# Patient Record
Sex: MALE | Race: BLACK OR AFRICAN AMERICAN | NOT HISPANIC OR LATINO | Employment: OTHER | URBAN - METROPOLITAN AREA
[De-identification: names, ages, dates, MRNs, and addresses within clinical notes are randomized per-mention and may not be internally consistent; named-entity substitution may affect disease eponyms.]

---

## 2018-07-12 ENCOUNTER — OFFICE VISIT (OUTPATIENT)
Dept: NEPHROLOGY | Facility: CLINIC | Age: 58
End: 2018-07-12
Payer: MEDICAID

## 2018-07-12 ENCOUNTER — LAB VISIT (OUTPATIENT)
Dept: LAB | Facility: HOSPITAL | Age: 58
End: 2018-07-12
Payer: MEDICAID

## 2018-07-12 VITALS
HEIGHT: 72 IN | OXYGEN SATURATION: 99 % | DIASTOLIC BLOOD PRESSURE: 60 MMHG | HEART RATE: 79 BPM | SYSTOLIC BLOOD PRESSURE: 110 MMHG | BODY MASS INDEX: 24.81 KG/M2 | WEIGHT: 183.19 LBS

## 2018-07-12 DIAGNOSIS — N18.30 STAGE 3 CHRONIC KIDNEY DISEASE: Primary | ICD-10-CM

## 2018-07-12 DIAGNOSIS — E11.22 TYPE 2 DIABETES MELLITUS WITH STAGE 3 CHRONIC KIDNEY DISEASE, WITHOUT LONG-TERM CURRENT USE OF INSULIN: ICD-10-CM

## 2018-07-12 DIAGNOSIS — N18.30 CHRONIC KIDNEY DISEASE, STAGE III (MODERATE): ICD-10-CM

## 2018-07-12 DIAGNOSIS — N18.30 TYPE 2 DIABETES MELLITUS WITH STAGE 3 CHRONIC KIDNEY DISEASE, WITHOUT LONG-TERM CURRENT USE OF INSULIN: ICD-10-CM

## 2018-07-12 DIAGNOSIS — N18.30 STAGE 3 CHRONIC KIDNEY DISEASE: ICD-10-CM

## 2018-07-12 DIAGNOSIS — D50.9 IRON DEFICIENCY ANEMIA, UNSPECIFIED IRON DEFICIENCY ANEMIA TYPE: ICD-10-CM

## 2018-07-12 LAB
BILIRUB UR QL STRIP: NEGATIVE
CLARITY UR REFRACT.AUTO: CLEAR
COLOR UR AUTO: YELLOW
CREAT UR-MCNC: 111 MG/DL
GLUCOSE UR QL STRIP: ABNORMAL
HGB UR QL STRIP: NEGATIVE
KETONES UR QL STRIP: NEGATIVE
LEUKOCYTE ESTERASE UR QL STRIP: NEGATIVE
NITRITE UR QL STRIP: NEGATIVE
PH UR STRIP: 5 [PH] (ref 5–8)
PROT UR QL STRIP: NEGATIVE
PROT UR-MCNC: 18 MG/DL
PROT/CREAT RATIO, UR: 0.16
SP GR UR STRIP: 1.01 (ref 1–1.03)
URN SPEC COLLECT METH UR: ABNORMAL
UROBILINOGEN UR STRIP-ACNC: NEGATIVE EU/DL

## 2018-07-12 PROCEDURE — 81003 URINALYSIS AUTO W/O SCOPE: CPT

## 2018-07-12 PROCEDURE — 99999 PR PBB SHADOW E&M-EST. PATIENT-LVL III: CPT | Mod: PBBFAC,,, | Performed by: INTERNAL MEDICINE

## 2018-07-12 PROCEDURE — 99204 OFFICE O/P NEW MOD 45 MIN: CPT | Mod: S$PBB,,, | Performed by: INTERNAL MEDICINE

## 2018-07-12 PROCEDURE — 99213 OFFICE O/P EST LOW 20 MIN: CPT | Mod: PBBFAC | Performed by: INTERNAL MEDICINE

## 2018-07-12 PROCEDURE — 84156 ASSAY OF PROTEIN URINE: CPT

## 2018-07-12 RX ORDER — ATORVASTATIN CALCIUM 40 MG/1
TABLET, FILM COATED ORAL
COMMUNITY
End: 2023-01-10

## 2018-07-12 RX ORDER — AMLODIPINE BESYLATE 10 MG/1
TABLET ORAL
COMMUNITY
End: 2020-03-05

## 2018-07-12 RX ORDER — TAMSULOSIN HYDROCHLORIDE 0.4 MG/1
CAPSULE ORAL
COMMUNITY
End: 2023-01-10

## 2018-07-12 RX ORDER — FENOFIBRATE 160 MG/1
TABLET ORAL DAILY
COMMUNITY
End: 2023-01-10

## 2018-07-12 NOTE — PROGRESS NOTES
Subjective:       Patient ID: Hao Medrano is a 57 y.o. Black or  male who presents for new evaluation of Chronic Renal Failure    Presents for nephrology follow up due to abnormal labs, specifically with sCr around 1.9 and identified at a clinic appointment earlier this year, prior to this patient has not had consistent follow.  Other issues identified during the past her was HTN (amlodipine and appears well controlled), hyperlipidemia, hypertriglyceridemia (on statin and fibrate).  He has no urinary complaints, denies NSAIDs, UA from outside labs reveals trace glucose and +1 protein.  A1c within the past several months was at 6.5.  He is not on any medications for diabetes.  He has not had a retinal exam.  He denies neuropathy.    PMH  HTN, hyperlipidemia, hypertriglyceridemia, BPH, leg edema    PSH  Denies    FH  Diabetes, HTN    SH  Non-smoker, no significant alcohol    Review of Systems   Constitutional: Negative for fever and unexpected weight change.   Respiratory: Negative for cough, chest tightness, shortness of breath and wheezing.    Cardiovascular: Positive for leg swelling. Negative for chest pain.   Gastrointestinal: Negative for abdominal distention, abdominal pain, diarrhea and nausea.   Endocrine: Negative for polydipsia.   Genitourinary: Negative for difficulty urinating, dysuria, flank pain, frequency and hematuria.   Musculoskeletal: Negative for arthralgias and myalgias.   Skin: Negative for rash.   Allergic/Immunologic: Negative for immunocompromised state.   Neurological: Negative for dizziness and light-headedness.   Hematological: Negative for adenopathy.   Psychiatric/Behavioral: Negative for confusion.       Objective:      Physical Exam   Constitutional: He is oriented to person, place, and time.   HENT:   Head: Atraumatic.   Neck: No JVD present.   Cardiovascular: Normal rate and regular rhythm.  Exam reveals no friction rub.    Pulmonary/Chest: Effort normal and  breath sounds normal.   Abdominal: Soft. He exhibits no distension.   Musculoskeletal: He exhibits edema.   Neurological: He is alert and oriented to person, place, and time.   Skin: Skin is warm.   Psychiatric: He has a normal mood and affect.       Assessment & Plan:       1. CKD:  Etiology not clear, incidentally noted sCr on follow up clinic appointment earlier this year, does have diabetes, but no reported neuropathy or retinopathy,      Lab Results   Component Value Date    CREATININE 2.3 (H) 07/12/2018     Protein Creatinine Ratios: negative, making DM nephropathy less likely    Prot/Creat Ratio, Ur   Date Value Ref Range Status   07/12/2018 0.16 0.00 - 0.20 Final     ·   ·   Acid-Base: not an issue on today's labs  Lab Results   Component Value Date     07/12/2018    K 4.6 07/12/2018    CO2 27 07/12/2018     2. HTN: Blood pressures well controlled    3. Renal osteodystrophy: last PTH WNL  Lab Results   Component Value Date    PTH 48.0 07/12/2018    CALCIUM 9.8 07/12/2018    PHOS 3.4 07/12/2018       4. Anemia: noted to be anemic, will check iron studies, free light chains and immunofixation electrophoresis  Lab Results   Component Value Date    HGB 10.0 (L) 07/12/2018        5. DM:  Last HbA1C, seems controlled managed by PCP  Lab Results   Component Value Date    HGBA1C 5.9 (H) 07/12/2018       6. Lipid management: managed by PCP  No results found for: LDLCALC    7. ESRD planing: do do not aniticipate at this time    Follow up in 2-3 months with repeats labs and renal ultrasound

## 2018-07-12 NOTE — PATIENT INSTRUCTIONS
1) no NSAIDs  2) blood and urine studies today  3) we will call you to get renal ultrasound done  4) follow up in office in 2-3 months

## 2018-07-12 NOTE — ASSESSMENT & PLAN NOTE
1. CKD: probably stage III, although no labs prior to this year, underlying etiology uncertain, but possibly undiagnosed diabetes and +/- HTN, since blood work started being checked earlier this year, has remained with a stable creatinine      Protein Creatinine Ratios: will need to check    Acid-Base: outside labs ok, will repeat    2. HTN: Blood pressures excellent control in office, continue amlodipine    3. Renal osteodystrophy: vitamin D on the low side per outside labs, start on supplementation, also check PTH    4. Anemia: Hgb 11 per outside labs, colon CA screening as per PCP, will check iron studies    5. DM:  Last HbA1C No results found for: HGBA1C    6. Lipid management: outside labs reviewed, not controlled and was started on statin and fibrate, PCP follows lipid panels      7. ESRD planing: no at this time    Follow up in 2-3 months    Plan:  -CBC  -iron studies  -RFP  -UA and UPC  -PTH and vitamin D  -renal ultrasound

## 2018-08-17 NOTE — PROGRESS NOTES
[facility]    The note from the fellow/resident was reviewed. I have personally interviewed and examined the patient. There were no additional findings with regards to the history or physical exam.    I agree with the assessment and plan of  Dr. Elie Graham

## 2019-02-05 ENCOUNTER — TELEPHONE (OUTPATIENT)
Dept: NEPHROLOGY | Facility: CLINIC | Age: 59
End: 2019-02-05

## 2019-04-02 ENCOUNTER — TELEPHONE (OUTPATIENT)
Dept: NEPHROLOGY | Facility: CLINIC | Age: 59
End: 2019-04-02

## 2019-05-27 DIAGNOSIS — N18.30 STAGE 3 CHRONIC KIDNEY DISEASE: Primary | ICD-10-CM

## 2019-05-31 ENCOUNTER — TELEPHONE (OUTPATIENT)
Dept: NEPHROLOGY | Facility: CLINIC | Age: 59
End: 2019-05-31

## 2019-06-13 ENCOUNTER — TELEPHONE (OUTPATIENT)
Dept: NEPHROLOGY | Facility: CLINIC | Age: 59
End: 2019-06-13

## 2019-06-14 ENCOUNTER — LAB VISIT (OUTPATIENT)
Dept: LAB | Facility: HOSPITAL | Age: 59
End: 2019-06-14
Payer: MEDICAID

## 2019-06-14 DIAGNOSIS — N18.30 STAGE 3 CHRONIC KIDNEY DISEASE: ICD-10-CM

## 2019-06-14 LAB
AMORPH CRY UR QL COMP ASSIST: ABNORMAL
BACTERIA #/AREA URNS AUTO: ABNORMAL /HPF
BILIRUB UR QL STRIP: NEGATIVE
CLARITY UR REFRACT.AUTO: ABNORMAL
COLOR UR AUTO: YELLOW
CREAT UR-MCNC: 132 MG/DL (ref 23–375)
GLUCOSE UR QL STRIP: NEGATIVE
HGB UR QL STRIP: NEGATIVE
HYALINE CASTS UR QL AUTO: 1 /LPF
KETONES UR QL STRIP: NEGATIVE
LEUKOCYTE ESTERASE UR QL STRIP: NEGATIVE
MICROSCOPIC COMMENT: ABNORMAL
NITRITE UR QL STRIP: NEGATIVE
PH UR STRIP: 5 [PH] (ref 5–8)
PROT UR QL STRIP: NEGATIVE
PROT UR-MCNC: 11 MG/DL (ref 0–15)
PROT/CREAT UR: 0.08 MG/G{CREAT} (ref 0–0.2)
RBC #/AREA URNS AUTO: 3 /HPF (ref 0–4)
SP GR UR STRIP: 1.01 (ref 1–1.03)
SQUAMOUS #/AREA URNS AUTO: 0 /HPF
URATE CRY UR QL COMP ASSIST: ABNORMAL
URN SPEC COLLECT METH UR: ABNORMAL
WBC #/AREA URNS AUTO: 1 /HPF (ref 0–5)

## 2019-06-14 PROCEDURE — 81001 URINALYSIS AUTO W/SCOPE: CPT

## 2019-06-14 PROCEDURE — 84156 ASSAY OF PROTEIN URINE: CPT

## 2019-06-20 ENCOUNTER — OFFICE VISIT (OUTPATIENT)
Dept: NEPHROLOGY | Facility: CLINIC | Age: 59
End: 2019-06-20
Payer: MEDICAID

## 2019-06-20 VITALS
SYSTOLIC BLOOD PRESSURE: 124 MMHG | DIASTOLIC BLOOD PRESSURE: 70 MMHG | OXYGEN SATURATION: 95 % | HEIGHT: 72 IN | BODY MASS INDEX: 27.36 KG/M2 | HEART RATE: 85 BPM | WEIGHT: 202 LBS

## 2019-06-20 DIAGNOSIS — N18.9 ANEMIA OF CHRONIC RENAL FAILURE, UNSPECIFIED CKD STAGE: Primary | ICD-10-CM

## 2019-06-20 DIAGNOSIS — D63.1 ANEMIA OF CHRONIC RENAL FAILURE, UNSPECIFIED CKD STAGE: Primary | ICD-10-CM

## 2019-06-20 DIAGNOSIS — N18.30 CHRONIC KIDNEY DISEASE, STAGE III (MODERATE): ICD-10-CM

## 2019-06-20 PROCEDURE — 99213 PR OFFICE/OUTPT VISIT, EST, LEVL III, 20-29 MIN: ICD-10-PCS | Mod: S$PBB,,, | Performed by: INTERNAL MEDICINE

## 2019-06-20 PROCEDURE — 99213 OFFICE O/P EST LOW 20 MIN: CPT | Mod: PBBFAC | Performed by: INTERNAL MEDICINE

## 2019-06-20 PROCEDURE — 99999 PR PBB SHADOW E&M-EST. PATIENT-LVL III: CPT | Mod: PBBFAC,,, | Performed by: INTERNAL MEDICINE

## 2019-06-20 PROCEDURE — 99999 PR PBB SHADOW E&M-EST. PATIENT-LVL III: ICD-10-PCS | Mod: PBBFAC,,, | Performed by: INTERNAL MEDICINE

## 2019-06-20 PROCEDURE — 99213 OFFICE O/P EST LOW 20 MIN: CPT | Mod: S$PBB,,, | Performed by: INTERNAL MEDICINE

## 2019-06-20 RX ORDER — ACETAMINOPHEN 500 MG
TABLET ORAL
COMMUNITY
End: 2023-01-10

## 2019-06-20 RX ORDER — AMLODIPINE BESYLATE 10 MG/1
TABLET ORAL
COMMUNITY
Start: 2014-03-18 | End: 2019-06-20 | Stop reason: SDUPTHER

## 2019-06-20 RX ORDER — LISINOPRIL 10 MG/1
10 TABLET ORAL
COMMUNITY
Start: 2014-03-18 | End: 2019-06-20

## 2019-06-20 NOTE — PROGRESS NOTES
Subjective:       Patient ID: Hao Medrano is a 58 y.o. Black or  male who presents for follow-up evaluation of Chronic Kidney Disease    Presents for nephrology follow up for CKD, he was last seen in July of 2018, sCr at that time 2.3, today noted to be 2.8.  Other issues identified during the past her was HTN (amlodipine and appears well controlled), hyperlipidemia, hypertriglyceridemia (on statin and fibrate).  He has no urinary complaints, denies NSAIDs,   A1c within the past several months was at 6.5.  He is not on any medications for diabetes.  He has not had a retinal exam.  He denies neuropathy.    Review of Systems   Constitutional: Negative for fever and unexpected weight change.   Respiratory: Negative for cough, chest tightness, shortness of breath and wheezing.    Cardiovascular: Negative for chest pain and leg swelling.   Gastrointestinal: Negative for abdominal distention, abdominal pain, diarrhea and nausea.   Endocrine: Negative for polydipsia.   Genitourinary: Negative for difficulty urinating, dysuria, flank pain, frequency and hematuria.   Musculoskeletal: Negative for arthralgias and myalgias.   Skin: Negative for rash.   Allergic/Immunologic: Negative for immunocompromised state.   Neurological: Negative for dizziness and light-headedness.   Hematological: Negative for adenopathy.   Psychiatric/Behavioral: Negative for confusion.       Objective:      Physical Exam   Constitutional: He appears well-developed.   HENT:   Head: Normocephalic and atraumatic.   Eyes: EOM are normal.   Neck: No JVD present.   Cardiovascular: Normal rate and regular rhythm. Exam reveals no friction rub.   Pulmonary/Chest: Effort normal and breath sounds normal.   Abdominal: Soft. He exhibits no distension.   Musculoskeletal: He exhibits no edema.   Neurological: He is alert.   Skin: Skin is warm.   Psychiatric: He has a normal mood and affect.       Assessment & Plan:       Chronic kidney disease,  stage III (moderate)  1. CKD: etiology not clear, work up as ordered during previous visit has not been completed and patient unfortunately was not able to get follow up as we had hoped for, sCr over the past year has gone from 2.3 --> 2.7     Lab Results   Component Value Date    CREATININE 2.7 (H) 06/14/2019     Protein Creatinine Ratios: negative, making significant glomerular pathology less likely    Prot/Creat Ratio, Ur   Date Value Ref Range Status   06/14/2019 0.08 0.00 - 0.20 Final   07/12/2018 0.16 0.00 - 0.20 Final     ·   ·   Acid-Base: stable  Lab Results   Component Value Date     06/14/2019    K 4.4 06/14/2019    CO2 24 06/14/2019     2. HTN: Blood pressures controlled in the office and per patient     3. Renal osteodystrophy: last PTH ok, but need to check again  Lab Results   Component Value Date    PTH 48.0 07/12/2018    CALCIUM 10.0 06/14/2019    PHOS 3.6 06/14/2019       4. Anemia: this is a year okd, we're checking iron studies, patient again instructed to follow up with PCP for colon CA screening   Lab Results   Component Value Date    HGB 10.0 (L) 07/12/2018        5. DM:  Last HbA1C reviewed  Lab Results   Component Value Date    HGBA1C 5.9 (H) 07/12/2018       6. Lipid management: managed by PCP  No results found for: LDLCALC    7. ESRD planing: no need for at this time    Follow up in one month, patient lost to follow up previously and we don't want this happening again    Patient seen with Dr Montoya

## 2019-06-20 NOTE — PATIENT INSTRUCTIONS
1) go to lab today  2) renal ultrasound ordered, [facility] will get in touch with you to schedule  3) no NSAIDs  4) follow up with PCP, need general health maintenance and preventive care, specifically colon cancer screening  5) follow up with us in one month

## 2019-06-20 NOTE — ASSESSMENT & PLAN NOTE
1. CKD: etiology not clear, work up as ordered during previous visit has not been completed and patient unfortunately was not able to get follow up as we had hoped for, sCr over the past year has gone from 2.3 --> 2.7     Lab Results   Component Value Date    CREATININE 2.7 (H) 06/14/2019     Protein Creatinine Ratios: negative, making significant glomerular pathology less likely    Prot/Creat Ratio, Ur   Date Value Ref Range Status   06/14/2019 0.08 0.00 - 0.20 Final   07/12/2018 0.16 0.00 - 0.20 Final     ·   ·   Acid-Base: stable  Lab Results   Component Value Date     06/14/2019    K 4.4 06/14/2019    CO2 24 06/14/2019     2. HTN: Blood pressures controlled in the office and per patient     3. Renal osteodystrophy: last PTH ok, but need to check again  Lab Results   Component Value Date    PTH 48.0 07/12/2018    CALCIUM 10.0 06/14/2019    PHOS 3.6 06/14/2019       4. Anemia: this is a year okd, we're checking iron studies, patient again instructed to follow up with PCP for colon CA screening   Lab Results   Component Value Date    HGB 10.0 (L) 07/12/2018        5. DM:  Last HbA1C reviewed  Lab Results   Component Value Date    HGBA1C 5.9 (H) 07/12/2018       6. Lipid management: managed by PCP  No results found for: LDLCALC    7. ESRD planing: no need for at this time    Follow up in one month, patient lost to follow up previously and we don't want this happening again    Patient seen with Dr Montoya

## 2019-06-21 ENCOUNTER — TELEPHONE (OUTPATIENT)
Dept: NEPHROLOGY | Facility: CLINIC | Age: 59
End: 2019-06-21

## 2019-06-27 ENCOUNTER — HOSPITAL ENCOUNTER (OUTPATIENT)
Dept: RADIOLOGY | Facility: HOSPITAL | Age: 59
Discharge: HOME | End: 2019-06-27
Attending: INTERNAL MEDICINE
Payer: MEDICAID

## 2019-06-27 DIAGNOSIS — N18.30 CHRONIC KIDNEY DISEASE, STAGE III (MODERATE): ICD-10-CM

## 2019-06-27 DIAGNOSIS — D63.1 ANEMIA OF CHRONIC RENAL FAILURE, UNSPECIFIED CKD STAGE: ICD-10-CM

## 2019-06-27 DIAGNOSIS — N18.9 ANEMIA OF CHRONIC RENAL FAILURE, UNSPECIFIED CKD STAGE: ICD-10-CM

## 2019-06-27 PROCEDURE — 76770 US RETROPERITONEAL COMPLETE: ICD-10-PCS | Mod: 26,,, | Performed by: RADIOLOGY

## 2019-06-27 PROCEDURE — 76770 US EXAM ABDO BACK WALL COMP: CPT | Mod: 26,,, | Performed by: RADIOLOGY

## 2019-06-27 PROCEDURE — 76770 US EXAM ABDO BACK WALL COMP: CPT | Mod: TC

## 2019-06-28 NOTE — PROGRESS NOTES
[facility]    The note from the fellow/resident was reviewed. I have personally interviewed and examined the patient. There were no additional findings with regards to the history or physical exam.    I agree with the assessment and plan of  Dr. Elie Graham

## 2019-07-09 ENCOUNTER — TELEPHONE (OUTPATIENT)
Dept: NEPHROLOGY | Facility: CLINIC | Age: 59
End: 2019-07-09

## 2020-01-23 ENCOUNTER — TELEPHONE (OUTPATIENT)
Dept: NEPHROLOGY | Facility: CLINIC | Age: 60
End: 2020-01-23

## 2020-02-14 ENCOUNTER — TELEPHONE (OUTPATIENT)
Dept: NEPHROLOGY | Facility: CLINIC | Age: 60
End: 2020-02-14

## 2020-02-26 ENCOUNTER — TELEPHONE (OUTPATIENT)
Dept: NEPHROLOGY | Facility: CLINIC | Age: 60
End: 2020-02-26

## 2020-02-26 DIAGNOSIS — N18.30 CHRONIC KIDNEY DISEASE, STAGE III (MODERATE): Primary | ICD-10-CM

## 2020-03-02 ENCOUNTER — LAB VISIT (OUTPATIENT)
Dept: LAB | Facility: HOSPITAL | Age: 60
End: 2020-03-02
Attending: STUDENT IN AN ORGANIZED HEALTH CARE EDUCATION/TRAINING PROGRAM
Payer: MEDICAID

## 2020-03-02 DIAGNOSIS — N18.30 CHRONIC KIDNEY DISEASE, STAGE III (MODERATE): ICD-10-CM

## 2020-03-02 LAB
ANION GAP SERPL CALC-SCNC: 8 MMOL/L (ref 8–16)
BASOPHILS # BLD AUTO: 0.03 K/UL (ref 0–0.2)
BASOPHILS NFR BLD: 0.5 % (ref 0–1.9)
BUN SERPL-MCNC: 23 MG/DL (ref 6–20)
CALCIUM SERPL-MCNC: 9.4 MG/DL (ref 8.7–10.5)
CHLORIDE SERPL-SCNC: 103 MMOL/L (ref 95–110)
CO2 SERPL-SCNC: 27 MMOL/L (ref 23–29)
CREAT SERPL-MCNC: 2.5 MG/DL (ref 0.5–1.4)
DIFFERENTIAL METHOD: ABNORMAL
EOSINOPHIL # BLD AUTO: 0.3 K/UL (ref 0–0.5)
EOSINOPHIL NFR BLD: 4.5 % (ref 0–8)
ERYTHROCYTE [DISTWIDTH] IN BLOOD BY AUTOMATED COUNT: 15.9 % (ref 11.5–14.5)
EST. GFR  (AFRICAN AMERICAN): 31.1 ML/MIN/1.73 M^2
EST. GFR  (NON AFRICAN AMERICAN): 26.9 ML/MIN/1.73 M^2
GLUCOSE SERPL-MCNC: 122 MG/DL (ref 70–110)
HCT VFR BLD AUTO: 39.9 % (ref 40–54)
HGB BLD-MCNC: 12.2 G/DL (ref 14–18)
IMM GRANULOCYTES # BLD AUTO: 0.01 K/UL (ref 0–0.04)
IMM GRANULOCYTES NFR BLD AUTO: 0.2 % (ref 0–0.5)
LYMPHOCYTES # BLD AUTO: 1.6 K/UL (ref 1–4.8)
LYMPHOCYTES NFR BLD: 27.4 % (ref 18–48)
MCH RBC QN AUTO: 28.8 PG (ref 27–31)
MCHC RBC AUTO-ENTMCNC: 30.6 G/DL (ref 32–36)
MCV RBC AUTO: 94 FL (ref 82–98)
MONOCYTES # BLD AUTO: 0.6 K/UL (ref 0.3–1)
MONOCYTES NFR BLD: 10.7 % (ref 4–15)
NEUTROPHILS # BLD AUTO: 3.3 K/UL (ref 1.8–7.7)
NEUTROPHILS NFR BLD: 56.7 % (ref 38–73)
NRBC BLD-RTO: 0 /100 WBC
PLATELET # BLD AUTO: 313 K/UL (ref 150–350)
PMV BLD AUTO: 10.1 FL (ref 9.2–12.9)
POTASSIUM SERPL-SCNC: 4.2 MMOL/L (ref 3.5–5.1)
RBC # BLD AUTO: 4.23 M/UL (ref 4.6–6.2)
SODIUM SERPL-SCNC: 138 MMOL/L (ref 136–145)
WBC # BLD AUTO: 5.77 K/UL (ref 3.9–12.7)

## 2020-03-02 PROCEDURE — 85025 COMPLETE CBC W/AUTO DIFF WBC: CPT

## 2020-03-02 PROCEDURE — 80048 BASIC METABOLIC PNL TOTAL CA: CPT

## 2020-03-02 PROCEDURE — 36415 COLL VENOUS BLD VENIPUNCTURE: CPT | Mod: PO

## 2020-03-05 ENCOUNTER — OFFICE VISIT (OUTPATIENT)
Dept: NEPHROLOGY | Facility: CLINIC | Age: 60
End: 2020-03-05
Payer: MEDICAID

## 2020-03-05 VITALS
HEIGHT: 73 IN | HEART RATE: 98 BPM | BODY MASS INDEX: 29.02 KG/M2 | DIASTOLIC BLOOD PRESSURE: 78 MMHG | OXYGEN SATURATION: 98 % | SYSTOLIC BLOOD PRESSURE: 128 MMHG | WEIGHT: 218.94 LBS

## 2020-03-05 DIAGNOSIS — N18.30 CKD (CHRONIC KIDNEY DISEASE) STAGE 3, GFR 30-59 ML/MIN: Primary | ICD-10-CM

## 2020-03-05 DIAGNOSIS — I10 ESSENTIAL HYPERTENSION: ICD-10-CM

## 2020-03-05 DIAGNOSIS — N18.9 ANEMIA OF CHRONIC RENAL FAILURE, UNSPECIFIED CKD STAGE: ICD-10-CM

## 2020-03-05 DIAGNOSIS — D63.1 ANEMIA OF CHRONIC RENAL FAILURE, UNSPECIFIED CKD STAGE: ICD-10-CM

## 2020-03-05 PROCEDURE — 99213 PR OFFICE/OUTPT VISIT, EST, LEVL III, 20-29 MIN: ICD-10-PCS | Mod: S$PBB,,, | Performed by: STUDENT IN AN ORGANIZED HEALTH CARE EDUCATION/TRAINING PROGRAM

## 2020-03-05 PROCEDURE — 99213 OFFICE O/P EST LOW 20 MIN: CPT | Mod: S$PBB,,, | Performed by: STUDENT IN AN ORGANIZED HEALTH CARE EDUCATION/TRAINING PROGRAM

## 2020-03-05 PROCEDURE — 99999 PR PBB SHADOW E&M-EST. PATIENT-LVL III: CPT | Mod: PBBFAC,,, | Performed by: STUDENT IN AN ORGANIZED HEALTH CARE EDUCATION/TRAINING PROGRAM

## 2020-03-05 PROCEDURE — 99999 PR PBB SHADOW E&M-EST. PATIENT-LVL III: ICD-10-PCS | Mod: PBBFAC,,, | Performed by: STUDENT IN AN ORGANIZED HEALTH CARE EDUCATION/TRAINING PROGRAM

## 2020-03-05 PROCEDURE — 99213 OFFICE O/P EST LOW 20 MIN: CPT | Mod: PBBFAC | Performed by: STUDENT IN AN ORGANIZED HEALTH CARE EDUCATION/TRAINING PROGRAM

## 2020-03-05 RX ORDER — LISINOPRIL 10 MG/1
20 TABLET ORAL DAILY
Qty: 180 TABLET | Refills: 3 | Status: SHIPPED | OUTPATIENT
Start: 2020-03-05 | End: 2023-01-10

## 2020-03-05 RX ORDER — FERROUS SULFATE 325(65) MG
325 TABLET, DELAYED RELEASE (ENTERIC COATED) ORAL 2 TIMES DAILY
Qty: 60 TABLET | Refills: 6 | Status: SHIPPED | OUTPATIENT
Start: 2020-03-05 | End: 2023-01-10

## 2020-03-05 NOTE — PROGRESS NOTES
I have personally interviewed and examined the patient. Clinical, laboratorial and imaging information available in medical records were reveiwed by me. I agree with the assessment and recommendations provided by Dr. Munguia who was under my supervision.

## 2020-03-05 NOTE — PROGRESS NOTES
Subjective:       Patient ID: Hao Medrano is a 59 y.o. Black or  male who presents for follow-up evaluation of No chief complaint on file.    59 y ro M with PMH of CKD, HTN, and HLD who presents for nephrology follow up of his CKD. He was last seen in nephrology clinic 6/2019 and at that time his Cr was 2.8 (previously 2.3 in 2018) and there was no protein on UA. CKD was noted to be of unclear etiology at that time. Medical issues identified in the past include HTN (on amlodipine and appears well controlled), hyperlipidemia, hypertriglyceridemia (on statin and fibrate).  He has no urinary complaints, denies NSAIDs, A1c in 2018 was 5.9.  He is not on any medications for diabetes.  He has not had a retinal exam.  He denies neuropathy.    Review of Systems   Constitutional: Negative for fever and unexpected weight change.   Respiratory: Negative for cough, chest tightness, shortness of breath and wheezing.    Cardiovascular: Negative for chest pain and leg swelling.   Gastrointestinal: Negative for abdominal distention, abdominal pain, diarrhea and nausea.   Endocrine: Negative for polydipsia.   Genitourinary: Negative for difficulty urinating, dysuria, flank pain, frequency and hematuria.   Musculoskeletal: Negative for arthralgias and myalgias.   Skin: Negative for rash.   Allergic/Immunologic: Negative for immunocompromised state.   Neurological: Negative for dizziness and light-headedness.   Hematological: Negative for adenopathy.   Psychiatric/Behavioral: Negative for confusion.       Medication List with Changes/Refills   Current Medications    AMLODIPINE (NORVASC) 10 MG TABLET    amlodipine 10 mg tablet   Take 1 tablet(s) every day by oral route.    ATORVASTATIN (LIPITOR) 40 MG TABLET    atorvastatin 40 mg tablet    CHOLECALCIFEROL, VITAMIN D3, (VITAMIN D3) 2,000 UNIT CAP    Vitamin D3 2,000 unit capsule   Take 1 capsule every day by oral route.    FENOFIBRATE 160 MG TAB    Take by mouth once  daily.    TAMSULOSIN (FLOMAX) 0.4 MG CAP    tamsulosin 0.4 mg capsule     No past medical history on file.    No family history on file.     No past surgical history on file.     Social History     Socioeconomic History    Marital status: Single     Spouse name: Not on file    Number of children: Not on file    Years of education: Not on file    Highest education level: Not on file   Occupational History    Not on file   Social Needs    Financial resource strain: Not on file    Food insecurity:     Worry: Not on file     Inability: Not on file    Transportation needs:     Medical: Not on file     Non-medical: Not on file   Tobacco Use    Smoking status: Never Smoker   Substance and Sexual Activity    Alcohol use: Not on file    Drug use: Not on file    Sexual activity: Not on file   Lifestyle    Physical activity:     Days per week: Not on file     Minutes per session: Not on file    Stress: Not on file   Relationships    Social connections:     Talks on phone: Not on file     Gets together: Not on file     Attends Yazdanism service: Not on file     Active member of club or organization: Not on file     Attends meetings of clubs or organizations: Not on file     Relationship status: Not on file   Other Topics Concern    Not on file   Social History Narrative    Not on file     Objective:     There were no vitals taken for this visit.    Physical Exam   Constitutional: He appears well-developed.   HENT:   Head: Normocephalic and atraumatic.   Eyes: EOM are normal.   Neck: No JVD present.   Cardiovascular: Normal rate and regular rhythm. Exam reveals no friction rub.   Pulmonary/Chest: Effort normal and breath sounds normal.   Abdominal: Soft. He exhibits no distension.   Musculoskeletal: He exhibits no edema.   Neurological: He is alert.   Skin: Skin is warm.   Psychiatric: He has a normal mood and affect.       Assessment & Plan:     Chronic kidney disease, stage III (moderate)  1. CKD   Cr today  2.5, improved from 2.7 in June 2019, cr was 2.3 in 2018- stable kidney disease   No significant protein on UA (0.14 u p/cr), making significant glomerular pathology less likely   Acid/base status stable- bicarb WNLs     2. HTN: Blood pressures controlled in the office and per patient   Switching amlodipine 10 mg to lisinopril 10 mg for this CKD patient   Will check BMP in 2 weeks to monitor for electrolyte abnormalities      3. Renal osteodystrophy:   last PTH 30 6/2019  Vit D 16 in 2018  On vit D supplementation already  Will order Vit D level at next vsiit    4. Anemia:   Hb 12.2, at goal for CKD  Sat 13, Fe 52, Ferritin 453 from 6/2019  Will start iron supplementation and repeat iron panel at next visit    5. DM:  Last HbA1C reviewed        Lab Results   Component Value Date     HGBA1C 5.9 (H) 07/12/2018         6. Lipid management: managed by PCP  On lipitor 40         RTC in 6 mo or PRN if symptoms worsen or fail to improve. Labs in 2 weeks.     Patient seen with Dr. Nixon Munguia MD  Nephrology PGY-4

## 2020-03-19 ENCOUNTER — LAB VISIT (OUTPATIENT)
Dept: LAB | Facility: HOSPITAL | Age: 60
End: 2020-03-19
Attending: STUDENT IN AN ORGANIZED HEALTH CARE EDUCATION/TRAINING PROGRAM
Payer: MEDICAID

## 2020-03-19 DIAGNOSIS — N18.9 ANEMIA OF CHRONIC RENAL FAILURE, UNSPECIFIED CKD STAGE: ICD-10-CM

## 2020-03-19 DIAGNOSIS — D63.1 ANEMIA OF CHRONIC RENAL FAILURE, UNSPECIFIED CKD STAGE: ICD-10-CM

## 2020-03-19 DIAGNOSIS — N18.30 CKD (CHRONIC KIDNEY DISEASE) STAGE 3, GFR 30-59 ML/MIN: ICD-10-CM

## 2020-03-19 LAB
25(OH)D3+25(OH)D2 SERPL-MCNC: 56 NG/ML (ref 30–96)
ANION GAP SERPL CALC-SCNC: 10 MMOL/L (ref 8–16)
BUN SERPL-MCNC: 19 MG/DL (ref 6–20)
CALCIUM SERPL-MCNC: 9.9 MG/DL (ref 8.7–10.5)
CHLORIDE SERPL-SCNC: 103 MMOL/L (ref 95–110)
CO2 SERPL-SCNC: 26 MMOL/L (ref 23–29)
CREAT SERPL-MCNC: 2.4 MG/DL (ref 0.5–1.4)
EST. GFR  (AFRICAN AMERICAN): 32.7 ML/MIN/1.73 M^2
EST. GFR  (NON AFRICAN AMERICAN): 28.3 ML/MIN/1.73 M^2
FERRITIN SERPL-MCNC: 475 NG/ML (ref 20–300)
GLUCOSE SERPL-MCNC: 110 MG/DL (ref 70–110)
IRON SERPL-MCNC: 99 UG/DL (ref 45–160)
POTASSIUM SERPL-SCNC: 4.1 MMOL/L (ref 3.5–5.1)
PTH-INTACT SERPL-MCNC: 39 PG/ML (ref 9–77)
SATURATED IRON: 24 % (ref 20–50)
SODIUM SERPL-SCNC: 139 MMOL/L (ref 136–145)
TOTAL IRON BINDING CAPACITY: 416 UG/DL (ref 250–450)
TRANSFERRIN SERPL-MCNC: 281 MG/DL (ref 200–375)

## 2020-03-19 PROCEDURE — 36415 COLL VENOUS BLD VENIPUNCTURE: CPT | Mod: PO

## 2020-03-19 PROCEDURE — 80048 BASIC METABOLIC PNL TOTAL CA: CPT

## 2020-03-19 PROCEDURE — 82728 ASSAY OF FERRITIN: CPT

## 2020-03-19 PROCEDURE — 83540 ASSAY OF IRON: CPT

## 2020-03-19 PROCEDURE — 83970 ASSAY OF PARATHORMONE: CPT

## 2020-03-19 PROCEDURE — 82306 VITAMIN D 25 HYDROXY: CPT

## 2020-11-04 ENCOUNTER — TELEPHONE (OUTPATIENT)
Dept: SURGERY | Facility: CLINIC | Age: 60
End: 2020-11-04

## 2020-11-04 NOTE — TELEPHONE ENCOUNTER
Called patient at all available numbers in reference to a referral to  Ambulatory Colorectal Surgery for colon cancer screening,patient will call back to schedule when can coordinate with spouse's off day.

## 2020-11-04 NOTE — TELEPHONE ENCOUNTER
----- Message from Grace Oropeza MA sent at 11/4/2020 10:24 AM CST -----  Good morning,    We received a referral from Keara Yanes NP with [facility] for this patient to be seen for a colonoscopy. The referring diagnosis is Z12.11. I have scanned the referral and records into  for review. Please review and reach out to the patient to schedule.    Thank you   [facility]    Ext [phone]

## 2023-01-10 ENCOUNTER — ANESTHESIA EVENT (OUTPATIENT)
Dept: ENDOSCOPY | Facility: HOSPITAL | Age: 63
End: 2023-01-10
Payer: MEDICARE

## 2023-01-10 ENCOUNTER — HOSPITAL ENCOUNTER (OUTPATIENT)
Facility: HOSPITAL | Age: 63
End: 2023-01-16
Attending: EMERGENCY MEDICINE | Admitting: STUDENT IN AN ORGANIZED HEALTH CARE EDUCATION/TRAINING PROGRAM
Payer: MEDICARE

## 2023-01-10 DIAGNOSIS — R07.9 CHEST PAIN: ICD-10-CM

## 2023-01-10 DIAGNOSIS — T85.528A DISLODGED GASTROSTOMY TUBE: Primary | ICD-10-CM

## 2023-01-10 DIAGNOSIS — R00.0 TACHYCARDIA: ICD-10-CM

## 2023-01-10 DIAGNOSIS — R13.12 OROPHARYNGEAL DYSPHAGIA: ICD-10-CM

## 2023-01-10 DIAGNOSIS — K94.23 PEG TUBE MALFUNCTION: ICD-10-CM

## 2023-01-10 PROBLEM — E78.5 HLD (HYPERLIPIDEMIA): Status: ACTIVE | Noted: 2023-01-10

## 2023-01-10 PROBLEM — D64.9 ANEMIA: Status: ACTIVE | Noted: 2023-01-10

## 2023-01-10 LAB
ALBUMIN SERPL BCP-MCNC: 2.6 G/DL (ref 3.5–5.2)
ALP SERPL-CCNC: 182 U/L (ref 55–135)
ALT SERPL W/O P-5'-P-CCNC: 25 U/L (ref 10–44)
ANION GAP SERPL CALC-SCNC: 12 MMOL/L (ref 8–16)
AST SERPL-CCNC: 20 U/L (ref 10–40)
BASOPHILS # BLD AUTO: 0.04 K/UL (ref 0–0.2)
BASOPHILS NFR BLD: 0.4 % (ref 0–1.9)
BILIRUB SERPL-MCNC: 0.4 MG/DL (ref 0.1–1)
BUN SERPL-MCNC: 31 MG/DL (ref 8–23)
CALCIUM SERPL-MCNC: 10.2 MG/DL (ref 8.7–10.5)
CHLORIDE SERPL-SCNC: 100 MMOL/L (ref 95–110)
CO2 SERPL-SCNC: 24 MMOL/L (ref 23–29)
CREAT SERPL-MCNC: 1.3 MG/DL (ref 0.5–1.4)
DIFFERENTIAL METHOD: ABNORMAL
EOSINOPHIL # BLD AUTO: 1.9 K/UL (ref 0–0.5)
EOSINOPHIL NFR BLD: 19.3 % (ref 0–8)
ERYTHROCYTE [DISTWIDTH] IN BLOOD BY AUTOMATED COUNT: 16.6 % (ref 11.5–14.5)
EST. GFR  (NO RACE VARIABLE): >60 ML/MIN/1.73 M^2
GLUCOSE SERPL-MCNC: 122 MG/DL (ref 70–110)
HCT VFR BLD AUTO: 39.4 % (ref 40–54)
HGB BLD-MCNC: 12.2 G/DL (ref 14–18)
IMM GRANULOCYTES # BLD AUTO: 0.02 K/UL (ref 0–0.04)
IMM GRANULOCYTES NFR BLD AUTO: 0.2 % (ref 0–0.5)
LYMPHOCYTES # BLD AUTO: 1.1 K/UL (ref 1–4.8)
LYMPHOCYTES NFR BLD: 11.6 % (ref 18–48)
MCH RBC QN AUTO: 27.6 PG (ref 27–31)
MCHC RBC AUTO-ENTMCNC: 31 G/DL (ref 32–36)
MCV RBC AUTO: 89 FL (ref 82–98)
MONOCYTES # BLD AUTO: 0.5 K/UL (ref 0.3–1)
MONOCYTES NFR BLD: 5.1 % (ref 4–15)
NEUTROPHILS # BLD AUTO: 6.1 K/UL (ref 1.8–7.7)
NEUTROPHILS NFR BLD: 63.4 % (ref 38–73)
NRBC BLD-RTO: 0 /100 WBC
PLATELET # BLD AUTO: 505 K/UL (ref 150–450)
PMV BLD AUTO: 9.6 FL (ref 9.2–12.9)
POCT GLUCOSE: 96 MG/DL (ref 70–110)
POTASSIUM SERPL-SCNC: 4.7 MMOL/L (ref 3.5–5.1)
PROT SERPL-MCNC: 8.3 G/DL (ref 6–8.4)
RBC # BLD AUTO: 4.42 M/UL (ref 4.6–6.2)
SODIUM SERPL-SCNC: 136 MMOL/L (ref 136–145)
WBC # BLD AUTO: 9.64 K/UL (ref 3.9–12.7)

## 2023-01-10 PROCEDURE — 99900026 HC AIRWAY MAINTENANCE (STAT)

## 2023-01-10 PROCEDURE — 99204 OFFICE O/P NEW MOD 45 MIN: CPT | Mod: GC,,, | Performed by: INTERNAL MEDICINE

## 2023-01-10 PROCEDURE — 99223 1ST HOSP IP/OBS HIGH 75: CPT | Mod: ,,,

## 2023-01-10 PROCEDURE — 99204 PR OFFICE/OUTPT VISIT, NEW, LEVL IV, 45-59 MIN: ICD-10-PCS | Mod: GC,,, | Performed by: INTERNAL MEDICINE

## 2023-01-10 PROCEDURE — G0378 HOSPITAL OBSERVATION PER HR: HCPCS

## 2023-01-10 PROCEDURE — 93010 EKG 12-LEAD: ICD-10-PCS | Mod: ,,, | Performed by: INTERNAL MEDICINE

## 2023-01-10 PROCEDURE — 99285 EMERGENCY DEPT VISIT HI MDM: CPT | Mod: ,,, | Performed by: EMERGENCY MEDICINE

## 2023-01-10 PROCEDURE — 99285 PR EMERGENCY DEPT VISIT,LEVEL V: ICD-10-PCS | Mod: ,,, | Performed by: EMERGENCY MEDICINE

## 2023-01-10 PROCEDURE — 99285 EMERGENCY DEPT VISIT HI MDM: CPT | Mod: 25

## 2023-01-10 PROCEDURE — 93010 ELECTROCARDIOGRAM REPORT: CPT | Mod: ,,, | Performed by: INTERNAL MEDICINE

## 2023-01-10 PROCEDURE — 82962 GLUCOSE BLOOD TEST: CPT

## 2023-01-10 PROCEDURE — 99223 PR INITIAL HOSPITAL CARE,LEVL III: ICD-10-PCS | Mod: ,,,

## 2023-01-10 PROCEDURE — 85025 COMPLETE CBC W/AUTO DIFF WBC: CPT | Performed by: STUDENT IN AN ORGANIZED HEALTH CARE EDUCATION/TRAINING PROGRAM

## 2023-01-10 PROCEDURE — 80053 COMPREHEN METABOLIC PANEL: CPT | Performed by: STUDENT IN AN ORGANIZED HEALTH CARE EDUCATION/TRAINING PROGRAM

## 2023-01-10 PROCEDURE — 93005 ELECTROCARDIOGRAM TRACING: CPT

## 2023-01-10 PROCEDURE — 99900035 HC TECH TIME PER 15 MIN (STAT)

## 2023-01-10 RX ORDER — ONDANSETRON 8 MG/1
8 TABLET, ORALLY DISINTEGRATING ORAL EVERY 8 HOURS PRN
Status: DISCONTINUED | OUTPATIENT
Start: 2023-01-10 | End: 2023-01-16 | Stop reason: HOSPADM

## 2023-01-10 RX ORDER — IPRATROPIUM BROMIDE AND ALBUTEROL SULFATE 2.5; .5 MG/3ML; MG/3ML
3 SOLUTION RESPIRATORY (INHALATION) EVERY 4 HOURS PRN
Status: DISCONTINUED | OUTPATIENT
Start: 2023-01-10 | End: 2023-01-16 | Stop reason: HOSPADM

## 2023-01-10 RX ORDER — TALC
6 POWDER (GRAM) TOPICAL NIGHTLY PRN
Status: DISCONTINUED | OUTPATIENT
Start: 2023-01-10 | End: 2023-01-16 | Stop reason: HOSPADM

## 2023-01-10 RX ORDER — PROCHLORPERAZINE EDISYLATE 5 MG/ML
5 INJECTION INTRAMUSCULAR; INTRAVENOUS EVERY 6 HOURS PRN
Status: DISCONTINUED | OUTPATIENT
Start: 2023-01-10 | End: 2023-01-16 | Stop reason: HOSPADM

## 2023-01-10 RX ORDER — IBUPROFEN 200 MG
16 TABLET ORAL
Status: DISCONTINUED | OUTPATIENT
Start: 2023-01-10 | End: 2023-01-16 | Stop reason: HOSPADM

## 2023-01-10 RX ORDER — IBUPROFEN 200 MG
24 TABLET ORAL
Status: DISCONTINUED | OUTPATIENT
Start: 2023-01-10 | End: 2023-01-16 | Stop reason: HOSPADM

## 2023-01-10 RX ORDER — INSULIN ASPART 100 [IU]/ML
0-5 INJECTION, SOLUTION INTRAVENOUS; SUBCUTANEOUS
Status: DISCONTINUED | OUTPATIENT
Start: 2023-01-10 | End: 2023-01-16 | Stop reason: HOSPADM

## 2023-01-10 RX ORDER — SODIUM CHLORIDE 0.9 % (FLUSH) 0.9 %
5 SYRINGE (ML) INJECTION
Status: DISCONTINUED | OUTPATIENT
Start: 2023-01-10 | End: 2023-01-16 | Stop reason: HOSPADM

## 2023-01-10 RX ORDER — SIMETHICONE 80 MG
1 TABLET,CHEWABLE ORAL 4 TIMES DAILY PRN
Status: DISCONTINUED | OUTPATIENT
Start: 2023-01-10 | End: 2023-01-16 | Stop reason: HOSPADM

## 2023-01-10 RX ORDER — ACETAMINOPHEN 325 MG/1
650 TABLET ORAL EVERY 4 HOURS PRN
Status: DISCONTINUED | OUTPATIENT
Start: 2023-01-10 | End: 2023-01-16 | Stop reason: HOSPADM

## 2023-01-10 RX ORDER — NALOXONE HCL 0.4 MG/ML
0.02 VIAL (ML) INJECTION
Status: DISCONTINUED | OUTPATIENT
Start: 2023-01-10 | End: 2023-01-16 | Stop reason: HOSPADM

## 2023-01-10 RX ORDER — ACETAMINOPHEN 500 MG
1000 TABLET ORAL EVERY 8 HOURS PRN
Status: DISCONTINUED | OUTPATIENT
Start: 2023-01-10 | End: 2023-01-16 | Stop reason: HOSPADM

## 2023-01-10 RX ORDER — MAG HYDROX/ALUMINUM HYD/SIMETH 200-200-20
30 SUSPENSION, ORAL (FINAL DOSE FORM) ORAL 4 TIMES DAILY PRN
Status: DISCONTINUED | OUTPATIENT
Start: 2023-01-10 | End: 2023-01-16 | Stop reason: HOSPADM

## 2023-01-10 RX ORDER — GLUCAGON 1 MG
1 KIT INJECTION
Status: DISCONTINUED | OUTPATIENT
Start: 2023-01-10 | End: 2023-01-16 | Stop reason: HOSPADM

## 2023-01-10 RX ORDER — BISACODYL 10 MG
10 SUPPOSITORY, RECTAL RECTAL DAILY PRN
Status: DISCONTINUED | OUTPATIENT
Start: 2023-01-10 | End: 2023-01-16 | Stop reason: HOSPADM

## 2023-01-10 NOTE — PHARMACY MED REC
"            Admission Medication History     The home medication history was taken by Leroy Pratt.    You may go to "Admission" then "Reconcile Home Medications" tabs to review and/or act upon these items.     The home medication list has been updated by the Pharmacy department.   Please read ALL comments highlighted in yellow.   Please address this information as you see fit.    Feel free to contact us if you have any questions or require assistance.      The medications listed below were removed from the home medication list. Please reorder if appropriate:  Patient reports no longer taking the following medication(s):  ATORVASTATIN 40 MG TABLET  CHOLECALCIFEROL 2,000 CAPSULE  FENOFIBRATE 160 MG TABLET  FERROUS SULFATE 325 MG TABLET  LISINOPRIL 10 MG TABLET  TAMSULOSIN 0.4 MG CAPSULE      PATIENT MEDICATION LIST IS ATTACHED TO MED RECONCILIATION REPORT    Leroy Pratt  EXT [id number]      .        "

## 2023-01-10 NOTE — ED PROVIDER NOTES
Encounter Date: 1/10/2023       History     Chief Complaint   Patient presents with    PEG Tube Problem     [facility]     Mr. Medrano is a 62yr old M with PMH of CKD, HTN, and HLD who presents to the emergency department due to PEG tube dislodgement.  According to patients skilled nursing facility they stated that patient's PEG tube had become dislodged. It is unknown how long  exactly it was dislodged for but they stated that patient has been unable to feed due to that and so he was sent to the emergency department for it to be replaced.  They stated that patient is at his baseline which is non-communicative.     The history is provided by the nursing home and the EMS personnel.   Review of patient's allergies indicates:  No Known Allergies  No past medical history on file.  No past surgical history on file.  No family history on file.  Social History     Tobacco Use    Smoking status: Never     Review of Systems   Unable to perform ROS: Patient nonverbal     Physical Exam     Initial Vitals [01/10/23 0558]   BP Pulse Resp Temp SpO2   134/81 95 18 98.7 °F (37.1 °C) 99 %      MAP       --         Physical Exam    Nursing note and vitals reviewed.  Constitutional: He appears well-developed and well-nourished. He is not diaphoretic. No distress.   HENT:   Head: Normocephalic and atraumatic.   Mouth/Throat: Oropharynx is clear and moist.   Trach in place   Eyes: Conjunctivae and EOM are normal. Pupils are equal, round, and reactive to light.   Neck:   Normal range of motion.  Cardiovascular:  Normal rate, normal heart sounds and intact distal pulses.     Exam reveals no gallop.       No murmur heard.  Pulmonary/Chest: Breath sounds normal. No respiratory distress. He has no wheezes. He exhibits no tenderness.   Abdominal: Abdomen is soft. Bowel sounds are normal. He exhibits no distension. There is no abdominal tenderness.   Tract of PEG tube in place   Musculoskeletal:         General:  No tenderness or edema.      Cervical back: Normal range of motion.     Neurological: He is alert.   Skin: Skin is warm. Capillary refill takes less than 2 seconds. No erythema.       ED Course   Procedures  Labs Reviewed   CBC W/ AUTO DIFFERENTIAL - Abnormal; Notable for the following components:       Result Value    RBC 4.42 (*)     Hemoglobin 12.2 (*)     Hematocrit 39.4 (*)     MCHC 31.0 (*)     RDW 16.6 (*)     Platelets 505 (*)     Eos # 1.9 (*)     Lymph % 11.6 (*)     Eosinophil % 19.3 (*)     All other components within normal limits   COMPREHENSIVE METABOLIC PANEL   PROTIME-INR   APTT   URINALYSIS, REFLEX TO URINE CULTURE          Imaging Results    None          Medications   iohexol (OMNIPAQUE) oral solution 30 mL (has no administration in time range)   sodium chloride 0.9% flush 5 mL (has no administration in time range)   albuterol-ipratropium 2.5 mg-0.5 mg/3 mL nebulizer solution 3 mL (has no administration in time range)   melatonin tablet 6 mg (has no administration in time range)   ondansetron disintegrating tablet 8 mg (has no administration in time range)   prochlorperazine injection Soln 5 mg (has no administration in time range)   bisacodyL suppository 10 mg (has no administration in time range)   simethicone chewable tablet 80 mg (has no administration in time range)   aluminum-magnesium hydroxide-simethicone 200-200-20 mg/5 mL suspension 30 mL (has no administration in time range)   acetaminophen tablet 650 mg (has no administration in time range)   acetaminophen tablet 1,000 mg (has no administration in time range)   naloxone 0.4 mg/mL injection 0.02 mg (has no administration in time range)   glucose chewable tablet 16 g (has no administration in time range)   glucose chewable tablet 24 g (has no administration in time range)   glucagon (human recombinant) injection 1 mg (has no administration in time range)   insulin aspart U-100 pen 0-5 Units (has no administration in time range)    dextrose 10% bolus 125 mL 125 mL (has no administration in time range)   dextrose 10% bolus 250 mL 250 mL (has no administration in time range)     Medical Decision Making:   Initial Assessment:   Mr. Medrano is a 62 y ro M with PMH of CKD, HTN, and HLD who presents to the emergency department due to PEG tube dislodgement.  Differential Diagnosis:   PEG tube dislodgement  PEG tube tract blockage  Malnutrition  Failure to thrive  ED Management:  Patient was thoroughly examined,  He appeared to be at his baseline  An attempt was made to replace the patient's PEG tube using a 22 Turkish which is what he had before but the tube was unable to be put in.  We then tried an 18 Turkish as well as a 16 Turkish but patient's tract appeared to be closed and was not able to take the tube.  Discussion was had with gastroenterology who came and evaluated the patient.   Gastroenterology was unable to replace the tube and so they advised that patient should be admitted for them to place it in the OR.  Discussion was had with Hospital Medicine and patient was admitted.            ED Course as of 01/10/23 1200   Tue Narayan 10, 2023   0809 Discussion had with the gastroenterology fellow who will come evaluate the patient.  [OO]   1017 I touched base again with Gastroenterology who now state that they will try to get the tube in but if they are not able to patient would need to be admitted to get a PEG.  [OO]      ED Course User Index  [OO] Bello Moses MD                 Clinical Impression:   Final diagnoses:  [K94.23] PEG tube malfunction        ED Disposition Condition    Observation                 Bello Moses MD  Resident  01/10/23 1200

## 2023-01-10 NOTE — CONSULTS
[facility]  Gastroenterology  Consult Note    Patient Name: Hao Medrano  MRN: [id number]  Admission Date: 1/10/2023  Hospital Length of Stay: 0 days  Code Status: Full Code   Attending Provider: Bharati Hernandez MD   Consulting Provider: Inder Del Castillo MD  Primary Care Physician: Keara Yanes NP  Principal Problem:Dislodged gastrostomy tube    Inpatient consult to Gastroenterology  Consult performed by: Inder Del Castillo MD  Consult ordered by: Bello Moses MD      Subjective:     HPI: Hao Medrano is a 62 y.o. male with history of CVA and subsequent trach/PEG, CKD3b, HLD who presented from NH after PEG was dislodged. Unclear when. Pt nonverbal. Unclear exactly when placed, ED was unable to contact NH to provide history. Multiple attempts to replace PEG with 22 Fr, 18 Fr, 16 Fr replacement PEGs were unsuccessful by both ED and GI at bedside. Concerned tract has closed.         No past medical history on file.    No past surgical history on file.    No family history on file.    Social History     Socioeconomic History    Marital status: Single   Tobacco Use    Smoking status: Never       No current facility-administered medications on file prior to encounter.     Current Outpatient Medications on File Prior to Encounter   Medication Sig Dispense Refill    atorvastatin (LIPITOR) 40 MG tablet atorvastatin 40 mg tablet      cholecalciferol, vitamin D3, (VITAMIN D3) 2,000 unit Cap Vitamin D3 2,000 unit capsule   Take 1 capsule every day by oral route.      fenofibrate 160 MG Tab Take by mouth once daily.      ferrous sulfate 325 (65 FE) MG EC tablet Take 1 tablet (325 mg total) by mouth 2 (two) times daily. 60 tablet 6    lisinopril 10 MG tablet Take 2 tablets (20 mg total) by mouth once daily. 180 tablet 3    tamsulosin (FLOMAX) 0.4 mg Cap tamsulosin 0.4 mg capsule         Review of patient's allergies indicates:  No Known Allergies    Review of Systems   Unable to perform  ROS: Mental status change      Objective:     Vitals:    01/10/23 1125   BP: 138/70   Pulse: 104   Resp: 17   Temp: 98.8 °F (37.1 °C)         Constitutional:  not in acute distress and well developed. Nonverbal, not responding to commands or questions.  HENT: Head: Normal, normocephalic, atraumatic.  Eyes: conjunctiva clear and sclera nonicteric  Cardiovascular: regular rate and rhythm and no murmur  Respiratory: normal chest expansion & respiratory effort   and no accessory muscle use  GI: soft, non-tender, without masses or organomegaly. PEG site with some granulation tissue, no clear visible lumen. No discharge.  Musculoskeletal: no muscular tenderness noted  Skin: normal color  Neurological: nonverbal, some spontaneous movement in UEs, chronic contractures        Significant Labs:  Recent Labs   Lab 01/10/23  1123   HGB 12.2*       Lab Results   Component Value Date    WBC 9.64 01/10/2023    HGB 12.2 (L) 01/10/2023    HCT 39.4 (L) 01/10/2023    MCV 89 01/10/2023     (H) 01/10/2023       Lab Results   Component Value Date     01/10/2023    K 4.7 01/10/2023     01/10/2023    CO2 24 01/10/2023    BUN 31 (H) 01/10/2023    CREATININE 1.3 01/10/2023    CALCIUM 10.2 01/10/2023    ANIONGAP 12 01/10/2023    ESTGFRAFRICA 32.7 (A) 03/19/2020    EGFRNONAA 28.3 (A) 03/19/2020       Lab Results   Component Value Date    ALT 25 01/10/2023    AST 20 01/10/2023    ALKPHOS 182 (H) 01/10/2023    BILITOT 0.4 01/10/2023       No results found for: INR    Significant Imaging:  Reviewed pertinent radiology findings.       Assessment/Plan:     Hao Medrano is a 62 y.o. male with history of CVA s/p trach and PEG (unclear when placed) who presents after PEG was dislodged at NH. Unable to replace PEG at bedside. Unclear how long PEG has been dislodged. Concerned tract has closed. Plan EGD 1/11 for replacement PEG vs new PEG at novel site.    Problem List:  PEG dislodged      Recommendations:  - EGD 1/11 with PEG  replacement vs new PEG . Keep NPO  - Hold DVT chemoprophylaxis, SCDs if needed    Thank you for involving us in the care of Hao Medrano. Please call with any additional questions, concerns or changes in the patient's clinical status. We will continue to follow.     Inder Del Castillo MD  Gastroenterology Fellow PGY IV  [facility]

## 2023-01-10 NOTE — PROVIDER PROGRESS NOTES - EMERGENCY DEPT.
Encounter Date: 1/10/2023    ED Physician Progress Notes          Physician Attestation Statement for Resident:  As the supervising MD   Physician Attestation Statement: I have personally seen and examined this patient.       I agree with the history unless otherwise noted.     As the supervising MD I agree with the PE unless otherwise noted.      Left upper quadrant with granulation tissue noted left upper quadrant with granulation tissue noted, able to pass Q-tip through G-tube site, unable to pass 22 [place] then 18 [place] then 16 [place] G-tube.    I have reviewed and agree with the residents interpretation of the following unless otherwise noted:   I have personally reviewed and interpreted the patients laboratory studies.    I have also reviewed the following:   old records at this facility,       As the supervising MD I agree with the treatment, course, plan, and disposition unless otherwise noted.    Patient is a 62-year-old male with a previous history of CVA, trach/PEG who was transferred from his nursing home for evaluation of PEG tube removal.  We were initially unable to contact the patient's nursing home to verify how long the PEG tube has been out as well as when the PEG tube was originally placed.    The patient's nursing home orders state that if the PEG tube is removed a Sheets catheter should be placed in a PEG tube replaced after calling the physician suggesting that this is likely a well-healed PEG site.  And thus we decided to attempt to replace PEG tube.  We were unable to pass a PEG tube after 4 attempts with 3 different sizes G tubes and thus we discussed with gastroenterology who were also unsuccessful.  Will plan for admission and GI will plan for either wire G-tube placement and dilation or will place a new PEG tube as appropriate.    Will plan for admission with plan as above

## 2023-01-10 NOTE — H&P
[facility] - Emergency Dept  [facility] Medicine  History & Physical    Patient Name: Hao Medrano  MRN: [id number]  Patient Class: OP- Observation  Admission Date: 1/10/2023  Attending Physician: Bharati Hernandez MD   Primary Care Provider: Keara Yanes NP         Patient information was obtained from past medical records and ER records.     Subjective:     Principal Problem:Dislodged gastrostomy tube    Chief Complaint:   Chief Complaint   Patient presents with    PEG Tube Problem     From [facility], pulled out PEG tube. Trach        HPI: Hao Medrano is a 62 y.o. male with a past medical history of CVA with subsequent trach/PEG, CKD3b, and HLD who was transferred from his nursing home for evaluation of PEG tube removal. Per ED note, patient's friend reports that they are unsure how log the peg tube has been dislodged and they are unsure how it happened. They stated that patient is at his baseline which is non-communicative.      ED: tachycardic up to 117, otherwise vital signs stable, afebile. CBC with anemia, Hb 12.2. No leukocytosis. Cr 1.3, glucose 122.  XR of g-tube ordered and pending. ED provider attempted to replace PEG tube with a Sheets but unsuccessful after four attempts with three different sized 3 tubes. GI consulted.       No past medical history on file.    No past surgical history on file.    Review of patient's allergies indicates:  No Known Allergies    No current facility-administered medications on file prior to encounter.     Current Outpatient Medications on File Prior to Encounter   Medication Sig    atorvastatin (LIPITOR) 40 MG tablet atorvastatin 40 mg tablet    cholecalciferol, vitamin D3, (VITAMIN D3) 2,000 unit Cap Vitamin D3 2,000 unit capsule   Take 1 capsule every day by oral route.    fenofibrate 160 MG Tab Take by mouth once daily.    ferrous sulfate 325 (65 FE) MG EC tablet Take 1 tablet (325 mg total) by mouth 2 (two) times daily.    lisinopril 10 MG tablet Take 2  tablets (20 mg total) by mouth once daily.    tamsulosin (FLOMAX) 0.4 mg Cap tamsulosin 0.4 mg capsule     Family History    None       Tobacco Use    Smoking status: Never    Smokeless tobacco: Not on file   Substance and Sexual Activity    Alcohol use: Not on file    Drug use: Not on file    Sexual activity: Not on file     Review of Systems   Unable to perform ROS: Patient nonverbal   Objective:     Vital Signs (Most Recent):  Temp: 98.8 °F (37.1 °C) (01/10/23 1125)  Pulse: 104 (01/10/23 1125)  Resp: 17 (01/10/23 1125)  BP: 138/70 (01/10/23 1125)  SpO2: 99 % (01/10/23 1125) Vital Signs (24h Range):  Temp:  [98.3 °F (36.8 °C)-98.8 °F (37.1 °C)] 98.8 °F (37.1 °C)  Pulse:  [] 104  Resp:  [16-23] 17  SpO2:  [96 %-100 %] 99 %  BP: (116-149)/(59-81) 138/70        There is no height or weight on file to calculate BMI.    Physical Exam  Vitals and nursing note reviewed.   Constitutional:       General: He is not in acute distress.     Appearance: He is well-developed.      Comments: Chronically ill-appearing   HENT:      Head: Normocephalic and atraumatic.   Eyes:      Extraocular Movements: Extraocular movements intact.      Conjunctiva/sclera: Conjunctivae normal.   Neck:      Trachea: Tracheostomy present.      Comments: Trach in place  Cardiovascular:      Rate and Rhythm: Regular rhythm. Tachycardia present.      Heart sounds: Normal heart sounds.   Pulmonary:      Effort: Pulmonary effort is normal. No respiratory distress.      Breath sounds: Normal breath sounds. No wheezing.   Abdominal:      General: Bowel sounds are normal. There is no distension.      Palpations: Abdomen is soft.      Tenderness: There is no abdominal tenderness.      Comments: G-tube site is pink and appears to be well healed, no erythema or discharge   Musculoskeletal:         General: No tenderness. Normal range of motion.      Cervical back: Normal range of motion and neck supple.   Skin:     General: Skin is warm and dry.       Capillary Refill: Capillary refill takes less than 2 seconds.      Findings: No rash.   Neurological:      General: No focal deficit present.      Mental Status: He is alert. Mental status is at baseline.   Psychiatric:         Behavior: Behavior normal.         Thought Content: Thought content normal.         Judgment: Judgment normal.           Significant Labs: All pertinent labs within the past 24 hours have been reviewed.  CBC:   Recent Labs   Lab 01/10/23  1123   WBC 9.64   HGB 12.2*   HCT 39.4*   *     CMP:   Recent Labs   Lab 01/10/23  1123      K 4.7      CO2 24   *   BUN 31*   CREATININE 1.3   CALCIUM 10.2   PROT 8.3   ALBUMIN 2.6*   BILITOT 0.4   ALKPHOS 182*   AST 20   ALT 25   ANIONGAP 12       Significant Imaging: I have reviewed all pertinent imaging results/findings within the past 24 hours.    Assessment/Plan:     * Dislodged gastrostomy tube  Patient transferred from nursing home due to dislodged PEG tube. Unsure of how long the peg tube has been dislodged.  - ED providers attempted four times to replaced peg tube with three different sized g tubes and were unsuccessful  - XR of G tube ordered and pending  - GI consulted, plan to replace tomorrow  - NPO for now      Chronic kidney disease, stage III (moderate)  - Cr 1.3 at admission, improved from recent baseline of ~2.0  - renally dosing all medications to CrCl cannot be calculated (Unknown ideal weight.).   - avoid nephrotoxins  - will continue to monitor on daily labs    Anemia  - patient's anemia is currently controlled  - etiology likely due to anemia of chronic disease +/- CKD  - continue ferrous sulfate when able  - current CBC reviewed -   Lab Results   Component Value Date    HGB 12.2 (L) 01/10/2023    HCT 39.4 (L) 01/10/2023     - monitor serial CBC and transfuse if patient becomes hemodynamically unstable, symptomatic, or H/H drops below 7/21.         HLD (hyperlipidemia)  - holding atorvastatin and fenofibrate  due to peg tube dislodgement  - resume when able        VTE Risk Mitigation (From admission, onward)         Ordered     IP VTE LOW RISK PATIENT  Once         01/10/23 1140     Place sequential compression device  Until discontinued         01/10/23 1140                   Elaina Mcwilliams PA-C  Department of Hospital Medicine   [facility] - Emergency Dept

## 2023-01-10 NOTE — SUBJECTIVE & OBJECTIVE
No past medical history on file.    No past surgical history on file.    Review of patient's allergies indicates:  No Known Allergies    No current facility-administered medications on file prior to encounter.     Current Outpatient Medications on File Prior to Encounter   Medication Sig    atorvastatin (LIPITOR) 40 MG tablet atorvastatin 40 mg tablet    cholecalciferol, vitamin D3, (VITAMIN D3) 2,000 unit Cap Vitamin D3 2,000 unit capsule   Take 1 capsule every day by oral route.    fenofibrate 160 MG Tab Take by mouth once daily.    ferrous sulfate 325 (65 FE) MG EC tablet Take 1 tablet (325 mg total) by mouth 2 (two) times daily.    lisinopril 10 MG tablet Take 2 tablets (20 mg total) by mouth once daily.    tamsulosin (FLOMAX) 0.4 mg Cap tamsulosin 0.4 mg capsule     Family History    None       Tobacco Use    Smoking status: Never    Smokeless tobacco: Not on file   Substance and Sexual Activity    Alcohol use: Not on file    Drug use: Not on file    Sexual activity: Not on file     Review of Systems   Unable to perform ROS: Patient nonverbal   Objective:     Vital Signs (Most Recent):  Temp: 98.8 °F (37.1 °C) (01/10/23 1125)  Pulse: 104 (01/10/23 1125)  Resp: 17 (01/10/23 1125)  BP: 138/70 (01/10/23 1125)  SpO2: 99 % (01/10/23 1125) Vital Signs (24h Range):  Temp:  [98.3 °F (36.8 °C)-98.8 °F (37.1 °C)] 98.8 °F (37.1 °C)  Pulse:  [] 104  Resp:  [16-23] 17  SpO2:  [96 %-100 %] 99 %  BP: (116-149)/(59-81) 138/70        There is no height or weight on file to calculate BMI.    Physical Exam  Vitals and nursing note reviewed.   Constitutional:       General: He is not in acute distress.     Appearance: He is well-developed.      Comments: Chronically ill-appearing   HENT:      Head: Normocephalic and atraumatic.   Eyes:      Extraocular Movements: Extraocular movements intact.      Conjunctiva/sclera: Conjunctivae normal.   Neck:      Trachea: Tracheostomy present.      Comments: Trach in  place  Cardiovascular:      Rate and Rhythm: Regular rhythm. Tachycardia present.      Heart sounds: Normal heart sounds.   Pulmonary:      Effort: Pulmonary effort is normal. No respiratory distress.      Breath sounds: Normal breath sounds. No wheezing.   Abdominal:      General: Bowel sounds are normal. There is no distension.      Palpations: Abdomen is soft.      Tenderness: There is no abdominal tenderness.      Comments: G-tube site is pink and appears to be well healed, no erythema or discharge   Musculoskeletal:         General: No tenderness. Normal range of motion.      Cervical back: Normal range of motion and neck supple.   Skin:     General: Skin is warm and dry.      Capillary Refill: Capillary refill takes less than 2 seconds.      Findings: No rash.   Neurological:      General: No focal deficit present.      Mental Status: He is alert. Mental status is at baseline.   Psychiatric:         Behavior: Behavior normal.         Thought Content: Thought content normal.         Judgment: Judgment normal.           Significant Labs: All pertinent labs within the past 24 hours have been reviewed.  CBC:   Recent Labs   Lab 01/10/23  1123   WBC 9.64   HGB 12.2*   HCT 39.4*   *     CMP:   Recent Labs   Lab 01/10/23  1123      K 4.7      CO2 24   *   BUN 31*   CREATININE 1.3   CALCIUM 10.2   PROT 8.3   ALBUMIN 2.6*   BILITOT 0.4   ALKPHOS 182*   AST 20   ALT 25   ANIONGAP 12       Significant Imaging: I have reviewed all pertinent imaging results/findings within the past 24 hours.

## 2023-01-10 NOTE — ASSESSMENT & PLAN NOTE
- Cr 1.3 at admission, improved from recent baseline of ~2.0  - renally dosing all medications to CrCl cannot be calculated (Unknown ideal weight.).   - avoid nephrotoxins  - will continue to monitor on daily labs

## 2023-01-10 NOTE — HPI
Hao Medrano is a 62 y.o. male with a past medical history of CVA with subsequent trach/PEG, CKD3b, and HLD who was transferred from his nursing home for evaluation of PEG tube removal. Per ED note, patient's friend reports that they are unsure how log the peg tube has been dislodged and they are unsure how it happened. They stated that patient is at his baseline which is non-communicative.      ED: tachycardic up to 117, otherwise vital signs stable, afebile. CBC with anemia, Hb 12.2. No leukocytosis. Cr 1.3, glucose 122.  XR of g-tube ordered and pending. ED provider attempted to replace PEG tube with a Sheets but unsuccessful after four attempts with three different sized 3 tubes. GI consulted.

## 2023-01-10 NOTE — ED TRIAGE NOTES
Hao ANTONIO Mitchell, an 62 y.o. male presents to the ED via EMS from [facility] due to a dislodged Peg tube. Pt is trached but not on a vent. Pt is in no acute distress at this time. Pt is nonverbal and able to answer questions.       Review of patient's allergies indicates:  No Known Allergies  Chief Complaint   Patient presents with    PEG Tube Problem     From [facility], pulled out PEG tube. Trach     No past medical history on file.   
Alert and oriented to person, place and time

## 2023-01-10 NOTE — ASSESSMENT & PLAN NOTE
Patient transferred from nursing home due to dislodged PEG tube. Unsure of how long the peg tube has been dislodged.  - ED providers attempted four times to replaced peg tube with three different sized g tubes and were unsuccessful  - XR of G tube ordered and pending  - GI consulted, plan to replace tomorrow  - NPO for now

## 2023-01-10 NOTE — H&P (VIEW-ONLY)
[facility]  Gastroenterology  Consult Note    Patient Name: Hao Medrano  MRN: [id number]  Admission Date: 1/10/2023  Hospital Length of Stay: 0 days  Code Status: Full Code   Attending Provider: Bharati Hernandez MD   Consulting Provider: Inder Del Castillo MD  Primary Care Physician: Keara Yanes NP  Principal Problem:Dislodged gastrostomy tube    Inpatient consult to Gastroenterology  Consult performed by: Inder Del Castillo MD  Consult ordered by: Bello Moses MD      Subjective:     HPI: Hao Medrano is a 62 y.o. male with history of CVA and subsequent trach/PEG, CKD3b, HLD who presented from NH after PEG was dislodged. Unclear when. Pt nonverbal. Unclear exactly when placed, ED was unable to contact NH to provide history. Multiple attempts to replace PEG with 22 Fr, 18 Fr, 16 Fr replacement PEGs were unsuccessful by both ED and GI at bedside. Concerned tract has closed.         No past medical history on file.    No past surgical history on file.    No family history on file.    Social History     Socioeconomic History    Marital status: Single   Tobacco Use    Smoking status: Never       No current facility-administered medications on file prior to encounter.     Current Outpatient Medications on File Prior to Encounter   Medication Sig Dispense Refill    atorvastatin (LIPITOR) 40 MG tablet atorvastatin 40 mg tablet      cholecalciferol, vitamin D3, (VITAMIN D3) 2,000 unit Cap Vitamin D3 2,000 unit capsule   Take 1 capsule every day by oral route.      fenofibrate 160 MG Tab Take by mouth once daily.      ferrous sulfate 325 (65 FE) MG EC tablet Take 1 tablet (325 mg total) by mouth 2 (two) times daily. 60 tablet 6    lisinopril 10 MG tablet Take 2 tablets (20 mg total) by mouth once daily. 180 tablet 3    tamsulosin (FLOMAX) 0.4 mg Cap tamsulosin 0.4 mg capsule         Review of patient's allergies indicates:  No Known Allergies    Review of Systems   Unable to perform  ROS: Mental status change      Objective:     Vitals:    01/10/23 1125   BP: 138/70   Pulse: 104   Resp: 17   Temp: 98.8 °F (37.1 °C)         Constitutional:  not in acute distress and well developed. Nonverbal, not responding to commands or questions.  HENT: Head: Normal, normocephalic, atraumatic.  Eyes: conjunctiva clear and sclera nonicteric  Cardiovascular: regular rate and rhythm and no murmur  Respiratory: normal chest expansion & respiratory effort   and no accessory muscle use  GI: soft, non-tender, without masses or organomegaly. PEG site with some granulation tissue, no clear visible lumen. No discharge.  Musculoskeletal: no muscular tenderness noted  Skin: normal color  Neurological: nonverbal, some spontaneous movement in UEs, chronic contractures        Significant Labs:  Recent Labs   Lab 01/10/23  1123   HGB 12.2*       Lab Results   Component Value Date    WBC 9.64 01/10/2023    HGB 12.2 (L) 01/10/2023    HCT 39.4 (L) 01/10/2023    MCV 89 01/10/2023     (H) 01/10/2023       Lab Results   Component Value Date     01/10/2023    K 4.7 01/10/2023     01/10/2023    CO2 24 01/10/2023    BUN 31 (H) 01/10/2023    CREATININE 1.3 01/10/2023    CALCIUM 10.2 01/10/2023    ANIONGAP 12 01/10/2023    ESTGFRAFRICA 32.7 (A) 03/19/2020    EGFRNONAA 28.3 (A) 03/19/2020       Lab Results   Component Value Date    ALT 25 01/10/2023    AST 20 01/10/2023    ALKPHOS 182 (H) 01/10/2023    BILITOT 0.4 01/10/2023       No results found for: INR    Significant Imaging:  Reviewed pertinent radiology findings.       Assessment/Plan:     Hao Medrano is a 62 y.o. male with history of CVA s/p trach and PEG (unclear when placed) who presents after PEG was dislodged at NH. Unable to replace PEG at bedside. Unclear how long PEG has been dislodged. Concerned tract has closed. Plan EGD 1/11 for replacement PEG vs new PEG at novel site.    Problem List:  PEG dislodged      Recommendations:  - EGD 1/11 with PEG  replacement vs new PEG . Keep NPO  - Hold DVT chemoprophylaxis, SCDs if needed    Thank you for involving us in the care of Hao Medrano. Please call with any additional questions, concerns or changes in the patient's clinical status. We will continue to follow.     Inder Del Castillo MD  Gastroenterology Fellow PGY IV  [facility]

## 2023-01-10 NOTE — ASSESSMENT & PLAN NOTE
- patient's anemia is currently controlled  - etiology likely due to anemia of chronic disease +/- CKD  - continue ferrous sulfate when able  - current CBC reviewed -   Lab Results   Component Value Date    HGB 12.2 (L) 01/10/2023    HCT 39.4 (L) 01/10/2023     - monitor serial CBC and transfuse if patient becomes hemodynamically unstable, symptomatic, or H/H drops below 7/21.

## 2023-01-11 ENCOUNTER — ANESTHESIA (OUTPATIENT)
Dept: ENDOSCOPY | Facility: HOSPITAL | Age: 63
End: 2023-01-11
Payer: MEDICARE

## 2023-01-11 ENCOUNTER — ANESTHESIA EVENT (OUTPATIENT)
Dept: ENDOSCOPY | Facility: HOSPITAL | Age: 63
End: 2023-01-11
Payer: MEDICARE

## 2023-01-11 LAB
ANION GAP SERPL CALC-SCNC: 11 MMOL/L (ref 8–16)
ANISOCYTOSIS BLD QL SMEAR: SLIGHT
BASOPHILS # BLD AUTO: 0.04 K/UL (ref 0–0.2)
BASOPHILS NFR BLD: 0.5 % (ref 0–1.9)
BUN SERPL-MCNC: 33 MG/DL (ref 8–23)
CALCIUM SERPL-MCNC: 10.2 MG/DL (ref 8.7–10.5)
CHLORIDE SERPL-SCNC: 103 MMOL/L (ref 95–110)
CO2 SERPL-SCNC: 24 MMOL/L (ref 23–29)
CREAT SERPL-MCNC: 1.1 MG/DL (ref 0.5–1.4)
DIFFERENTIAL METHOD: ABNORMAL
EOSINOPHIL # BLD AUTO: 2.4 K/UL (ref 0–0.5)
EOSINOPHIL NFR BLD: 26.7 % (ref 0–8)
ERYTHROCYTE [DISTWIDTH] IN BLOOD BY AUTOMATED COUNT: 16.8 % (ref 11.5–14.5)
EST. GFR  (NO RACE VARIABLE): >60 ML/MIN/1.73 M^2
GLUCOSE SERPL-MCNC: 90 MG/DL (ref 70–110)
HCT VFR BLD AUTO: 36.7 % (ref 40–54)
HGB BLD-MCNC: 11.1 G/DL (ref 14–18)
IMM GRANULOCYTES # BLD AUTO: 0.02 K/UL (ref 0–0.04)
IMM GRANULOCYTES NFR BLD AUTO: 0.2 % (ref 0–0.5)
LYMPHOCYTES # BLD AUTO: 1.2 K/UL (ref 1–4.8)
LYMPHOCYTES NFR BLD: 13.1 % (ref 18–48)
MCH RBC QN AUTO: 28 PG (ref 27–31)
MCHC RBC AUTO-ENTMCNC: 30.2 G/DL (ref 32–36)
MCV RBC AUTO: 93 FL (ref 82–98)
MONOCYTES # BLD AUTO: 0.7 K/UL (ref 0.3–1)
MONOCYTES NFR BLD: 7.8 % (ref 4–15)
NEUTROPHILS # BLD AUTO: 4.6 K/UL (ref 1.8–7.7)
NEUTROPHILS NFR BLD: 51.7 % (ref 38–73)
NRBC BLD-RTO: 0 /100 WBC
PLATELET # BLD AUTO: 502 K/UL (ref 150–450)
PLATELET BLD QL SMEAR: ABNORMAL
PMV BLD AUTO: 9.8 FL (ref 9.2–12.9)
POCT GLUCOSE: 103 MG/DL (ref 70–110)
POCT GLUCOSE: 85 MG/DL (ref 70–110)
POTASSIUM SERPL-SCNC: 4.2 MMOL/L (ref 3.5–5.1)
RBC # BLD AUTO: 3.96 M/UL (ref 4.6–6.2)
SODIUM SERPL-SCNC: 138 MMOL/L (ref 136–145)
WBC # BLD AUTO: 8.86 K/UL (ref 3.9–12.7)
WBC TOXIC VACUOLES BLD QL SMEAR: PRESENT

## 2023-01-11 PROCEDURE — 27000221 HC OXYGEN, UP TO 24 HOURS

## 2023-01-11 PROCEDURE — 37000008 HC ANESTHESIA 1ST 15 MINUTES: Performed by: INTERNAL MEDICINE

## 2023-01-11 PROCEDURE — 36415 COLL VENOUS BLD VENIPUNCTURE: CPT

## 2023-01-11 PROCEDURE — 96374 THER/PROPH/DIAG INJ IV PUSH: CPT

## 2023-01-11 PROCEDURE — 25000003 PHARM REV CODE 250: Performed by: NURSE ANESTHETIST, CERTIFIED REGISTERED

## 2023-01-11 PROCEDURE — 94761 N-INVAS EAR/PLS OXIMETRY MLT: CPT

## 2023-01-11 PROCEDURE — G0378 HOSPITAL OBSERVATION PER HR: HCPCS

## 2023-01-11 PROCEDURE — 63600175 PHARM REV CODE 636 W HCPCS: Performed by: NURSE ANESTHETIST, CERTIFIED REGISTERED

## 2023-01-11 PROCEDURE — 99900035 HC TECH TIME PER 15 MIN (STAT)

## 2023-01-11 PROCEDURE — 80048 BASIC METABOLIC PNL TOTAL CA: CPT

## 2023-01-11 PROCEDURE — 99900026 HC AIRWAY MAINTENANCE (STAT)

## 2023-01-11 PROCEDURE — 37000009 HC ANESTHESIA EA ADD 15 MINS: Performed by: INTERNAL MEDICINE

## 2023-01-11 PROCEDURE — 99232 PR SUBSEQUENT HOSPITAL CARE,LEVL II: ICD-10-PCS | Mod: ,,, | Performed by: STUDENT IN AN ORGANIZED HEALTH CARE EDUCATION/TRAINING PROGRAM

## 2023-01-11 PROCEDURE — 99232 SBSQ HOSP IP/OBS MODERATE 35: CPT | Mod: ,,, | Performed by: STUDENT IN AN ORGANIZED HEALTH CARE EDUCATION/TRAINING PROGRAM

## 2023-01-11 PROCEDURE — 85025 COMPLETE CBC W/AUTO DIFF WBC: CPT

## 2023-01-11 PROCEDURE — 63600175 PHARM REV CODE 636 W HCPCS: Performed by: STUDENT IN AN ORGANIZED HEALTH CARE EDUCATION/TRAINING PROGRAM

## 2023-01-11 RX ORDER — INSULIN GLARGINE-YFGN 100 [IU]/ML
15 INJECTION, SOLUTION SUBCUTANEOUS DAILY
COMMUNITY

## 2023-01-11 RX ORDER — NYSTATIN 100000 [USP'U]/G
POWDER TOPICAL
COMMUNITY

## 2023-01-11 RX ORDER — LIDOCAINE HYDROCHLORIDE 20 MG/ML
INJECTION INTRAVENOUS
Status: DISCONTINUED | OUTPATIENT
Start: 2023-01-11 | End: 2023-01-11

## 2023-01-11 RX ORDER — CARVEDILOL 3.12 MG/1
3.12 TABLET ORAL 2 TIMES DAILY
COMMUNITY

## 2023-01-11 RX ORDER — LEVETIRACETAM 500 MG/5ML
750 INJECTION, SOLUTION, CONCENTRATE INTRAVENOUS EVERY 12 HOURS
Status: DISCONTINUED | OUTPATIENT
Start: 2023-01-11 | End: 2023-01-12

## 2023-01-11 RX ORDER — CALCIUM CARBONATE 500(1250)
1250 TABLET ORAL DAILY
COMMUNITY

## 2023-01-11 RX ORDER — ARGININE/GLUTAMINE/CALCIUM BMB 7G-7G-1.5G
1 POWDER IN PACKET (EA) ORAL 2 TIMES DAILY
COMMUNITY

## 2023-01-11 RX ORDER — ESOMEPRAZOLE MAGNESIUM 40 MG/1
40 GRANULE, DELAYED RELEASE ORAL DAILY
COMMUNITY

## 2023-01-11 RX ORDER — NAPROXEN SODIUM 220 MG/1
81 TABLET, FILM COATED ORAL DAILY
Status: ON HOLD | COMMUNITY
End: 2023-04-11 | Stop reason: HOSPADM

## 2023-01-11 RX ORDER — CEFAZOLIN SODIUM 1 G/3ML
INJECTION, POWDER, FOR SOLUTION INTRAMUSCULAR; INTRAVENOUS
Status: DISCONTINUED | OUTPATIENT
Start: 2023-01-11 | End: 2023-01-11

## 2023-01-11 RX ORDER — LEVETIRACETAM 100 MG/ML
750 SOLUTION ORAL 2 TIMES DAILY
COMMUNITY

## 2023-01-11 RX ORDER — BACLOFEN 5 MG/1
15 TABLET ORAL 3 TIMES DAILY
COMMUNITY

## 2023-01-11 RX ORDER — PROPOFOL 10 MG/ML
VIAL (ML) INTRAVENOUS
Status: DISCONTINUED | OUTPATIENT
Start: 2023-01-11 | End: 2023-01-11

## 2023-01-11 RX ORDER — PREDNISONE 10 MG/1
10 TABLET ORAL DAILY
Status: ON HOLD | COMMUNITY
End: 2023-02-12 | Stop reason: HOSPADM

## 2023-01-11 RX ADMIN — PROPOFOL 20 MG: 10 INJECTION, EMULSION INTRAVENOUS at 02:01

## 2023-01-11 RX ADMIN — SODIUM CHLORIDE: 0.9 INJECTION, SOLUTION INTRAVENOUS at 02:01

## 2023-01-11 RX ADMIN — CEFAZOLIN 2 G: 2 INJECTION, POWDER, FOR SOLUTION INTRAMUSCULAR; INTRAVENOUS at 02:01

## 2023-01-11 RX ADMIN — LEVETIRACETAM 750 MG: 100 INJECTION, SOLUTION INTRAVENOUS at 09:01

## 2023-01-11 RX ADMIN — LIDOCAINE HYDROCHLORIDE 20 MG: 20 INJECTION INTRAVENOUS at 02:01

## 2023-01-11 NOTE — ANESTHESIA PREPROCEDURE EVALUATION
[facility]  Anesthesia Pre-Operative Evaluation         Patient Name: Hao Medrano  YOB: 1960  MRN: [id number]    SUBJECTIVE:     Pre-operative evaluation for Procedure(s) (LRB):  EGD (ESOPHAGOGASTRODUODENOSCOPY) (N/A)     01/11/2023    Hao Medrano is a 62 y.o. male w/ a significant PMHx of CVA with subsequent trach/PEG, CKD3b, and HLD who was transferred from his nursing home for evaluation of PEG tube removal. Per ED note, patient's friend reports that they are unsure how log the peg tube has been dislodged and they are unsure how it happened. They stated that patient is at his baseline which is non-communicative.     Patient now presents for the above procedure(s).      Of note, pts common law wife provided consent via telephone given pt is non verbal at baseline     LDA:        Peripheral IV - Single Lumen 01/10/23 1219 20 G Left Upper Arm (Active)   Site Assessment Clean;Dry;Intact 01/10/23 1219   Extremity Assessment Distal to IV No abnormal discoloration 01/10/23 1219   Line Status Blood return noted;Saline locked;Flushed 01/10/23 1219   Dressing Status Clean;Dry;Intact 01/10/23 1219   Number of days: 0       Prev airway: Pt has tracheostomy     Drips: None documented.    Patient Active Problem List   Diagnosis    Chronic kidney disease, stage III (moderate)    Dislodged gastrostomy tube    HLD (hyperlipidemia)    Anemia    Oropharyngeal dysphagia       Review of patient's allergies indicates:  No Known Allergies    Current Outpatient Medications:  No current facility-administered medications for this visit.  No current outpatient medications on file.    Facility-Administered Medications Ordered in Other Visits:     acetaminophen tablet 1,000 mg, 1,000 mg, Oral, Q8H PRN, Elaina Mcwilliams PA-C    acetaminophen tablet 650 mg, 650 mg, Oral, Q4H PRN, Elaina Mcwilliams PA-C    albuterol-ipratropium 2.5 mg-0.5 mg/3 mL nebulizer solution 3 mL, 3 mL, Nebulization, Q4H  PRN, Elaina Mcwilliams PA-C    aluminum-magnesium hydroxide-simethicone 200-200-20 mg/5 mL suspension 30 mL, 30 mL, Oral, QID PRN, Elaina Mcwilliams PA-C    bisacodyL suppository 10 mg, 10 mg, Rectal, Daily PRN, Elaina Mcwilliams PA-C    ceFAZolin 2 g in dextrose 5 % in water (D5W) 5 % 50 mL IVPB (MB+), 2 g, Intravenous, Once, Inder Del Castillo MD    dextrose 10% bolus 125 mL 125 mL, 12.5 g, Intravenous, PRN, Bharati Hernandez MD    dextrose 10% bolus 250 mL 250 mL, 25 g, Intravenous, PRN, Bharati Hernandez MD    glucagon (human recombinant) injection 1 mg, 1 mg, Intramuscular, PRN, Elaina Mcwilliams PA-C    glucose chewable tablet 16 g, 16 g, Oral, PRN, Elaina Mcwilliams PA-C    glucose chewable tablet 24 g, 24 g, Oral, PRN, Elaina Mcwilliams PA-C    insulin aspart U-100 pen 0-5 Units, 0-5 Units, Subcutaneous, QID (AC + HS) PRN, Elaina Mcwilliams PA-C    levETIRAcetam injection 750 mg, 750 mg, Intravenous, Q12H, Bharati Hernandez MD    melatonin tablet 6 mg, 6 mg, Oral, Nightly PRN, Elaina Mcwilliams PA-C    naloxone 0.4 mg/mL injection 0.02 mg, 0.02 mg, Intravenous, PRN, Elaina Mcwilliams PA-C    ondansetron disintegrating tablet 8 mg, 8 mg, Oral, Q8H PRN, Elaina Mcwilliams PA-C    prochlorperazine injection Soln 5 mg, 5 mg, Intravenous, Q6H PRN, Elaina Mcwilliams PA-C    simethicone chewable tablet 80 mg, 1 tablet, Oral, QID PRN, Elaina Mcwilliams PA-C    sodium chloride 0.9% flush 5 mL, 5 mL, Intravenous, PRN, Elaina Mcwilliams PA-C    No past surgical history on file.    Social History     Socioeconomic History    Marital status: Single   Tobacco Use    Smoking status: Never       OBJECTIVE:     Vital Signs Range (Last 24H):  Temp:  [36.6 °C (97.8 °F)-37.2 °C (99 °F)]   Pulse:  []   Resp:  [16-24]   BP: (124-147)/(60-78)   SpO2:  [96 %-100 %]       Significant Labs:  Lab Results   Component Value Date    WBC 8.86 01/11/2023    HGB 11.1 (L) 01/11/2023    HCT 36.7 (L) 01/11/2023     (H) 01/11/2023     ALT 25 01/10/2023    AST 20 01/10/2023     01/11/2023    K 4.2 01/11/2023     01/11/2023    CREATININE 1.1 01/11/2023    BUN 33 (H) 01/11/2023    CO2 24 01/11/2023    HGBA1C 5.9 (H) 07/12/2018       Diagnostic Studies: No relevant studies.    EKG:   Results for orders placed or performed during the hospital encounter of 01/10/23   EKG 12-lead    Collection Time: 01/10/23 12:29 PM    Narrative    Test Reason : R00.0,    Vent. Rate : 100 BPM     Atrial Rate : 100 BPM     P-R Int : 148 ms          QRS Dur : 082 ms      QT Int : 354 ms       P-R-T Axes : 049 029 012 degrees     QTc Int : 456 ms    Normal sinus rhythm  Nonspecific T wave abnormality  Abnormal ECG  No previous ECGs available  Confirmed by Shayne Moss MD ([id number]) on 1/10/2023 4:34:35 PM    Referred By: [email or web address]   SELF           Confirmed By:Shayne Moss MD       2D ECHO:  TTE:  No results found for this or any previous visit.    HARDEEP:  No results found for this or any previous visit.    ASSESSMENT/PLAN:                                                                                                                 01/11/2023  Hao Medrano is a 62 y.o., male.      Pre-op Assessment    I have reviewed the Patient Summary Reports.     I have reviewed the Nursing Notes. I have reviewed the NPO Status.   I have reviewed the Medications.     Review of Systems  Anesthesia Hx:  History of prior surgery of interest to airway management or planning: Denies Family Hx of Anesthesia complications.   Denies Personal Hx of Anesthesia complications.   Hematology/Oncology:         -- Anemia:   Cardiovascular:   Denies MI.  Denies CAD.    hyperlipidemia    Pulmonary:   Denies COPD. Tracheostomy    Renal/:   Chronic Renal Disease, CKD    Hepatic/GI:   Denies GERD.    Neurological:   CVA    Psych:  Psychiatric Normal           Physical Exam  General: Cooperative  Non verbal at baseline   Airway:  Pre-Existing Airway: Tracheostomy tube    Dental:Airway  exam limited 2/2 to pt cooperation   Chest/Lungs:  Clear to auscultation, Normal Respiratory Rate  Trach in place   Heart:  Rhythm: Regular Rhythm        Anesthesia Plan  Type of Anesthesia, risks & benefits discussed:    Anesthesia Type: Gen Natural Airway, MAC  Intra-op Monitoring Plan: Standard ASA Monitors  Post Op Pain Control Plan: multimodal analgesia and IV/PO Opioids PRN  Induction:  IV  Informed Consent: Informed consent signed with the Patient representative and all parties understand the risks and agree with anesthesia plan.  All questions answered.   ASA Score: 3  Day of Surgery Review of History & Physical: H&P Update referred to the surgeon/provider.    Ready For Surgery From Anesthesia Perspective.     .

## 2023-01-11 NOTE — ED NOTES
Awaiting GI  
Bed: EDOU02  Expected date: 1/10/23  Expected time: 2:48 PM  Means of arrival:   Comments:  
CBG 96  
GI at bedside  
MD at bedside attempting to replace PEG tube  
Patient awake in stretcher, pt has trach (no vent), nonverbal. VSS. Bed low and locked, SR up x2, call bell in patient reach. Pt remains on continuous cardiac monitor, continuous pulse ox, and auto BP cuff.    APPEARANCE: awake,   SKIN: warm, dry and intact. Heel protectors present, Bandage to L heel   MUSCULOSKELETAL: Significantly impaired, bed bound   RESPIRATORY: Denies shortness of breath.Respirations unlabored. Trach present, no vent   CARDIAC: Denies CP, 2+ distal pulses; no peripheral edema  ABDOMEN: S/ND/NT, Denies nausea. Dislodged Peg tube   : Incont. X2   Neurologic: Unable to assess due to pt's condition, nonverbal   
Patient incontinent of urine and stool.  Patient's diapers and linens changed.  Wound care provided and dressings changed appropriately. Patient repositioned for comfort.  Side rails up x 2.  All risk band applied.  Call button in reach.  VSS   
Respiratory at bedside suctioning patient.   
Detail Level: Detailed

## 2023-01-11 NOTE — ANESTHESIA POSTPROCEDURE EVALUATION
Anesthesia Post Evaluation    Patient: Hao Medrano    Procedure(s) Performed: Procedure(s) (LRB):  EGD (ESOPHAGOGASTRODUODENOSCOPY) (N/A)    Final Anesthesia Type: general      Patient location during evaluation: PACU  Patient participation: Yes- Able to Participate  Level of consciousness: awake and alert  Post-procedure vital signs: reviewed and stable  Pain management: adequate  Airway patency: patent    PONV status at discharge: No PONV  Anesthetic complications: no      Cardiovascular status: blood pressure returned to baseline  Respiratory status: spontaneous ventilation and room air  Hydration status: euvolemic  Follow-up not needed.          Vitals Value Taken Time         No case tracking events are documented in the log.      Pain/Jane Score: No data recorded

## 2023-01-11 NOTE — RESPIRATORY THERAPY
RAPID RESPONSE RESPIRATORY THERAPY PROACTIVE NOTE           Time of visit: 1128     Code Status: Full Code   : 1960  Bed: 91159/96649 A:   MRN: [id number]  Time spent at the bedside: < 15 min    SITUATION    Evaluated patient for: LDA Check     BACKGROUND    Patient has no past medical history on file.  Clinically Significant Surgical Hx: tracheostomy    24 Hours Vitals Range:  Temp:  [97.8 °F (36.6 °C)-99 °F (37.2 °C)]   Pulse:  []   Resp:  [15-24]   BP: (118-146)/(60-78)   SpO2:  [95 %-100 %]     Labs:    Recent Labs     01/10/23  1123 23  0451    138   K 4.7 4.2    103   CO2 24 24   CREATININE 1.3 1.1   * 90        No results for input(s): PH, PCO2, PO2, HCO3, POCSATURATED, BE in the last 72 hours.    ASSESSMENT/INTERVENTIONS  Patient resting comfortably. No respiratory concerns at this time.       Last VS   Temp: 97.8 °F (36.6 °C) (1207)  Pulse: 106 (1207)  Resp: 16 (1207)  BP: 140/69 (1207)  SpO2: 100 % (1207)      Extra trachs at bedside: 60XLT proximal cuffed Shiley, 50XLT proximal cuffed Shiley sent to floor and RT made aware to retrieve from tube station  Level of Consciousness: Level of Consciousness (AVPU): alert  Respiratory Effort: Respiratory Effort: Normal, Unlabored Expansion/Accessory Muscle Usage: Expansion/Accessory Muscles/Retractions: expansion symmetric, no retractions, no use of accessory muscles  All Lung Field Breath Sounds: All Lung Fields Breath Sounds: Anterior:, Lateral:, coarse  O2 Device/Concentration: 5L/28%  Surgical airway: Yes, Type: Shiley Size: 6 XLT, cuffed  Ambu at bedside: Ambu bag with the patient?: Yes, Adult Ambu     Active Orders   Respiratory Care    Inhalation Treatment Q4H PRN     Frequency: Q4H PRN     Number of Occurrences: Until Specified    Oxygen PRN     Frequency: PRN     Number of Occurrences: Until Specified     Order Questions:      Device type: Low flow      Device: Nasal Cannula (1- 5 Liters)       LPM: 1-5      Titrate O2 per Oxygen Titration Protocol: Yes      To maintain SpO2 goal of: >= 90%      Notify MD of: Inability to achieve desired SpO2; Sudden change in patient status and requires 20% increase in FiO2; Patient requires >60% FiO2       RECOMMENDATIONS    We recommend: RRT Recs: Continue POC per primary team.      FOLLOW-UP    Please call back the Rapid Response RT, Patricia Khan, RRT at x [phone] for any questions or concerns.

## 2023-01-11 NOTE — ASSESSMENT & PLAN NOTE
Patient transferred from nursing home due to dislodged PEG tube. Unsure of how long the peg tube has been dislodged.  ED providers attempted four times to replaced peg tube with three different sized g tubes and were unsuccessful  - GI consulted, plan to replace today  - NPO for now

## 2023-01-11 NOTE — NURSING
Nurses Note -- 4 Eyes      1/11/2023   6:07 AM      Skin assessed during: Admit      [] No Pressure Injuries Present    []Prevention Measures Documented      [x] Yes- Altered Skin Integrity Present or Discovered   [] LDA Added if Not in Epic (Describe Wound)   [x] New Altered Skin Integrity was Present on Admit and Documented in LDA   [] Wound Image Taken    Wound Care Consulted? Yes    Attending Nurse:  Juli Treviño LPN     Second RN/Staff Member:  THIERRY FLOWERS

## 2023-01-11 NOTE — TRANSFER OF CARE
Anesthesia Transfer of Care Note    Patient: Hao Medrano    Procedure(s) Performed: Procedure(s) (LRB):  EGD (ESOPHAGOGASTRODUODENOSCOPY) (N/A)    Patient location: PACU    Anesthesia Type: general    Transport from OR: Transported from OR on 6-10 L/min O2 by face mask with adequate spontaneous ventilation    Post pain: adequate analgesia    Post assessment: no apparent anesthetic complications    Post vital signs: stable    Level of consciousness: awake    Nausea/Vomiting: no nausea/vomiting    Complications: none    Transfer of care protocol was followed      Last vitals:   Visit Vitals  BP (!) 147/73 (BP Location: Right leg, Patient Position: Lying)   Pulse 99   Temp 36.8 °C (98.2 °F) (Temporal)   Resp 16   SpO2 96%

## 2023-01-11 NOTE — PLAN OF CARE
CM Unable to do initial assessment d/t patient just returned from surgery, unable to answer questions at this time.    NATASHA StinsonN, BS, RN, CCM

## 2023-01-11 NOTE — TREATMENT PLAN
Brief GI Treatment Plan    While attempting to sedate for EGD and PEG replacement, only present IV infiltrated and was not functional. RN and anesthesia attempted to obtain new IV access but unsuccessful. Will reschedule for 1/12. Please ensure functional IV access is available.    Inder Del Castillo  Gastroenterology Fellow, PGY-IV

## 2023-01-11 NOTE — INTERVAL H&P NOTE
The patient has been examined and the H&P has been reviewed:    I concur with the findings and no changes have occurred since H&P was written.    Procedure risks, benefits and alternative options discussed and understood by family.    EGD with attempted PEG replacement      Active Hospital Problems    Diagnosis  POA    *Dislodged gastrostomy tube [T85.528A]  Yes    HLD (hyperlipidemia) [E78.5]  Yes    Anemia [D64.9]  Yes    Oropharyngeal dysphagia [R13.12]  Yes    Chronic kidney disease, stage III (moderate) [N18.30]  Yes      Resolved Hospital Problems   No resolved problems to display.

## 2023-01-11 NOTE — SUBJECTIVE & OBJECTIVE
Interval History:  No acute events overnight.  Patient is non communicative however tracks me across the room and nod to yes or no to simple questions.    Review of Systems   Unable to perform ROS: Patient nonverbal   Objective:     Vital Signs (Most Recent):  Temp: 98.7 °F (37.1 °C) (01/11/23 0859)  Pulse: 104 (01/11/23 1124)  Resp: 19 (01/11/23 1124)  BP: (!) 144/78 (01/11/23 0859)  SpO2: 100 % (01/11/23 1124)   Vital Signs (24h Range):  Temp:  [98.1 °F (36.7 °C)-99 °F (37.2 °C)] 98.7 °F (37.1 °C)  Pulse:  [] 104  Resp:  [15-24] 19  SpO2:  [95 %-100 %] 100 %  BP: (118-146)/(60-78) 144/78        There is no height or weight on file to calculate BMI.  No intake or output data in the 24 hours ending 01/11/23 1134   Physical Exam  Constitutional:       General: He is not in acute distress.     Appearance: He is well-developed. He is ill-appearing. He is not toxic-appearing.   HENT:      Head: Normocephalic and atraumatic.   Eyes:      Conjunctiva/sclera: Conjunctivae normal.      Pupils: Pupils are equal, round, and reactive to light.   Neck:      Thyroid: No thyromegaly.      Vascular: No JVD.   Cardiovascular:      Rate and Rhythm: Regular rhythm. Tachycardia present.      Heart sounds: Normal heart sounds. No murmur heard.    No friction rub. No gallop.   Pulmonary:      Effort: Pulmonary effort is normal. No respiratory distress.      Breath sounds: Normal breath sounds. No wheezing or rales.      Comments: Trach in place  Abdominal:      General: Bowel sounds are normal. There is no distension.      Palpations: Abdomen is soft. There is no mass.      Tenderness: There is no abdominal tenderness. There is no guarding or rebound.      Hernia: No hernia is present.      Comments: G-tube site is pink and appears to be well healed, no erythema or discharge    Musculoskeletal:         General: No deformity. Normal range of motion.      Cervical back: Normal range of motion and neck supple.   Skin:     General:  Skin is warm and dry.   Neurological:      Mental Status: He is alert. Mental status is at baseline.       Significant Labs: All pertinent labs within the past 24 hours have been reviewed.  CBC:   Recent Labs   Lab 01/10/23  1123 01/11/23  0451   WBC 9.64 8.86   HGB 12.2* 11.1*   HCT 39.4* 36.7*   * 502*     CMP:   Recent Labs   Lab 01/10/23  1123 01/11/23  0451    138   K 4.7 4.2    103   CO2 24 24   * 90   BUN 31* 33*   CREATININE 1.3 1.1   CALCIUM 10.2 10.2   PROT 8.3  --    ALBUMIN 2.6*  --    BILITOT 0.4  --    ALKPHOS 182*  --    AST 20  --    ALT 25  --    ANIONGAP 12 11       Significant Imaging: I have reviewed all pertinent imaging results/findings within the past 24 hours.

## 2023-01-11 NOTE — PROGRESS NOTES
[facility] ([address] Medicine  Progress Note    Patient Name: Hao Medrano  MRN: [id number]  Patient Class: OP- Observation   Admission Date: 1/10/2023  Length of Stay: 0 days  Attending Physician: Bharati Hernandez MD  Primary Care Provider: Keara Yanes NP        Subjective:     Principal Problem:Dislodged gastrostomy tube        HPI:  Hao Medrano is a 62 y.o. male with a past medical history of CVA with subsequent trach/PEG, CKD3b, and HLD who was transferred from his nursing home for evaluation of PEG tube removal. Per ED note, patient's friend reports that they are unsure how log the peg tube has been dislodged and they are unsure how it happened. They stated that patient is at his baseline which is non-communicative.      ED: tachycardic up to 117, otherwise vital signs stable, afebile. CBC with anemia, Hb 12.2. No leukocytosis. Cr 1.3, glucose 122.  XR of g-tube ordered and pending. ED provider attempted to replace PEG tube with a Sheets but unsuccessful after four attempts with three different sized 3 tubes. GI consulted.       Overview/Hospital Course:  No acute events since admission.  Pending EGD-peg placement      Interval History:  No acute events overnight.  Patient is non communicative however tracks me across the room and nod to yes or no to simple questions.    Review of Systems   Unable to perform ROS: Patient nonverbal   Objective:     Vital Signs (Most Recent):  Temp: 98.7 °F (37.1 °C) (01/11/23 0859)  Pulse: 104 (01/11/23 1124)  Resp: 19 (01/11/23 1124)  BP: (!) 144/78 (01/11/23 0859)  SpO2: 100 % (01/11/23 1124)   Vital Signs (24h Range):  Temp:  [98.1 °F (36.7 °C)-99 °F (37.2 °C)] 98.7 °F (37.1 °C)  Pulse:  [] 104  Resp:  [15-24] 19  SpO2:  [95 %-100 %] 100 %  BP: (118-146)/(60-78) 144/78        There is no height or weight on file to calculate BMI.  No intake or output data in the 24 hours ending 01/11/23 4208   Physical Exam  Constitutional:        General: He is not in acute distress.     Appearance: He is well-developed. He is ill-appearing. He is not toxic-appearing.   HENT:      Head: Normocephalic and atraumatic.   Eyes:      Conjunctiva/sclera: Conjunctivae normal.      Pupils: Pupils are equal, round, and reactive to light.   Neck:      Thyroid: No thyromegaly.      Vascular: No JVD.   Cardiovascular:      Rate and Rhythm: Regular rhythm. Tachycardia present.      Heart sounds: Normal heart sounds. No murmur heard.    No friction rub. No gallop.   Pulmonary:      Effort: Pulmonary effort is normal. No respiratory distress.      Breath sounds: Normal breath sounds. No wheezing or rales.      Comments: Trach in place  Abdominal:      General: Bowel sounds are normal. There is no distension.      Palpations: Abdomen is soft. There is no mass.      Tenderness: There is no abdominal tenderness. There is no guarding or rebound.      Hernia: No hernia is present.      Comments: G-tube site is pink and appears to be well healed, no erythema or discharge    Musculoskeletal:         General: No deformity. Normal range of motion.      Cervical back: Normal range of motion and neck supple.   Skin:     General: Skin is warm and dry.   Neurological:      Mental Status: He is alert. Mental status is at baseline.       Significant Labs: All pertinent labs within the past 24 hours have been reviewed.  CBC:   Recent Labs   Lab 01/10/23  1123 01/11/23  0451   WBC 9.64 8.86   HGB 12.2* 11.1*   HCT 39.4* 36.7*   * 502*     CMP:   Recent Labs   Lab 01/10/23  1123 01/11/23  0451    138   K 4.7 4.2    103   CO2 24 24   * 90   BUN 31* 33*   CREATININE 1.3 1.1   CALCIUM 10.2 10.2   PROT 8.3  --    ALBUMIN 2.6*  --    BILITOT 0.4  --    ALKPHOS 182*  --    AST 20  --    ALT 25  --    ANIONGAP 12 11       Significant Imaging: I have reviewed all pertinent imaging results/findings within the past 24 hours.      Assessment/Plan:      * Dislodged  gastrostomy tube  Patient transferred from nursing home due to dislodged PEG tube. Unsure of how long the peg tube has been dislodged.  ED providers attempted four times to replaced peg tube with three different sized g tubes and were unsuccessful  - GI consulted, plan to replace today  - NPO for now      Oropharyngeal dysphagia  S/p PEG placement previously. CVA secondary to prev history of CVA  - Pending Peg replacement by GI today.      Anemia  - patient's anemia is currently controlled  - etiology likely due to anemia of chronic disease +/- CKD  - continue ferrous sulfate when able  - current CBC reviewed -   Lab Results   Component Value Date    HGB 12.2 (L) 01/10/2023    HCT 39.4 (L) 01/10/2023     - monitor serial CBC and transfuse if patient becomes hemodynamically unstable, symptomatic, or H/H drops below 7/21.         HLD (hyperlipidemia)  - holding atorvastatin and fenofibrate due to peg tube dislodgement  - resume when able      Chronic kidney disease, stage III (moderate)  - Cr 1.3 at admission, improved from recent baseline of ~2.0  - renally dosing all medications to CrCl cannot be calculated (Unknown ideal weight.).   - avoid nephrotoxins  - will continue to monitor on daily labs      VTE Risk Mitigation (From admission, onward)         Ordered     IP VTE LOW RISK PATIENT  Once         01/10/23 1140     Place sequential compression device  Until discontinued         01/10/23 1140                Discharge Planning   FAINA:      Code Status: Full Code   Is the patient medically ready for discharge?: No    Reason for patient still in hospital (select all that apply): Patient trending condition                     Bharati Hernandez MD  Department of Hospital Medicine   [facility] - Intensive Care ([place])

## 2023-01-11 NOTE — ASSESSMENT & PLAN NOTE
S/p PEG placement previously. CVA secondary to prev history of CVA  - Pending Peg replacement by GI today.

## 2023-01-11 NOTE — ANESTHESIA PREPROCEDURE EVALUATION
[facility]  Anesthesia Pre-Operative Evaluation         Patient Name: Hao Medrano  YOB: 1960  MRN: [id number]    SUBJECTIVE:     Pre-operative evaluation for Procedure(s) (LRB):  EGD (ESOPHAGOGASTRODUODENOSCOPY) (N/A)     01/10/2023    Hao Medrano is a 62 y.o. male w/ a significant PMHx of CVA with subsequent trach/PEG, CKD3b, and HLD who was transferred from his nursing home for evaluation of PEG tube removal. Per ED note, patient's friend reports that they are unsure how log the peg tube has been dislodged and they are unsure how it happened. They stated that patient is at his baseline which is non-communicative.     Patient now presents for the above procedure(s).      Of note, pts common law wife provided consent via telephone given pt is non verbal at baseline     LDA:        Peripheral IV - Single Lumen 01/10/23 1219 20 G Left Upper Arm (Active)   Site Assessment Clean;Dry;Intact 01/10/23 1219   Extremity Assessment Distal to IV No abnormal discoloration 01/10/23 1219   Line Status Blood return noted;Saline locked;Flushed 01/10/23 1219   Dressing Status Clean;Dry;Intact 01/10/23 1219   Number of days: 0       Prev airway: Pt has tracheostomy     Drips: None documented.    Patient Active Problem List   Diagnosis    Chronic kidney disease, stage III (moderate)    Dislodged gastrostomy tube    HLD (hyperlipidemia)    Anemia    Oropharyngeal dysphagia       Review of patient's allergies indicates:  No Known Allergies    Current Outpatient Medications:    Current Facility-Administered Medications:     acetaminophen tablet 1,000 mg, 1,000 mg, Oral, Q8H PRN, Elaina Mcwilliams PA-C    acetaminophen tablet 650 mg, 650 mg, Oral, Q4H PRN, Elaina Mcwilliams PA-C    albuterol-ipratropium 2.5 mg-0.5 mg/3 mL nebulizer solution 3 mL, 3 mL, Nebulization, Q4H PRN, Elaina Mcwilliams PA-C    aluminum-magnesium hydroxide-simethicone 200-200-20 mg/5 mL suspension 30 mL, 30 mL, Oral, QID  PRN, Elaina Mcwilliams PA-C    bisacodyL suppository 10 mg, 10 mg, Rectal, Daily PRN, Elaina Mcwilliams PA-C    dextrose 10% bolus 125 mL 125 mL, 12.5 g, Intravenous, PRN, Bharati Hernandez MD    dextrose 10% bolus 250 mL 250 mL, 25 g, Intravenous, PRN, Bharati Hernandez MD    glucagon (human recombinant) injection 1 mg, 1 mg, Intramuscular, PRN, Elaina Mcwilliams PA-C    glucose chewable tablet 16 g, 16 g, Oral, PRN, Elaina Mcwilliams PA-C    glucose chewable tablet 24 g, 24 g, Oral, PRN, Elaina Mcwilliams PA-C    insulin aspart U-100 pen 0-5 Units, 0-5 Units, Subcutaneous, QID (AC + HS) PRN, Elaina Mcwilliams PA-C    melatonin tablet 6 mg, 6 mg, Oral, Nightly PRN, Elaina Mcwilliams PA-C    naloxone 0.4 mg/mL injection 0.02 mg, 0.02 mg, Intravenous, PRN, Elaina Mcwilliams PA-C    ondansetron disintegrating tablet 8 mg, 8 mg, Oral, Q8H PRN, Elaina Mcwilliams PA-C    prochlorperazine injection Soln 5 mg, 5 mg, Intravenous, Q6H PRN, Elaina Mcwilliams PA-C    simethicone chewable tablet 80 mg, 1 tablet, Oral, QID PRN, Elaina Mcwilliams PA-C    sodium chloride 0.9% flush 5 mL, 5 mL, Intravenous, PRN, Elaina Mcwilliams PA-C    No past surgical history on file.    Social History     Socioeconomic History    Marital status: Single   Tobacco Use    Smoking status: Never       OBJECTIVE:     Vital Signs Range (Last 24H):  Temp:  [36.8 °C (98.3 °F)-37.2 °C (99 °F)]   Pulse:  []   Resp:  [15-24]   BP: (116-149)/(59-81)   SpO2:  [95 %-100 %]       Significant Labs:  Lab Results   Component Value Date    WBC 9.64 01/10/2023    HGB 12.2 (L) 01/10/2023    HCT 39.4 (L) 01/10/2023     (H) 01/10/2023    ALT 25 01/10/2023    AST 20 01/10/2023     01/10/2023    K 4.7 01/10/2023     01/10/2023    CREATININE 1.3 01/10/2023    BUN 31 (H) 01/10/2023    CO2 24 01/10/2023    HGBA1C 5.9 (H) 07/12/2018       Diagnostic Studies: No relevant studies.    EKG:   Results for orders placed or performed during the hospital  encounter of 01/10/23   EKG 12-lead    Collection Time: 01/10/23 12:29 PM    Narrative    Test Reason : R00.0,    Vent. Rate : 100 BPM     Atrial Rate : 100 BPM     P-R Int : 148 ms          QRS Dur : 082 ms      QT Int : 354 ms       P-R-T Axes : 049 029 012 degrees     QTc Int : 456 ms    Normal sinus rhythm  Nonspecific T wave abnormality  Abnormal ECG  No previous ECGs available  Confirmed by Shayne Moss MD ([id number]) on 1/10/2023 4:34:35 PM    Referred By: MAKAYLA   SELF           Confirmed By:Shayne Moss MD       2D ECHO:  TTE:  No results found for this or any previous visit.    HARDEEP:  No results found for this or any previous visit.    ASSESSMENT/PLAN:                                                                                                                 01/10/2023  Hao Medrano is a 62 y.o., male.      Pre-op Assessment    I have reviewed the Patient Summary Reports.     I have reviewed the Nursing Notes. I have reviewed the NPO Status.   I have reviewed the Medications.     Review of Systems  Anesthesia Hx:  History of prior surgery of interest to airway management or planning: Denies Family Hx of Anesthesia complications.   Denies Personal Hx of Anesthesia complications.   Hematology/Oncology:         -- Anemia:   Cardiovascular:   Denies MI.  Denies CAD.    hyperlipidemia    Pulmonary:   Denies COPD. Tracheostomy    Renal/:   Chronic Renal Disease, CKD    Hepatic/GI:   Denies GERD.    Neurological:   CVA    Psych:  Psychiatric Normal           Physical Exam  General: Cooperative  Non verbal at baseline   Airway:  Pre-Existing Airway: Tracheostomy tube    Dental:Airway exam limited 2/2 to pt cooperation   Chest/Lungs:  Clear to auscultation, Normal Respiratory Rate  Trach in place   Heart:  Rhythm: Regular Rhythm        Anesthesia Plan  Type of Anesthesia, risks & benefits discussed:    Anesthesia Type: Gen Natural Airway, MAC  Intra-op Monitoring Plan: Standard ASA Monitors  Post Op Pain  Control Plan: multimodal analgesia and IV/PO Opioids PRN  Induction:  IV  Informed Consent: Informed consent signed with the Patient representative and all parties understand the risks and agree with anesthesia plan.  All questions answered.   ASA Score: 3  Day of Surgery Review of History & Physical: H&P Update referred to the surgeon/provider.    Ready For Surgery From Anesthesia Perspective.     .

## 2023-01-11 NOTE — PHARMACY MED REC
"Admission Medication History     The home medication history was taken by Bree Rubin.    You may go to "Admission" then "Reconcile Home Medications" tabs to review and/or act upon these items.     The home medication list has been updated by the Pharmacy department.   Please read ALL comments highlighted in yellow.   Please address this information as you see fit.    Feel free to contact us if you have any questions or require assistance.        Medications listed below were obtained from: Nursing home  PTA Medications   Medication Sig    arginine-glutamine-calcium HMB (SHEREE) 7-7-1.5 gram PwPk   1 packet by Per G Tube route 2 (two) times daily.    aspirin 81 MG Chew   81 mg by Per G Tube route once daily.    baclofen (LIORESAL) 5 mg Tab tablet   15 mg by Per G Tube route 3 (three) times daily.    calcium carbonate (OS-OLGA) 500 mg calcium (1,250 mg) tablet   1,250 mg by Per G Tube route once daily.    carvediloL (COREG) 3.125 MG tablet   3.125 mg by Per G Tube route 2 (two) times daily.    esomeprazole (NEXIUM PACKET) 40 mg GrPS   40 mg by Per G Tube route once daily.    insulin glargine-yfgn (SEMGLEE,INSULIN GLARG-YFGN,PEN) 100 unit/mL (3 mL) InPn   Inject 15 Units into the skin once daily.    Lactobacillus rhamnosus GG (CULTURELLE) 10 billion cell capsule   2 capsules by Per G Tube route 2 (two) times a day.    levETIRAcetam (KEPPRA) 100 mg/mL Soln   750 mg by Per G Tube route 2 (two) times daily.    menthol/zinc oxide (CALMOSEPTINE TOP)   Apply to the affected area twice daily as needed    nystatin (MYCOSTATIN) powder   Apply to the affected area daily    predniSONE (DELTASONE) 10 MG tablet 10 mg by Per G Tube route once daily.       Potential issues to be addressed PRIOR TO DISCHARGE  The listed medications were obtained from another facility ([facility]). The patient may not have been able to fill these prescriptions prior to this admission and may require new scripts upon discharge. "     Bree Rubin  [id number]                .

## 2023-01-11 NOTE — PLAN OF CARE
Problem: Adult Inpatient Plan of Care  Goal: Plan of Care Review  Outcome: Ongoing, Progressing     Problem: Infection  Goal: Absence of Infection Signs and Symptoms  Outcome: Ongoing, Progressing     Problem: Impaired Wound Healing  Goal: Optimal Wound Healing  Outcome: Ongoing, Progressing     Problem: Skin Injury Risk Increased  Goal: Skin Health and Integrity  Outcome: Ongoing, Progressing     Pt AO to self. No acute events noted during shift. Vitals remained stable. No physical signs or c/o pain or discomfort noted, pt remains NPO per MD orders. Remains on 5L per Trach. Q1-2hr safety checks and turns completed. Bed remained low, locked, with all personal items in reach.

## 2023-01-11 NOTE — NURSING
Pt is Alert to self. Arrived to unit via stretcher and one assist. Upon arrival vs stable. Pt has been oriented to room. Bed is low, locked, with all personal items in reach. Will continue to monitor.

## 2023-01-12 ENCOUNTER — ANESTHESIA (OUTPATIENT)
Dept: ENDOSCOPY | Facility: HOSPITAL | Age: 63
End: 2023-01-12
Payer: MEDICARE

## 2023-01-12 LAB
ANION GAP SERPL CALC-SCNC: 15 MMOL/L (ref 8–16)
ANISOCYTOSIS BLD QL SMEAR: SLIGHT
BASOPHILS # BLD AUTO: 0.04 K/UL (ref 0–0.2)
BASOPHILS NFR BLD: 0.4 % (ref 0–1.9)
BUN SERPL-MCNC: 31 MG/DL (ref 8–23)
CALCIUM SERPL-MCNC: 10.1 MG/DL (ref 8.7–10.5)
CHLORIDE SERPL-SCNC: 105 MMOL/L (ref 95–110)
CO2 SERPL-SCNC: 22 MMOL/L (ref 23–29)
CREAT SERPL-MCNC: 1.3 MG/DL (ref 0.5–1.4)
DIFFERENTIAL METHOD: ABNORMAL
EOSINOPHIL # BLD AUTO: 2.6 K/UL (ref 0–0.5)
EOSINOPHIL NFR BLD: 27.8 % (ref 0–8)
ERYTHROCYTE [DISTWIDTH] IN BLOOD BY AUTOMATED COUNT: 16.3 % (ref 11.5–14.5)
EST. GFR  (NO RACE VARIABLE): >60 ML/MIN/1.73 M^2
GLUCOSE SERPL-MCNC: 74 MG/DL (ref 70–110)
HCT VFR BLD AUTO: 36.2 % (ref 40–54)
HGB BLD-MCNC: 11.1 G/DL (ref 14–18)
IMM GRANULOCYTES # BLD AUTO: 0.02 K/UL (ref 0–0.04)
IMM GRANULOCYTES NFR BLD AUTO: 0.2 % (ref 0–0.5)
LYMPHOCYTES # BLD AUTO: 1.3 K/UL (ref 1–4.8)
LYMPHOCYTES NFR BLD: 14.3 % (ref 18–48)
MCH RBC QN AUTO: 27.3 PG (ref 27–31)
MCHC RBC AUTO-ENTMCNC: 30.7 G/DL (ref 32–36)
MCV RBC AUTO: 89 FL (ref 82–98)
MONOCYTES # BLD AUTO: 0.7 K/UL (ref 0.3–1)
MONOCYTES NFR BLD: 7.2 % (ref 4–15)
NEUTROPHILS # BLD AUTO: 4.7 K/UL (ref 1.8–7.7)
NEUTROPHILS NFR BLD: 50.1 % (ref 38–73)
NRBC BLD-RTO: 0 /100 WBC
PLATELET # BLD AUTO: 579 K/UL (ref 150–450)
PLATELET BLD QL SMEAR: ABNORMAL
PMV BLD AUTO: 9.9 FL (ref 9.2–12.9)
POCT GLUCOSE: 77 MG/DL (ref 70–110)
POCT GLUCOSE: 79 MG/DL (ref 70–110)
POCT GLUCOSE: 80 MG/DL (ref 70–110)
POIKILOCYTOSIS BLD QL SMEAR: SLIGHT
POTASSIUM SERPL-SCNC: 4.2 MMOL/L (ref 3.5–5.1)
RBC # BLD AUTO: 4.06 M/UL (ref 4.6–6.2)
SODIUM SERPL-SCNC: 142 MMOL/L (ref 136–145)
WBC # BLD AUTO: 9.38 K/UL (ref 3.9–12.7)

## 2023-01-12 PROCEDURE — 63600175 PHARM REV CODE 636 W HCPCS: Performed by: NURSE ANESTHETIST, CERTIFIED REGISTERED

## 2023-01-12 PROCEDURE — 99900035 HC TECH TIME PER 15 MIN (STAT)

## 2023-01-12 PROCEDURE — 25000003 PHARM REV CODE 250: Performed by: HOSPITALIST

## 2023-01-12 PROCEDURE — G0378 HOSPITAL OBSERVATION PER HR: HCPCS

## 2023-01-12 PROCEDURE — 43246 PR EGD, FLEX, W/PLCMT, GASTROSTOMY TUBE: ICD-10-PCS | Mod: ,,, | Performed by: INTERNAL MEDICINE

## 2023-01-12 PROCEDURE — D9220A PRA ANESTHESIA: ICD-10-PCS | Mod: ANES,,, | Performed by: ANESTHESIOLOGY

## 2023-01-12 PROCEDURE — 27201012 HC FORCEPS, HOT/COLD, DISP: Performed by: INTERNAL MEDICINE

## 2023-01-12 PROCEDURE — 43246 EGD PLACE GASTROSTOMY TUBE: CPT | Performed by: INTERNAL MEDICINE

## 2023-01-12 PROCEDURE — D9220A PRA ANESTHESIA: Mod: CRNA,,, | Performed by: NURSE ANESTHETIST, CERTIFIED REGISTERED

## 2023-01-12 PROCEDURE — D9220A PRA ANESTHESIA: Mod: ANES,,, | Performed by: ANESTHESIOLOGY

## 2023-01-12 PROCEDURE — 43246 EGD PLACE GASTROSTOMY TUBE: CPT | Mod: ,,, | Performed by: INTERNAL MEDICINE

## 2023-01-12 PROCEDURE — 37000008 HC ANESTHESIA 1ST 15 MINUTES: Performed by: INTERNAL MEDICINE

## 2023-01-12 PROCEDURE — 99900031 HC PATIENT EDUCATION (STAT)

## 2023-01-12 PROCEDURE — 99900026 HC AIRWAY MAINTENANCE (STAT)

## 2023-01-12 PROCEDURE — 94761 N-INVAS EAR/PLS OXIMETRY MLT: CPT

## 2023-01-12 PROCEDURE — 63600175 PHARM REV CODE 636 W HCPCS: Performed by: HOSPITALIST

## 2023-01-12 PROCEDURE — D9220A PRA ANESTHESIA: ICD-10-PCS | Mod: CRNA,,, | Performed by: NURSE ANESTHETIST, CERTIFIED REGISTERED

## 2023-01-12 PROCEDURE — 27000221 HC OXYGEN, UP TO 24 HOURS

## 2023-01-12 PROCEDURE — 36415 COLL VENOUS BLD VENIPUNCTURE: CPT

## 2023-01-12 PROCEDURE — 80048 BASIC METABOLIC PNL TOTAL CA: CPT

## 2023-01-12 PROCEDURE — 27201018 HC KIT, PEG (ANY): Performed by: INTERNAL MEDICINE

## 2023-01-12 PROCEDURE — 85025 COMPLETE CBC W/AUTO DIFF WBC: CPT

## 2023-01-12 PROCEDURE — 37000009 HC ANESTHESIA EA ADD 15 MINS: Performed by: INTERNAL MEDICINE

## 2023-01-12 PROCEDURE — 25000003 PHARM REV CODE 250: Performed by: NURSE ANESTHETIST, CERTIFIED REGISTERED

## 2023-01-12 RX ORDER — PROPOFOL 10 MG/ML
VIAL (ML) INTRAVENOUS CONTINUOUS PRN
Status: DISCONTINUED | OUTPATIENT
Start: 2023-01-12 | End: 2023-01-12

## 2023-01-12 RX ORDER — PROPOFOL 10 MG/ML
VIAL (ML) INTRAVENOUS
Status: DISCONTINUED | OUTPATIENT
Start: 2023-01-12 | End: 2023-01-12

## 2023-01-12 RX ORDER — LORAZEPAM 2 MG/ML
0.25 INJECTION INTRAMUSCULAR ONCE AS NEEDED
Status: DISCONTINUED | OUTPATIENT
Start: 2023-01-12 | End: 2023-01-12 | Stop reason: HOSPADM

## 2023-01-12 RX ORDER — MEPERIDINE HYDROCHLORIDE 50 MG/ML
12.5 INJECTION INTRAMUSCULAR; INTRAVENOUS; SUBCUTANEOUS ONCE AS NEEDED
Status: DISCONTINUED | OUTPATIENT
Start: 2023-01-12 | End: 2023-01-12 | Stop reason: HOSPADM

## 2023-01-12 RX ORDER — CEFAZOLIN SODIUM 1 G/3ML
INJECTION, POWDER, FOR SOLUTION INTRAMUSCULAR; INTRAVENOUS
Status: DISCONTINUED | OUTPATIENT
Start: 2023-01-12 | End: 2023-01-12

## 2023-01-12 RX ORDER — HYDROMORPHONE HYDROCHLORIDE 1 MG/ML
0.2 INJECTION, SOLUTION INTRAMUSCULAR; INTRAVENOUS; SUBCUTANEOUS EVERY 5 MIN PRN
Status: DISCONTINUED | OUTPATIENT
Start: 2023-01-12 | End: 2023-01-12 | Stop reason: HOSPADM

## 2023-01-12 RX ORDER — PANTOPRAZOLE SODIUM 40 MG/1
40 FOR SUSPENSION ORAL DAILY
Status: DISCONTINUED | OUTPATIENT
Start: 2023-01-12 | End: 2023-01-16 | Stop reason: HOSPADM

## 2023-01-12 RX ORDER — LEVETIRACETAM 100 MG/ML
750 SOLUTION ORAL 2 TIMES DAILY
Status: DISCONTINUED | OUTPATIENT
Start: 2023-01-12 | End: 2023-01-16 | Stop reason: HOSPADM

## 2023-01-12 RX ORDER — PREDNISONE 5 MG/1
10 TABLET ORAL DAILY
Status: DISCONTINUED | OUTPATIENT
Start: 2023-01-12 | End: 2023-01-16 | Stop reason: HOSPADM

## 2023-01-12 RX ORDER — ONDANSETRON 2 MG/ML
4 INJECTION INTRAMUSCULAR; INTRAVENOUS DAILY PRN
Status: DISCONTINUED | OUTPATIENT
Start: 2023-01-12 | End: 2023-01-12 | Stop reason: HOSPADM

## 2023-01-12 RX ORDER — SODIUM CHLORIDE 9 MG/ML
INJECTION, SOLUTION INTRAVENOUS CONTINUOUS
Status: DISCONTINUED | OUTPATIENT
Start: 2023-01-12 | End: 2023-01-13

## 2023-01-12 RX ORDER — LIDOCAINE HCL/PF 100 MG/5ML
SYRINGE (ML) INTRAVENOUS
Status: DISCONTINUED | OUTPATIENT
Start: 2023-01-12 | End: 2023-01-12

## 2023-01-12 RX ORDER — SODIUM CHLORIDE 9 MG/ML
INJECTION, SOLUTION INTRAVENOUS CONTINUOUS PRN
Status: DISCONTINUED | OUTPATIENT
Start: 2023-01-12 | End: 2023-01-12

## 2023-01-12 RX ADMIN — PROPOFOL 150 MCG/KG/MIN: 10 INJECTION, EMULSION INTRAVENOUS at 10:01

## 2023-01-12 RX ADMIN — PROPOFOL 20 MG: 10 INJECTION, EMULSION INTRAVENOUS at 10:01

## 2023-01-12 RX ADMIN — SODIUM CHLORIDE: 0.9 INJECTION, SOLUTION INTRAVENOUS at 10:01

## 2023-01-12 RX ADMIN — PREDNISONE 10 MG: 5 TABLET ORAL at 08:01

## 2023-01-12 RX ADMIN — LEVETIRACETAM 750 MG: 100 SOLUTION ORAL at 08:01

## 2023-01-12 RX ADMIN — PANTOPRAZOLE SODIUM 40 MG: 40 GRANULE, DELAYED RELEASE ORAL at 08:01

## 2023-01-12 RX ADMIN — CEFAZOLIN 2 G: 330 INJECTION, POWDER, FOR SOLUTION INTRAMUSCULAR; INTRAVENOUS at 10:01

## 2023-01-12 RX ADMIN — PROPOFOL 80 MG: 10 INJECTION, EMULSION INTRAVENOUS at 10:01

## 2023-01-12 RX ADMIN — CEFAZOLIN 1 G: 330 INJECTION, POWDER, FOR SOLUTION INTRAMUSCULAR; INTRAVENOUS at 10:01

## 2023-01-12 RX ADMIN — BACLOFEN 15 MG: 5 TABLET ORAL at 08:01

## 2023-01-12 RX ADMIN — Medication 20 MG: at 10:01

## 2023-01-12 NOTE — INTERVAL H&P NOTE
The patient has been examined and the H&P has been reviewed:    I concur with the findings and no changes have occurred since H&P was written.    Procedure risks, benefits and alternative options discussed and understood by patient/family.    EGD today for PEG placement      Active Hospital Problems    Diagnosis  POA    *Dislodged gastrostomy tube [T85.528A]  Yes    HLD (hyperlipidemia) [E78.5]  Yes    Anemia [D64.9]  Yes    Oropharyngeal dysphagia [R13.12]  Yes    Chronic kidney disease, stage III (moderate) [N18.30]  Yes      Resolved Hospital Problems   No resolved problems to display.

## 2023-01-12 NOTE — ANESTHESIA RELEASE NOTE
"Anesthesia Release from PACU Note    Patient: Hao Medrano    Procedure(s) Performed: Procedure(s) (LRB):  EGD (ESOPHAGOGASTRODUODENOSCOPY) (N/A)    Anesthesia type: general    Post pain: Adequate analgesia    Post assessment: no apparent anesthetic complications    Last Vitals:   Visit Vitals  /71   Pulse 104   Temp 36.7 °C (98 °F) (Temporal)   Resp 18   Ht 6' 0.84" (1.85 m)   Wt 72 kg (158 lb 11.7 oz)   SpO2 100%   BMI 21.04 kg/m²       Post vital signs: stable    Level of consciousness: awake and alert     Nausea/Vomiting: no nausea/no vomiting    Complications: none    Airway Patency: patent    Respiratory: unassisted on baseline O2 requirement    Cardiovascular: stable and blood pressure at baseline    Hydration: euvolemic  "

## 2023-01-12 NOTE — PLAN OF CARE
[facility] ([place] [address])  Initial Discharge Assessment       Primary Care Provider: Keara Yanes NP    Admission Diagnosis: Oropharyngeal dysphagia [R13.12]  Tachycardia [R00.0]  Dislodged gastrostomy tube [T85.528A]  Chest pain [R07.9]  PEG tube malfunction [K94.23]    Admission Date: 1/10/2023  Expected Discharge Date: 1/12/2023    Discharge Barriers Identified: (P) None    Payor: [employer] MANAGED MEDICARE / Plan: [employer]ET[employer] MEDICARE DUAL DSNP / Product Type: Medicare Advantage /     Extended Emergency Contact Information  Primary Emergency Contact: Rosa Isela Damon  Home Phone: [phone]  Relation: Spouse    Discharge Plan A: (P) Return to nursing home  Discharge Plan B: (P) Return to Nursing Home      [place] [id number] at [facility] [address] AT [facility] [address]  [address]  [facility] [address]  [facility] [address]  Phone: [phone] Fax: [phone]    [place] [address] [facility]  [address] [facility]  [address]  [facility] [address]  Phone: [phone] Fax: [phone]      Initial Assessment (most recent)       Adult Discharge Assessment - 01/12/23 1602          Discharge Assessment    Assessment Type Discharge Planning Assessment (P)      Confirmed/corrected address, phone number and insurance Yes (P)    Lives at [facility].    Source of Information family (P)      If unable to respond/provide information was family/caregiver contacted? Yes (P)      Contact Name/Number Rosa Isela Damon [phone] (P)      Communicated FAINA with patient/caregiver No (P)      Reason For Admission Dislodged gastrostomy tube (P)      People in Home facility resident (P)      Facility Arrived From: [facility] (P)      Do you expect to return to your current living situation? Yes (P)      Do you have help at home or someone to help you manage your care at home? Yes (P)      Who are your caregiver(s) and their phone number(s)?  Rosa Isela Damon [phone]; staff at [facility] (P)      Prior to hospitilization cognitive status: Unable to Assess (P)      Current cognitive status: Unable to Assess (P)    Pt with history of massive stroke per spouse; currently has a trach    Walking or Climbing Stairs transferring difficulty, dependent (P)      Mobility Management Facility assists (P)      Dressing/Bathing dressing difficulty, dependent (P)      Dressing/Bathing Management Facility assists (P)      Equipment Currently Used at Home none (P)      Readmission within 30 days? No (P)      Do you currently have service(s) that help you manage your care at home? Yes (P)      Name and Contact number of agency Rosa Isela Damon [phone] (P)      Is the pt/caregiver preference to resume services with current agency Yes (P)      Do you take prescription medications? Yes (P)      Do you have prescription coverage? Yes (P)      Coverage [employer] (P)      Who is going to help you get home at discharge? transfer by ambulance (P)      How do you get to doctors appointments? other (see comments) (P)    [facility] resident    Are you on dialysis? No (P)      Do you take coumadin? No (P)      Discharge Plan A Return to nursing home (P)      Discharge Plan B Return to Nursing Home (P)      DME Needed Upon Discharge  other (see comments) (P)    tbd    Discharge Plan discussed with: Spouse/sig other (P)      Discharge Barriers Identified None (P)         OTHER    Name(s) of People in Home Facility Resident (P)                    Pt unable to speak d/t history massive stroke.  Spoke with spouse Rosa Isela Damon, at [phone] from [employer]; she states her number is [phone].  Patient is resident of [facility], and plan is for him to return upon d/c from this facility.    Tamara Rice, NATASHAN, BS, RN, CCM

## 2023-01-12 NOTE — TRANSFER OF CARE
"Anesthesia Transfer of Care Note    Patient: Hao Medrano    Procedure(s) Performed: Procedure(s) (LRB):  EGD (ESOPHAGOGASTRODUODENOSCOPY) (N/A)    Patient location: PACU    Anesthesia Type: general    Transport from OR: Transported from OR on 6-10 L/min O2 by face mask with adequate spontaneous ventilation    Post pain: adequate analgesia    Post assessment: no apparent anesthetic complications and tolerated procedure well    Post vital signs: stable    Level of consciousness: sedated    Nausea/Vomiting: no nausea/vomiting    Complications: none    Transfer of care protocol was followed      Last vitals:   Visit Vitals  /66 (BP Location: Left arm, Patient Position: Lying)   Pulse 108   Temp 36.8 °C (98.3 °F) (Temporal)   Resp 17   Ht 6' 0.84" (1.85 m)   Wt 72 kg (158 lb 11.7 oz)   SpO2 100%   BMI 21.04 kg/m²     "

## 2023-01-12 NOTE — TREATMENT PLAN
GI Treatment Plan    19 F PEG tube placed without any immediate complications.   External bumper at 3 cm shelby.    Please follow the post-PEG recommendations including:   - Today: After 4 hours, may use PEG today for only medications and water  - Tomorrow: may use PEG tomorrow for feedings  - Flushes: flush PEG daily with 60 ml water  - Please consult nutrition for tube feed goals and recommendations.    Care Instructions  - antibiotic ointment to site, clean site with soap and water daily and dry thoroughly and clean site daily with half-strength hydrogen peroxide for three days, then soap and water daily.  - Dressing: change dressing on top of bumper daily    Inder Del Castillo MD  Gastroenterology Fellow

## 2023-01-12 NOTE — RESPIRATORY THERAPY
RAPID RESPONSE RESPIRATORY THERAPY PROACTIVE NOTE           Time of visit: 1536     Code Status: Full Code   : 1960  Bed: 73121/80120 A:   MRN: [id number]  Time spent at the bedside: < 15 min    SITUATION    Evaluated patient for: LDA Check     BACKGROUND    Patient has no past medical history on file.  Clinically Significant Surgical Hx: tracheostomy    24 Hours Vitals Range:  Temp:  [97.7 °F (36.5 °C)-98.8 °F (37.1 °C)]   Pulse:  [104-120]   Resp:  [16-20]   BP: (122-146)/(65-77)   SpO2:  [100 %]     Labs:    Recent Labs     01/10/23  1123 23  0451 23  0427    138 142   K 4.7 4.2 4.2    103 105   CO2 24 24 22*   CREATININE 1.3 1.1 1.3   * 90 74        No results for input(s): PH, PCO2, PO2, HCO3, POCSATURATED, BE in the last 72 hours.    ASSESSMENT/INTERVENTIONS  Pt on 5L 28% trach collar. Shiley size 6 cuffed XLT trach in place. All supplies in room. Extra cuffed XLT trachs at bedside sizes 4 and 6, as well as sizes 4 and 6 uncuffed.      Last VS   Temp: 97.7 °F (36.5 °C) ( 1508)  Pulse: 108 ( 1508)  Resp: 17 ( 1508)  BP: 131/65 ( 1508)  SpO2: 100 % ( 1508)      Extra trachs at bedside: cuffed XLT 4 and 6, and uncuffed XLT 4 and 6.  Level of Consciousness: Level of Consciousness (AVPU): responds to voice  Respiratory Effort: Respiratory Effort: Unlabored Expansion/Accessory Muscle Usage: Expansion/Accessory Muscles/Retractions: no retractions, no use of accessory muscles  All Lung Field Breath Sounds: All Lung Fields Breath Sounds: Anterior:, Lateral:, diminished  O2 Device/Concentration: 5L 28% TC.  Surgical airway: Yes, Type: Shiley Size: 6 XLT, cuffed  Ambu at bedside: Ambu bag with the patient?: Yes, Adult Ambu     Active Orders   Respiratory Care    Inhalation Treatment Q4H PRN     Frequency: Q4H PRN     Number of Occurrences: Until Specified    Oxygen Continuous     Frequency: Continuous     Number of Occurrences: Until Specified     Order  Comments: Discontinue when Sp02 is greater than or equal to 95% of equal to Preop Sp02       Order Questions:      Device type: Low flow      Device: Trach Collar      FiO2%: 21      Titrate O2 per Oxygen Titration Protocol: Yes      To maintain SpO2 goal of: >= 90%      Notify MD of: Inability to achieve desired SpO2    Oxygen PRN     Frequency: PRN     Number of Occurrences: Until Specified     Order Questions:      Device type: Low flow      Device: Trach Collar      Titrate O2 per Oxygen Titration Protocol: Yes      To maintain SpO2 goal of: >= 90%      Notify MD of: Inability to achieve desired SpO2; Sudden change in patient status and requires 20% increase in FiO2; Patient requires >60% FiO2    Routine tracheostomy care     Frequency: BID     Number of Occurrences: Until Specified    SUCTION PRN     Frequency: PRN     Number of Occurrences: Until Specified       RECOMMENDATIONS    We recommend: RRT Recs: Continue POC per primary team.      FOLLOW-UP    Please call back the Rapid Response RT, Isak Ly RRT at x [phone] for any questions or concerns.

## 2023-01-12 NOTE — PROVATION PATIENT INSTRUCTIONS
Discharge Summary/Instructions after an Endoscopic Procedure  Patient Name: Hao Medrano  Patient MRN: [id number]  Patient YOB: 1960 Thursday, January 12, 2023  Francisco Javier Holt MD  Dear patient,  As a result of recent federal legislation (The Federal Cures Act), you may   receive lab or pathology results from your procedure in your [facility]   account before your physician is able to contact you. Your physician or   their representative will relay the results to you with their   recommendations at their soonest availability.  Thank you,  RESTRICTIONS:  During your procedure today, you received medications for sedation.  These   medications may affect your judgment, balance and coordination.  Therefore,   for 24 hours, you have the following restrictions:   - DO NOT drive a car, operate machinery, make legal/financial decisions,   sign important papers or drink alcohol.    ACTIVITY:  Today: no heavy lifting, straining or running due to procedural   sedation/anesthesia.  The following day: return to full activity including work.  DIET:  Eat and drink normally unless instructed otherwise.     TREATMENT FOR COMMON SIDE EFFECTS:  - Mild abdominal pain, nausea, belching, bloating or excessive gas:  rest,   eat lightly and use a heating pad.  - Sore Throat: treat with throat lozenges and/or gargle with warm salt   water.  - Because air was used during the procedure, expelling large amounts of air   from your rectum or belching is normal.  - If a bowel prep was taken, you may not have a bowel movement for 1-3 days.    This is normal.  SYMPTOMS TO WATCH FOR AND REPORT TO YOUR PHYSICIAN:  1. Abdominal pain or bloating, other than gas cramps.  2. Chest pain.  3. Back pain.  4. Signs of infection such as: chills or fever occurring within 24 hours   after the procedure.  5. Rectal bleeding, which would show as bright red, maroon, or black stools.   (A tablespoon of blood from the rectum is not serious, especially  if   hemorrhoids are present.)  6. Vomiting.  7. Weakness or dizziness.  GO DIRECTLY TO THE NEAREST EMERGENCY ROOM IF YOU HAVE ANY OF THE FOLLOWING:      Difficulty breathing              Chills and/or fever over 101 F   Persistent vomiting and/or vomiting blood   Severe abdominal pain   Severe chest pain   Black, tarry stools   Bleeding- more than one tablespoon   Any other symptom or condition that you feel may need urgent attention  Your doctor recommends these additional instructions:  If any biopsies were taken, your doctors clinic will contact you in 1 to 2   weeks with any results.  - Return patient to hospital [facility] for ongoing care.   - Please follow the post-PEG recommendations including: Nutrition consult   for formula and volume, advance food and medications per primary care   provider, change dressing once per day, may use PEG today for meds and   water and may use PEG tomorrow for feedings.   - Continue present medications.  For questions, problems or results please call your physician - Francisco Javier Holt MD at Work:  [phone].  [facility], EMERGENCY ROOM PHONE NUMBER: [phone]  IF A COMPLICATION OR EMERGENCY SITUATION ARISES AND YOU ARE UNABLE TO REACH   YOUR PHYSICIAN - GO DIRECTLY TO THE EMERGENCY ROOM.  Francisco Javier Holt MD  1/12/2023 12:20:31 PM  This report has been verified and signed electronically.  Dear patient,  As a result of recent federal legislation (The Federal Cures Act), you may   receive lab or pathology results from your procedure in your [facility]   account before your physician is able to contact you. Your physician or   their representative will relay the results to you with their   recommendations at their soonest availability.  Thank you,  PROVATION

## 2023-01-13 LAB
BACTERIA #/AREA URNS AUTO: ABNORMAL /HPF
BILIRUB UR QL STRIP: NEGATIVE
CLARITY UR REFRACT.AUTO: ABNORMAL
COLOR UR AUTO: YELLOW
GLUCOSE UR QL STRIP: NEGATIVE
HGB UR QL STRIP: ABNORMAL
HYALINE CASTS UR QL AUTO: 0 /LPF
KETONES UR QL STRIP: ABNORMAL
LEUKOCYTE ESTERASE UR QL STRIP: ABNORMAL
MICROSCOPIC COMMENT: ABNORMAL
NITRITE UR QL STRIP: NEGATIVE
PH UR STRIP: 5 [PH] (ref 5–8)
POCT GLUCOSE: 106 MG/DL (ref 70–110)
POCT GLUCOSE: 119 MG/DL (ref 70–110)
POCT GLUCOSE: 95 MG/DL (ref 70–110)
POCT GLUCOSE: 98 MG/DL (ref 70–110)
PROT UR QL STRIP: ABNORMAL
RBC #/AREA URNS AUTO: 5 /HPF (ref 0–4)
SARS-COV-2 RNA RESP QL NAA+PROBE: NOT DETECTED
SP GR UR STRIP: 1.02 (ref 1–1.03)
SQUAMOUS #/AREA URNS AUTO: 0 /HPF
URN SPEC COLLECT METH UR: ABNORMAL
WBC #/AREA URNS AUTO: >100 /HPF (ref 0–5)
YEAST UR QL AUTO: ABNORMAL

## 2023-01-13 PROCEDURE — 63600175 PHARM REV CODE 636 W HCPCS: Performed by: HOSPITALIST

## 2023-01-13 PROCEDURE — 99900035 HC TECH TIME PER 15 MIN (STAT)

## 2023-01-13 PROCEDURE — 99900026 HC AIRWAY MAINTENANCE (STAT)

## 2023-01-13 PROCEDURE — 87086 URINE CULTURE/COLONY COUNT: CPT

## 2023-01-13 PROCEDURE — 51798 US URINE CAPACITY MEASURE: CPT

## 2023-01-13 PROCEDURE — 99900031 HC PATIENT EDUCATION (STAT)

## 2023-01-13 PROCEDURE — U0005 INFEC AGEN DETEC AMPLI PROBE: HCPCS | Performed by: HOSPITALIST

## 2023-01-13 PROCEDURE — 27200966 HC CLOSED SUCTION SYSTEM

## 2023-01-13 PROCEDURE — 27000221 HC OXYGEN, UP TO 24 HOURS

## 2023-01-13 PROCEDURE — 25000003 PHARM REV CODE 250: Performed by: HOSPITALIST

## 2023-01-13 PROCEDURE — 81001 URINALYSIS AUTO W/SCOPE: CPT

## 2023-01-13 PROCEDURE — 87106 FUNGI IDENTIFICATION YEAST: CPT

## 2023-01-13 PROCEDURE — 87088 URINE BACTERIA CULTURE: CPT

## 2023-01-13 PROCEDURE — G0378 HOSPITAL OBSERVATION PER HR: HCPCS

## 2023-01-13 PROCEDURE — 94761 N-INVAS EAR/PLS OXIMETRY MLT: CPT

## 2023-01-13 RX ORDER — PANTOPRAZOLE SODIUM 40 MG/1
40 FOR SUSPENSION ORAL DAILY
Qty: 30 PACKET | Refills: 11 | Status: SHIPPED | OUTPATIENT
Start: 2023-01-13 | End: 2023-01-13 | Stop reason: HOSPADM

## 2023-01-13 RX ADMIN — PANTOPRAZOLE SODIUM 40 MG: 40 GRANULE, DELAYED RELEASE ORAL at 10:01

## 2023-01-13 RX ADMIN — LEVETIRACETAM 750 MG: 100 SOLUTION ORAL at 11:01

## 2023-01-13 RX ADMIN — LEVETIRACETAM 750 MG: 100 SOLUTION ORAL at 10:01

## 2023-01-13 RX ADMIN — PREDNISONE 10 MG: 5 TABLET ORAL at 10:01

## 2023-01-13 RX ADMIN — BACLOFEN 15 MG: 5 TABLET ORAL at 11:01

## 2023-01-13 RX ADMIN — BACLOFEN 15 MG: 5 TABLET ORAL at 05:01

## 2023-01-13 RX ADMIN — BACLOFEN 15 MG: 5 TABLET ORAL at 10:01

## 2023-01-13 NOTE — PLAN OF CARE
[facility]     Department of Hospital Medicine     [address] [place] [address]     [phone] after hours  [phone] fax       NURSING HOME ORDERS    Patient Name: Hao Medrano  YOB: [birth year]    Admit to Nursing Home:  Regular Bed       Diagnoses:  Active Hospital Problems    Diagnosis  POA    *Dislodged gastrostomy tube [T85.528A]  Yes    HLD (hyperlipidemia) [E78.5]  Yes    Anemia [D64.9]  Yes    Oropharyngeal dysphagia [R13.12]  Yes    Chronic kidney disease, stage III (moderate) [N18.30]  Yes      Resolved Hospital Problems   No resolved problems to display.       Patient is homebound due to:  Dislodged gastrostomy tube    Allergies:Review of patient's allergies indicates:  No Known Allergies    Vitals:       Per Routine    Diet:     Tube Feeding:       -  Isosource 1.5 @ 55ml/hr x 24 hr to provide 1980 kcal, 90g protein, 1008 ml fluid.      - Flush tube with 250 mL water every 8 hours    Acitivities:  - Up in a chair each morning as tolerated  - Ambulate with assistance to bathroom  - Scheduled walks once each shift (every 8 hours)  - May ambulate independently  - May use walker, cane, or self-propelled wheelchair       - Weight bearing: as tolerated    LABS:  Per facility protocol    Nursing Precautions:     - Aspiration precautions:             - Total assistance with meals            -  Upright 90 degrees befor during and after meals             -  Suction at bedside          - Fall precautions per nursing home protocol   - Seizure precaution per detention protocol   - Decubitus precautions:        -  for positioning   - Pressure reducing foam mattress   - Turn patient every two hours. Use wedge pillows to anchor patient    CONSULTS:    Physical Therapy to evaluate and treat     Occupational Therapy to evaluate and treat     Speech Therapy  to evaluate and treat     Nutrition to evaluate and recommend  diet     Psychiatry to evaluate and follow patients for delirium       MISCELLANEOUS CARE:                  PEG Care:  Clean site every 24 hours  flush PEG daily with 60 ml water  - Antibiotic ointment to site, clean site with soap and water daily and dry thoroughly and clean site daily with half-strength hydrogen peroxide for three days, then soap and water daily.  - Dressing: change dressing on top of bumper daily       Sheets Care: Empty Sheets bag every shift.  Change Sheets every month     Routine Skin for Bedridden Patients:  Apply moisture barrier cream to all    skin folds and wet areas in perineal area daily and after baths and                           all bowel movements.    An    WOUND care     wound care to bi-lateral heel, sacrum, left inner thigh ,right medial ankle ,and right medial lower leg.      sacrum area-  cleanse sacral area with soap and water pat dry apply triad paste, twice a day.     The bi-lateral heels treatment to cleanse with soap and water pat dry apply Mepilex Ag that will dry out the open skin treatment daily     the right medial lower leg and right medial ankle apply Aquacel Ag daily.     To right 5th toe apply Mepilex Ag daily  .      The left heel and left great toe has the mepilex Ag for treatment.     Apply dry gauze and abd pad to bi lateral heels, wrap with cast padding covering the wound dressing to the right lower leg and right and left toes, secure with ace bandage daily.     The left inner thigh, cleanse with soap and water pat dry apply a Mepilex border daily     Medications: Discontinue all previous medication orders, if any. See new list below.      START taking these medications    Details   pantoprazole (PROTONIX) 40 mg suspension 1 packet (40 mg total) by Per G Tube route once daily.  Qty: 30 packet, Refills: 11           CONTINUE these medications which have NOT CHANGED    Details   arginine-glutamine-calcium HMB (SHEREE) 7-7-1.5 gram PwPk 1 packet by Per G Tube route  2 (two) times daily.      aspirin 81 MG Chew 81 mg by Per G Tube route once daily.      baclofen (LIORESAL) 5 mg Tab tablet 15 mg by Per G Tube route 3 (three) times daily.      calcium carbonate (OS-OLGA) 500 mg calcium (1,250 mg) tablet 1,250 mg by Per G Tube route once daily.      carvediloL (COREG) 3.125 MG tablet 3.125 mg by Per G Tube route 2 (two) times daily.      esomeprazole (NEXIUM PACKET) 40 mg GrPS 40 mg by Per G Tube route once daily.      insulin glargine-yfgn (SEMGLEE,INSULIN GLARG-YFGN,PEN) 100 unit/mL (3 mL) InPn Inject 15 Units into the skin once daily.      Lactobacillus rhamnosus GG (CULTURELLE) 10 billion cell capsule 2 capsules by Per G Tube route 2 (two) times a day.      levETIRAcetam (KEPPRA) 100 mg/mL Soln 750 mg by Per G Tube route 2 (two) times daily.      menthol/zinc oxide (CALMOSEPTINE TOP) Apply to the affected area twice daily as needed      nystatin (MYCOSTATIN) powder Apply to the affected area daily      predniSONE (DELTASONE) 10 MG tablet 10 mg by Per G Tube route once daily.                        _________________________________  Suyapa Guzmán MD  01/13/2023

## 2023-01-13 NOTE — PROGRESS NOTES
01/13/23 0800   WOCN Assessment   WOCN Total Time (mins) 45   Visit Date 01/13/23   Visit Time 0800   Consult Type New   WOCN Speciality Wound   Intervention assessed;changed;applied;chart review;coordination of care;orders        Altered Skin Integrity 01/10/23 1434 Left anterior Thigh #1 Skin Tear Partial thickness tissue loss. Shallow open ulcer with a red or pink wound bed, without slough. Intact or Open/Ruptured Serum-filled blister.   Date First Assessed/Time First Assessed: 01/10/23 1434   Altered Skin Integrity Present on Admission: yes  Side: Left  Orientation: anterior  Location: Thigh  Wound Number: #1  Is this injury device related?: (c) Other (see comments)  Primary Wound Ty...   Wound Image    Description of Altered Skin Integrity Partial thickness tissue loss. Shallow open ulcer with a red or pink wound bed, without slough. Intact or Open/Ruptured Serum-filled blister.   Dressing Appearance Dried drainage   Drainage Amount Scant   Drainage Characteristics/Odor Serosanguineous;No odor   Appearance Red   Tissue loss description Partial thickness   Red (%), Wound Tissue Color 100 %   Periwound Area Dry   Wound Edges Defined   Wound Length (cm) 16 cm   Wound Width (cm) 5 cm   Wound Depth (cm) 0.1 cm   Wound Volume (cm^3) 8 cm^3   Wound Surface Area (cm^2) 80 cm^2        Altered Skin Integrity 01/10/23 1436 Right posterior Heel #2 Ulceration Full thickness tissue loss. Subcutaneous fat may be visible but bone, tendon or muscle are not exposed   Date First Assessed/Time First Assessed: 01/10/23 1436   Altered Skin Integrity Present on Admission: yes  Side: Right  Orientation: posterior  Location: Heel  Wound Number: #2  Is this injury device related?: Yes  Primary Wound Type: Ulceration  Desc...   Wound Image    Description of Altered Skin Integrity Full thickness tissue loss. Subcutaneous fat may be visible but bone, tendon or muscle are not exposed   Dressing Appearance Moist drainage   Drainage Amount  Small   Drainage Characteristics/Odor Serosanguineous;No odor   Appearance Red   Tissue loss description Full thickness   Red (%), Wound Tissue Color 90 %   Yellow (%), Wound Tissue Color 10 %   Periwound Area Pink   Wound Edges Defined   Wound Length (cm) 4 cm   Wound Width (cm) 8 cm   Wound Depth (cm) 0.1 cm   Wound Volume (cm^3) 3.2 cm^3   Wound Surface Area (cm^2) 32 cm^2        Altered Skin Integrity 01/10/23 1437 Left posterior Heel #3   Date First Assessed/Time First Assessed: 01/10/23 1437   Altered Skin Integrity Present on Admission: yes  Side: Left  Orientation: posterior  Location: Heel  Wound Number: #3  Is this injury device related?: Yes   Wound Image    Description of Altered Skin Integrity Full thickness tissue loss. Subcutaneous fat may be visible but bone, tendon or muscle are not exposed   Dressing Appearance Moist drainage   Drainage Amount Small   Drainage Characteristics/Odor Serosanguineous;No odor   Tissue loss description Full thickness   Black (%), Wound Tissue Color 20 %   Red (%), Wound Tissue Color 80 %   Wound Edges Defined   Wound Length (cm) 2.5 cm   Wound Width (cm) 4 cm   Wound Depth (cm) 0.1 cm   Wound Volume (cm^3) 1 cm^3   Wound Surface Area (cm^2) 10 cm^2        Altered Skin Integrity 01/10/23 2300 Sacral spine   Date First Assessed/Time First Assessed: 01/10/23 2300   Altered Skin Integrity Present on Admission: yes  Location: Sacral spine  Is this injury device related?: No   Wound Image    Description of Altered Skin Integrity Partial thickness tissue loss. Shallow open ulcer with a red or pink wound bed, without slough. Intact or Open/Ruptured Serum-filled blister.   Dressing Appearance Dry   Drainage Amount Scant   Drainage Characteristics/Odor Serosanguineous   Appearance Red   Tissue loss description Partial thickness   Red (%), Wound Tissue Color 100 %   Periwound Area Dry   Wound Edges Undefined   Wound Length (cm) 17 cm   Wound Width (cm) 15 cm   Wound Depth (cm) 0.1  cm   Wound Volume (cm^3) 25.5 cm^3   Wound Surface Area (cm^2) 255 cm^2        Altered Skin Integrity 01/11/23 0800 Right lateral Toe, fifth #4 Ulceration Partial thickness tissue loss. Shallow open ulcer with a red or pink wound bed, without slough. Intact or Open/Ruptured Serum-filled blister.   Date First Assessed/Time First Assessed: 01/11/23 0800   Altered Skin Integrity Present on Admission: yes  Side: Right  Orientation: lateral  Location: Toe, fifth  Wound Number: #4  Is this injury device related?: No  Primary Wound Type: Ulceration  D...   Wound Image    Description of Altered Skin Integrity Full thickness tissue loss. Subcutaneous fat may be visible but bone, tendon or muscle are not exposed   Dressing Appearance Moist drainage   Drainage Amount Small   Drainage Characteristics/Odor Serosanguineous;No odor   Red (%), Wound Tissue Color 60 %   Yellow (%), Wound Tissue Color 40 %   Periwound Area Dry   Wound Length (cm) 4 cm   Wound Width (cm) 7 cm   Wound Depth (cm) 0.4 cm   Wound Volume (cm^3) 11.2 cm^3   Wound Surface Area (cm^2) 28 cm^2        Altered Skin Integrity 01/13/23 0800 Right anterior;medial Ankle Partial thickness tissue loss. Shallow open ulcer with a red or pink wound bed, without slough. Intact or Open/Ruptured Serum-filled blister.   Date First Assessed/Time First Assessed: 01/13/23 0800   Side: Right  Orientation: anterior;medial  Location: Ankle  Description of Altered Skin Integrity: Partial thickness tissue loss. Shallow open ulcer with a red or pink wound bed, without slough....   Wound Image    Description of Altered Skin Integrity Partial thickness tissue loss. Shallow open ulcer with a red or pink wound bed, without slough. Intact or Open/Ruptured Serum-filled blister.   Dressing Appearance Dried drainage   Drainage Amount Small   Drainage Characteristics/Odor Serosanguineous;No odor   Appearance Red   Red (%), Wound Tissue Color 100 %   Wound Edges Defined   Wound Length (cm) 1.5  cm   Wound Width (cm) 2.5 cm   Wound Depth (cm) 0.1 cm   Wound Volume (cm^3) 0.375 cm^3   Wound Surface Area (cm^2) 3.75 cm^2        Altered Skin Integrity 01/13/23 0800 Right lower;medial Leg Abrasion(s)   Date First Assessed/Time First Assessed: 01/13/23 0800   Side: Right  Orientation: lower;medial  Location: Leg  Primary Wound Type: Abrasion(s)   Wound Image    Description of Altered Skin Integrity Full thickness tissue loss. Subcutaneous fat may be visible but bone, tendon or muscle are not exposed   Dressing Appearance Moist drainage   Drainage Amount Small   Drainage Characteristics/Odor Serosanguineous;No odor   Tissue loss description Full thickness   Red (%), Wound Tissue Color 95 %   Yellow (%), Wound Tissue Color 5 %   Periwound Area Dry;Excoriated   Wound Edges Defined   Wound Length (cm) 4 cm   Wound Width (cm) 2 cm   Wound Depth (cm) 0.1 cm   Wound Volume (cm^3) 0.8 cm^3   Wound Surface Area (cm^2) 8 cm^2        Altered Skin Integrity 01/13/23 0800 Left medial;anterior Toe, first   Date First Assessed/Time First Assessed: 01/13/23 0800   Side: Left  Orientation: medial;anterior  Location: Toe, first   Wound Image    Dressing Appearance Dry   Drainage Amount Scant   Drainage Characteristics/Odor Serosanguineous;No odor   Black (%), Wound Tissue Color 40 %   Red (%), Wound Tissue Color 60 %   Periwound Area Dry   Wound Edges Defined   Wound Length (cm) 9 cm   Wound Width (cm) 5 cm   Wound Depth (cm) 0.1 cm   Wound Volume (cm^3) 4.5 cm^3   Wound Surface Area (cm^2) 45 cm^2   [facility] ([place] [address])  Wound Care    Patient Name:  Hao Medrano   MRN:  [id number]  Date: 1/13/2023  Diagnosis: Dislodged gastrostomy tube    History:     History reviewed. No pertinent past medical history.    Social History     Socioeconomic History    Marital status: Single   Tobacco Use    Smoking status: Never       Precautions:     Allergies as of 01/10/2023    (No Known Allergies)       [facility] Assessment  Details/Treatment   Patient consult for wound care to bi-lateral heel, sacrum, left inner thigh ,right medial ankle ,and right medial lower leg. Patient admitted with wounds and dry flaky fungal skin. The sacrum area is moisture associated. The treatment, to cleanse sacral area with soap and water pat dry apply triad paste, twice a day. The bi-lateral heels treatment to cleanse with soap and water pat dry apply Mepilex Ag that will dry out the open skin, the right medial lower leg and right medial ankle apply Aquacel Ag , to right 5th toe apply Mepilex Ag . The mepilex Ag is a foam dressing for drying out wound and provide comfort. The left heel and left great toe has the mepilex Ag for treatment. Apply dry gauze and abd pad to bi lateral heels, wrap with cast padding covering the wound dressing to the right lower leg and right and left toes, secure with ace bandage. The left inner thigh, cleanse with soap and water pat dry apply a Mepilex border. The sacral area, cleanse sacral area with soap and water pat dry apply triad paste. The patient has a waffle mattress ordered, with purple wedge for turning.    Recommendations made to primary team for  the above Orders placed.     01/13/2023

## 2023-01-13 NOTE — TREATMENT PLAN
GI Treatment Plan    PEG site examined. Bumper at 3 cm shelby at skin level. Bumper rotates 360 degrees with ease. Abdomen is soft and non-tender.     - May start using PEG for feeding.   - Be advised to elevate head end of bed to 30 degrees or more while using PEG.   - Please consult nutrition for tube feed goals and recommendations.    Care Instructions  - Flushes: flush PEG daily with 60 ml water  - Antibiotic ointment to site, clean site with soap and water daily and dry thoroughly and clean site daily with half-strength hydrogen peroxide for three days, then soap and water daily.  - Dressing: change dressing on top of bumper daily    Thank you for involving us in the care of Hao Medrano. We are signing-off. Please call with any additional questions, concerns or changes in the patient's clinical status.    Inder Del Castillo MD  Gastroenterology Fellow

## 2023-01-13 NOTE — PLAN OF CARE
Recommendations    If continuous TF warranted: Isosource 1.5 @ 55ml/hr x 24 hr to provide 1980 kcal, 90g protein, 1008 ml fluid. Require additional 1000ml FW/day or per MD    If Bolus TF warranted: Isosource 1.5 x 5 cans per day to provide 1875 kcal, 85g protein, 955ml fluid. Require additional 1000ml FW/day or per MD    When medically able, ADAT Regular diet with texture per SLP    RD to monitor and follow    Goals: Meet % EEN, EPN by RD f/u  Nutrition Goal Status: new  Communication of RD Recs:  (POC)

## 2023-01-13 NOTE — PLAN OF CARE
LELA called [facility] [phone] to ask about returning patient.  Admissions is closed and pt cannot return without admissions accepting pt back. LELA was told admissions will be back in the office on Monday January 16 - even though it is a holiday      Kesha Anand CD, MSW, LMSW, RSW   Case Management  [facility]  Email: [email or web address]

## 2023-01-13 NOTE — CONSULTS
"  [facility] ([facility]  Adult Nutrition  Consult Note    SUMMARY     Recommendations    If continuous TF warranted: Isosource 1.5 @ 55ml/hr x 24 hr to provide 1980 kcal, 90g protein, 1008 ml fluid. Require additional 1000ml FW/day or per MD    If Bolus TF warranted: Isosource 1.5 x 5 cans per day to provide 1875 kcal, 85g protein, 955ml fluid. Require additional 1000ml FW/day or per MD    When medically able, ADAT Regular diet with texture per SLP    RD to monitor and follow    Goals: Meet % EEN, EPN by RD f/u  Nutrition Goal Status: new  Communication of RD Recs:  (POC)    Assessment and Plan    Nutrition Problem:  Inadequate energy intake     Related to (etiology):   Inability to consume sufficient energy      Signs and Symptoms (as evidenced by):   NPO     Interventions/Recommendations (treatment strategy):  Collaboration with providers  EN     Nutrition Diagnosis Status:   New     Reason for Assessment    Reason For Assessment: consult  Diagnosis:  (Dislodged gastrostomy tube)  Relevant Medical History: CKD stage 3, HLD, anemia, dysphagi  Interdisciplinary Rounds: did not attend    General Information Comments:   62 y.o. male with a past medical history of CVA with subsequent trach/PEG, CKD3b, and HLD who was transferred from his nursing home for evaluation of PEG tube removal. RD consulted for TF recs. PEG tube in place. No records on pt's previous TF regimen at NH.  No recent wt hx available.  lb. Unable to determine nutritional status at this time. RD following.     Nutrition Discharge Planning: Isosource 1.5 @ 55ml/hr x 24 hr     Nutrition Risk Screen    Nutrition Risk Screen: tube feeding or parenteral nutrition    Anthropometrics    Temp: 97.7 °F (36.5 °C)  Height: 6' 0.84" (185 cm) (Prior admission)  Height (inches): 72.84 in  Weight: 72 kg (158 lb 11.7 oz)  Weight (lb): 158.73 lb  Ideal Body Weight (IBW), Male: 183.04 lb     Lab/Procedures/Meds    Pertinent Labs " Reviewed: reviewed  Pertinent Labs Comments: BUN 31  Pertinent Medications Reviewed: reviewed  Pertinent Medications Comments: prednisone, pantoprazole    Estimated/Assessed Needs    Weight Used For Calorie Calculations: 71.7 kg (158 lb)  Energy Calorie Requirements (kcal): 2663-5461 kcal  Energy Need Method: Kcal/kg (25-30)  Protein Requirements: 71-86g (1-1.2g/kg)  Weight Used For Protein Calculations: 71.7 kg (158 lb)  Fluid Requirements (mL): 1ml/kcal or per MD  Estimated Fluid Requirement Method: RDA Method  RDA Method (mL): 1791    Nutrition Prescription Ordered    Current Diet Order: -    Evaluation of Received Nutrient/Fluid Intake    Comments: LBM 1/10    Nutrition Risk    Level of Risk/Frequency of Follow-up:  (1x/week)       Monitor and Evaluation    Food and Nutrient Intake: energy intake, food and beverage intake, enteral nutrition intake, parenteral nutrition intake  Food and Nutrient Adminstration: diet order, enteral and parenteral nutrition administration  Physical Activity and Function: nutrition-related ADLs and IADLs  Anthropometric Measurements: height/length, weight change, weight, body mass index  Biochemical Data, Medical Tests and Procedures: gastrointestinal profile, electrolyte and renal panel, glucose/endocrine profile, inflammatory profile, lipid profile  Nutrition-Focused Physical Findings: overall appearance       Nutrition Follow-Up    RD Follow-up?: Yes

## 2023-01-13 NOTE — RESPIRATORY THERAPY
RAPID RESPONSE RESPIRATORY THERAPY PROACTIVE NOTE           Time of visit: 803     Code Status: Full Code   : 1960  Bed: 95992/14571 A:   MRN: [id number]  Time spent at the bedside: < 15 min    SITUATION    Evaluated patient for: LDA Check     BACKGROUND    Patient has no past medical history on file.  Clinically Significant Surgical Hx: tracheostomy    24 Hours Vitals Range:  Temp:  [97.7 °F (36.5 °C)-98.7 °F (37.1 °C)]   Pulse:  []   Resp:  [16-18]   BP: (121-141)/(67-84)   SpO2:  [98 %-100 %]     Labs:    Recent Labs     23  0451 23  0427    142   K 4.2 4.2    105   CO2 24 22*   CREATININE 1.1 1.3   GLU 90 74        No results for input(s): PH, PCO2, PO2, HCO3, POCSATURATED, BE in the last 72 hours.    ASSESSMENT/INTERVENTIONS  Pt resting comfortably with no respiratory needs at this time.      Last VS   Temp: 97.9 °F (36.6 °C) ( 1130)  Pulse: 97 ( 1422)  Resp: 17 ( 1130)  BP: 121/67 ( 1130)  SpO2: 98 % ( 1422)      Extra trachs at bedside: y  Level of Consciousness: Level of Consciousness (AVPU): responds to voice  Respiratory Effort: Respiratory Effort: Unlabored Expansion/Accessory Muscle Usage: Expansion/Accessory Muscles/Retractions: no use of accessory muscles  All Lung Field Breath Sounds: All Lung Fields Breath Sounds: coarse  O2 Device/Concentration: 5L/21%  Surgical airway: Yes, Type: Shiley Size: 6, cuffed  Ambu at bedside: Ambu bag with the patient?: Yes, Adult Ambu     Active Orders   Respiratory Care    Inhalation Treatment Q4H PRN     Frequency: Q4H PRN     Number of Occurrences: Until Specified    Oxygen Continuous     Frequency: Continuous     Number of Occurrences: Until Specified     Order Comments: Discontinue when Sp02 is greater than or equal to 95% of equal to Preop Sp02       Order Questions:      Device type: Low flow      Device: Trach Collar      FiO2%: 21      Titrate O2 per Oxygen Titration Protocol: Yes      To maintain  SpO2 goal of: >= 90%      Notify MD of: Inability to achieve desired SpO2    Oxygen PRN     Frequency: PRN     Number of Occurrences: Until Specified     Order Questions:      Device type: Low flow      Device: Trach Collar      Titrate O2 per Oxygen Titration Protocol: Yes      To maintain SpO2 goal of: >= 90%      Notify MD of: Inability to achieve desired SpO2; Sudden change in patient status and requires 20% increase in FiO2; Patient requires >60% FiO2    Routine tracheostomy care     Frequency: BID     Number of Occurrences: Until Specified    SUCTION PRN     Frequency: PRN     Number of Occurrences: Until Specified       RECOMMENDATIONS    We recommend: RRT Recs: Continue POC per primary team.      FOLLOW-UP    Please call back the Rapid Response RTAyad RRT at x [phone] for any questions or concerns.

## 2023-01-13 NOTE — PLAN OF CARE
LELA spoke with admissions at [facility].  They have asked for a COVID test and orders before providing the clearance for return.    LELA/MARTINA to fax [facility] with orders and covid test results to: [phone]      Kesha Anand CD, MSW, LMSW, RSW   Case Management  [facility]  Email: [email or web address]

## 2023-01-13 NOTE — PLAN OF CARE
01/13/23 1539   Post-Acute Status   Post-Acute Authorization Placement   Post-Acute Placement Status Set-up Complete/Auth obtained   Discharge Delays (!) Post-Acute Set-up   Discharge Plan   Discharge Plan A Return to nursing home     As per Terrell pt cannot return at this time due to admissions office closure from 1530hrs 01/13 to the morning of 01/16.    SW questioned if admissions would be working 01/16 as it is a holiday and nursing staff stated they believed they would be open as it was not a holiday for them      Kesha Anand, TADEO, MSW, LMSW, RSW   Case Management  [facility]  Email: [email or web address]

## 2023-01-14 LAB
POCT GLUCOSE: 112 MG/DL (ref 70–110)
POCT GLUCOSE: 145 MG/DL (ref 70–110)
POCT GLUCOSE: 165 MG/DL (ref 70–110)

## 2023-01-14 PROCEDURE — 63600175 PHARM REV CODE 636 W HCPCS: Performed by: HOSPITALIST

## 2023-01-14 PROCEDURE — 99233 SBSQ HOSP IP/OBS HIGH 50: CPT | Mod: ,,, | Performed by: HOSPITALIST

## 2023-01-14 PROCEDURE — 27000221 HC OXYGEN, UP TO 24 HOURS

## 2023-01-14 PROCEDURE — 99900035 HC TECH TIME PER 15 MIN (STAT)

## 2023-01-14 PROCEDURE — 99233 PR SUBSEQUENT HOSPITAL CARE,LEVL III: ICD-10-PCS | Mod: ,,, | Performed by: HOSPITALIST

## 2023-01-14 PROCEDURE — G0378 HOSPITAL OBSERVATION PER HR: HCPCS

## 2023-01-14 PROCEDURE — 25000003 PHARM REV CODE 250: Performed by: HOSPITALIST

## 2023-01-14 PROCEDURE — 94761 N-INVAS EAR/PLS OXIMETRY MLT: CPT

## 2023-01-14 PROCEDURE — 27200966 HC CLOSED SUCTION SYSTEM

## 2023-01-14 PROCEDURE — 99900026 HC AIRWAY MAINTENANCE (STAT)

## 2023-01-14 PROCEDURE — 27201113

## 2023-01-14 RX ADMIN — PREDNISONE 10 MG: 5 TABLET ORAL at 09:01

## 2023-01-14 RX ADMIN — BACLOFEN 15 MG: 5 TABLET ORAL at 02:01

## 2023-01-14 RX ADMIN — PANTOPRAZOLE SODIUM 40 MG: 40 GRANULE, DELAYED RELEASE ORAL at 09:01

## 2023-01-14 RX ADMIN — BACLOFEN 15 MG: 5 TABLET ORAL at 10:01

## 2023-01-14 RX ADMIN — BACLOFEN 15 MG: 5 TABLET ORAL at 09:01

## 2023-01-14 RX ADMIN — LEVETIRACETAM 750 MG: 100 SOLUTION ORAL at 09:01

## 2023-01-14 RX ADMIN — LEVETIRACETAM 750 MG: 100 SOLUTION ORAL at 10:01

## 2023-01-14 NOTE — RESPIRATORY THERAPY
RAPID RESPONSE RESPIRATORY THERAPY PROACTIVE NOTE           Time of visit: 712     Code Status: Full Code   : 1960  Bed: 73010/41814 A:   MRN: [id number]  Time spent at the bedside: < 15 min    SITUATION    Evaluated patient for: LDA Check     BACKGROUND    Patient has no past medical history on file.  Clinically Significant Surgical Hx: tracheostomy    24 Hours Vitals Range:  Temp:  [96.5 °F (35.8 °C)-97.9 °F (36.6 °C)]   Pulse:  []   Resp:  [16-20]   BP: (119-144)/(65-77)   SpO2:  [96 %-100 %]     Labs:    Recent Labs     23  0427      K 4.2      CO2 22*   CREATININE 1.3   GLU 74        No results for input(s): PH, PCO2, PO2, HCO3, POCSATURATED, BE in the last 72 hours.    ASSESSMENT/INTERVENTIONS  Patient resting comfortably in bed. Patient on 5L 21% TC. All trach supplies visible at bedside. Obturator at HOB. No respiratory concerns at this time.      Last VS   Temp: 97.6 °F (36.4 °C) (743)  Pulse: 96 ( 0900)  Resp: 16 (743)  BP: 142/74 (743)  SpO2: 98 % (900)      Extra trachs at bedside: 5 & 6 XLT cuffed  Level of Consciousness: Level of Consciousness (AVPU): alert  Respiratory Effort: Respiratory Effort: Unlabored Expansion/Accessory Muscle Usage: Expansion/Accessory Muscles/Retractions: no use of accessory muscles  All Lung Field Breath Sounds: All Lung Fields Breath Sounds: coarse  O2 Device/Concentration: 5L 21%  Surgical airway: Yes, Type: Shiley Size: 6 XLT, cuffed  Ambu at bedside: Ambu bag with the patient?: Yes, Adult Ambu     Active Orders   Respiratory Care    Inhalation Treatment Q4H PRN     Frequency: Q4H PRN     Number of Occurrences: Until Specified    Oxygen Continuous     Frequency: Continuous     Number of Occurrences: Until Specified     Order Comments: Discontinue when Sp02 is greater than or equal to 95% of equal to Preop Sp02       Order Questions:      Device type: Low flow      Device: Trach Collar      FiO2%: 21       Titrate O2 per Oxygen Titration Protocol: Yes      To maintain SpO2 goal of: >= 90%      Notify MD of: Inability to achieve desired SpO2    Oxygen PRN     Frequency: PRN     Number of Occurrences: Until Specified     Order Questions:      Device type: Low flow      Device: Trach Collar      Titrate O2 per Oxygen Titration Protocol: Yes      To maintain SpO2 goal of: >= 90%      Notify MD of: Inability to achieve desired SpO2; Sudden change in patient status and requires 20% increase in FiO2; Patient requires >60% FiO2    Routine tracheostomy care     Frequency: BID     Number of Occurrences: Until Specified    SUCTION PRN     Frequency: PRN     Number of Occurrences: Until Specified       RECOMMENDATIONS    We recommend: RRT Recs: Continue POC per primary team.      FOLLOW-UP    Please call back the Rapid Response RT, Triny Arias, RRT at x [phone] for any questions or concerns.

## 2023-01-14 NOTE — SUBJECTIVE & OBJECTIVE
Interval History:   1/14: no acute issues.     Review of Systems   Unable to perform ROS: Patient nonverbal   Objective:     Vital Signs (Most Recent):  Temp: 98.5 °F (36.9 °C) (01/14/23 1130)  Pulse: 89 (01/14/23 1300)  Resp: 16 (01/14/23 0743)  BP: (!) 142/74 (01/14/23 0743)  SpO2: 98 % (01/14/23 1300)   Vital Signs (24h Range):  Temp:  [96.5 °F (35.8 °C)-98.5 °F (36.9 °C)] 98.5 °F (36.9 °C)  Pulse:  [] 89  Resp:  [16-20] 16  SpO2:  [96 %-99 %] 98 %  BP: (119-144)/(65-77) 142/74     Weight: 72 kg (158 lb 11.7 oz)  Body mass index is 21.04 kg/m².    Intake/Output Summary (Last 24 hours) at 1/14/2023 1518  Last data filed at 1/14/2023 1052  Gross per 24 hour   Intake 200 ml   Output 870 ml   Net -670 ml      Physical Exam  Constitutional:       General: He is not in acute distress.     Appearance: He is well-developed. He is ill-appearing. He is not toxic-appearing.   HENT:      Head: Normocephalic and atraumatic.   Eyes:      Conjunctiva/sclera: Conjunctivae normal.      Pupils: Pupils are equal, round, and reactive to light.   Neck:      Thyroid: No thyromegaly.      Vascular: No JVD.   Cardiovascular:      Rate and Rhythm: Regular rhythm. Tachycardia present.      Heart sounds: Normal heart sounds. No murmur heard.    No friction rub. No gallop.   Pulmonary:      Effort: Pulmonary effort is normal. No respiratory distress.      Breath sounds: Normal breath sounds. No wheezing or rales.      Comments: Trach in place  Abdominal:      General: Bowel sounds are normal. There is no distension.      Palpations: Abdomen is soft. There is no mass.      Tenderness: There is no abdominal tenderness. There is no guarding or rebound.      Hernia: No hernia is present.      Comments: G-tube site is pink and appears to be well healed, no erythema or discharge    Musculoskeletal:         General: No deformity. Normal range of motion.      Cervical back: Normal range of motion and neck supple.   Skin:     General: Skin is  warm and dry.   Neurological:      Mental Status: He is alert. Mental status is at baseline.       Significant Labs: All pertinent labs within the past 24 hours have been reviewed.    Significant Imaging: I have reviewed all pertinent imaging results/findings within the past 24 hours.

## 2023-01-14 NOTE — ANESTHESIA POSTPROCEDURE EVALUATION
Anesthesia Post Evaluation    Patient: Hao Medrano    Procedure(s) Performed: Procedure(s) (LRB):  EGD (ESOPHAGOGASTRODUODENOSCOPY) (N/A)    Final Anesthesia Type: general      Patient location during evaluation: PACU  Patient participation: Yes- Able to Participate  Level of consciousness: awake and alert  Post-procedure vital signs: reviewed and stable  Pain management: adequate  Airway patency: patent    PONV status at discharge: No PONV  Anesthetic complications: no      Cardiovascular status: blood pressure returned to baseline  Respiratory status: unassisted  Hydration status: euvolemic  Follow-up not needed.          Vitals Value Taken Time   /77 01/13/23 1638   Temp 36.6 °C (97.9 °F) 01/13/23 1638   Pulse 87 01/13/23 1831   Resp 17 01/13/23 1638   SpO2 98 % 01/13/23 1717   Vitals shown include unvalidated device data.      Event Time   Out of Recovery 11:34:00         Pain/Jane Score: Jane Score: 7 (1/12/2023 11:30 AM)

## 2023-01-14 NOTE — PROGRESS NOTES
[facility] - [facility] ([address] Medicine  Progress Note    Patient Name: Hao Medrano  MRN: [id number]  Patient Class: OP- Observation   Admission Date: 1/10/2023  Length of Stay: 0 days  Attending Physician: Suyapa Guzmán MD  Primary Care Provider: Keara Yanes NP        Subjective:     Principal Problem:Dislodged gastrostomy tube        HPI:  Hao Medrano is a 62 y.o. male with a past medical history of CVA with subsequent trach/PEG, CKD3b, and HLD who was transferred from his nursing home for evaluation of PEG tube removal. Per ED note, patient's friend reports that they are unsure how log the peg tube has been dislodged and they are unsure how it happened. They stated that patient is at his baseline which is non-communicative.      ED: tachycardic up to 117, otherwise vital signs stable, afebile. CBC with anemia, Hb 12.2. No leukocytosis. Cr 1.3, glucose 122.  XR of g-tube ordered and pending. ED provider attempted to replace PEG tube with a Sheets but unsuccessful after four attempts with three different sized 3 tubes. GI consulted.       Overview/Hospital Course:  No acute events since admission.  Pending EGD-peg placement      Interval History:   1/14: no acute issues.     Review of Systems   Unable to perform ROS: Patient nonverbal   Objective:     Vital Signs (Most Recent):  Temp: 98.5 °F (36.9 °C) (01/14/23 1130)  Pulse: 89 (01/14/23 1300)  Resp: 16 (01/14/23 0743)  BP: (!) 142/74 (01/14/23 0743)  SpO2: 98 % (01/14/23 1300)   Vital Signs (24h Range):  Temp:  [96.5 °F (35.8 °C)-98.5 °F (36.9 °C)] 98.5 °F (36.9 °C)  Pulse:  [] 89  Resp:  [16-20] 16  SpO2:  [96 %-99 %] 98 %  BP: (119-144)/(65-77) 142/74     Weight: 72 kg (158 lb 11.7 oz)  Body mass index is 21.04 kg/m².    Intake/Output Summary (Last 24 hours) at 1/14/2023 1518  Last data filed at 1/14/2023 1052  Gross per 24 hour   Intake 200 ml   Output 870 ml   Net -670 ml      Physical Exam  Constitutional:        General: He is not in acute distress.     Appearance: He is well-developed. He is ill-appearing. He is not toxic-appearing.   HENT:      Head: Normocephalic and atraumatic.   Eyes:      Conjunctiva/sclera: Conjunctivae normal.      Pupils: Pupils are equal, round, and reactive to light.   Neck:      Thyroid: No thyromegaly.      Vascular: No JVD.   Cardiovascular:      Rate and Rhythm: Regular rhythm. Tachycardia present.      Heart sounds: Normal heart sounds. No murmur heard.    No friction rub. No gallop.   Pulmonary:      Effort: Pulmonary effort is normal. No respiratory distress.      Breath sounds: Normal breath sounds. No wheezing or rales.      Comments: Trach in place  Abdominal:      General: Bowel sounds are normal. There is no distension.      Palpations: Abdomen is soft. There is no mass.      Tenderness: There is no abdominal tenderness. There is no guarding or rebound.      Hernia: No hernia is present.      Comments: G-tube site is pink and appears to be well healed, no erythema or discharge    Musculoskeletal:         General: No deformity. Normal range of motion.      Cervical back: Normal range of motion and neck supple.   Skin:     General: Skin is warm and dry.   Neurological:      Mental Status: He is alert. Mental status is at baseline.       Significant Labs: All pertinent labs within the past 24 hours have been reviewed.    Significant Imaging: I have reviewed all pertinent imaging results/findings within the past 24 hours.      Assessment/Plan:      * Dislodged gastrostomy tube  Patient transferred from nursing home due to dislodged PEG tube. Unsure of how long the peg tube has been dislodged.  ED providers attempted four times to replaced peg tube with three different sized g tubes and were unsuccessful  - GI consulted, plan to replace today  - NPO for now      Oropharyngeal dysphagia  S/p PEG placement previously. CVA secondary to prev history of CVA  - Pending Peg replacement by GI  today.      Anemia  - patient's anemia is currently controlled  - etiology likely due to anemia of chronic disease +/- CKD  - continue ferrous sulfate when able  - current CBC reviewed -   Lab Results   Component Value Date    HGB 12.2 (L) 01/10/2023    HCT 39.4 (L) 01/10/2023     - monitor serial CBC and transfuse if patient becomes hemodynamically unstable, symptomatic, or H/H drops below 7/21.         HLD (hyperlipidemia)  - holding atorvastatin and fenofibrate due to peg tube dislodgement  - resume when able      Chronic kidney disease, stage III (moderate)  - Cr 1.3 at admission, improved from recent baseline of ~2.0  - renally dosing all medications to CrCl cannot be calculated (Unknown ideal weight.).   - avoid nephrotoxins  - will continue to monitor on daily labs      VTE Risk Mitigation (From admission, onward)         Ordered     IP VTE LOW RISK PATIENT  Once         01/10/23 1140     Place sequential compression device  Until discontinued         01/10/23 1140                Discharge Planning   FAINA: 1/13/2023     Code Status: Full Code   Is the patient medically ready for discharge?: No    Reason for patient still in hospital (select all that apply): Patient trending condition  Discharge Plan A: Return to nursing home   Discharge Delays: (!) Post-Acute Set-up              Suyapa Guzmán MD  Department of Hospital Medicine   [facility] - Intensive Care ([place]-16)

## 2023-01-15 LAB
BACTERIA UR CULT: ABNORMAL
POCT GLUCOSE: 159 MG/DL (ref 70–110)
POCT GLUCOSE: 164 MG/DL (ref 70–110)
POCT GLUCOSE: 174 MG/DL (ref 70–110)
POCT GLUCOSE: 197 MG/DL (ref 70–110)

## 2023-01-15 PROCEDURE — 51798 US URINE CAPACITY MEASURE: CPT

## 2023-01-15 PROCEDURE — 99233 SBSQ HOSP IP/OBS HIGH 50: CPT | Mod: ,,, | Performed by: HOSPITALIST

## 2023-01-15 PROCEDURE — 94761 N-INVAS EAR/PLS OXIMETRY MLT: CPT

## 2023-01-15 PROCEDURE — 63600175 PHARM REV CODE 636 W HCPCS: Performed by: HOSPITALIST

## 2023-01-15 PROCEDURE — 27200966 HC CLOSED SUCTION SYSTEM

## 2023-01-15 PROCEDURE — 99900035 HC TECH TIME PER 15 MIN (STAT)

## 2023-01-15 PROCEDURE — 99233 PR SUBSEQUENT HOSPITAL CARE,LEVL III: ICD-10-PCS | Mod: ,,, | Performed by: HOSPITALIST

## 2023-01-15 PROCEDURE — 27201113

## 2023-01-15 PROCEDURE — 25000003 PHARM REV CODE 250: Performed by: HOSPITALIST

## 2023-01-15 PROCEDURE — 94760 N-INVAS EAR/PLS OXIMETRY 1: CPT

## 2023-01-15 PROCEDURE — 27000221 HC OXYGEN, UP TO 24 HOURS

## 2023-01-15 PROCEDURE — 99900026 HC AIRWAY MAINTENANCE (STAT)

## 2023-01-15 PROCEDURE — G0378 HOSPITAL OBSERVATION PER HR: HCPCS

## 2023-01-15 RX ADMIN — PREDNISONE 10 MG: 5 TABLET ORAL at 08:01

## 2023-01-15 RX ADMIN — PANTOPRAZOLE SODIUM 40 MG: 40 GRANULE, DELAYED RELEASE ORAL at 08:01

## 2023-01-15 RX ADMIN — BACLOFEN 15 MG: 5 TABLET ORAL at 05:01

## 2023-01-15 RX ADMIN — LEVETIRACETAM 750 MG: 100 SOLUTION ORAL at 08:01

## 2023-01-15 RX ADMIN — BACLOFEN 15 MG: 5 TABLET ORAL at 08:01

## 2023-01-15 NOTE — SUBJECTIVE & OBJECTIVE
Interval History:   1/15: no acute issues overnight    Review of Systems   Unable to perform ROS: Patient nonverbal   Objective:     Vital Signs (Most Recent):  Temp: 98.1 °F (36.7 °C) (01/15/23 1121)  Pulse: 88 (01/15/23 1512)  Resp: 18 (01/15/23 0720)  BP: 135/67 (01/15/23 0720)  SpO2: 95 % (01/15/23 1512) Vital Signs (24h Range):  Temp:  [97.9 °F (36.6 °C)-98.4 °F (36.9 °C)] 98.1 °F (36.7 °C)  Pulse:  [83-93] 88  Resp:  [18] 18  SpO2:  [94 %-98 %] 95 %  BP: (129-141)/(67-74) 135/67     Weight: 72 kg (158 lb 11.7 oz)  Body mass index is 21.04 kg/m².    Intake/Output Summary (Last 24 hours) at 1/15/2023 1651  Last data filed at 1/15/2023 1517  Gross per 24 hour   Intake 400 ml   Output 350 ml   Net 50 ml      Physical Exam  Constitutional:       General: He is not in acute distress.     Appearance: He is well-developed. He is ill-appearing. He is not toxic-appearing.   HENT:      Head: Normocephalic and atraumatic.   Eyes:      Conjunctiva/sclera: Conjunctivae normal.      Pupils: Pupils are equal, round, and reactive to light.   Neck:      Thyroid: No thyromegaly.      Vascular: No JVD.   Cardiovascular:      Rate and Rhythm: Regular rhythm. Tachycardia present.      Heart sounds: Normal heart sounds. No murmur heard.    No friction rub. No gallop.   Pulmonary:      Effort: Pulmonary effort is normal. No respiratory distress.      Breath sounds: Normal breath sounds. No wheezing or rales.      Comments: Trach in place  Abdominal:      General: Bowel sounds are normal. There is no distension.      Palpations: Abdomen is soft. There is no mass.      Tenderness: There is no abdominal tenderness. There is no guarding or rebound.      Hernia: No hernia is present.      Comments: G-tube site is pink and appears to be well healed, no erythema or discharge    Musculoskeletal:         General: No deformity. Normal range of motion.      Cervical back: Normal range of motion and neck supple.   Skin:     General: Skin is warm  and dry.   Neurological:      Mental Status: He is alert. Mental status is at baseline.       Significant Labs: All pertinent labs within the past 24 hours have been reviewed.    Significant Imaging: I have reviewed all pertinent imaging results/findings within the past 24 hours.   refuses to discuss

## 2023-01-15 NOTE — PROGRESS NOTES
[facility] - [facility] ([address] Medicine  Progress Note    Patient Name: Hao Medrano  MRN: [id number]  Patient Class: OP- Observation   Admission Date: 1/10/2023  Length of Stay: 0 days  Attending Physician: Suyapa Guzmán MD  Primary Care Provider: Keara Yanes NP        Subjective:     Principal Problem:Dislodged gastrostomy tube        HPI:  Hao Medrano is a 62 y.o. male with a past medical history of CVA with subsequent trach/PEG, CKD3b, and HLD who was transferred from his nursing home for evaluation of PEG tube removal. Per ED note, patient's friend reports that they are unsure how log the peg tube has been dislodged and they are unsure how it happened. They stated that patient is at his baseline which is non-communicative.      ED: tachycardic up to 117, otherwise vital signs stable, afebile. CBC with anemia, Hb 12.2. No leukocytosis. Cr 1.3, glucose 122.  XR of g-tube ordered and pending. ED provider attempted to replace PEG tube with a Sheets but unsuccessful after four attempts with three different sized 3 tubes. GI consulted.       Overview/Hospital Course:  No acute events since admission.  Pending EGD-peg placement      Interval History:   1/15: no acute issues overnight    Review of Systems   Unable to perform ROS: Patient nonverbal   Objective:     Vital Signs (Most Recent):  Temp: 98.1 °F (36.7 °C) (01/15/23 1121)  Pulse: 88 (01/15/23 1512)  Resp: 18 (01/15/23 0720)  BP: 135/67 (01/15/23 0720)  SpO2: 95 % (01/15/23 1512) Vital Signs (24h Range):  Temp:  [97.9 °F (36.6 °C)-98.4 °F (36.9 °C)] 98.1 °F (36.7 °C)  Pulse:  [83-93] 88  Resp:  [18] 18  SpO2:  [94 %-98 %] 95 %  BP: (129-141)/(67-74) 135/67     Weight: 72 kg (158 lb 11.7 oz)  Body mass index is 21.04 kg/m².    Intake/Output Summary (Last 24 hours) at 1/15/2023 1651  Last data filed at 1/15/2023 1517  Gross per 24 hour   Intake 400 ml   Output 350 ml   Net 50 ml      Physical Exam  Constitutional:        General: He is not in acute distress.     Appearance: He is well-developed. He is ill-appearing. He is not toxic-appearing.   HENT:      Head: Normocephalic and atraumatic.   Eyes:      Conjunctiva/sclera: Conjunctivae normal.      Pupils: Pupils are equal, round, and reactive to light.   Neck:      Thyroid: No thyromegaly.      Vascular: No JVD.   Cardiovascular:      Rate and Rhythm: Regular rhythm. Tachycardia present.      Heart sounds: Normal heart sounds. No murmur heard.    No friction rub. No gallop.   Pulmonary:      Effort: Pulmonary effort is normal. No respiratory distress.      Breath sounds: Normal breath sounds. No wheezing or rales.      Comments: Trach in place  Abdominal:      General: Bowel sounds are normal. There is no distension.      Palpations: Abdomen is soft. There is no mass.      Tenderness: There is no abdominal tenderness. There is no guarding or rebound.      Hernia: No hernia is present.      Comments: G-tube site is pink and appears to be well healed, no erythema or discharge    Musculoskeletal:         General: No deformity. Normal range of motion.      Cervical back: Normal range of motion and neck supple.   Skin:     General: Skin is warm and dry.   Neurological:      Mental Status: He is alert. Mental status is at baseline.       Significant Labs: All pertinent labs within the past 24 hours have been reviewed.    Significant Imaging: I have reviewed all pertinent imaging results/findings within the past 24 hours.      Assessment/Plan:      * Dislodged gastrostomy tube  Patient transferred from nursing home due to dislodged PEG tube. Unsure of how long the peg tube has been dislodged.  ED providers attempted four times to replaced peg tube with three different sized g tubes and were unsuccessful  - GI consulted, plan to replace today  - NPO for now      Oropharyngeal dysphagia  S/p PEG placement previously. CVA secondary to prev history of CVA  - Pending Peg replacement by GI  today.      Anemia  - patient's anemia is currently controlled  - etiology likely due to anemia of chronic disease +/- CKD  - continue ferrous sulfate when able  - current CBC reviewed -   Lab Results   Component Value Date    HGB 12.2 (L) 01/10/2023    HCT 39.4 (L) 01/10/2023     - monitor serial CBC and transfuse if patient becomes hemodynamically unstable, symptomatic, or H/H drops below 7/21.         HLD (hyperlipidemia)  - holding atorvastatin and fenofibrate due to peg tube dislodgement  - resume when able      Chronic kidney disease, stage III (moderate)  - Cr 1.3 at admission, improved from recent baseline of ~2.0  - renally dosing all medications to CrCl cannot be calculated (Unknown ideal weight.).   - avoid nephrotoxins  - will continue to monitor on daily labs      VTE Risk Mitigation (From admission, onward)         Ordered     IP VTE LOW RISK PATIENT  Once         01/10/23 1140     Place sequential compression device  Until discontinued         01/10/23 1140                Discharge Planning   FAINA: 1/15/2023     Code Status: Full Code   Is the patient medically ready for discharge?: No    Reason for patient still in hospital (select all that apply): Pending disposition  Discharge Plan A: Return to nursing home   Discharge Delays: (!) Post-Acute Set-up              Suyapa Guzmán MD  Department of Hospital Medicine   [facility] - Intensive Care ([place]-16)

## 2023-01-16 VITALS
DIASTOLIC BLOOD PRESSURE: 66 MMHG | SYSTOLIC BLOOD PRESSURE: 124 MMHG | OXYGEN SATURATION: 98 % | BODY MASS INDEX: 21.04 KG/M2 | HEART RATE: 86 BPM | RESPIRATION RATE: 15 BRPM | HEIGHT: 73 IN | WEIGHT: 158.75 LBS | TEMPERATURE: 98 F

## 2023-01-16 LAB
POCT GLUCOSE: 173 MG/DL (ref 70–110)
POCT GLUCOSE: 190 MG/DL (ref 70–110)

## 2023-01-16 PROCEDURE — 25000003 PHARM REV CODE 250: Performed by: HOSPITALIST

## 2023-01-16 PROCEDURE — 51798 US URINE CAPACITY MEASURE: CPT

## 2023-01-16 PROCEDURE — 63600175 PHARM REV CODE 636 W HCPCS: Performed by: HOSPITALIST

## 2023-01-16 PROCEDURE — 27200966 HC CLOSED SUCTION SYSTEM

## 2023-01-16 PROCEDURE — 27000221 HC OXYGEN, UP TO 24 HOURS

## 2023-01-16 PROCEDURE — 99900035 HC TECH TIME PER 15 MIN (STAT)

## 2023-01-16 PROCEDURE — 99239 HOSP IP/OBS DSCHRG MGMT >30: CPT | Mod: ,,, | Performed by: HOSPITALIST

## 2023-01-16 PROCEDURE — G0378 HOSPITAL OBSERVATION PER HR: HCPCS

## 2023-01-16 PROCEDURE — 94761 N-INVAS EAR/PLS OXIMETRY MLT: CPT

## 2023-01-16 PROCEDURE — 99900026 HC AIRWAY MAINTENANCE (STAT)

## 2023-01-16 PROCEDURE — 99239 PR HOSPITAL DISCHARGE DAY,>30 MIN: ICD-10-PCS | Mod: ,,, | Performed by: HOSPITALIST

## 2023-01-16 RX ADMIN — BACLOFEN 15 MG: 5 TABLET ORAL at 08:01

## 2023-01-16 RX ADMIN — BACLOFEN 15 MG: 5 TABLET ORAL at 05:01

## 2023-01-16 RX ADMIN — PREDNISONE 10 MG: 5 TABLET ORAL at 08:01

## 2023-01-16 RX ADMIN — PANTOPRAZOLE SODIUM 40 MG: 40 GRANULE, DELAYED RELEASE ORAL at 08:01

## 2023-01-16 RX ADMIN — LEVETIRACETAM 750 MG: 100 SOLUTION ORAL at 08:01

## 2023-01-16 NOTE — PLAN OF CARE
LELA called [facility] [phone] and spoke with Patricia in admissions.  Cirilo stated that the orders and COID from 01/13 could be faxed this am for return of pt.      LELA faxed orders and COVID to [phone]    Fax failed to transmit after 6 tries.  LELA called Patricia in admissions.  LELA was asked to email orders and COVID to [email or web address]      Kesha Anand CD, MSW, LMSW, RSW   Case Management  [facility]  Email: [email or web address]

## 2023-01-16 NOTE — RESPIRATORY THERAPY
RAPID RESPONSE RESPIRATORY THERAPY PROACTIVE NOTE           Time of visit: 927     Code Status: Full Code   : 1960  Bed: 70638/21129 A:   MRN: [id number]  Time spent at the bedside: < 15 min    SITUATION    Evaluated patient for: LDA Check     BACKGROUND    Patient has no past medical history on file.  Clinically Significant Surgical Hx: tracheostomy    24 Hours Vitals Range:  Temp:  [97.4 °F (36.3 °C)-98.1 °F (36.7 °C)]   Pulse:  [82-94]   Resp:  [15-18]   BP: (106-148)/(62-74)   SpO2:  [95 %-97 %]     Labs:    No results for input(s): NA, K, CL, CO2, CREATININE, GLU, PHOS, MG in the last 72 hours.    Invalid input(s): CMP,  BUN, TBIL     No results for input(s): PH, PCO2, PO2, HCO3, POCSATURATED, BE in the last 72 hours.    ASSESSMENT/INTERVENTIONS  Patient resting comfortably. No respiratory concerns at this time.      Last VS   Temp: 97.6 °F (36.4 °C) (805)  Pulse: 94 (805)  Resp: 15 (805)  BP: 106/62 (805)  SpO2: 95 % (805)      Extra trachs at bedside: 4.0 cuffed Shiley, 6.0 cuffed Shiley  Level of Consciousness: Level of Consciousness (AVPU): alert  Respiratory Effort: Respiratory Effort: Normal, Unlabored Expansion/Accessory Muscle Usage: Expansion/Accessory Muscles/Retractions: no use of accessory muscles  All Lung Field Breath Sounds: All Lung Fields Breath Sounds: Anterior:, coarse  O2 Device/Concentration: 5L/21%  Surgical airway: Yes, Type: Shiley Size: 6 XLT, cuffed  Ambu at bedside: Ambu bag with the patient?: Yes, Adult Ambu     Active Orders   Respiratory Care    Inhalation Treatment Q4H PRN     Frequency: Q4H PRN     Number of Occurrences: Until Specified    Oxygen Continuous     Frequency: Continuous     Number of Occurrences: Until Specified     Order Comments: Discontinue when Sp02 is greater than or equal to 95% of equal to Preop Sp02       Order Questions:      Device type: Low flow      Device: Trach Collar      FiO2%: 21      Titrate O2 per Oxygen  Titration Protocol: Yes      To maintain SpO2 goal of: >= 90%      Notify MD of: Inability to achieve desired SpO2    Oxygen PRN     Frequency: PRN     Number of Occurrences: Until Specified     Order Questions:      Device type: Low flow      Device: Trach Collar      Titrate O2 per Oxygen Titration Protocol: Yes      To maintain SpO2 goal of: >= 90%      Notify MD of: Inability to achieve desired SpO2; Sudden change in patient status and requires 20% increase in FiO2; Patient requires >60% FiO2    Routine tracheostomy care     Frequency: BID     Number of Occurrences: Until Specified    SUCTION PRN     Frequency: PRN     Number of Occurrences: Until Specified       RECOMMENDATIONS    We recommend: RRT Recs: Continue POC per primary team.      FOLLOW-UP    Please call back the Rapid Response RT, Patricia Khan RRT at x [phone] for any questions or concerns.

## 2023-01-16 NOTE — PLAN OF CARE
[phone] and left a message about calling report and transport for pt to return at 1120hrs      Kesha Anand CD, MSW, LMSW, RSW   Case Management  [facility]  Email: [email or web address]

## 2023-01-16 NOTE — PLAN OF CARE
[facility] - Intensive Care ([facility]-16)  Discharge Final Note    Primary Care Provider: Keara Yanes NP    Expected Discharge Date: 1/16/2023    Pt to return to [facility] [address] [facility], [address]  Room [place] 18    Transport via stretcher scheduled for 1230hrs on 01/16    Nurse to call report [phone] and ask for [place] for Report    Family notified of pt transfer    Final Discharge Note (most recent)       Final Note - 01/16/23 1129          Final Note    Assessment Type Final Discharge Note (P)      Anticipated Discharge Disposition Long Term Care (P)         Post-Acute Status    Post-Acute Authorization Placement (P)      Post-Acute Placement Status Set-up Complete/Auth obtained (P)      Discharge Delays None known at this time (P)                    Kesha Anand CD, MSW, LMSW, RSW   Case Management  [facility]  Email: [email or web address]      Important Message from Medicare             Contact Info       Keara Yanes NP   Specialty: Family Medicine   Relationship: PCP - General    Family Medicine  [address] [facility] [address]   Phone: [phone]       Next Steps: Schedule an appointment as soon as possible for a visit

## 2023-01-16 NOTE — PLAN OF CARE
SW emailed new orders, MAR, progress notes, respiratory notes to [email or web address]      Kesha Anand CD, MSW, LMSW, RSW   Case Management  [facility]  Email: [email or web address]

## 2023-01-16 NOTE — RESPIRATORY THERAPY
RAPID RESPONSE RESPIRATORY THERAPY PROACTIVE NOTE           Time of visit: 1250     Code Status: Full Code   : 1960  Bed: 76760/38293 A:   MRN: [id number]  Time spent at the bedside: < 15 min    SITUATION    Evaluated patient for: LDA Check     BACKGROUND    Patient has no past medical history on file.  Clinically Significant Surgical Hx: tracheostomy    24 Hours Vitals Range:  Temp:  [97.9 °F (36.6 °C)-98.4 °F (36.9 °C)]   Pulse:  [83-92]   Resp:  [18]   BP: (129-141)/(67-74)   SpO2:  [94 %-98 %]     Labs:    No results for input(s): NA, K, CL, CO2, CREATININE, GLU, PHOS, MG in the last 72 hours.    Invalid input(s): CMP,  BUN, TBIL     No results for input(s): PH, PCO2, PO2, HCO3, POCSATURATED, BE in the last 72 hours.    ASSESSMENT/INTERVENTIONS  All supplies at bedside      Last VS   Temp: 98 °F (36.7 °C) (01/15 1512)  Pulse: 88 (01/15 1512)  Resp: 18 (01/15 0720)  BP: 135/67 (01/15 0720)  SpO2: 95 % (01/15 1512)      Extra trachs at bedside: 5 & 6 xlt  Level of Consciousness: Level of Consciousness (AVPU): responds to voice  Respiratory Effort: Respiratory Effort: Normal, Unlabored Expansion/Accessory Muscle Usage: Expansion/Accessory Muscles/Retractions: no retractions, no use of accessory muscles  All Lung Field Breath Sounds: All Lung Fields Breath Sounds: coarse, diminished  O2 Device/Concentration: 5l 21%  Surgical airway: Yes, Type: Shiley Size: 6 XLT, cuffed  Ambu at bedside: Ambu bag with the patient?: Yes, Adult Ambu     Active Orders   Respiratory Care    Inhalation Treatment Q4H PRN     Frequency: Q4H PRN     Number of Occurrences: Until Specified    Oxygen Continuous     Frequency: Continuous     Number of Occurrences: Until Specified     Order Comments: Discontinue when Sp02 is greater than or equal to 95% of equal to Preop Sp02       Order Questions:      Device type: Low flow      Device: Trach Collar      FiO2%: 21      Titrate O2 per Oxygen Titration Protocol: Yes      To maintain SpO2  goal of: >= 90%      Notify MD of: Inability to achieve desired SpO2    Oxygen PRN     Frequency: PRN     Number of Occurrences: Until Specified     Order Questions:      Device type: Low flow      Device: Trach Collar      Titrate O2 per Oxygen Titration Protocol: Yes      To maintain SpO2 goal of: >= 90%      Notify MD of: Inability to achieve desired SpO2; Sudden change in patient status and requires 20% increase in FiO2; Patient requires >60% FiO2    Routine tracheostomy care     Frequency: BID     Number of Occurrences: Until Specified    SUCTION PRN     Frequency: PRN     Number of Occurrences: Until Specified       RECOMMENDATIONS    We recommend: RRT Recs: Continue POC per primary team.      FOLLOW-UP    Please call back the Rapid Response RT, Natividad Thomason, RRT at x [phone] for any questions or concerns.

## 2023-01-16 NOTE — NURSING
Report given to Terrell; [facility] to facility.

## 2023-01-16 NOTE — NURSING
Report given to receiving nurse at [facility]. Receiving nurse question why ua wasn't treated. Charge nurse called receiving facility back stating that No clinical indication for any treatment. Sample Consistent with contaminated sample from skin candida groin infection which is due to lack of diaper hygiene at nursing facility. Recommendations from wound care for increased frequency of diaper changing to ensure dry groin skin conditions and application of barrier cream and to address pt numerous wounds. Orders in plan of care that was sent per facility protocol. Patient is medically cleared for transportation per MD. Pt IV removed and feed disconnected . Pt is non verbal.Trach collar/peg tube and wound dressings clean dry and intact.Pt leaving floor via stretcher [facility].

## 2023-01-16 NOTE — PLAN OF CARE
Problem: Adult Inpatient Plan of Care  Goal: Plan of Care Review  Outcome: Ongoing, Progressing     Problem: Adult Inpatient Plan of Care  Goal: Patient-Specific Goal (Individualized)  Outcome: Ongoing, Progressing     Problem: Infection  Goal: Absence of Infection Signs and Symptoms  Outcome: Ongoing, Progressing     Problem: Impaired Wound Healing  Goal: Optimal Wound Healing  Outcome: Ongoing, Not Progressing     Problem: Skin Injury Risk Increased  Goal: Skin Health and Integrity  Outcome: Ongoing, Not Progressing

## 2023-01-17 NOTE — PLAN OF CARE
[facility] - Intensive Care ([facility]-16)  Discharge Final Note    Primary Care Provider: Keara Yanes NP    Expected Discharge Date: 1/16/2023    Final Discharge Note (most recent)       Final Note - 01/17/23 1424          Final Note    Assessment Type Final Discharge Note (P)      Anticipated Discharge Disposition Skilled Nursing Facility (P)                      Important Message from Medicare    Pt d/c'd to [facility]; patient transported via [facility] to facility.         Contact Info       Keara Yanes NP   Specialty: Family Medicine   Relationship: PCP - General    Family Medicine  [address] [place] [address]   Phone: [phone]       Next Steps: Schedule an appointment as soon as possible for a visit

## 2023-01-25 ENCOUNTER — HOSPITAL ENCOUNTER (INPATIENT)
Facility: HOSPITAL | Age: 63
LOS: 17 days | Discharge: HOME | DRG: 393 | End: 2023-02-12
Attending: STUDENT IN AN ORGANIZED HEALTH CARE EDUCATION/TRAINING PROGRAM | Admitting: STUDENT IN AN ORGANIZED HEALTH CARE EDUCATION/TRAINING PROGRAM
Payer: MEDICARE

## 2023-01-25 ENCOUNTER — ANESTHESIA EVENT (OUTPATIENT)
Dept: ENDOSCOPY | Facility: HOSPITAL | Age: 63
DRG: 393 | End: 2023-01-25
Payer: MEDICARE

## 2023-01-25 DIAGNOSIS — D64.9 ANEMIA, UNSPECIFIED TYPE: ICD-10-CM

## 2023-01-25 DIAGNOSIS — R07.9 CHEST PAIN: ICD-10-CM

## 2023-01-25 DIAGNOSIS — R78.81 BACTEREMIA: ICD-10-CM

## 2023-01-25 DIAGNOSIS — D72.829 LEUKOCYTOSIS: ICD-10-CM

## 2023-01-25 DIAGNOSIS — R78.81 GRAM-POSITIVE COCCI BACTEREMIA: ICD-10-CM

## 2023-01-25 DIAGNOSIS — T14.8XXA MULTIPLE WOUNDS OF SKIN: Primary | ICD-10-CM

## 2023-01-25 DIAGNOSIS — T85.528A DISLODGED GASTROSTOMY TUBE: ICD-10-CM

## 2023-01-25 PROBLEM — E11.9 TYPE 2 DIABETES MELLITUS, WITH LONG-TERM CURRENT USE OF INSULIN: Status: ACTIVE | Noted: 2023-01-25

## 2023-01-25 PROBLEM — Z79.4 TYPE 2 DIABETES MELLITUS, WITH LONG-TERM CURRENT USE OF INSULIN: Status: ACTIVE | Noted: 2023-01-25

## 2023-01-25 PROBLEM — I10 HTN (HYPERTENSION): Status: ACTIVE | Noted: 2023-01-25

## 2023-01-25 PROBLEM — N39.0 UTI (URINARY TRACT INFECTION): Status: ACTIVE | Noted: 2023-01-25

## 2023-01-25 PROBLEM — Z79.899 ON ANTIEPILEPTIC THERAPY: Status: ACTIVE | Noted: 2023-01-25

## 2023-01-25 LAB
ALBUMIN SERPL BCP-MCNC: 2.4 G/DL (ref 3.5–5.2)
ALP SERPL-CCNC: 250 U/L (ref 55–135)
ALT SERPL W/O P-5'-P-CCNC: 50 U/L (ref 10–44)
ANION GAP SERPL CALC-SCNC: 14 MMOL/L (ref 8–16)
AST SERPL-CCNC: 30 U/L (ref 10–40)
BACTERIA #/AREA URNS AUTO: ABNORMAL /HPF
BASOPHILS # BLD AUTO: 0.11 K/UL (ref 0–0.2)
BASOPHILS NFR BLD: 0.5 % (ref 0–1.9)
BILIRUB SERPL-MCNC: 0.2 MG/DL (ref 0.1–1)
BILIRUB UR QL STRIP: NEGATIVE
BUN SERPL-MCNC: 40 MG/DL (ref 8–23)
CALCIUM SERPL-MCNC: 10.3 MG/DL (ref 8.7–10.5)
CHLORIDE SERPL-SCNC: 101 MMOL/L (ref 95–110)
CLARITY UR REFRACT.AUTO: ABNORMAL
CO2 SERPL-SCNC: 26 MMOL/L (ref 23–29)
COLOR UR AUTO: ABNORMAL
CREAT SERPL-MCNC: 1.3 MG/DL (ref 0.5–1.4)
CRP SERPL-MCNC: 207.7 MG/L (ref 0–8.2)
CRP SERPL-MCNC: 220.9 MG/L (ref 0–8.2)
DIFFERENTIAL METHOD: ABNORMAL
EOSINOPHIL # BLD AUTO: 1.5 K/UL (ref 0–0.5)
EOSINOPHIL NFR BLD: 6.3 % (ref 0–8)
ERYTHROCYTE [DISTWIDTH] IN BLOOD BY AUTOMATED COUNT: 16.8 % (ref 11.5–14.5)
ERYTHROCYTE [SEDIMENTATION RATE] IN BLOOD BY PHOTOMETRIC METHOD: >120 MM/HR (ref 0–23)
EST. GFR  (NO RACE VARIABLE): >60 ML/MIN/1.73 M^2
GLUCOSE SERPL-MCNC: 126 MG/DL (ref 70–110)
GLUCOSE UR QL STRIP: NEGATIVE
HCT VFR BLD AUTO: 37.5 % (ref 40–54)
HGB BLD-MCNC: 11.4 G/DL (ref 14–18)
HGB UR QL STRIP: ABNORMAL
HYALINE CASTS UR QL AUTO: 0 /LPF
IMM GRANULOCYTES # BLD AUTO: 0.13 K/UL (ref 0–0.04)
IMM GRANULOCYTES NFR BLD AUTO: 0.6 % (ref 0–0.5)
INR PPP: 1 (ref 0.8–1.2)
KETONES UR QL STRIP: NEGATIVE
LACTATE SERPL-SCNC: 1.1 MMOL/L (ref 0.5–2.2)
LEUKOCYTE ESTERASE UR QL STRIP: ABNORMAL
LYMPHOCYTES # BLD AUTO: 2.3 K/UL (ref 1–4.8)
LYMPHOCYTES NFR BLD: 9.9 % (ref 18–48)
MCH RBC QN AUTO: 27.2 PG (ref 27–31)
MCHC RBC AUTO-ENTMCNC: 30.4 G/DL (ref 32–36)
MCV RBC AUTO: 90 FL (ref 82–98)
MICROSCOPIC COMMENT: ABNORMAL
MONOCYTES # BLD AUTO: 1.4 K/UL (ref 0.3–1)
MONOCYTES NFR BLD: 6.1 % (ref 4–15)
NEUTROPHILS # BLD AUTO: 17.8 K/UL (ref 1.8–7.7)
NEUTROPHILS NFR BLD: 76.6 % (ref 38–73)
NITRITE UR QL STRIP: NEGATIVE
NRBC BLD-RTO: 0 /100 WBC
PH UR STRIP: 6 [PH] (ref 5–8)
PLATELET # BLD AUTO: 517 K/UL (ref 150–450)
PMV BLD AUTO: 10.6 FL (ref 9.2–12.9)
POCT GLUCOSE: 114 MG/DL (ref 70–110)
POTASSIUM SERPL-SCNC: 4.4 MMOL/L (ref 3.5–5.1)
PROCALCITONIN SERPL IA-MCNC: 0.27 NG/ML
PROT SERPL-MCNC: 9.1 G/DL (ref 6–8.4)
PROT UR QL STRIP: ABNORMAL
PROTHROMBIN TIME: 10.9 SEC (ref 9–12.5)
RBC # BLD AUTO: 4.19 M/UL (ref 4.6–6.2)
RBC #/AREA URNS AUTO: 55 /HPF (ref 0–4)
SODIUM SERPL-SCNC: 141 MMOL/L (ref 136–145)
SP GR UR STRIP: 1.02 (ref 1–1.03)
URN SPEC COLLECT METH UR: ABNORMAL
WBC # BLD AUTO: 23.17 K/UL (ref 3.9–12.7)
WBC #/AREA URNS AUTO: >100 /HPF (ref 0–5)
WBC CLUMPS UR QL AUTO: ABNORMAL
YEAST UR QL AUTO: ABNORMAL

## 2023-01-25 PROCEDURE — 81001 URINALYSIS AUTO W/SCOPE: CPT | Performed by: STUDENT IN AN ORGANIZED HEALTH CARE EDUCATION/TRAINING PROGRAM

## 2023-01-25 PROCEDURE — 25000003 PHARM REV CODE 250

## 2023-01-25 PROCEDURE — 99284 PR EMERGENCY DEPT VISIT,LEVEL IV: ICD-10-PCS | Mod: ,,, | Performed by: INTERNAL MEDICINE

## 2023-01-25 PROCEDURE — 87186 SC STD MICRODIL/AGAR DIL: CPT | Performed by: STUDENT IN AN ORGANIZED HEALTH CARE EDUCATION/TRAINING PROGRAM

## 2023-01-25 PROCEDURE — S0030 INJECTION, METRONIDAZOLE: HCPCS

## 2023-01-25 PROCEDURE — 85652 RBC SED RATE AUTOMATED: CPT | Performed by: STUDENT IN AN ORGANIZED HEALTH CARE EDUCATION/TRAINING PROGRAM

## 2023-01-25 PROCEDURE — 36410 PR VENIPUNC NEED PHYS SKILL,DX OR RX: ICD-10-PCS | Mod: ,,, | Performed by: STUDENT IN AN ORGANIZED HEALTH CARE EDUCATION/TRAINING PROGRAM

## 2023-01-25 PROCEDURE — 83605 ASSAY OF LACTIC ACID: CPT | Performed by: STUDENT IN AN ORGANIZED HEALTH CARE EDUCATION/TRAINING PROGRAM

## 2023-01-25 PROCEDURE — 87106 FUNGI IDENTIFICATION YEAST: CPT | Performed by: STUDENT IN AN ORGANIZED HEALTH CARE EDUCATION/TRAINING PROGRAM

## 2023-01-25 PROCEDURE — G0378 HOSPITAL OBSERVATION PER HR: HCPCS

## 2023-01-25 PROCEDURE — 85610 PROTHROMBIN TIME: CPT | Performed by: STUDENT IN AN ORGANIZED HEALTH CARE EDUCATION/TRAINING PROGRAM

## 2023-01-25 PROCEDURE — 99223 1ST HOSP IP/OBS HIGH 75: CPT | Mod: AI,,, | Performed by: STUDENT IN AN ORGANIZED HEALTH CARE EDUCATION/TRAINING PROGRAM

## 2023-01-25 PROCEDURE — 81003 URINALYSIS AUTO W/O SCOPE: CPT | Performed by: STUDENT IN AN ORGANIZED HEALTH CARE EDUCATION/TRAINING PROGRAM

## 2023-01-25 PROCEDURE — 80053 COMPREHEN METABOLIC PANEL: CPT | Performed by: STUDENT IN AN ORGANIZED HEALTH CARE EDUCATION/TRAINING PROGRAM

## 2023-01-25 PROCEDURE — 94761 N-INVAS EAR/PLS OXIMETRY MLT: CPT

## 2023-01-25 PROCEDURE — 99285 EMERGENCY DEPT VISIT HI MDM: CPT | Mod: 25

## 2023-01-25 PROCEDURE — 63600175 PHARM REV CODE 636 W HCPCS: Performed by: STUDENT IN AN ORGANIZED HEALTH CARE EDUCATION/TRAINING PROGRAM

## 2023-01-25 PROCEDURE — 99900035 HC TECH TIME PER 15 MIN (STAT)

## 2023-01-25 PROCEDURE — 99284 EMERGENCY DEPT VISIT MOD MDM: CPT | Mod: ,,, | Performed by: INTERNAL MEDICINE

## 2023-01-25 PROCEDURE — 36410 VNPNXR 3YR/> PHY/QHP DX/THER: CPT | Mod: ,,, | Performed by: STUDENT IN AN ORGANIZED HEALTH CARE EDUCATION/TRAINING PROGRAM

## 2023-01-25 PROCEDURE — 86140 C-REACTIVE PROTEIN: CPT | Performed by: STUDENT IN AN ORGANIZED HEALTH CARE EDUCATION/TRAINING PROGRAM

## 2023-01-25 PROCEDURE — 99900026 HC AIRWAY MAINTENANCE (STAT)

## 2023-01-25 PROCEDURE — 87040 BLOOD CULTURE FOR BACTERIA: CPT | Mod: 59 | Performed by: STUDENT IN AN ORGANIZED HEALTH CARE EDUCATION/TRAINING PROGRAM

## 2023-01-25 PROCEDURE — 99223 PR INITIAL HOSPITAL CARE,LEVL III: ICD-10-PCS | Mod: ,,,

## 2023-01-25 PROCEDURE — 99223 1ST HOSP IP/OBS HIGH 75: CPT | Mod: ,,,

## 2023-01-25 PROCEDURE — 87086 URINE CULTURE/COLONY COUNT: CPT | Performed by: STUDENT IN AN ORGANIZED HEALTH CARE EDUCATION/TRAINING PROGRAM

## 2023-01-25 PROCEDURE — 84145 PROCALCITONIN (PCT): CPT

## 2023-01-25 PROCEDURE — 86140 C-REACTIVE PROTEIN: CPT | Mod: 91 | Performed by: STUDENT IN AN ORGANIZED HEALTH CARE EDUCATION/TRAINING PROGRAM

## 2023-01-25 PROCEDURE — 87077 CULTURE AEROBIC IDENTIFY: CPT | Performed by: STUDENT IN AN ORGANIZED HEALTH CARE EDUCATION/TRAINING PROGRAM

## 2023-01-25 PROCEDURE — 27000221 HC OXYGEN, UP TO 24 HOURS

## 2023-01-25 PROCEDURE — 99285 EMERGENCY DEPT VISIT HI MDM: CPT | Mod: 25,,, | Performed by: STUDENT IN AN ORGANIZED HEALTH CARE EDUCATION/TRAINING PROGRAM

## 2023-01-25 PROCEDURE — 25000003 PHARM REV CODE 250: Performed by: STUDENT IN AN ORGANIZED HEALTH CARE EDUCATION/TRAINING PROGRAM

## 2023-01-25 PROCEDURE — 63600175 PHARM REV CODE 636 W HCPCS

## 2023-01-25 PROCEDURE — 87088 URINE BACTERIA CULTURE: CPT | Performed by: STUDENT IN AN ORGANIZED HEALTH CARE EDUCATION/TRAINING PROGRAM

## 2023-01-25 PROCEDURE — 99285 PR EMERGENCY DEPT VISIT,LEVEL V: ICD-10-PCS | Mod: 25,,, | Performed by: STUDENT IN AN ORGANIZED HEALTH CARE EDUCATION/TRAINING PROGRAM

## 2023-01-25 PROCEDURE — 85025 COMPLETE CBC W/AUTO DIFF WBC: CPT | Performed by: STUDENT IN AN ORGANIZED HEALTH CARE EDUCATION/TRAINING PROGRAM

## 2023-01-25 PROCEDURE — 99223 PR INITIAL HOSPITAL CARE,LEVL III: ICD-10-PCS | Mod: AI,,, | Performed by: STUDENT IN AN ORGANIZED HEALTH CARE EDUCATION/TRAINING PROGRAM

## 2023-01-25 RX ORDER — DEXTROSE MONOHYDRATE AND SODIUM CHLORIDE 5; .9 G/100ML; G/100ML
INJECTION, SOLUTION INTRAVENOUS CONTINUOUS
Status: DISCONTINUED | OUTPATIENT
Start: 2023-01-25 | End: 2023-01-25

## 2023-01-25 RX ORDER — BISACODYL 10 MG
10 SUPPOSITORY, RECTAL RECTAL DAILY PRN
Status: DISCONTINUED | OUTPATIENT
Start: 2023-01-25 | End: 2023-02-12 | Stop reason: HOSPADM

## 2023-01-25 RX ORDER — ONDANSETRON 4 MG/1
4 TABLET, ORALLY DISINTEGRATING ORAL EVERY 8 HOURS PRN
Status: DISCONTINUED | OUTPATIENT
Start: 2023-01-25 | End: 2023-01-25

## 2023-01-25 RX ORDER — MAG HYDROX/ALUMINUM HYD/SIMETH 200-200-20
30 SUSPENSION, ORAL (FINAL DOSE FORM) ORAL 4 TIMES DAILY PRN
Status: DISCONTINUED | OUTPATIENT
Start: 2023-01-25 | End: 2023-01-25

## 2023-01-25 RX ORDER — SODIUM CHLORIDE 0.9 % (FLUSH) 0.9 %
5 SYRINGE (ML) INJECTION
Status: DISCONTINUED | OUTPATIENT
Start: 2023-01-25 | End: 2023-02-12 | Stop reason: HOSPADM

## 2023-01-25 RX ORDER — SODIUM CHLORIDE 9 MG/ML
INJECTION, SOLUTION INTRAVENOUS CONTINUOUS
Status: ACTIVE | OUTPATIENT
Start: 2023-01-25 | End: 2023-01-26

## 2023-01-25 RX ORDER — SODIUM CHLORIDE 0.9 % (FLUSH) 0.9 %
10 SYRINGE (ML) INJECTION
Status: DISCONTINUED | OUTPATIENT
Start: 2023-01-25 | End: 2023-02-12 | Stop reason: HOSPADM

## 2023-01-25 RX ORDER — NALOXONE HCL 0.4 MG/ML
0.02 VIAL (ML) INJECTION
Status: DISCONTINUED | OUTPATIENT
Start: 2023-01-25 | End: 2023-02-12 | Stop reason: HOSPADM

## 2023-01-25 RX ORDER — IBUPROFEN 200 MG
16 TABLET ORAL
Status: DISCONTINUED | OUTPATIENT
Start: 2023-01-25 | End: 2023-01-25

## 2023-01-25 RX ORDER — LEVETIRACETAM 500 MG/5ML
750 INJECTION, SOLUTION, CONCENTRATE INTRAVENOUS EVERY 12 HOURS
Status: DISCONTINUED | OUTPATIENT
Start: 2023-01-25 | End: 2023-01-26

## 2023-01-25 RX ORDER — IPRATROPIUM BROMIDE AND ALBUTEROL SULFATE 2.5; .5 MG/3ML; MG/3ML
3 SOLUTION RESPIRATORY (INHALATION) EVERY 4 HOURS PRN
Status: DISCONTINUED | OUTPATIENT
Start: 2023-01-25 | End: 2023-02-12 | Stop reason: HOSPADM

## 2023-01-25 RX ORDER — ACETAMINOPHEN 325 MG/1
650 TABLET ORAL EVERY 4 HOURS PRN
Status: DISCONTINUED | OUTPATIENT
Start: 2023-01-25 | End: 2023-01-25

## 2023-01-25 RX ORDER — PROCHLORPERAZINE EDISYLATE 5 MG/ML
5 INJECTION INTRAMUSCULAR; INTRAVENOUS EVERY 6 HOURS PRN
Status: DISCONTINUED | OUTPATIENT
Start: 2023-01-25 | End: 2023-01-25

## 2023-01-25 RX ORDER — GLUCAGON 1 MG
1 KIT INJECTION
Status: DISCONTINUED | OUTPATIENT
Start: 2023-01-25 | End: 2023-02-12 | Stop reason: HOSPADM

## 2023-01-25 RX ORDER — IBUPROFEN 200 MG
24 TABLET ORAL
Status: DISCONTINUED | OUTPATIENT
Start: 2023-01-25 | End: 2023-01-25

## 2023-01-25 RX ORDER — TALC
6 POWDER (GRAM) TOPICAL NIGHTLY PRN
Status: DISCONTINUED | OUTPATIENT
Start: 2023-01-25 | End: 2023-01-25

## 2023-01-25 RX ORDER — ACETAMINOPHEN 500 MG
1000 TABLET ORAL EVERY 8 HOURS PRN
Status: DISCONTINUED | OUTPATIENT
Start: 2023-01-25 | End: 2023-01-25

## 2023-01-25 RX ORDER — INSULIN ASPART 100 [IU]/ML
0-5 INJECTION, SOLUTION INTRAVENOUS; SUBCUTANEOUS
Status: DISCONTINUED | OUTPATIENT
Start: 2023-01-25 | End: 2023-02-12 | Stop reason: HOSPADM

## 2023-01-25 RX ORDER — LABETALOL HCL 20 MG/4 ML
10 SYRINGE (ML) INTRAVENOUS EVERY 4 HOURS PRN
Status: DISCONTINUED | OUTPATIENT
Start: 2023-01-25 | End: 2023-02-12 | Stop reason: HOSPADM

## 2023-01-25 RX ORDER — METRONIDAZOLE 500 MG/100ML
500 INJECTION, SOLUTION INTRAVENOUS
Status: DISCONTINUED | OUTPATIENT
Start: 2023-01-25 | End: 2023-01-27

## 2023-01-25 RX ORDER — SIMETHICONE 80 MG
1 TABLET,CHEWABLE ORAL 4 TIMES DAILY PRN
Status: DISCONTINUED | OUTPATIENT
Start: 2023-01-25 | End: 2023-01-25

## 2023-01-25 RX ADMIN — SODIUM CHLORIDE, POTASSIUM CHLORIDE, SODIUM LACTATE AND CALCIUM CHLORIDE 1000 ML: 600; 310; 30; 20 INJECTION, SOLUTION INTRAVENOUS at 07:01

## 2023-01-25 RX ADMIN — METRONIDAZOLE 500 MG: 500 INJECTION, SOLUTION INTRAVENOUS at 07:01

## 2023-01-25 RX ADMIN — VANCOMYCIN HYDROCHLORIDE 1500 MG: 1.5 INJECTION, POWDER, LYOPHILIZED, FOR SOLUTION INTRAVENOUS at 09:01

## 2023-01-25 RX ADMIN — SODIUM CHLORIDE: 9 INJECTION, SOLUTION INTRAVENOUS at 09:01

## 2023-01-25 RX ADMIN — METRONIDAZOLE 500 MG: 500 INJECTION, SOLUTION INTRAVENOUS at 11:01

## 2023-01-25 RX ADMIN — CEFTRIAXONE 1 G: 1 INJECTION, POWDER, FOR SOLUTION INTRAMUSCULAR; INTRAVENOUS at 06:01

## 2023-01-25 RX ADMIN — LEVETIRACETAM 750 MG: 100 INJECTION INTRAVENOUS at 09:01

## 2023-01-25 NOTE — ASSESSMENT & PLAN NOTE
- VSSAF  - leukocytosis of 23K upon admission  - g-tube site is clean without discharge or erythma  - patient on prednisone 10 mg daily, unclear why per chart review - could be contributing to significant leukocytosis  - CXR pending  - UA pending  - blood cultures obtained  - procal ordered  - will start empirical ceftriaxone and flagyl in the meantime  - follow up UA, CXR, and blood cultures

## 2023-01-25 NOTE — SUBJECTIVE & OBJECTIVE
History reviewed. No pertinent past medical history.    Past Surgical History:   Procedure Laterality Date    ESOPHAGOGASTRODUODENOSCOPY N/A 1/11/2023    Procedure: EGD (ESOPHAGOGASTRODUODENOSCOPY);  Surgeon: Francisco Javier Holt MD;  Location: [address]);  Service: Endoscopy;  Laterality: N/A;    ESOPHAGOGASTRODUODENOSCOPY N/A 1/12/2023    Procedure: EGD (ESOPHAGOGASTRODUODENOSCOPY);  Surgeon: Francisco Javier Holt MD;  Location: [address]);  Service: Endoscopy;  Laterality: N/A;       Review of patient's allergies indicates:  No Known Allergies    No current facility-administered medications on file prior to encounter.     Current Outpatient Medications on File Prior to Encounter   Medication Sig    arginine-glutamine-calcium HMB (SHEREE) 7-7-1.5 gram PwPk 1 packet by Per G Tube route 2 (two) times daily.    aspirin 81 MG Chew 81 mg by Per G Tube route once daily.    baclofen (LIORESAL) 5 mg Tab tablet 15 mg by Per G Tube route 3 (three) times daily.    calcium carbonate (OS-OLGA) 500 mg calcium (1,250 mg) tablet 1,250 mg by Per G Tube route once daily.    carvediloL (COREG) 3.125 MG tablet 3.125 mg by Per G Tube route 2 (two) times daily.    esomeprazole (NEXIUM) 40 mg GrPS 40 mg by Per G Tube route once daily.    insulin glargine-yfgn 100 unit/mL (3 mL) InPn Inject 15 Units into the skin once daily.    Lactobacillus rhamnosus GG (CULTURELLE) 10 billion cell capsule 2 capsules by Per G Tube route 2 (two) times a day.    levETIRAcetam (KEPPRA) 100 mg/mL Soln 750 mg by Per G Tube route 2 (two) times daily.    menthol/zinc oxide (CALMOSEPTINE TOP) Apply to the affected area twice daily as needed    nystatin (MYCOSTATIN) powder Apply to the affected area daily    predniSONE (DELTASONE) 10 MG tablet 10 mg by Per G Tube route once daily.     Family History    None       Tobacco Use    Smoking status: Never    Smokeless tobacco: Not on file   Substance and Sexual Activity    Alcohol use: Not on file    Drug use:  Not on file    Sexual activity: Not on file     Review of Systems   Unable to perform ROS: Patient nonverbal   Objective:     Vital Signs (Most Recent):  Temp: 98.1 °F (36.7 °C) (01/25/23 0911)  Pulse: 92 (01/25/23 1047)  Resp: 18 (01/25/23 0950)  BP: 107/68 (01/25/23 1047)  SpO2: 100 % (01/25/23 1047) Vital Signs (24h Range):  Temp:  [98.1 °F (36.7 °C)] 98.1 °F (36.7 °C)  Pulse:  [] 92  Resp:  [18] 18  SpO2:  [99 %-100 %] 100 %  BP: (107-120)/(68-78) 107/68     Weight: 71.7 kg (158 lb)  Body mass index is 21.43 kg/m².    Physical Exam  Vitals and nursing note reviewed.   Constitutional:       General: He is not in acute distress.     Appearance: He is well-developed.      Comments: Chronically ill-appearing   HENT:      Head: Normocephalic and atraumatic.      Mouth/Throat:      Pharynx: No oropharyngeal exudate.   Eyes:      Conjunctiva/sclera: Conjunctivae normal.      Pupils: Pupils are equal, round, and reactive to light.   Neck:      Trachea: Tracheostomy present.   Cardiovascular:      Rate and Rhythm: Regular rhythm. Tachycardia present.      Heart sounds: Normal heart sounds.   Pulmonary:      Effort: Pulmonary effort is normal. No respiratory distress.      Breath sounds: Normal breath sounds. No wheezing.   Abdominal:      General: Bowel sounds are normal. There is no distension.      Palpations: Abdomen is soft.      Tenderness: There is no abdominal tenderness.      Comments: Peg tube site in epigastric region without evidence of discharge or erythema, previous peg tube site healing well   Musculoskeletal:         General: No tenderness. Normal range of motion.      Cervical back: Normal range of motion and neck supple.      Comments: RUE contracture   Lymphadenopathy:      Cervical: No cervical adenopathy.   Skin:     General: Skin is warm and dry.      Capillary Refill: Capillary refill takes less than 2 seconds.      Findings: Wound present. No rash.      Comments: Sacral, medial bilateral  thigh, bilateral heal, and perineal wounds   Neurological:      Mental Status: He is alert and oriented to person, place, and time.      Cranial Nerves: No cranial nerve deficit.      Sensory: No sensory deficit.      Coordination: Coordination normal.   Psychiatric:         Behavior: Behavior normal.         Thought Content: Thought content normal.         Judgment: Judgment normal.         CRANIAL NERVES     CN III, IV, VI   Pupils are equal, round, and reactive to light.     Significant Labs: All pertinent labs within the past 24 hours have been reviewed.  CBC:   Recent Labs   Lab 01/25/23  1156   WBC 23.17*   HGB 11.4*   HCT 37.5*   *       Significant Imaging: I have reviewed all pertinent imaging results/findings within the past 24 hours.

## 2023-01-25 NOTE — ED NOTES
Straight cath completed for urine sample. Sterile technique maintained. Wounds noted to groin and under penis. Pt had another bowel movement of diarrhea. Small amount noted.     Pt cleaned, new gown placed on pt. Sheets changed, pt turned.

## 2023-01-25 NOTE — CONSULTS
[facility]  Gastroenterology  Consult Note    Patient Name: Hao Medrano  MRN: [id number]  Admission Date: 1/25/2023  Hospital Length of Stay: 0 days  Code Status: Prior   Attending Provider: Hiren Kenyon DO   Consulting Provider: Inder Del Castillo MD  Primary Care Physician: Keara Yanes NP  Principal Problem:<principal problem not specified>    Inpatient consult to Gastroenterology  Consult performed by: Inder Del Castillo MD  Consult ordered by: Hiren Kenyon DO      Subjective:     HPI: Hao Medrano is a 62 y.o. male with history of CVA and subsequent trach/PEG, CKD3b, HLD who presents after PEG was dislodged at NH at 0400 this morning. Patient is nonverbal, history obtained from chart and ED staff. Recently admitted on 1/10 for same issue, unclear when PEG was initially placed but had come out for unknown period of time, tract had closed upon that presentation. New PEG placed 1/12. Returns to ED as PEG was again dislodged this morning, per NH at exactly 0400.         PMH: CVA, trach/PEG dependence, HLD, CKD    Past Surgical History:   Procedure Laterality Date    ESOPHAGOGASTRODUODENOSCOPY N/A 1/11/2023    Procedure: EGD (ESOPHAGOGASTRODUODENOSCOPY);  Surgeon: Francisco Javier Holt MD;  Location: [address]);  Service: Endoscopy;  Laterality: N/A;    ESOPHAGOGASTRODUODENOSCOPY N/A 1/12/2023    Procedure: EGD (ESOPHAGOGASTRODUODENOSCOPY);  Surgeon: Francisco Javier Holt MD;  Location: [address]);  Service: Endoscopy;  Laterality: N/A;       No family history on file.    Social History     Socioeconomic History    Marital status: Significant Other   Tobacco Use    Smoking status: Never       No current facility-administered medications on file prior to encounter.     Current Outpatient Medications on File Prior to Encounter   Medication Sig Dispense Refill    arginine-glutamine-calcium HMB (SHEREE) 7-7-1.5 gram PwPk 1 packet by Per G Tube route 2 (two) times daily.       aspirin 81 MG Chew 81 mg by Per G Tube route once daily.      baclofen (LIORESAL) 5 mg Tab tablet 15 mg by Per G Tube route 3 (three) times daily.      calcium carbonate (OS-OLGA) 500 mg calcium (1,250 mg) tablet 1,250 mg by Per G Tube route once daily.      carvediloL (COREG) 3.125 MG tablet 3.125 mg by Per G Tube route 2 (two) times daily.      esomeprazole (NEXIUM PACKET) 40 mg GrPS 40 mg by Per G Tube route once daily.      insulin glargine-yfgn (SEMGLEE,INSULIN GLARG-YFGN,PEN) 100 unit/mL (3 mL) InPn Inject 15 Units into the skin once daily.      Lactobacillus rhamnosus GG (CULTURELLE) 10 billion cell capsule 2 capsules by Per G Tube route 2 (two) times a day.      levETIRAcetam (KEPPRA) 100 mg/mL Soln 750 mg by Per G Tube route 2 (two) times daily.      menthol/zinc oxide (CALMOSEPTINE TOP) Apply to the affected area twice daily as needed      nystatin (MYCOSTATIN) powder Apply to the affected area daily      predniSONE (DELTASONE) 10 MG tablet 10 mg by Per G Tube route once daily.         Review of patient's allergies indicates:  No Known Allergies    Review of Systems   Unable to perform ROS: Patient nonverbal      Objective:     Vitals:    01/25/23 0959   BP:    Pulse: 89   Resp:    Temp:          Constitutional:  awake, nonverbal, not following commands, NAD  HENT: Head: Normal, normocephalic, atraumatic.  Eyes: conjunctiva clear and sclera nonicteric  Cardiovascular: regular rate and rhythm and no murmur  Respiratory: normal chest expansion & respiratory effort   and no accessory muscle use  GI: soft, nondistended. Recent PEG site without appreciable lumen, no surrounding erythema or drainage  Musculoskeletal: no muscular tenderness noted  Skin: normal color, no rashes  Neurological: awake but nonverbal, some spontaneous UE movement      Significant Labs:  No results for input(s): HGB in the last 168 hours.    Lab Results   Component Value Date    WBC 9.38 01/12/2023    HGB 11.1 (L) 01/12/2023    HCT 36.2  (L) 01/12/2023    MCV 89 01/12/2023     (H) 01/12/2023       Lab Results   Component Value Date     01/12/2023    K 4.2 01/12/2023     01/12/2023    CO2 22 (L) 01/12/2023    BUN 31 (H) 01/12/2023    CREATININE 1.3 01/12/2023    CALCIUM 10.1 01/12/2023    ANIONGAP 15 01/12/2023    ESTGFRAFRICA 32.7 (A) 03/19/2020    EGFRNONAA 28.3 (A) 03/19/2020       Lab Results   Component Value Date    ALT 25 01/10/2023    AST 20 01/10/2023    ALKPHOS 182 (H) 01/10/2023    BILITOT 0.4 01/10/2023       No results found for: INR    Significant Imaging:  No pertinent imaging available      Assessment/Plan:     Hao Medrano is a 62 y.o. male with history of CVA and subsequent trach/PEG, CKD3b, HLD who presents for dislodged PEG. Recently admitted for same issue and had new PEG placed 1/12. Returns as PEG was dislodged at NH this morning. Tract closed on exam and is only two weeks old. Will likely require placement of PEG at new site. EGD today 1/25. Admit to hospital medicine.    Problem List:  Dislodged PEG    Recommendations:  - Obtain CBC, CMP, INR  - Admit to hospital medicine  - Plan for EGD with new PEG placement, likely today 1/25    Thank you for involving us in the care of Hao Medrano. Please call with any additional questions, concerns or changes in the patient's clinical status. We will continue to follow.     Inder Del Castillo MD  Gastroenterology Fellow PGY IV  [facility]

## 2023-01-25 NOTE — H&P
[facility] - Emergency Dept  [facility] Medicine  History & Physical    Patient Name: Hao Medrano  MRN: [id number]  Patient Class: OP- Observation  Admission Date: 1/25/2023  Attending Physician: Divya Galvin MD   Primary Care Provider: Keara Yanes NP         Patient information was obtained from ER records.     Subjective:     Principal Problem:Dislodged gastrostomy tube    Chief Complaint:   Chief Complaint   Patient presents with    PEG tube problem     Arrived via ems from [facility] with c/o peg tube dislodged this morning, pt nonverbal at baseline        HPI: Hao Medrano is a 62 y.o. male with a past medical history of CVA with trach and G-tube, CKD, HTN, and HLD who is being palced in observation for further management of dislodged peg tube. Patient presented to the ED from [facility]. Patient is nonverbal, history obtained from chart and ED staff. Per the ED, the nursing home reports they noticed the dislodged ped tube around 0400 this am. Patient was recently admitted on 1/10 for same issue with a new peg placed on 1/12.    ED: vital signs stable, afebrile. Leukocytosis of 23K. Hb 11.4. PT/INR 10/9/1.0. Furhter labs pending at time of admission. CXR with .. Blood cultures obtained. GI consulted and plan for EGD with G-tube placement tomorrow.       History reviewed. No pertinent past medical history.    Past Surgical History:   Procedure Laterality Date    ESOPHAGOGASTRODUODENOSCOPY N/A 1/11/2023    Procedure: EGD (ESOPHAGOGASTRODUODENOSCOPY);  Surgeon: Francisco Javier Holt MD;  Location: [address];  Service: Endoscopy;  Laterality: N/A;    ESOPHAGOGASTRODUODENOSCOPY N/A 1/12/2023    Procedure: EGD (ESOPHAGOGASTRODUODENOSCOPY);  Surgeon: Francisco Javier Holt MD;  Location: [address]);  Service: Endoscopy;  Laterality: N/A;       Review of patient's allergies indicates:  No Known Allergies    No current facility-administered medications on file prior to  encounter.     Current Outpatient Medications on File Prior to Encounter   Medication Sig    arginine-glutamine-calcium HMB (SHEREE) 7-7-1.5 gram PwPk 1 packet by Per G Tube route 2 (two) times daily.    aspirin 81 MG Chew 81 mg by Per G Tube route once daily.    baclofen (LIORESAL) 5 mg Tab tablet 15 mg by Per G Tube route 3 (three) times daily.    calcium carbonate (OS-OLGA) 500 mg calcium (1,250 mg) tablet 1,250 mg by Per G Tube route once daily.    carvediloL (COREG) 3.125 MG tablet 3.125 mg by Per G Tube route 2 (two) times daily.    esomeprazole (NEXIUM) 40 mg GrPS 40 mg by Per G Tube route once daily.    insulin glargine-yfgn 100 unit/mL (3 mL) InPn Inject 15 Units into the skin once daily.    Lactobacillus rhamnosus GG (CULTURELLE) 10 billion cell capsule 2 capsules by Per G Tube route 2 (two) times a day.    levETIRAcetam (KEPPRA) 100 mg/mL Soln 750 mg by Per G Tube route 2 (two) times daily.    menthol/zinc oxide (CALMOSEPTINE TOP) Apply to the affected area twice daily as needed    nystatin (MYCOSTATIN) powder Apply to the affected area daily    predniSONE (DELTASONE) 10 MG tablet 10 mg by Per G Tube route once daily.     Family History    None       Tobacco Use    Smoking status: Never    Smokeless tobacco: Not on file   Substance and Sexual Activity    Alcohol use: Not on file    Drug use: Not on file    Sexual activity: Not on file     Review of Systems   Unable to perform ROS: Patient nonverbal   Objective:     Vital Signs (Most Recent):  Temp: 98.1 °F (36.7 °C) (01/25/23 0911)  Pulse: 92 (01/25/23 1047)  Resp: 18 (01/25/23 0950)  BP: 107/68 (01/25/23 1047)  SpO2: 100 % (01/25/23 1047) Vital Signs (24h Range):  Temp:  [98.1 °F (36.7 °C)] 98.1 °F (36.7 °C)  Pulse:  [] 92  Resp:  [18] 18  SpO2:  [99 %-100 %] 100 %  BP: (107-120)/(68-78) 107/68     Weight: 71.7 kg (158 lb)  Body mass index is 21.43 kg/m².    Physical Exam  Vitals and nursing note reviewed.   Constitutional:        General: He is not in acute distress.     Appearance: He is well-developed.      Comments: Chronically ill-appearing   HENT:      Head: Normocephalic and atraumatic.      Mouth/Throat:      Pharynx: No oropharyngeal exudate.   Eyes:      Conjunctiva/sclera: Conjunctivae normal.      Pupils: Pupils are equal, round, and reactive to light.   Neck:      Trachea: Tracheostomy present.   Cardiovascular:      Rate and Rhythm: Regular rhythm. Tachycardia present.      Heart sounds: Normal heart sounds.   Pulmonary:      Effort: Pulmonary effort is normal. No respiratory distress.      Breath sounds: Normal breath sounds. No wheezing.   Abdominal:      General: Bowel sounds are normal. There is no distension.      Palpations: Abdomen is soft.      Tenderness: There is no abdominal tenderness.      Comments: Peg tube site in epigastric region without evidence of discharge or erythema, previous peg tube site healing well   Musculoskeletal:         General: No tenderness. Normal range of motion.      Cervical back: Normal range of motion and neck supple.      Comments: RUE contracture   Lymphadenopathy:      Cervical: No cervical adenopathy.   Skin:     General: Skin is warm and dry.      Capillary Refill: Capillary refill takes less than 2 seconds.      Findings: Wound present. No rash.      Comments: Sacral, medial bilateral thigh, bilateral heal, and perineal wounds   Neurological:      Mental Status: He is alert and oriented to person, place, and time.      Cranial Nerves: No cranial nerve deficit.      Sensory: No sensory deficit.      Coordination: Coordination normal.   Psychiatric:         Behavior: Behavior normal.         Thought Content: Thought content normal.         Judgment: Judgment normal.         CRANIAL NERVES     CN III, IV, VI   Pupils are equal, round, and reactive to light.     Significant Labs: All pertinent labs within the past 24 hours have been reviewed.  CBC:   Recent Labs   Lab 01/25/23  1156   WBC  23.17*   HGB 11.4*   HCT 37.5*   *       Significant Imaging: I have reviewed all pertinent imaging results/findings within the past 24 hours.    Assessment/Plan:     * Dislodged gastrostomy tube  Oropharyngeal dysphagia     - per the ED, patient's nursing home noticed the dislodged peg tube this morning around 0400  - no attempts to replace thus far  - patient was admitted for the same issue about two weeks ago with replacement on 1/12  - GI consulted in the ED, plan for EGD with tube placement tomorrow  - NPO for now  - resume rosanna when able    Leukocytosis  - VSSAF  - leukocytosis of 23K upon admission  - g-tube site is clean without discharge or erythma  - patient on prednisone 10 mg daily, unclear why per chart review - could be contributing to significant leukocytosis  - CXR pending  - UA pending  - blood cultures obtained  - procal ordered  - will start empirical ceftriaxone and flagyl in the meantime  - follow up UA, CXR, and blood cultures    Chronic kidney disease, stage III (moderate)  - baseline ~1.3  - CMP pending    HTN (hypertension)  - BP well controlled upon admission  - continue coreg 3.125 mg BID once peg tube is replaced      On antiepileptic therapy  - continue keppra 750 mg BID once peg tube is replaced      Type 2 diabetes mellitus, with long-term current use of insulin  Patient's FSGs are controlled on current medication regimen.  Last A1c reviewed-   Lab Results   Component Value Date    HGBA1C 5.9 (H) 07/12/2018     Most recent fingerstick glucose reviewed- No results for input(s): POCTGLUCOSE in the last 24 hours.  Current correctional scale  Low  Maintain anti-hyperglycemic dose as follows-   Antihyperglycemics (From admission, onward)    Start     Stop Route Frequency Ordered    01/25/23 1511  insulin aspart U-100 pen 0-5 Units         -- SubQ Before meals & nightly PRN 01/25/23 1411        Hold Oral hypoglycemics while patient is in the hospital.  - patient is on insulin  glargine 15U daily at the nursing home  - resume when tube feedings are resumed    Anemia  - patient's anemia is currently controlled  - etiology likely due to anemia of chronic disease  - current CBC reviewed -   Lab Results   Component Value Date    HGB 11.4 (L) 01/25/2023    HCT 37.5 (L) 01/25/2023     - monitor serial CBC and transfuse if patient becomes hemodynamically unstable, symptomatic, or H/H drops below 7/21.           VTE Risk Mitigation (From admission, onward)         Ordered     Place sequential compression device  Until discontinued         01/25/23 1411     IP VTE HIGH RISK PATIENT  Once         01/25/23 1411     Reason for No Pharmacological VTE Prophylaxis  Once        Question:  Reasons:  Answer:  Risk of Bleeding  Comment:  pre-op    01/25/23 1411                   Elaina Mcwilliams PA-C  Department of Hospital Medicine   [facility] - Emergency Dept

## 2023-01-25 NOTE — PHARMACY MED REC
"Admission Medication History     The home medication history was taken by Bree Ruibn.    You may go to "Admission" then "Reconcile Home Medications" tabs to review and/or act upon these items.     The home medication list has been updated by the Pharmacy department.   Please read ALL comments highlighted in yellow.   Please address this information as you see fit.    Feel free to contact us if you have any questions or require assistance.      Medications listed below were obtained from: Nursing home    Current Outpatient Medications on File Prior to Encounter   Medication Sig    arginine-glutamine-calcium HMB (SHEREE) 7-7-1.5 gram PwPk   1 packet by Per G Tube route 2 (two) times daily.    aspirin 81 MG Chew   81 mg by Per G Tube route once daily.    baclofen (LIORESAL) 5 mg Tab tablet   15 mg by Per G Tube route 3 (three) times daily.    calcium carbonate (OS-OLGA) 500 mg calcium (1,250 mg) tablet   1,250 mg by Per G Tube route once daily.    carvediloL (COREG) 3.125 MG tablet   3.125 mg by Per G Tube route 2 (two) times daily.    esomeprazole (NEXIUM) 40 mg GrPS   40 mg by Per G Tube route once daily.    insulin glargine-yfgn 100 unit/mL (3 mL) InPn   Inject 15 Units into the skin once daily.    Lactobacillus rhamnosus GG (CULTURELLE) 10 billion cell capsule   2 capsules by Per G Tube route 2 (two) times a day.    levETIRAcetam (KEPPRA) 100 mg/mL Soln   750 mg by Per G Tube route 2 (two) times daily.    menthol/zinc oxide (CALMOSEPTINE TOP)   Apply to the affected area twice daily as needed    nystatin (MYCOSTATIN) powder   Apply to the affected area daily    predniSONE (DELTASONE) 10 MG tablet 10 mg by Per G Tube route once daily.           Potential issues to be addressed PRIOR TO DISCHARGE  The listed medications were obtained from another facility ([facility]). The patient may not have been able to fill these prescriptions prior to this admission and may require new scripts upon " discharge.     Bree Rubin  EXT [phone]                  .

## 2023-01-25 NOTE — ED NOTES
Pt changed and cleaned, new gown applied. Pt had moderate amount of light brown liquid stool. New Mepilex applied to sacrum with date of today. Stage one pressure wound noted to entire sacrum, small area of partial thickness noted with some scabbing, another small area of suspected tunneling wound noted as well.

## 2023-01-25 NOTE — ED PROVIDER NOTES
Source of History  nursing home records and EMS    Chief Complaint    PEG tube problem (Arrived via ems from [facility] with c/o peg tube dislodged this morning, pt nonverbal at baseline)      History of Present Illness    Hao Medrano is a 62 y.o. male presenting with dislodged gastrostomy tube.  Reportedly it is 20 Tam[place].  It was replaced January 12th by Gastroenterology after he had accidentally dislodge it then, however it was complicated in unable to be replaced in the emergency department setting and need to be done via EGD secondary to closure of tract.    Reportedly at 4:00 a.m. this morning they noted that the PEG tube had become dislodged.  They did not replace anything within the tract.  He comes from [facility].    He is unable to provide any history secondary to nonverbal state after stroke.    Review of Systems    As per HPI and below:  Review of Systems:    Pertinent information obtained as above.  Otherwise limited due to: cognitive disability and nonverbal state     Past History    As per HPI and below:  History reviewed. No pertinent past medical history.    Past Surgical History:   Procedure Laterality Date    ESOPHAGOGASTRODUODENOSCOPY N/A 1/11/2023    Procedure: EGD (ESOPHAGOGASTRODUODENOSCOPY);  Surgeon: Francisco Javier Holt MD;  Location: [address]);  Service: Endoscopy;  Laterality: N/A;    ESOPHAGOGASTRODUODENOSCOPY N/A 1/12/2023    Procedure: EGD (ESOPHAGOGASTRODUODENOSCOPY);  Surgeon: Francisco Javier Holt MD;  Location: [address]);  Service: Endoscopy;  Laterality: N/A;       Social History     Socioeconomic History    Marital status: Significant Other   Tobacco Use    Smoking status: Never       No family history on file.    Review of patient's allergies indicates:  No Known Allergies    No current facility-administered medications on file prior to encounter.     Current Outpatient Medications on File Prior to Encounter   Medication Sig Dispense Refill     arginine-glutamine-calcium HMB (SHEREE) 7-7-1.5 gram PwPk 1 packet by Per G Tube route 2 (two) times daily.      aspirin 81 MG Chew 81 mg by Per G Tube route once daily.      baclofen (LIORESAL) 5 mg Tab tablet 15 mg by Per G Tube route 3 (three) times daily.      calcium carbonate (OS-OLGA) 500 mg calcium (1,250 mg) tablet 1,250 mg by Per G Tube route once daily.      carvediloL (COREG) 3.125 MG tablet 3.125 mg by Per G Tube route 2 (two) times daily.      esomeprazole (NEXIUM) 40 mg GrPS 40 mg by Per G Tube route once daily.      insulin glargine-yfgn 100 unit/mL (3 mL) InPn Inject 15 Units into the skin once daily.      Lactobacillus rhamnosus GG (CULTURELLE) 10 billion cell capsule 2 capsules by Per G Tube route 2 (two) times a day.      levETIRAcetam (KEPPRA) 100 mg/mL Soln 750 mg by Per G Tube route 2 (two) times daily.      menthol/zinc oxide (CALMOSEPTINE TOP) Apply to the affected area twice daily as needed      nystatin (MYCOSTATIN) powder Apply to the affected area daily      predniSONE (DELTASONE) 10 MG tablet 10 mg by Per G Tube route once daily.         Physical Exam    Reviewed nursing notes.  Vitals:    01/25/23 0911 01/25/23 0950 01/25/23 0959 01/25/23 1047   BP: 120/78   107/68   Pulse: 100  89 92   Resp: 18 18     Temp: 98.1 °F (36.7 °C)      SpO2: 99%  100% 100%   Weight: 71.7 kg (158 lb)      Height: 6' (1.829 m)        General:  Alert, no acute distress.    Skin:  Warm, dry, intact.  No rash.  Head:  Normocephalic, atraumatic.    Neck:  Supple.   Eye:  No acute findings  Ears, nose, mouth and throat:  Trach on room air, no significant secretions  Cardiovascular:  No edema.  Regular pulses.    Respiratory:  Respirations are non-labored.   Gastrointestinal:  Nondistended.  No belching or vomiting.  G-tube site appears to be closed.  Back:  Cachectic with restriction of motion  Musculoskeletal:  stiffened and unable to move extremities to command  Neurological:  Alert with eyes opened, nonverbal,  does not follow commands, further unable to assess due to impaired baseline mental status  Psychiatric:  SUHAIL    Initial MDM and Data Review    62 y.o. male presenting for evaluation of dislodged PEG tube  EMS reported that it came out around 4:00 a.m., however there was no replacement of a tube, and the site itself appears to be closed.    Differential includes: dislodged peg tube    Work-up includes: GI consult for placement     The patient has significant medical comorbidities that influence decision making for this acute process, such as:  CVA with nonverbal state at baseline, and difficulty understanding placement of his G-tube    I decided to obtain the patient's medical records and review relevant documentation from hospital records, EMS records, and nursing home.  Pertinent information is noted.  Spoke with his nurse at the nursing facility.  She states it came out at exactly 4:00 a.m..  They do not have the ability to place anything in its stead, and there was a delay in transfer from EMS.    She also states that the physician at the facility is requesting abdominal binder wants the PEG is placed.    Reviewed prior note from 1/11 time frame  Attempted at bedside in ED, no success - went to EGD for placement of 20fr catheter    Medications - No data to display    Results and ED Course    Labs Reviewed   CBC W/ AUTO DIFFERENTIAL - Abnormal; Notable for the following components:       Result Value    WBC 23.17 (*)     RBC 4.19 (*)     Hemoglobin 11.4 (*)     Hematocrit 37.5 (*)     MCHC 30.4 (*)     RDW 16.8 (*)     Platelets 517 (*)     Immature Granulocytes 0.6 (*)     Gran # (ANC) 17.8 (*)     Immature Grans (Abs) 0.13 (*)     Mono # 1.4 (*)     Eos # 1.5 (*)     Gran % 76.6 (*)     Lymph % 9.9 (*)     All other components within normal limits   CULTURE, BLOOD   CULTURE, BLOOD   PROTIME-INR   URINALYSIS, REFLEX TO URINE CULTURE   COMPREHENSIVE METABOLIC PANEL       Imaging Results              X-Ray Chest 1  View (In process)                     ED Course as of 01/25/23 1342   Wed Jan 25, 2023   1317 WBC(!): 23.17 [AC]   1317 Hemoglobin(!): 11.4 [AC]   1317 Platelets(!): 517 [AC]   1317 Concern for new leukocytosis as the patient had a normal white blood cell count when he was last admitted for his PEG tube.  The patient does have a trach collar.  High risk for aspiration.  There is no fever.  There may be concern for aspiration as a source versus urine.  I will add urinalysis and chest x-ray.  Blood cultures can also be added.  There does not seem to be any other obvious source.  He has sacral wounds but they do not appear acutely infected.  Otherwise, continue to plan for admission for EGD placement of PEG tube by GI. [AC]   1320 A source of the leukocytosis, versus urine [AC]   1335 CXR poor quality due to contracture of hand   Additional O2 not needed above his baseline  Awaiting straight cath UA  Admit to  [AC]      ED Course User Index  [AC] Hiren Kenyon DO       ED US Guided Misc Procedure    Date/Time: 1/25/2023 11:56 AM  Performed by: Hiren Kenyon DO  Authorized by: Hiren Kenyon DO     Procedure:  Ultrasound-guided peripheral venous cannulation  Indication:  Failed or difficult IV access   Left   Forearm  Procedure:  Dynamic ultrasound guidance used, Candidate vein examined with linear probe - confirmed collapsibility, lack of pulsatility, and proper anatomic location. and Using aseptic technique, IV catheter inserted with flash of blood noted, flow of venous blood confirmed.  Catheter gauge:  20   Flushes easily and without pain. Patient tolerated procedure well.  Complications:  None  Charge?:  Yes       Impression and Plan    62 y.o. male with findings of dislodged peg tube and new leukocytosis based on the work up in the emergency department as above.    Important lab/imaging findings include:  uninterpretable Chest x-ray due to contracture of hand, leukocytosis    I consulted with: GI  They  will be unable to place a Sheets or PEG tube at bedside   They are recommending admission to hospital medicine for EGD PEG tube placement    All test, treatment options and disposition options were considered.  The decision was made to admit the patient.    The patient was admitted in stable condition for PEG tube placement and further evaluation for leukocytosis.             Final diagnoses:  [T85.528A] Dislodged gastrostomy tube (Primary)  [D72.829] Leukocytosis        ED Disposition Condition    Observation Stable                  Hiren Kenyon DO  01/25/23 1342

## 2023-01-25 NOTE — ED TRIAGE NOTES
Patient identifiers for Hao Medrano 62 y.o. male checked and correct.  Chief Complaint   Patient presents with    PEG tube problem     Arrived via ems from [facility] with c/o peg tube dislodged this morning, pt nonverbal at baseline     No past medical history on file.  Allergies reported: Review of patient's allergies indicates:  No Known Allergies      LOC: pt nonverbal at baseline  APPEARANCE: Patient resting comfortably and in no acute distress. Patient is clean and well groomed, patient's clothing is properly fastened.  SKIN: The skin is warm and dry. Patient has normal skin turgor and moist mucus membranes. Skin breakdown noted to sacral and feet/heels bilaterally  MUSKULOSKELETAL: pt contracted in all 4 extremities  RESPIRATORY: Airway is open and patent. Respirations are spontaneous and non-labored with normal effort and rate. Trach in place on RA  CARDIAC: Patient has a normal rate and rhythm on cardiac monitor. No peripheral edema noted.   ABDOMEN: No distention noted. Soft and non-tender upon palpation.  NEUROLOGICAL: . Facial expression is symmetrical. Hand grasps are equal bilaterally. Normal sensation in all extremities when touched with finger.

## 2023-01-25 NOTE — ASSESSMENT & PLAN NOTE
- patient's anemia is currently controlled  - etiology likely due to anemia of chronic disease  - current CBC reviewed -   Lab Results   Component Value Date    HGB 11.4 (L) 01/25/2023    HCT 37.5 (L) 01/25/2023     - monitor serial CBC and transfuse if patient becomes hemodynamically unstable, symptomatic, or H/H drops below 7/21.

## 2023-01-25 NOTE — ASSESSMENT & PLAN NOTE
Patient's FSGs are controlled on current medication regimen.  Last A1c reviewed-   Lab Results   Component Value Date    HGBA1C 5.9 (H) 07/12/2018     Most recent fingerstick glucose reviewed- No results for input(s): POCTGLUCOSE in the last 24 hours.  Current correctional scale  Low  Maintain anti-hyperglycemic dose as follows-   Antihyperglycemics (From admission, onward)    Start     Stop Route Frequency Ordered    01/25/23 1511  insulin aspart U-100 pen 0-5 Units         -- SubQ Before meals & nightly PRN 01/25/23 1411        Hold Oral hypoglycemics while patient is in the hospital.  - patient is on insulin glargine 15U daily at the nursing home  - resume when tube feedings are resumed

## 2023-01-25 NOTE — CONSULTS
PharmD Consult received for:    1.) Admit medication history/reconciliation: Complete. See note entered by Bree Rubin 1/25/23    2.) Renally adjust all medications: No medications ordered. Pharmacy will review daily for needed adjustments through departmental protocols.        Thank you for the consult, will sign off  Keyla Frederick.D., BCPS  [id number]      **Note: Consults are reviewed Monday-Friday 7:00am-3:30pm. The above recommendations are only suggested. The recommendations should be considered in conjunction with all patient factors.**

## 2023-01-25 NOTE — ED NOTES
Hospital Med at bedside. Mentioned that EGD is cancelled for today and will take place tomorrow instead.

## 2023-01-25 NOTE — ANESTHESIA PREPROCEDURE EVALUATION
[facility]  Anesthesia Pre-Operative Evaluation         Patient Name: Hao Medrano  YOB: 1960  MRN: [id number]    SUBJECTIVE:     Pre-operative evaluation for Procedure(s) (LRB):  EGD (ESOPHAGOGASTRODUODENOSCOPY) (N/A)     01/25/2023    Hao Medrano is a 62 y.o. male w/ a significant PMHx of  CVA with subsequent trach/PEG, CKD3b, and HLD who presented after PEG dislodgement. Patient is nonverbal at baseline.    Patient now presents for the above procedure(s).    Phone consent obtained on 1/26 at 7:40 AM from wife Rosa Isela.    LDA:        Gastrostomy/Enterostomy 01/12/23 1055 Percutaneous endoscopic gastrostomy (PEG) midline feeding (Active)   Securement secured to abdomen w/ adhesive device 01/16/23 1200   Interventions Prior to Feeding patency checked;residual checked;residual returned 01/16/23 1200   Feeding Type continuous 01/16/23 1200   Clamp Status/Tolerance unclamped 01/16/23 1200   Feeding Action feeding continued 01/16/23 1200   Dressing no dressing;dry and intact 01/16/23 1200   Insertion Site no redness;no warmth;no drainage;no tenderness;no swelling 01/16/23 1200   Site Care site cleansed w/ sterile normal saline;sterile 4 x 4 gauze dressing applied 01/16/23 1200   Flush/Irrigation flushed w/;water;no resistance met 01/16/23 1200   Catheter Position (cm marking) 3 cm 01/15/23 0822   Current Rate (mL/hr) 55 mL/hr 01/16/23 0800   Goal Rate (mL/hr) 55 mL/hr 01/16/23 0800   Water Bolus (mL) 200 mL 01/16/23 0800   Formula Name ISO 1.5 01/16/23 1200   Residual Amount (ml) 5 ml 01/15/23 2100   Number of days: 13       Prev airway: None documented.        Patient Active Problem List   Diagnosis    Chronic kidney disease, stage III (moderate)    Dislodged gastrostomy tube    HLD (hyperlipidemia)    Anemia    Oropharyngeal dysphagia    Type 2 diabetes mellitus, with long-term current use of insulin    Leukocytosis    HTN (hypertension)       Review of patient's allergies  indicates:  No Known Allergies    Current Inpatient Medications:   cefTRIAXone (ROCEPHIN) IVPB  1 g Intravenous Q24H    metronidazole  500 mg Intravenous Q8H       No current facility-administered medications on file prior to encounter.     Current Outpatient Medications on File Prior to Encounter   Medication Sig Dispense Refill    arginine-glutamine-calcium HMB (SHEREE) 7-7-1.5 gram PwPk 1 packet by Per G Tube route 2 (two) times daily.      aspirin 81 MG Chew 81 mg by Per G Tube route once daily.      baclofen (LIORESAL) 5 mg Tab tablet 15 mg by Per G Tube route 3 (three) times daily.      calcium carbonate (OS-OLGA) 500 mg calcium (1,250 mg) tablet 1,250 mg by Per G Tube route once daily.      carvediloL (COREG) 3.125 MG tablet 3.125 mg by Per G Tube route 2 (two) times daily.      esomeprazole (NEXIUM) 40 mg GrPS 40 mg by Per G Tube route once daily.      insulin glargine-yfgn 100 unit/mL (3 mL) InPn Inject 15 Units into the skin once daily.      Lactobacillus rhamnosus GG (CULTURELLE) 10 billion cell capsule 2 capsules by Per G Tube route 2 (two) times a day.      levETIRAcetam (KEPPRA) 100 mg/mL Soln 750 mg by Per G Tube route 2 (two) times daily.      menthol/zinc oxide (CALMOSEPTINE TOP) Apply to the affected area twice daily as needed      nystatin (MYCOSTATIN) powder Apply to the affected area daily      predniSONE (DELTASONE) 10 MG tablet 10 mg by Per G Tube route once daily.         Past Surgical History:   Procedure Laterality Date    ESOPHAGOGASTRODUODENOSCOPY N/A 1/11/2023    Procedure: EGD (ESOPHAGOGASTRODUODENOSCOPY);  Surgeon: Francisco Javier Holt MD;  Location: [address]);  Service: Endoscopy;  Laterality: N/A;    ESOPHAGOGASTRODUODENOSCOPY N/A 1/12/2023    Procedure: EGD (ESOPHAGOGASTRODUODENOSCOPY);  Surgeon: Francisco Javier Holt MD;  Location: [address]);  Service: Endoscopy;  Laterality: N/A;       Social History     Socioeconomic History    Marital status:  Significant Other   Tobacco Use    Smoking status: Never       OBJECTIVE:     Vital Signs Range (Last 24H):  Temp:  [36.7 °C (98.1 °F)]   Pulse:  []   Resp:  [18]   BP: (107-120)/(68-78)   SpO2:  [99 %-100 %]       Significant Labs:  Lab Results   Component Value Date    WBC 23.17 (H) 01/25/2023    HGB 11.4 (L) 01/25/2023    HCT 37.5 (L) 01/25/2023     (H) 01/25/2023    ALT 25 01/10/2023    AST 20 01/10/2023     01/12/2023    K 4.2 01/12/2023     01/12/2023    CREATININE 1.3 01/12/2023    BUN 31 (H) 01/12/2023    CO2 22 (L) 01/12/2023    INR 1.0 01/25/2023    HGBA1C 5.9 (H) 07/12/2018       Diagnostic Studies: No relevant studies.    EKG:   Results for orders placed or performed during the hospital encounter of 01/10/23   EKG 12-lead    Collection Time: 01/10/23 12:29 PM    Narrative    Test Reason : R00.0,    Vent. Rate : 100 BPM     Atrial Rate : 100 BPM     P-R Int : 148 ms          QRS Dur : 082 ms      QT Int : 354 ms       P-R-T Axes : 049 029 012 degrees     QTc Int : 456 ms    Normal sinus rhythm  Nonspecific T wave abnormality  Abnormal ECG  No previous ECGs available  Confirmed by Shayne Moss MD ([id number]) on 1/10/2023 4:34:35 PM    Referred By: [email or web address]   SELF           Confirmed By:Shayne Moss MD       2D ECHO:  TTE:  No results found for this or any previous visit.    HARDEEP:  No results found for this or any previous visit.    ASSESSMENT/PLAN:                                                                                                                  01/25/2023  Hao Medrano is a 62 y.o., male.      Pre-op Assessment    I have reviewed the Patient Summary Reports.     I have reviewed the Nursing Notes. I have reviewed the NPO Status.   I have reviewed the Medications.     Review of Systems  Anesthesia Hx:  History of prior surgery of interest to airway management or planning: Denies Family Hx of Anesthesia complications.   Denies Personal Hx of Anesthesia  complications.   Hematology/Oncology:         -- Anemia:   Cardiovascular:   Denies MI.  Denies CAD.    hyperlipidemia    Pulmonary:   Denies COPD. Tracheostomy    Renal/:   Chronic Renal Disease, CKD    Hepatic/GI:   Denies GERD.    Neurological:   CVA    Psych:  Psychiatric Normal           Physical Exam  General: Cachexia, Confusion and Flat Affect  Nonverbal  Airway:  Mallampati: unable to assess / II  Mouth Opening: Normal  TM Distance: Normal  Tongue: Normal  Neck ROM: Normal ROM  Pre-Existing Airway: Tracheostomy tube    Dental:  Edentulous        Anesthesia Plan  Type of Anesthesia, risks & benefits discussed:    Anesthesia Type: Gen Natural Airway  Intra-op Monitoring Plan: Standard ASA Monitors  Post Op Pain Control Plan: multimodal analgesia and IV/PO Opioids PRN  Induction:  IV  Informed Consent: Informed consent signed with the Patient representative and all parties understand the risks and agree with anesthesia plan.  All questions answered.   ASA Score: 3  Day of Surgery Review of History & Physical: H&P Update referred to the surgeon/provider.    Ready For Surgery From Anesthesia Perspective.     .

## 2023-01-25 NOTE — HPI
Hao Medrano is a 62 y.o. male with a past medical history of CVA with trach and G-tube, CKD, HTN, and HLD who is being palced in observation for further management of dislodged peg tube. Patient presented to the ED from [facility]. Patient is nonverbal, history obtained from chart and ED staff. Per the ED, the nursing home reports they noticed the dislodged ped tube around 0400 this am. Patient was recently admitted on 1/10 for same issue with a new peg placed on 1/12.    ED: vital signs stable, afebrile. Leukocytosis of 23K. Hb 11.4. PT/INR 10/9/1.0. Furhter labs pending at time of admission. CXR with inconclusive findings due to patient position. Blood cultures obtained. GI consulted and plan for EGD with G-tube placement tomorrow.

## 2023-01-25 NOTE — ASSESSMENT & PLAN NOTE
Oropharyngeal dysphagia     - per the ED, patient's nursing home noticed the dislodged peg tube this morning around 0400  - no attempts to replace thus far  - patient was admitted for the same issue about two weeks ago with replacement on 1/12  - GI consulted in the ED, plan for EGD with tube placement tomorrow  - NPO for now  - resume rosanna when able

## 2023-01-25 NOTE — ED NOTES
Received report and assumed care of pt at this time. Was advised of no IV access at this time, MD aware.       LOC/ APPEARANCE: The patient is alert, non-verbal at baseline, does make eye contact when spoken to and can track you with eyes. Pt changed into hospital gown. Continuous pulse ox, and auto BP cuff maintained on patient. Pt is soiled with stool and skin doesn't seem to be cleaned. Pt updated on POC. Bed low and locked with side rails up x2, call bell in pt reach.  SKIN: Skin is warm, dry, flaky, and dirty. Multiple pressure wounds noted. Mepilex from living facility noted with date of 1/29/23 written. Other Mepilex dressings added to other wounds from nurse prior to report.   RESPIRATORY: Trach in place with humidified air at 6L going with blow-by cover.   CARDIAC: Patient has regular heart rate.  No peripheral edema noted, and patient has no c/o chest pain.   ABDOMEN: Soft and non-tender to palpation with no distention noted. Former PEG tube locations noted, closed with wet skin tissue noted, no drainage noted from site. Pt was soiled with diarrhea.   NEUROLOGIC: Pt opens eyes spontaneously.   MUSCULOSKELETAL: Pt contracted to all extremities, some spontaneous movement noted to left upper extremity.    : Pt has urine incontinence, no urine output noted at this time.

## 2023-01-26 ENCOUNTER — ANESTHESIA (OUTPATIENT)
Dept: ENDOSCOPY | Facility: HOSPITAL | Age: 63
DRG: 393 | End: 2023-01-26
Payer: MEDICARE

## 2023-01-26 PROBLEM — R78.81 BACTEREMIA: Status: ACTIVE | Noted: 2023-01-26

## 2023-01-26 LAB
ALBUMIN SERPL BCP-MCNC: 2 G/DL (ref 3.5–5.2)
ALP SERPL-CCNC: 202 U/L (ref 55–135)
ALT SERPL W/O P-5'-P-CCNC: 36 U/L (ref 10–44)
ANION GAP SERPL CALC-SCNC: 12 MMOL/L (ref 8–16)
AST SERPL-CCNC: 16 U/L (ref 10–40)
BASOPHILS # BLD AUTO: 0.08 K/UL (ref 0–0.2)
BASOPHILS NFR BLD: 0.5 % (ref 0–1.9)
BILIRUB SERPL-MCNC: 0.2 MG/DL (ref 0.1–1)
BUN SERPL-MCNC: 33 MG/DL (ref 8–23)
CALCIUM SERPL-MCNC: 9.7 MG/DL (ref 8.7–10.5)
CHLORIDE SERPL-SCNC: 105 MMOL/L (ref 95–110)
CO2 SERPL-SCNC: 25 MMOL/L (ref 23–29)
CREAT SERPL-MCNC: 1 MG/DL (ref 0.5–1.4)
DIFFERENTIAL METHOD: ABNORMAL
EOSINOPHIL # BLD AUTO: 2 K/UL (ref 0–0.5)
EOSINOPHIL NFR BLD: 11.1 % (ref 0–8)
ERYTHROCYTE [DISTWIDTH] IN BLOOD BY AUTOMATED COUNT: 16.7 % (ref 11.5–14.5)
EST. GFR  (NO RACE VARIABLE): >60 ML/MIN/1.73 M^2
GLUCOSE SERPL-MCNC: 102 MG/DL (ref 70–110)
HCT VFR BLD AUTO: 34 % (ref 40–54)
HGB BLD-MCNC: 10.2 G/DL (ref 14–18)
IMM GRANULOCYTES # BLD AUTO: 0.07 K/UL (ref 0–0.04)
IMM GRANULOCYTES NFR BLD AUTO: 0.4 % (ref 0–0.5)
LYMPHOCYTES # BLD AUTO: 1.5 K/UL (ref 1–4.8)
LYMPHOCYTES NFR BLD: 8.3 % (ref 18–48)
MAGNESIUM SERPL-MCNC: 1.8 MG/DL (ref 1.6–2.6)
MCH RBC QN AUTO: 26.8 PG (ref 27–31)
MCHC RBC AUTO-ENTMCNC: 30 G/DL (ref 32–36)
MCV RBC AUTO: 89 FL (ref 82–98)
MONOCYTES # BLD AUTO: 0.9 K/UL (ref 0.3–1)
MONOCYTES NFR BLD: 4.9 % (ref 4–15)
NEUTROPHILS # BLD AUTO: 13.3 K/UL (ref 1.8–7.7)
NEUTROPHILS NFR BLD: 74.8 % (ref 38–73)
NRBC BLD-RTO: 0 /100 WBC
PHOSPHATE SERPL-MCNC: 3.8 MG/DL (ref 2.7–4.5)
PLATELET # BLD AUTO: 452 K/UL (ref 150–450)
PMV BLD AUTO: 9.9 FL (ref 9.2–12.9)
POCT GLUCOSE: 116 MG/DL (ref 70–110)
POCT GLUCOSE: 136 MG/DL (ref 70–110)
POCT GLUCOSE: 85 MG/DL (ref 70–110)
POTASSIUM SERPL-SCNC: 3.6 MMOL/L (ref 3.5–5.1)
PROT SERPL-MCNC: 7.3 G/DL (ref 6–8.4)
RBC # BLD AUTO: 3.81 M/UL (ref 4.6–6.2)
SODIUM SERPL-SCNC: 142 MMOL/L (ref 136–145)
WBC # BLD AUTO: 17.72 K/UL (ref 3.9–12.7)

## 2023-01-26 PROCEDURE — 84100 ASSAY OF PHOSPHORUS: CPT | Performed by: STUDENT IN AN ORGANIZED HEALTH CARE EDUCATION/TRAINING PROGRAM

## 2023-01-26 PROCEDURE — 27201018 HC KIT, PEG (ANY): Performed by: INTERNAL MEDICINE

## 2023-01-26 PROCEDURE — 63600175 PHARM REV CODE 636 W HCPCS: Performed by: STUDENT IN AN ORGANIZED HEALTH CARE EDUCATION/TRAINING PROGRAM

## 2023-01-26 PROCEDURE — 43246 EGD PLACE GASTROSTOMY TUBE: CPT | Mod: GC,,, | Performed by: INTERNAL MEDICINE

## 2023-01-26 PROCEDURE — 43246 PR EGD, FLEX, W/PLCMT, GASTROSTOMY TUBE: ICD-10-PCS | Mod: GC,,, | Performed by: INTERNAL MEDICINE

## 2023-01-26 PROCEDURE — S0030 INJECTION, METRONIDAZOLE: HCPCS

## 2023-01-26 PROCEDURE — D9220A PRA ANESTHESIA: ICD-10-PCS | Mod: ANES,,, | Performed by: ANESTHESIOLOGY

## 2023-01-26 PROCEDURE — 63600175 PHARM REV CODE 636 W HCPCS

## 2023-01-26 PROCEDURE — 99900026 HC AIRWAY MAINTENANCE (STAT)

## 2023-01-26 PROCEDURE — 25000003 PHARM REV CODE 250: Performed by: NURSE ANESTHETIST, CERTIFIED REGISTERED

## 2023-01-26 PROCEDURE — D9220A PRA ANESTHESIA: Mod: ANES,,, | Performed by: ANESTHESIOLOGY

## 2023-01-26 PROCEDURE — 11000001 HC ACUTE MED/SURG PRIVATE ROOM

## 2023-01-26 PROCEDURE — 27200966 HC CLOSED SUCTION SYSTEM

## 2023-01-26 PROCEDURE — 63600175 PHARM REV CODE 636 W HCPCS: Performed by: NURSE ANESTHETIST, CERTIFIED REGISTERED

## 2023-01-26 PROCEDURE — 99232 SBSQ HOSP IP/OBS MODERATE 35: CPT | Mod: ,,, | Performed by: FAMILY MEDICINE

## 2023-01-26 PROCEDURE — D9220A PRA ANESTHESIA: Mod: CRNA,,, | Performed by: NURSE ANESTHETIST, CERTIFIED REGISTERED

## 2023-01-26 PROCEDURE — 99232 PR SUBSEQUENT HOSPITAL CARE,LEVL II: ICD-10-PCS | Mod: ,,, | Performed by: FAMILY MEDICINE

## 2023-01-26 PROCEDURE — 80053 COMPREHEN METABOLIC PANEL: CPT | Performed by: STUDENT IN AN ORGANIZED HEALTH CARE EDUCATION/TRAINING PROGRAM

## 2023-01-26 PROCEDURE — 37000009 HC ANESTHESIA EA ADD 15 MINS: Performed by: INTERNAL MEDICINE

## 2023-01-26 PROCEDURE — 83735 ASSAY OF MAGNESIUM: CPT | Performed by: STUDENT IN AN ORGANIZED HEALTH CARE EDUCATION/TRAINING PROGRAM

## 2023-01-26 PROCEDURE — 85025 COMPLETE CBC W/AUTO DIFF WBC: CPT | Performed by: STUDENT IN AN ORGANIZED HEALTH CARE EDUCATION/TRAINING PROGRAM

## 2023-01-26 PROCEDURE — D9220A PRA ANESTHESIA: ICD-10-PCS | Mod: CRNA,,, | Performed by: NURSE ANESTHETIST, CERTIFIED REGISTERED

## 2023-01-26 PROCEDURE — 99900035 HC TECH TIME PER 15 MIN (STAT)

## 2023-01-26 PROCEDURE — 94761 N-INVAS EAR/PLS OXIMETRY MLT: CPT

## 2023-01-26 PROCEDURE — 25000003 PHARM REV CODE 250: Performed by: FAMILY MEDICINE

## 2023-01-26 PROCEDURE — 43246 EGD PLACE GASTROSTOMY TUBE: CPT | Performed by: INTERNAL MEDICINE

## 2023-01-26 PROCEDURE — 37000008 HC ANESTHESIA 1ST 15 MINUTES: Performed by: INTERNAL MEDICINE

## 2023-01-26 PROCEDURE — C9113 INJ PANTOPRAZOLE SODIUM, VIA: HCPCS | Performed by: STUDENT IN AN ORGANIZED HEALTH CARE EDUCATION/TRAINING PROGRAM

## 2023-01-26 PROCEDURE — 36415 COLL VENOUS BLD VENIPUNCTURE: CPT | Performed by: STUDENT IN AN ORGANIZED HEALTH CARE EDUCATION/TRAINING PROGRAM

## 2023-01-26 PROCEDURE — 27000221 HC OXYGEN, UP TO 24 HOURS

## 2023-01-26 PROCEDURE — 25000003 PHARM REV CODE 250

## 2023-01-26 PROCEDURE — 25000003 PHARM REV CODE 250: Performed by: STUDENT IN AN ORGANIZED HEALTH CARE EDUCATION/TRAINING PROGRAM

## 2023-01-26 RX ORDER — NAPROXEN SODIUM 220 MG/1
81 TABLET, FILM COATED ORAL DAILY
Status: DISCONTINUED | OUTPATIENT
Start: 2023-01-27 | End: 2023-02-09

## 2023-01-26 RX ORDER — CALCIUM CARBONATE 200(500)MG
500 TABLET,CHEWABLE ORAL DAILY
Status: DISCONTINUED | OUTPATIENT
Start: 2023-01-27 | End: 2023-02-12 | Stop reason: HOSPADM

## 2023-01-26 RX ORDER — FENTANYL CITRATE 50 UG/ML
INJECTION, SOLUTION INTRAMUSCULAR; INTRAVENOUS
Status: DISCONTINUED | OUTPATIENT
Start: 2023-01-26 | End: 2023-01-26

## 2023-01-26 RX ORDER — LEVETIRACETAM 100 MG/ML
750 SOLUTION ORAL 2 TIMES DAILY
Status: DISCONTINUED | OUTPATIENT
Start: 2023-01-26 | End: 2023-02-12 | Stop reason: HOSPADM

## 2023-01-26 RX ORDER — NYSTATIN 100000 [USP'U]/G
POWDER TOPICAL 2 TIMES DAILY
Status: DISCONTINUED | OUTPATIENT
Start: 2023-01-26 | End: 2023-02-12 | Stop reason: HOSPADM

## 2023-01-26 RX ORDER — CARVEDILOL 3.12 MG/1
3.12 TABLET ORAL 2 TIMES DAILY
Status: DISCONTINUED | OUTPATIENT
Start: 2023-01-26 | End: 2023-02-03

## 2023-01-26 RX ORDER — ESOMEPRAZOLE MAGNESIUM 10 MG/1
40 GRANULE, FOR SUSPENSION, EXTENDED RELEASE ORAL DAILY
Status: DISCONTINUED | OUTPATIENT
Start: 2023-01-27 | End: 2023-02-12 | Stop reason: HOSPADM

## 2023-01-26 RX ORDER — PREDNISONE 10 MG/1
10 TABLET ORAL DAILY
Status: DISCONTINUED | OUTPATIENT
Start: 2023-01-27 | End: 2023-02-11

## 2023-01-26 RX ORDER — PROPOFOL 10 MG/ML
VIAL (ML) INTRAVENOUS
Status: DISCONTINUED | OUTPATIENT
Start: 2023-01-26 | End: 2023-01-26

## 2023-01-26 RX ORDER — PHENYLEPHRINE HCL IN 0.9% NACL 1 MG/10 ML
SYRINGE (ML) INTRAVENOUS
Status: DISCONTINUED | OUTPATIENT
Start: 2023-01-26 | End: 2023-01-26

## 2023-01-26 RX ADMIN — CARVEDILOL 3.12 MG: 3.12 TABLET, FILM COATED ORAL at 09:01

## 2023-01-26 RX ADMIN — PROPOFOL 50 MG: 10 INJECTION, EMULSION INTRAVENOUS at 12:01

## 2023-01-26 RX ADMIN — VANCOMYCIN HYDROCHLORIDE 750 MG: 750 INJECTION, POWDER, LYOPHILIZED, FOR SOLUTION INTRAVENOUS at 09:01

## 2023-01-26 RX ADMIN — CEFTRIAXONE 1 G: 1 INJECTION, POWDER, FOR SOLUTION INTRAMUSCULAR; INTRAVENOUS at 02:01

## 2023-01-26 RX ADMIN — BACLOFEN 15 MG: 5 TABLET ORAL at 09:01

## 2023-01-26 RX ADMIN — VANCOMYCIN HYDROCHLORIDE 750 MG: 750 INJECTION, POWDER, LYOPHILIZED, FOR SOLUTION INTRAVENOUS at 10:01

## 2023-01-26 RX ADMIN — METRONIDAZOLE 500 MG: 500 INJECTION, SOLUTION INTRAVENOUS at 03:01

## 2023-01-26 RX ADMIN — SODIUM CHLORIDE: 9 INJECTION, SOLUTION INTRAVENOUS at 12:01

## 2023-01-26 RX ADMIN — LEVETIRACETAM 750 MG: 100 INJECTION INTRAVENOUS at 09:01

## 2023-01-26 RX ADMIN — SODIUM CHLORIDE: 9 INJECTION, SOLUTION INTRAVENOUS at 02:01

## 2023-01-26 RX ADMIN — FENTANYL CITRATE 25 MCG: 50 INJECTION, SOLUTION INTRAMUSCULAR; INTRAVENOUS at 12:01

## 2023-01-26 RX ADMIN — Medication 100 MCG: at 01:01

## 2023-01-26 RX ADMIN — METRONIDAZOLE 500 MG: 500 INJECTION, SOLUTION INTRAVENOUS at 11:01

## 2023-01-26 RX ADMIN — METRONIDAZOLE 500 MG: 500 INJECTION, SOLUTION INTRAVENOUS at 09:01

## 2023-01-26 RX ADMIN — LEVETIRACETAM 750 MG: 100 SOLUTION ORAL at 09:01

## 2023-01-26 RX ADMIN — NYSTATIN: 100000 POWDER TOPICAL at 09:01

## 2023-01-26 NOTE — RESPIRATORY THERAPY
RAPID RESPONSE RESPIRATORY THERAPY PROACTIVE NOTE           Time of visit: 935     Code Status: Full Code   : 1960  Bed: 1155  MRN: [id number]  Time spent at the bedside: < 15 min    SITUATION    Evaluated patient for: LDA Check     BACKGROUND    Patient has no past medical history on file.  Clinically Significant Surgical Hx: tracheostomy    24 Hours Vitals Range:  Temp:  [97.3 °F (36.3 °C)-99 °F (37.2 °C)]   Pulse:  []   Resp:  [12-20]   BP: (108-136)/(57-73)   SpO2:  [98 %-100 %]     Labs:    Recent Labs     23  1556 23  0452    142   K 4.4 3.6    105   CO2 26 25   CREATININE 1.3 1.0   * 102   PHOS  --  3.8   MG  --  1.8        No results for input(s): PH, PCO2, PO2, HCO3, POCSATURATED, BE in the last 72 hours.    ASSESSMENT/INTERVENTIONS  Pt resting comfortably in bed, no distress on exam, all supplies at bedside.      Last VS   Temp: 98.2 °F (36.8 °C) ( 1200)  Pulse: 84 ( 0824)  Resp: 20 ( 1200)  BP: 128/71 ( 1200)  SpO2: 100 % ( 1200)      Extra trachs at bedside: 6.0 & 5.0 Shiley XLT cuffed  Level of Consciousness: Level of Consciousness (AVPU): alert  Respiratory Effort: Respiratory Effort: Unlabored Expansion/Accessory Muscle Usage: Expansion/Accessory Muscles/Retractions: no use of accessory muscles  All Lung Field Breath Sounds: All Lung Fields Breath Sounds: coarse  O2 Device/Concentration: trach collar 5L/28%  Surgical airway: Yes, Type: Shiley Size: 6 XLT, cuffed  Ambu at bedside: Ambu bag with the patient?: Yes, Adult Ambu     Active Orders   Respiratory Care    Inhalation Treatment Q4H PRN     Frequency: Q4H PRN     Number of Occurrences: Until Specified    Oxygen PRN     Frequency: PRN     Number of Occurrences: Until Specified     Order Questions:      Device type: Low flow      Device: Trach Collar      Titrate O2 per Oxygen Titration Protocol: Yes      To maintain SpO2 goal of: >= 90%      Notify MD of: Inability to achieve  desired SpO2; Sudden change in patient status and requires 20% increase in FiO2; Patient requires >60% FiO2    Pulse Oximetry Q4H     Frequency: Q4H     Number of Occurrences: Until Specified    Routine tracheostomy care     Frequency: BID     Number of Occurrences: Until Specified       RECOMMENDATIONS    We recommend: RRT Recs: Continue POC per primary team.      FOLLOW-UP    Please call back the Rapid Response RT, Stef Benites, RRT at x [phone] for any questions or concerns.

## 2023-01-26 NOTE — NURSING
Patient returned from endoscopy. PEG tube in place and currently clamped. Covered with abd binder. New IV noted to lower right leg. It is infusing NS per MD order. Blood glucose checked: 136. VS as charted.

## 2023-01-26 NOTE — PLAN OF CARE
Patient is alert. Unable to tell if he is oriented as he is nonverbal. Patient can follow some commands and nods his head. He will be turned q2h. He has significant skin break down to bilateral feet, sacrum, groin area.   He is NPO. Plan to replace PEG tube today. He was given IV abx. Q2h rounding for safety. Pending wound care consult.     Problem: Infection  Goal: Absence of Infection Signs and Symptoms  Outcome: Ongoing, Progressing     Problem: Adult Inpatient Plan of Care  Goal: Plan of Care Review  Outcome: Ongoing, Progressing     Problem: Impaired Wound Healing  Goal: Optimal Wound Healing  Outcome: Ongoing, Progressing

## 2023-01-26 NOTE — ED NOTES
Pt had an episode of diarrhea, pt changed and cleaned up. Mepilex to sacrum changed. All mepilex to feet and ankles changed.

## 2023-01-26 NOTE — ANESTHESIA POSTPROCEDURE EVALUATION
Anesthesia Post Evaluation    Patient: Hao Medrano    Procedure(s) Performed: Procedure(s) (LRB):  EGD (ESOPHAGOGASTRODUODENOSCOPY) (N/A)    Final Anesthesia Type: general      Patient location during evaluation: PACU  Patient participation: Yes- Able to Participate  Level of consciousness: awake and alert  Post-procedure vital signs: reviewed and stable  Pain management: adequate  Airway patency: patent    PONV status at discharge: No PONV  Anesthetic complications: no      Cardiovascular status: blood pressure returned to baseline  Respiratory status: unassisted  Follow-up not needed.          Vitals Value Taken Time   /69 01/26/23 1346   Temp 36.7 °C (98 °F) 01/26/23 1325   Pulse 107 01/26/23 1346   Resp 18 01/26/23 1346   SpO2 100 % 01/26/23 1346   Vitals shown include unvalidated device data.      Event Time   Out of Recovery 01/26/2023 13:54:00         Pain/Jane Score: Jane Score: 9 (1/26/2023  1:30 PM)

## 2023-01-26 NOTE — TRANSFER OF CARE
Anesthesia Transfer of Care Note    Patient: Hao Medrano    Procedure(s) Performed: Procedure(s) (LRB):  EGD (ESOPHAGOGASTRODUODENOSCOPY) (N/A)    Patient location: PACU    Anesthesia Type: general    Transport from OR: Upon arrival to PACU/ICU, patient attached to 100% O2 by T-piece with adequate spontaneous ventilation    Post pain: adequate analgesia    Post assessment: no apparent anesthetic complications and tolerated procedure well    Post vital signs: stable    Level of consciousness: awake and alert    Nausea/Vomiting: no nausea/vomiting    Complications: none    Transfer of care protocol was followed      Last vitals:   Visit Vitals  /71 (BP Location: Left arm, Patient Position: Lying)   Pulse 84   Temp 36.8 °C (98.2 °F) (Temporal)   Resp 20   Ht 6' (1.829 m)   Wt 71.7 kg (158 lb)   SpO2 100%   BMI 21.43 kg/m²

## 2023-01-26 NOTE — H&P
Short Stay Endoscopy History and Physical    PCP - Keara Yanes NP    Procedure - EGD w PEG placement  ASA - per anesthesia  Mallampati - per anesthesia  History of Anesthesia problems - no  Family history Anesthesia problems -  no   Plan of anesthesia - General    HPI:  This is a 62 y.o. male here for PEG placement    ROS:  Constitutional: No fevers, chills  CV: No chest pain  Pulm: No shortness of breath  GI: see HPI  Derm: No rash    Medical History:  has no past medical history on file.    Surgical History:  has a past surgical history that includes Esophagogastroduodenoscopy (N/A, 1/11/2023) and Esophagogastroduodenoscopy (N/A, 1/12/2023).    Family History: family history is not on file.. Otherwise no colon cancer, inflammatory bowel disease, or GI malignancies.    Social History:  reports that he has never smoked. He does not have any smokeless tobacco history on file.    Review of patient's allergies indicates:  No Known Allergies    Medications:   Medications Prior to Admission   Medication Sig Dispense Refill Last Dose    arginine-glutamine-calcium HMB (SHEREE) 7-7-1.5 gram PwPk 1 packet by Per G Tube route 2 (two) times daily.       aspirin 81 MG Chew 81 mg by Per G Tube route once daily.       baclofen (LIORESAL) 5 mg Tab tablet 15 mg by Per G Tube route 3 (three) times daily.       calcium carbonate (OS-OLGA) 500 mg calcium (1,250 mg) tablet 1,250 mg by Per G Tube route once daily.       carvediloL (COREG) 3.125 MG tablet 3.125 mg by Per G Tube route 2 (two) times daily.       esomeprazole (NEXIUM) 40 mg GrPS 40 mg by Per G Tube route once daily.       insulin glargine-yfgn 100 unit/mL (3 mL) InPn Inject 15 Units into the skin once daily.       Lactobacillus rhamnosus GG (CULTURELLE) 10 billion cell capsule 2 capsules by Per G Tube route 2 (two) times a day.       levETIRAcetam (KEPPRA) 100 mg/mL Soln 750 mg by Per G Tube route 2 (two) times daily.       menthol/zinc oxide (CALMOSEPTINE TOP)  Apply to the affected area twice daily as needed       nystatin (MYCOSTATIN) powder Apply to the affected area daily       predniSONE (DELTASONE) 10 MG tablet 10 mg by Per G Tube route once daily.            Physical Exam:    Vital Signs:   Vitals:    01/26/23 1200   BP: 128/71   Pulse:    Resp: 20   Temp: 98.2 °F (36.8 °C)           Labs:  Lab Results   Component Value Date    WBC 17.72 (H) 01/26/2023    HGB 10.2 (L) 01/26/2023    HCT 34.0 (L) 01/26/2023     (H) 01/26/2023    ALT 36 01/26/2023    AST 16 01/26/2023     01/26/2023    K 3.6 01/26/2023     01/26/2023    CREATININE 1.0 01/26/2023    BUN 33 (H) 01/26/2023    CO2 25 01/26/2023    INR 1.0 01/25/2023    HGBA1C 5.9 (H) 07/12/2018       I have explained the risks and benefits of endoscopy procedures to the patient including but not limited to bleeding, perforation, infection, and death.  The patient was asked if they understand and allowed to ask any further questions to their satisfaction.    Noemi Howard MD

## 2023-01-26 NOTE — NURSING TRANSFER
Nursing Transfer Note      1/26/2023     Reason patient is being transferred: to room post recovery    Transfer to 1155    Transfer via stretcher    Transfer with O2 @ 5LPM per trach collar    Transported by YONY Simons & BONITA Hernandez    Medicines sent: None    Any special needs or follow-up needed: Heel wounds need to be redressed please    Chart send with patient: Yes    Notified: None listed to notify; No family in room upon arrival    Patient reassessed at: 01/26/23 @ 1350    Upon arrival to floor: Transfer of care to JAROD Agosto RN - Primary nurse Janelle given phone report prior to transfer back to room

## 2023-01-26 NOTE — ASSESSMENT & PLAN NOTE
Oropharyngeal dysphagia     - noticed the dislodged peg tube s/p 20 F PEG tube placed without any immediate complications.   External bumper at 3 cm shelby.- no attempts to replace thus far  - patient was admitted for the same issue about two weeks ago with replacement on 1/12  - resume rosanna when able to tolerate see GI recommendation

## 2023-01-26 NOTE — ASSESSMENT & PLAN NOTE
With positive blood Cx gram positive cocci  On rocephin, vanco, metronidazole  ID consulted F/u recommendation

## 2023-01-26 NOTE — PROGRESS NOTES
[facility] - Observation [address] [place] [address] Medicine  Progress Note    Patient Name: Hao Medrano  MRN: [id number]  Patient Class: OP- Observation   Admission Date: 1/25/2023  Length of Stay: 0 days  Attending Physician: Tommy Rhodes MD  Primary Care Provider: Keara Yanes NP        Subjective:     Principal Problem:Dislodged gastrostomy tube        HPI:  Hao Medrano is a 62 y.o. male with a past medical history of CVA with trach and G-tube, CKD, HTN, and HLD who is being palced in observation for further management of dislodged peg tube. Patient presented to the ED from [facility]. Patient is nonverbal, history obtained from chart and ED staff. Per the ED, the nursing home reports they noticed the dislodged ped tube around 0400 this am. Patient was recently admitted on 1/10 for same issue with a new peg placed on 1/12.    ED: vital signs stable, afebrile. Leukocytosis of 23K. Hb 11.4. PT/INR 10/9/1.0. Furhter labs pending at time of admission. CXR with inconclusive findings due to patient position. Blood cultures obtained. GI consulted and plan for EGD with G-tube placement tomorrow.       Overview/Hospital Course:  S/p 20 F PEG tube placed without any immediate complications.   External bumper at 3 cm shelby.  Positive blood Cx with gram positive Cocci        Interval History:   Seen and examined non verbal, alert, unable to assess for acute concern s/p gastric tube dislodged placement, with trach in place, no fever.    Review of Systems   Unable to perform ROS: Patient nonverbal   Objective:     Vital Signs (Most Recent):  Temp: 98.4 °F (36.9 °C) (01/26/23 1433)  Pulse: 105 (01/26/23 1433)  Resp: 16 (01/26/23 1433)  BP: 135/80 (01/26/23 1433)  SpO2: 99 % (01/26/23 1449) Vital Signs (24h Range):  Temp:  [97.3 °F (36.3 °C)-99 °F (37.2 °C)] 98.4 °F (36.9 °C)  Pulse:  [] 105  Resp:  [12-20] 16  SpO2:  [98 %-100 %] 99 %  BP: (108-136)/(58-80) 135/80     Weight: 71.7 kg (158 lb)  Body  mass index is 21.43 kg/m².    Intake/Output Summary (Last 24 hours) at 1/26/2023 1647  Last data filed at 1/25/2023 1917  Gross per 24 hour   Intake 48.2 ml   Output --   Net 48.2 ml      Physical Exam  Constitutional:       General: He is not in acute distress.     Appearance: He is ill-appearing.   HENT:      Head: Normocephalic and atraumatic.   Eyes:      Extraocular Movements: Extraocular movements intact.      Pupils: Pupils are equal, round, and reactive to light.   Cardiovascular:      Rate and Rhythm: Tachycardia present. Rhythm irregular.      Heart sounds: No murmur heard.    No friction rub. No gallop.   Pulmonary:      Effort: Pulmonary effort is normal. No respiratory distress.      Breath sounds: No wheezing.   Abdominal:      General: Bowel sounds are normal. There is no distension.      Palpations: Abdomen is soft.      Tenderness: There is no abdominal tenderness. There is no guarding or rebound.   Musculoskeletal:         General: Deformity present.      Cervical back: No rigidity or tenderness.      Right lower leg: No edema.      Left lower leg: No edema.   Skin:     General: Skin is dry.      Findings: Lesion and rash present.   Neurological:      Mental Status: He is alert.      Motor: Weakness present.      Coordination: Coordination abnormal.      Gait: Gait abnormal.      Comments: Generalized weakness with lower ext atrophy       Significant Labs: All pertinent labs within the past 24 hours have been reviewed.  BMP:   Recent Labs   Lab 01/26/23  0452         K 3.6      CO2 25   BUN 33*   CREATININE 1.0   CALCIUM 9.7   MG 1.8     CBC:   Recent Labs   Lab 01/25/23  1156 01/26/23  0452   WBC 23.17* 17.72*   HGB 11.4* 10.2*   HCT 37.5* 34.0*   * 452*     CMP:   Recent Labs   Lab 01/25/23  1556 01/26/23  0452    142   K 4.4 3.6    105   CO2 26 25   * 102   BUN 40* 33*   CREATININE 1.3 1.0   CALCIUM 10.3 9.7   PROT 9.1* 7.3   ALBUMIN 2.4* 2.0*    BILITOT 0.2 0.2   ALKPHOS 250* 202*   AST 30 16   ALT 50* 36   ANIONGAP 14 12     Recent Lab Results  (Last 5 results in the past 24 hours)        01/26/23  1442   01/26/23  0934   01/26/23  0452   01/25/23  1842   01/25/23  1840        Procalcitonin         0.27  Comment: A concentration < 0.25 ng/mL represents a low risk of bacterial   infection.  Procalcitonin may not be accurate among patients with localized   infection, recent trauma or major surgery, immunosuppressed state,   invasive fungal infection, renal dysfunction. Decisions regarding   initiation or continuation of antibiotic therapy should not be based   solely on procalcitonin levels.         Albumin     2.0           Alkaline Phosphatase     202           ALT     36           Anion Gap     12           AST     16           Baso #     0.08           Basophil %     0.5           BILIRUBIN TOTAL     0.2  Comment: For infants and newborns, interpretation of results should be based  on gestational age, weight and in agreement with clinical  observations.    Premature Infant recommended reference ranges:  Up to 24 hours.............<8.0 mg/dL  Up to 48 hours............<12.0 mg/dL  3-5 days..................<15.0 mg/dL  6-29 days.................<15.0 mg/dL             BUN     33           Calcium     9.7           Chloride     105           CO2     25           Creatinine     1.0           CRP         207.7       Differential Method     Automated           eGFR     >60.0           Eos #     2.0           Eosinophil %     11.1           Glucose     102           Gran # (ANC)     13.3           Gran %     74.8           Hematocrit     34.0           Hemoglobin     10.2           Immature Grans (Abs)     0.07  Comment: Mild elevation in immature granulocytes is non specific and   can be seen in a variety of conditions including stress response,   acute inflammation, trauma and pregnancy. Correlation with other   laboratory and clinical findings is  essential.             Immature Granulocytes     0.4           Lactate, Michael         1.1  Comment: Falsely low lactic acid results can be found in samples   containing >=13.0 mg/dL total bilirubin and/or >=3.5 mg/dL   direct bilirubin.         Lymph #     1.5           Lymph %     8.3           Magnesium     1.8           MCH     26.8           MCHC     30.0           MCV     89           Mono #     0.9           Mono %     4.9           MPV     9.9           nRBC     0           Phosphorus     3.8           Platelets     452           POCT Glucose 136   116     114         Potassium     3.6           PROTEIN TOTAL     7.3           RBC     3.81           RDW     16.7           Sed Rate         >120       Sodium     142           WBC     17.72                                  Significant Imaging: I have reviewed all pertinent imaging results/findings within the past 24 hours.      Assessment/Plan:      * Dislodged gastrostomy tube  Oropharyngeal dysphagia     - noticed the dislodged peg tube s/p 20 F PEG tube placed without any immediate complications.   External bumper at 3 cm shelby.- no attempts to replace thus far  - patient was admitted for the same issue about two weeks ago with replacement on 1/12  - resume rosanna when able to tolerate see GI recommendation    Bacteremia  With positive blood Cx gram positive cocci  On rocephin, vanco, metronidazole  ID consulted F/u recommendation        Multiple wounds of skin  With scattered lesions with dry skin  Encourage moisturize skin, skin care  Avoid skin debridement, changing position q 2Hrs  Cont Antibiotic ointment at the PEG site      On antiepileptic therapy  - continue keppra 750 mg BID once peg tube is replaced      HTN (hypertension)  - BP well controlled upon admission  - continue coreg 3.125 mg BID once peg tube is replaced      Leukocytosis  - VSSAF  - leukocytosis of 23K upon admission  - g-tube site is clean without discharge or erythma  - patient on  prednisone 10 mg daily, unclear why per chart review - could be contributing to significant leukocytosis  - CXR pending  - UA pending  - blood cultures obtained  - procal ordered  - will start empirical ceftriaxone and flagyl in the meantime  - follow up UA, CXR, and blood cultures    Type 2 diabetes mellitus, with long-term current use of insulin  Patient's FSGs are controlled on current medication regimen.  Last A1c reviewed-   Lab Results   Component Value Date    HGBA1C 5.9 (H) 07/12/2018     Most recent fingerstick glucose reviewed- No results for input(s): POCTGLUCOSE in the last 24 hours.  Current correctional scale  Low  Maintain anti-hyperglycemic dose as follows-   Antihyperglycemics (From admission, onward)    Start     Stop Route Frequency Ordered    01/25/23 1511  insulin aspart U-100 pen 0-5 Units         -- SubQ Before meals & nightly PRN 01/25/23 1411        Hold Oral hypoglycemics while patient is in the hospital.  - patient is on insulin glargine 15U daily at the nursing home  - resume when tube feedings are resumed    Anemia  - patient's anemia is currently controlled  - etiology likely due to anemia of chronic disease  - current CBC reviewed -   Lab Results   Component Value Date    HGB 10.2 (L) 01/26/2023    HCT 34.0 (L) 01/26/2023     - monitor serial CBC and transfuse if patient becomes hemodynamically unstable, symptomatic, or H/H drops below 7/21.         Chronic kidney disease, stage III (moderate)  - baseline ~1.3  - CMP pending      VTE Risk Mitigation (From admission, onward)         Ordered     Place sequential compression device  Until discontinued         01/25/23 1411     IP VTE HIGH RISK PATIENT  Once         01/25/23 1411     Reason for No Pharmacological VTE Prophylaxis  Once        Question:  Reasons:  Answer:  Risk of Bleeding  Comment:  pre-op    01/25/23 1411                Discharge Planning   FAINA: 1/28/2023     Code Status: Full Code   Is the patient medically ready for  discharge?: No    Reason for patient still in hospital (select all that apply): Patient new problem, Treatment, Consult recommendations and Pending disposition  Discharge Plan A: Return to nursing home          Time spent >30 minutes        Tommy Rhodes MD  Department of Hospital Medicine   [facility] - Observation 11H

## 2023-01-26 NOTE — PROVATION PATIENT INSTRUCTIONS
Discharge Summary/Instructions after an Endoscopic Procedure  Patient Name: Hao Medrano  Patient MRN: [id number]  Patient YOB: 1960 Thursday, January 26, 2023  Manju Whitman MD  Dear patient,  As a result of recent federal legislation (The Federal Cures Act), you may   receive lab or pathology results from your procedure in your [facility]   account before your physician is able to contact you. Your physician or   their representative will relay the results to you with their   recommendations at their soonest availability.  Thank you,  RESTRICTIONS:  During your procedure today, you received medications for sedation.  These   medications may affect your judgment, balance and coordination.  Therefore,   for 24 hours, you have the following restrictions:   - DO NOT drive a car, operate machinery, make legal/financial decisions,   sign important papers or drink alcohol.    ACTIVITY:  Today: no heavy lifting, straining or running due to procedural   sedation/anesthesia.  The following day: return to full activity including work.  DIET:  Eat and drink normally unless instructed otherwise.     TREATMENT FOR COMMON SIDE EFFECTS:  - Mild abdominal pain, nausea, belching, bloating or excessive gas:  rest,   eat lightly and use a heating pad.  - Sore Throat: treat with throat lozenges and/or gargle with warm salt   water.  - Because air was used during the procedure, expelling large amounts of air   from your rectum or belching is normal.  - If a bowel prep was taken, you may not have a bowel movement for 1-3 days.    This is normal.  SYMPTOMS TO WATCH FOR AND REPORT TO YOUR PHYSICIAN:  1. Abdominal pain or bloating, other than gas cramps.  2. Chest pain.  3. Back pain.  4. Signs of infection such as: chills or fever occurring within 24 hours   after the procedure.  5. Rectal bleeding, which would show as bright red, maroon, or black stools.   (A tablespoon of blood from the rectum is not serious, especially  if   hemorrhoids are present.)  6. Vomiting.  7. Weakness or dizziness.  GO DIRECTLY TO THE NEAREST EMERGENCY ROOM IF YOU HAVE ANY OF THE FOLLOWING:      Difficulty breathing              Chills and/or fever over 101 F   Persistent vomiting and/or vomiting blood   Severe abdominal pain   Severe chest pain   Black, tarry stools   Bleeding- more than one tablespoon   Any other symptom or condition that you feel may need urgent attention  Your doctor recommends these additional instructions:  If any biopsies were taken, your doctors clinic will contact you in 1 to 2   weeks with any results.  - [facility] patient to [facility] for [facility] care.   - NPO.   - Please follow the post-PEG recommendations including: Nutrition consult   for formula and volume, NPO x4 hrs then water today and may use PEG   tomorrow for feedings.   - Maintain abdominal binder at all times.  For questions, problems or results please call your physician - Manju Whitman MD at Work:  [phone].  [facility], EMERGENCY ROOM PHONE NUMBER: [phone]  IF A COMPLICATION OR EMERGENCY SITUATION ARISES AND YOU ARE UNABLE TO REACH   YOUR PHYSICIAN - GO DIRECTLY TO THE EMERGENCY ROOM.  Manju Whitman MD  1/26/2023 1:27:34 PM  This report has been verified and signed electronically.  Dear patient,  As a result of recent federal legislation (The Federal Cures Act), you may   receive lab or pathology results from your procedure in your [facility]   account before your physician is able to contact you. Your physician or   their representative will relay the results to you with their   recommendations at their soonest availability.  Thank you,  PROVATION

## 2023-01-26 NOTE — PLAN OF CARE
Referral sent to [facility] for return to NH. TC to Patricia in Admissions, she verified pt is a resident/correction pt. She requested orders and clinicals be faxed to [phone] or emailed to [email or web address] in lieu of [facility].      01/26/23 0941   Post-Acute Status   Post-Acute Authorization Placement   Post-Acute Placement Status Referrals Sent   Hospital Resources/Appts/Education Provided Provided patient/caregiver with written discharge plan information;Appointments scheduled and added to S   Discharge Plan   Discharge Plan A Return to nursing home   Discharge Plan B Return to Nursing Home     Elie Burgos, RN,BSN

## 2023-01-26 NOTE — ASSESSMENT & PLAN NOTE
- patient's anemia is currently controlled  - etiology likely due to anemia of chronic disease  - current CBC reviewed -   Lab Results   Component Value Date    HGB 10.2 (L) 01/26/2023    HCT 34.0 (L) 01/26/2023     - monitor serial CBC and transfuse if patient becomes hemodynamically unstable, symptomatic, or H/H drops below 7/21.

## 2023-01-26 NOTE — HOSPITAL COURSE
Patient was admitted for dislodged PEG tube. GI was consulted and he underwent 20 F PEG tube placed without any immediate complications. External bumper at 3 cm shelby. During the course of this admission serial routine labs with mild hyperglycemia, mild anemia, persistent leukocytosis. He was found to have positive blood Culture with enterococcus Faecalis and coagulase negative staph. No vegetation noted on TTE. ID was consulted and was started on IV antibiotic and outpt recs made by ID. Unable to get outpt Abx covered by insurance so last day of IV ampicillin 2/11/23. Leukocytosis through most of admission but likely related to steroids. WBC WNL on discharge labs. Morning of 2/9, pt found with large amount of bright red blood in bed. Source thought to be multiple excoriations in gluteal/perineal region oozing blood actively on exam. Pressure dressing applied with hemostasis. Hb and vitals monitored closely and stable. Rectal exam with no hemorrhoids noted or palpated. Brown stool on glove, no evidence of GI bleed. Aspirin held temporarily held for active bleeding. Wound care consulted. Vistaril ordered due to pruritis. No further bleeding noted. Hb trended down but likely represented blood loss from 2/9/23. I called the pt's wife morning of 2/12/23 and reviewed pt's hospitalization in detail. Discussed risk of bleeding with aspirin vs benefit of further stroke prevention. Wife wishes to proceed with Aspirin therapy. Discussed code status and goals of care at length with wife including Full code vs DNR vs Palliative or Hospice. Currently, wife has elected to proceed with full code. Plan for discharge back to the nursing home and follow up with ID, PCP. Recommend close monitoring for bleeding on discharge. Repeat labs 2/14/23.    Of note, pt was started on prednisone previous admission on 1/12/23. I was unable to identify indication. Contacted pharmacy and they were unable to identify indication either. Will start to  titrate down dose and recommend further taper at facility. Will provide instructions.       Pt completed IV antibiotics in the hospital. Labs ordered for 2/14/23    Fax Lab Results to Infectious Diseases Provider: Dr. Price     [facility] Fax Number: [phone]     Outpatient Infectious Diseases Follow-up     Follow-up appointment will be arranged by the ID clinic and will be found in the patient's appointments tab.  Prior to discharge, please ensure the patient's follow-up has been scheduled.    If there is still no follow-up scheduled prior to discharge, please send an EPIC message to Lauren Stiles in Infectious Diseases.    The patient's chronic medical conditions were managed appropriately. Discharge plan of care was discussed at length with patient's wife.

## 2023-01-26 NOTE — ASSESSMENT & PLAN NOTE
With scattered lesions with dry skin  Encourage moisturize skin, skin care  Avoid skin debridement, changing position q 2Hrs  Cont Antibiotic ointment at the PEG site

## 2023-01-26 NOTE — PROGRESS NOTES
Pharmacokinetic Initial Assessment: IV Vancomycin    Assessment/Plan:    Initiate intravenous vancomycin with loading dose of 17968 mg once followed by a maintenance dose of vancomycin 750 mg IV every 12 hours  Desired empiric serum trough concentration is 10 to 15 mcg/mL  Draw vancomycin trough level 60 min prior to fourth dose on 1/27/2023 at approximately 0900  Pharmacy will continue to follow and monitor vancomycin.      Please contact pharmacy at extension [phone] with any questions regarding this assessment.     Thank you for the consult,   Kesha Perez       Patient brief summary:  Hao Medrano is a 62 y.o. male initiated on antimicrobial therapy with IV Vancomycin for treatment of suspected skin & soft tissue infection    Drug Allergies:   Review of patient's allergies indicates:  No Known Allergies    Actual Body Weight:   71.7 kg    Renal Function:   Estimated Creatinine Clearance: 59 mL/min (based on SCr of 1.3 mg/dL).,     Dialysis Method (if applicable):  N/A    CBC (last 72 hours):  Recent Labs   Lab Result Units 01/25/23  1156   WBC K/uL 23.17*   Hemoglobin g/dL 11.4*   Hematocrit % 37.5*   Platelets K/uL 517*   Gran % % 76.6*   Lymph % % 9.9*   Mono % % 6.1   Eosinophil % % 6.3   Basophil % % 0.5   Differential Method  Automated       Metabolic Panel (last 72 hours):  Recent Labs   Lab Result Units 01/25/23  1550 01/25/23  1556   Sodium mmol/L  --  141   Potassium mmol/L  --  4.4   Chloride mmol/L  --  101   CO2 mmol/L  --  26   Glucose mg/dL  --  126*   Glucose, UA  Negative  --    BUN mg/dL  --  40*   Creatinine mg/dL  --  1.3   Albumin g/dL  --  2.4*   Total Bilirubin mg/dL  --  0.2   Alkaline Phosphatase U/L  --  250*   AST U/L  --  30   ALT U/L  --  50*       Drug levels (last 3 results):  No results for input(s): VANCOMYCINRA, VANCORANDOM, VANCOMYCINPE, VANCOPEAK, VANCOMYCINTR, VANCOTROUGH in the last 72 hours.    Microbiologic Results:  Microbiology Results (last 7 days)       Procedure  Component Value Units Date/Time    Urine culture [[id number]] Collected: 01/25/23 1550    Order Status: No result Specimen: Urine Updated: 01/25/23 1725    Blood culture [[id number]]     Order Status: Canceled Specimen: Blood     Blood culture [[id number]]     Order Status: Canceled Specimen: Blood     Blood Culture #1 **CANNOT BE ORDERED STAT** [[id number]] Collected: 01/25/23 1556    Order Status: Sent Specimen: Blood from Peripheral, Upper Arm, Right Updated: 01/25/23 1603    Blood Culture #2 **CANNOT BE ORDERED STAT** [[id number]] Collected: 01/25/23 1556    Order Status: Sent Specimen: Blood from Peripheral, Forearm, Left Updated: 01/25/23 1603

## 2023-01-26 NOTE — PLAN OF CARE
[facility]  Discharge Assessment    Primary Care Provider: Keara Yanes NP     Discharge Assessment (most recent)       BRIEF DISCHARGE ASSESSMENT - 01/26/23 0936          Discharge Planning    Assessment Type Discharge Planning Brief Assessment     Resource/Environmental Concerns none     Support Systems Family members;Spouse/significant other     Assistance Needed Full     Equipment Currently Used at Home wheelchair;hospital bed     Current Living Arrangements residential facility     Care Facility Name [facility]     Patient/Family Anticipates Transition to long-term care facility     Patient/Family Anticipated Services at Transition none     DME Needed Upon Discharge  none     Discharge Plan A Return to nursing home     Discharge Plan B Return to Nursing Home                   Pt is a 62 y.o. male admitted with dislodged G Tube and has a PMH of CVA w/ Trach and HTN. He is a resident at [facility]. Per Patricia in admissions he is a total care. Pt to return to [facility] [place]. [facility] Discharge Packet given to patient and/or family with understanding verbalized.   name and number and estimated discharge date written on white board in patient's room with request to call for any questions or concerns.  Will continue to follow for needs.  Elie Burgos, RN,BSN

## 2023-01-26 NOTE — SUBJECTIVE & OBJECTIVE
Interval History:   Seen and examined non verbal, alert, unable to assess for acute concern s/p gastric tube dislodged placement, with trach in place, no fever.    Review of Systems   Unable to perform ROS: Patient nonverbal   Objective:     Vital Signs (Most Recent):  Temp: 98.4 °F (36.9 °C) (01/26/23 1433)  Pulse: 105 (01/26/23 1433)  Resp: 16 (01/26/23 1433)  BP: 135/80 (01/26/23 1433)  SpO2: 99 % (01/26/23 1449) Vital Signs (24h Range):  Temp:  [97.3 °F (36.3 °C)-99 °F (37.2 °C)] 98.4 °F (36.9 °C)  Pulse:  [] 105  Resp:  [12-20] 16  SpO2:  [98 %-100 %] 99 %  BP: (108-136)/(58-80) 135/80     Weight: 71.7 kg (158 lb)  Body mass index is 21.43 kg/m².    Intake/Output Summary (Last 24 hours) at 1/26/2023 1647  Last data filed at 1/25/2023 1917  Gross per 24 hour   Intake 48.2 ml   Output --   Net 48.2 ml      Physical Exam  Constitutional:       General: He is not in acute distress.     Appearance: He is ill-appearing.   HENT:      Head: Normocephalic and atraumatic.   Eyes:      Extraocular Movements: Extraocular movements intact.      Pupils: Pupils are equal, round, and reactive to light.   Cardiovascular:      Rate and Rhythm: Tachycardia present. Rhythm irregular.      Heart sounds: No murmur heard.    No friction rub. No gallop.   Pulmonary:      Effort: Pulmonary effort is normal. No respiratory distress.      Breath sounds: No wheezing.   Abdominal:      General: Bowel sounds are normal. There is no distension.      Palpations: Abdomen is soft.      Tenderness: There is no abdominal tenderness. There is no guarding or rebound.   Musculoskeletal:         General: Deformity present.      Cervical back: No rigidity or tenderness.      Right lower leg: No edema.      Left lower leg: No edema.   Skin:     General: Skin is dry.      Findings: Lesion and rash present.   Neurological:      Mental Status: He is alert.      Motor: Weakness present.      Coordination: Coordination abnormal.      Gait: Gait  abnormal.      Comments: Generalized weakness with lower ext atrophy       Significant Labs: All pertinent labs within the past 24 hours have been reviewed.  BMP:   Recent Labs   Lab 01/26/23  0452         K 3.6      CO2 25   BUN 33*   CREATININE 1.0   CALCIUM 9.7   MG 1.8     CBC:   Recent Labs   Lab 01/25/23  1156 01/26/23  0452   WBC 23.17* 17.72*   HGB 11.4* 10.2*   HCT 37.5* 34.0*   * 452*     CMP:   Recent Labs   Lab 01/25/23  1556 01/26/23  0452    142   K 4.4 3.6    105   CO2 26 25   * 102   BUN 40* 33*   CREATININE 1.3 1.0   CALCIUM 10.3 9.7   PROT 9.1* 7.3   ALBUMIN 2.4* 2.0*   BILITOT 0.2 0.2   ALKPHOS 250* 202*   AST 30 16   ALT 50* 36   ANIONGAP 14 12     Recent Lab Results  (Last 5 results in the past 24 hours)        01/26/23  1442   01/26/23  0934   01/26/23  0452   01/25/23  1842   01/25/23  1840        Procalcitonin         0.27  Comment: A concentration < 0.25 ng/mL represents a low risk of bacterial   infection.  Procalcitonin may not be accurate among patients with localized   infection, recent trauma or major surgery, immunosuppressed state,   invasive fungal infection, renal dysfunction. Decisions regarding   initiation or continuation of antibiotic therapy should not be based   solely on procalcitonin levels.         Albumin     2.0           Alkaline Phosphatase     202           ALT     36           Anion Gap     12           AST     16           Baso #     0.08           Basophil %     0.5           BILIRUBIN TOTAL     0.2  Comment: For infants and newborns, interpretation of results should be based  on gestational age, weight and in agreement with clinical  observations.    Premature Infant recommended reference ranges:  Up to 24 hours.............<8.0 mg/dL  Up to 48 hours............<12.0 mg/dL  3-5 days..................<15.0 mg/dL  6-29 days.................<15.0 mg/dL             BUN     33           Calcium     9.7           Chloride      105           CO2     25           Creatinine     1.0           CRP         207.7       Differential Method     Automated           eGFR     >60.0           Eos #     2.0           Eosinophil %     11.1           Glucose     102           Gran # (ANC)     13.3           Gran %     74.8           Hematocrit     34.0           Hemoglobin     10.2           Immature Grans (Abs)     0.07  Comment: Mild elevation in immature granulocytes is non specific and   can be seen in a variety of conditions including stress response,   acute inflammation, trauma and pregnancy. Correlation with other   laboratory and clinical findings is essential.             Immature Granulocytes     0.4           Lactate, Michael         1.1  Comment: Falsely low lactic acid results can be found in samples   containing >=13.0 mg/dL total bilirubin and/or >=3.5 mg/dL   direct bilirubin.         Lymph #     1.5           Lymph %     8.3           Magnesium     1.8           MCH     26.8           MCHC     30.0           MCV     89           Mono #     0.9           Mono %     4.9           MPV     9.9           nRBC     0           Phosphorus     3.8           Platelets     452           POCT Glucose 136   116     114         Potassium     3.6           PROTEIN TOTAL     7.3           RBC     3.81           RDW     16.7           Sed Rate         >120       Sodium     142           WBC     17.72                                  Significant Imaging: I have reviewed all pertinent imaging results/findings within the past 24 hours.

## 2023-01-27 LAB
ALBUMIN SERPL BCP-MCNC: 2 G/DL (ref 3.5–5.2)
ALP SERPL-CCNC: 182 U/L (ref 55–135)
ALT SERPL W/O P-5'-P-CCNC: 25 U/L (ref 10–44)
ANION GAP SERPL CALC-SCNC: 10 MMOL/L (ref 8–16)
ANISOCYTOSIS BLD QL SMEAR: SLIGHT
AST SERPL-CCNC: 15 U/L (ref 10–40)
BACTERIA UR CULT: ABNORMAL
BASOPHILS # BLD AUTO: 0.04 K/UL (ref 0–0.2)
BASOPHILS NFR BLD: 0.3 % (ref 0–1.9)
BILIRUB SERPL-MCNC: 0.2 MG/DL (ref 0.1–1)
BUN SERPL-MCNC: 23 MG/DL (ref 8–23)
CALCIUM SERPL-MCNC: 9.3 MG/DL (ref 8.7–10.5)
CHLORIDE SERPL-SCNC: 109 MMOL/L (ref 95–110)
CO2 SERPL-SCNC: 20 MMOL/L (ref 23–29)
CREAT SERPL-MCNC: 1 MG/DL (ref 0.5–1.4)
DIFFERENTIAL METHOD: ABNORMAL
EOSINOPHIL # BLD AUTO: 2.5 K/UL (ref 0–0.5)
EOSINOPHIL NFR BLD: 17 % (ref 0–8)
ERYTHROCYTE [DISTWIDTH] IN BLOOD BY AUTOMATED COUNT: 16.5 % (ref 11.5–14.5)
EST. GFR  (NO RACE VARIABLE): >60 ML/MIN/1.73 M^2
GLUCOSE SERPL-MCNC: 86 MG/DL (ref 70–110)
HCT VFR BLD AUTO: 32.4 % (ref 40–54)
HGB BLD-MCNC: 10 G/DL (ref 14–18)
HYPOCHROMIA BLD QL SMEAR: ABNORMAL
IMM GRANULOCYTES # BLD AUTO: 0.1 K/UL (ref 0–0.04)
IMM GRANULOCYTES NFR BLD AUTO: 0.7 % (ref 0–0.5)
LYMPHOCYTES # BLD AUTO: 1.2 K/UL (ref 1–4.8)
LYMPHOCYTES NFR BLD: 8.2 % (ref 18–48)
MAGNESIUM SERPL-MCNC: 1.7 MG/DL (ref 1.6–2.6)
MCH RBC QN AUTO: 27.5 PG (ref 27–31)
MCHC RBC AUTO-ENTMCNC: 30.9 G/DL (ref 32–36)
MCV RBC AUTO: 89 FL (ref 82–98)
MONOCYTES # BLD AUTO: 0.8 K/UL (ref 0.3–1)
MONOCYTES NFR BLD: 5.1 % (ref 4–15)
NEUTROPHILS # BLD AUTO: 10.2 K/UL (ref 1.8–7.7)
NEUTROPHILS NFR BLD: 68.7 % (ref 38–73)
NRBC BLD-RTO: 0 /100 WBC
PHOSPHATE SERPL-MCNC: 4.1 MG/DL (ref 2.7–4.5)
PLATELET # BLD AUTO: 436 K/UL (ref 150–450)
PLATELET BLD QL SMEAR: ABNORMAL
PMV BLD AUTO: 9.9 FL (ref 9.2–12.9)
POCT GLUCOSE: 101 MG/DL (ref 70–110)
POCT GLUCOSE: 111 MG/DL (ref 70–110)
POCT GLUCOSE: 142 MG/DL (ref 70–110)
POCT GLUCOSE: 147 MG/DL (ref 70–110)
POTASSIUM SERPL-SCNC: 3.9 MMOL/L (ref 3.5–5.1)
PROT SERPL-MCNC: 7 G/DL (ref 6–8.4)
RBC # BLD AUTO: 3.64 M/UL (ref 4.6–6.2)
SODIUM SERPL-SCNC: 139 MMOL/L (ref 136–145)
VANCOMYCIN TROUGH SERPL-MCNC: 24.8 UG/ML (ref 10–22)
WBC # BLD AUTO: 14.82 K/UL (ref 3.9–12.7)

## 2023-01-27 PROCEDURE — 94761 N-INVAS EAR/PLS OXIMETRY MLT: CPT

## 2023-01-27 PROCEDURE — S0030 INJECTION, METRONIDAZOLE: HCPCS

## 2023-01-27 PROCEDURE — 99900035 HC TECH TIME PER 15 MIN (STAT)

## 2023-01-27 PROCEDURE — 27200966 HC CLOSED SUCTION SYSTEM

## 2023-01-27 PROCEDURE — 25000003 PHARM REV CODE 250

## 2023-01-27 PROCEDURE — 63600175 PHARM REV CODE 636 W HCPCS: Performed by: FAMILY MEDICINE

## 2023-01-27 PROCEDURE — 25000003 PHARM REV CODE 250: Performed by: FAMILY MEDICINE

## 2023-01-27 PROCEDURE — 36415 COLL VENOUS BLD VENIPUNCTURE: CPT | Performed by: FAMILY MEDICINE

## 2023-01-27 PROCEDURE — 87040 BLOOD CULTURE FOR BACTERIA: CPT | Mod: 59 | Performed by: FAMILY MEDICINE

## 2023-01-27 PROCEDURE — 93005 ELECTROCARDIOGRAM TRACING: CPT

## 2023-01-27 PROCEDURE — 99232 SBSQ HOSP IP/OBS MODERATE 35: CPT | Mod: ,,, | Performed by: FAMILY MEDICINE

## 2023-01-27 PROCEDURE — 85025 COMPLETE CBC W/AUTO DIFF WBC: CPT | Performed by: FAMILY MEDICINE

## 2023-01-27 PROCEDURE — 80202 ASSAY OF VANCOMYCIN: CPT | Performed by: STUDENT IN AN ORGANIZED HEALTH CARE EDUCATION/TRAINING PROGRAM

## 2023-01-27 PROCEDURE — 25000003 PHARM REV CODE 250: Performed by: STUDENT IN AN ORGANIZED HEALTH CARE EDUCATION/TRAINING PROGRAM

## 2023-01-27 PROCEDURE — 36415 COLL VENOUS BLD VENIPUNCTURE: CPT | Performed by: STUDENT IN AN ORGANIZED HEALTH CARE EDUCATION/TRAINING PROGRAM

## 2023-01-27 PROCEDURE — 84100 ASSAY OF PHOSPHORUS: CPT | Performed by: STUDENT IN AN ORGANIZED HEALTH CARE EDUCATION/TRAINING PROGRAM

## 2023-01-27 PROCEDURE — 93010 EKG 12-LEAD: ICD-10-PCS | Mod: ,,, | Performed by: INTERNAL MEDICINE

## 2023-01-27 PROCEDURE — 99223 PR INITIAL HOSPITAL CARE,LEVL III: ICD-10-PCS | Mod: GC,,, | Performed by: INTERNAL MEDICINE

## 2023-01-27 PROCEDURE — 11000001 HC ACUTE MED/SURG PRIVATE ROOM

## 2023-01-27 PROCEDURE — 27000221 HC OXYGEN, UP TO 24 HOURS

## 2023-01-27 PROCEDURE — 83735 ASSAY OF MAGNESIUM: CPT | Performed by: STUDENT IN AN ORGANIZED HEALTH CARE EDUCATION/TRAINING PROGRAM

## 2023-01-27 PROCEDURE — 63600175 PHARM REV CODE 636 W HCPCS: Performed by: STUDENT IN AN ORGANIZED HEALTH CARE EDUCATION/TRAINING PROGRAM

## 2023-01-27 PROCEDURE — 99232 PR SUBSEQUENT HOSPITAL CARE,LEVL II: ICD-10-PCS | Mod: ,,, | Performed by: FAMILY MEDICINE

## 2023-01-27 PROCEDURE — 80053 COMPREHEN METABOLIC PANEL: CPT | Performed by: STUDENT IN AN ORGANIZED HEALTH CARE EDUCATION/TRAINING PROGRAM

## 2023-01-27 PROCEDURE — 99223 1ST HOSP IP/OBS HIGH 75: CPT | Mod: GC,,, | Performed by: INTERNAL MEDICINE

## 2023-01-27 PROCEDURE — 93010 ELECTROCARDIOGRAM REPORT: CPT | Mod: ,,, | Performed by: INTERNAL MEDICINE

## 2023-01-27 RX ADMIN — CALCIUM CARBONATE (ANTACID) CHEW TAB 500 MG 500 MG: 500 CHEW TAB at 09:01

## 2023-01-27 RX ADMIN — BACLOFEN 15 MG: 5 TABLET ORAL at 02:01

## 2023-01-27 RX ADMIN — CARVEDILOL 3.12 MG: 3.12 TABLET, FILM COATED ORAL at 08:01

## 2023-01-27 RX ADMIN — LEVETIRACETAM 750 MG: 100 SOLUTION ORAL at 09:01

## 2023-01-27 RX ADMIN — BACLOFEN 15 MG: 5 TABLET ORAL at 09:01

## 2023-01-27 RX ADMIN — ASPIRIN 81 MG CHEWABLE TABLET 81 MG: 81 TABLET CHEWABLE at 09:01

## 2023-01-27 RX ADMIN — VANCOMYCIN HYDROCHLORIDE 750 MG: 750 INJECTION, POWDER, LYOPHILIZED, FOR SOLUTION INTRAVENOUS at 11:01

## 2023-01-27 RX ADMIN — BACLOFEN 15 MG: 5 TABLET ORAL at 08:01

## 2023-01-27 RX ADMIN — NYSTATIN: 100000 POWDER TOPICAL at 08:01

## 2023-01-27 RX ADMIN — METRONIDAZOLE 500 MG: 500 INJECTION, SOLUTION INTRAVENOUS at 09:01

## 2023-01-27 RX ADMIN — LEVETIRACETAM 750 MG: 100 SOLUTION ORAL at 08:01

## 2023-01-27 RX ADMIN — CARVEDILOL 3.12 MG: 3.12 TABLET, FILM COATED ORAL at 09:01

## 2023-01-27 RX ADMIN — PREDNISONE 10 MG: 10 TABLET ORAL at 09:01

## 2023-01-27 NOTE — ASSESSMENT & PLAN NOTE
Oropharyngeal dysphagia    - s/p 20 F PEG tube placed without any immediate complications.   External bumper at 3 cm shelby.-  -resume feeding tube with Isosource 1.5 nathaniel and dietician consult

## 2023-01-27 NOTE — RESPIRATORY THERAPY
HPI     Have you seen any other physician or provider since your last visit :{YES / [id number]}    Have you had any other diagnostic tests since your last visit?: {YES / [id number]}     Have you changed or stopped any medications since your last visit including any over-the-counter medicines, vitamins, or herbal medicines? :{YES / FM:[id number]}    Are you taking all your prescribed medications?:{YES / HV:40590}    If NO, why?***              REVIEW OF SYSTEMS    Review of Systems    PAST MEDICAL HISTORY    Past Medical History:   Diagnosis Date    LVH (left ventricular hypertrophy)     Tachycardia        FAMILY HISTORY    Family History   Problem Relation Age of Onset    Asthma Mother     Asthma Brother     Asthma Maternal Grandfather        SURGICAL HISTORY    Past Surgical History:   Procedure Laterality Date    CARDIAC CATHETERIZATION         CURRENT MEDICATIONS    Current Outpatient Prescriptions   Medication Sig Dispense Refill    Incontinence Supply Disposable (BRIEF OVERNIGHT LARGE) MISC use as needed at night 1 Bottle 5    oxybutynin (DITROPAN XL) 10 MG extended release tablet Take 1 tablet by mouth 2 times daily 30 tablet 3    polyethylene glycol (MIRALAX) powder Take 17 g by mouth 2 times daily 510 g 3    atenolol (TENORMIN) 25 MG tablet TAKE 1 TAB BY MOUTH TWICE A DAY 60 tablet 0    nystatin (MYCOSTATIN) 937678 UNIT/GM ointment Apply topically 2 times daily. 30 g 1    Incontinence Supply Disposable (ATTENDS BRIEFS CLASSIC LARGE) MISC Incontinence briefs for daytime and night time  use . 1 Bottle 3    divalproex (DEPAKOTE ER) 500 MG extended release tablet       paliperidone (INVEGA) 3 MG extended release tablet       sertraline (ZOLOFT) 50 MG tablet       topiramate (TOPAMAX) 50 MG tablet take 2 tablets ( 100 mg total) at night .  60 tablet 2    montelukast (SINGULAIR) 10 MG tablet Take 1 tablet by mouth daily Diagnosis asthma 90 tablet 3    albuterol sulfate HFA (VENTOLIN HFA) 108 (90 Base) RAPID RESPONSE RESPIRATORY THERAPY PROACTIVE NOTE           Time of visit: 1210     Code Status: Full Code   : 1960  Bed: 1155/1155 A:   MRN: [id number]  Time spent at the bedside: < 15 min    SITUATION    Evaluated patient for: LDA Check     BACKGROUND    Patient has no past medical history on file.  Clinically Significant Surgical Hx: tracheostomy    24 Hours Vitals Range:  Temp:  [97.2 °F (36.2 °C)-99.5 °F (37.5 °C)]   Pulse:  []   Resp:  [14-20]   BP: (112-146)/(62-90)   SpO2:  [96 %-100 %]     Labs:    Recent Labs     23  1556 23  0452 23  0548    142 139   K 4.4 3.6 3.9    105 109   CO2 26 25 20*   CREATININE 1.3 1.0 1.0   * 102 86   PHOS  --  3.8 4.1   MG  --  1.8 1.7        No results for input(s): PH, PCO2, PO2, HCO3, POCSATURATED, BE in the last 72 hours.    ASSESSMENT/INTERVENTIONS  Pt resting comfortably with no respiratory needs at this time      Last VS   Temp: 98.6 °F (37 °C) (1100)  Pulse: 89 (1100)  Resp: 17 (1100)  BP: 112/70 (1100)  SpO2: 96 % (1100)      Extra trachs at bedside: y  Level of Consciousness: Level of Consciousness (AVPU): alert  Respiratory Effort: Respiratory Effort: Unlabored Expansion/Accessory Muscle Usage: Expansion/Accessory Muscles/Retractions: expansion symmetric  All Lung Field Breath Sounds: All Lung Fields Breath Sounds: Anterior:, Lateral:, diminished, equal bilaterally  O2 Device/Concentration: 5/28%  Surgical airway: Yes, Type: Shiley Size: 6 XLT, cuffed  Ambu at bedside: Ambu bag with the patient?: Yes, Adult Ambu     Active Orders   Respiratory Care    Inhalation Treatment Q4H PRN     Frequency: Q4H PRN     Number of Occurrences: Until Specified    Oxygen PRN     Frequency: PRN     Number of Occurrences: Until Specified     Order Questions:      Device type: Low flow      Device: Trach Collar      Titrate O2 per Oxygen Titration Protocol: Yes      To maintain SpO2 goal of: >= 90%       Notify MD of: Inability to achieve desired SpO2; Sudden change in patient status and requires 20% increase in FiO2; Patient requires >60% FiO2    Pulse Oximetry Q4H     Frequency: Q4H     Number of Occurrences: Until Specified    Routine tracheostomy care     Frequency: BID     Number of Occurrences: Until Specified       RECOMMENDATIONS    We recommend: RRT Recs: Continue POC per primary team.      FOLLOW-UP    Please call back the Rapid Response RT, Ayad Herbert RRT at x [phone] for any questions or concerns.

## 2023-01-27 NOTE — PROGRESS NOTES
Pharmacokinetic Assessment Follow Up: IV Vancomycin    Vancomycin serum concentration assessment(s):    The trough level was drawn correctly and can be used to guide therapy at this time. The measurement is above the desired definitive target range of 10 to 15 mcg/mL.    Vancomycin Regimen Plan:    Change regimen to Vancomycin 1000 mg IV every 24 hours with next serum trough concentration measured at 1200 prior to 3rd dose on 1/30    Drug levels (last 3 results):  Recent Labs   Lab Result Units 01/27/23  0852   Vancomycin-Trough ug/mL 24.8*       Pharmacy will continue to follow and monitor vancomycin.    Please contact pharmacy at extension [phone] for questions regarding this assessment.    Thank you for the consult,   Eloy Chan       Patient brief summary:  Hao Medrano is a 62 y.o. male initiated on antimicrobial therapy with IV Vancomycin for treatment of bacteremia    Drug Allergies:   Review of patient's allergies indicates:  No Known Allergies    Actual Body Weight:   71.7 kg    Renal Function:   Estimated Creatinine Clearance: 76.7 mL/min (based on SCr of 1 mg/dL).,     CBC (last 72 hours):  Recent Labs   Lab Result Units 01/25/23  1156 01/26/23  0452 01/27/23  1113   WBC K/uL 23.17* 17.72* 14.82*   Hemoglobin g/dL 11.4* 10.2* 10.0*   Hematocrit % 37.5* 34.0* 32.4*   Platelets K/uL 517* 452* 436   Gran % % 76.6* 74.8* 68.7   Lymph % % 9.9* 8.3* 8.2*   Mono % % 6.1 4.9 5.1   Eosinophil % % 6.3 11.1* 17.0*   Basophil % % 0.5 0.5 0.3   Differential Method  Automated Automated Automated       Metabolic Panel (last 72 hours):  Recent Labs   Lab Result Units 01/25/23  1550 01/25/23  1556 01/26/23  0452 01/27/23  0548   Sodium mmol/L  --  141 142 139   Potassium mmol/L  --  4.4 3.6 3.9   Chloride mmol/L  --  101 105 109   CO2 mmol/L  --  26 25 20*   Glucose mg/dL  --  126* 102 86   Glucose, UA  Negative  --   --   --    BUN mg/dL  --  40* 33* 23   Creatinine mg/dL  --  1.3 1.0 1.0   Albumin g/dL  --  2.4*  2.0* 2.0*   Total Bilirubin mg/dL  --  0.2 0.2 0.2   Alkaline Phosphatase U/L  --  250* 202* 182*   AST U/L  --  30 16 15   ALT U/L  --  50* 36 25   Magnesium mg/dL  --   --  1.8 1.7   Phosphorus mg/dL  --   --  3.8 4.1       Vancomycin Administrations:  vancomycin given in the last 96 hours                     vancomycin 750 mg in dextrose 5 % (D5W) 250 mL IVPB (Vial-Mate) (mg) 750 mg New Bag 01/27/23 1109     750 mg New Bag 01/26/23 2147     750 mg New Bag  1040    vancomycin 1,500 mg in dextrose 5 % (D5W) 250 mL IVPB (Vial-Mate) (mg) 1,500 mg New Bag 01/25/23 2156                    Microbiologic Results:  Microbiology Results (last 7 days)       Procedure Component Value Units Date/Time    Blood Culture #1 **CANNOT BE ORDERED STAT** [[id number]]  (Abnormal) Collected: 01/25/23 1556    Order Status: Completed Specimen: Blood from Peripheral, Upper Arm, Right Updated: 01/27/23 1250     Blood Culture, Routine Gram stain aer bottle: Gram positive cocci in chains resembling Strep      Results called to and read back by: Marcy Hernández RN  01/26/2023  10:31      Gram stain omar bottle: Gram positive cocci      Positive results previously called 01/26/2023  11:58      ENTEROCOCCUS FAECALIS  Susceptibility pending        COAGULASE-NEGATIVE STAPHYLOCOCCUS SPECIES  Organism is a probable contaminant      Blood Culture #2 **CANNOT BE ORDERED STAT** [[id number]]  (Abnormal) Collected: 01/25/23 1556    Order Status: Completed Specimen: Blood from Peripheral, Forearm, Left Updated: 01/27/23 1002     Blood Culture, Routine Gram stain omar bottle: Gram positive cocci in chains resembling Strep      Results called to and read back by: Marcy Hernández RN  01/26/2023  10:32      Gram stain aer bottle: Gram positive cocci      Positive results previously called 01/26/2023  11:57      ENTEROCOCCUS SPECIES  Identification pending  For susceptibility see order #[id number]      Urine culture [[id number]] Collected: 01/25/23 1550    Order  Status: No result Specimen: Urine Updated: 01/25/23 1725    Blood culture [[id number]]     Order Status: Canceled Specimen: Blood     Blood culture [[id number]]     Order Status: Canceled Specimen: Blood

## 2023-01-27 NOTE — PLAN OF CARE
Pt update with clinicals emailed to [facility] Admissions with [employer] [email or web address].  Will continue to update plan as needed.  Elie Burgos RN,BSN

## 2023-01-27 NOTE — PROGRESS NOTES
[facility] - Observation 11H  Wound Care    Patient Name:  Hao Medrano   MRN:  [id number]  Date: 1/27/2023  Diagnosis: Dislodged gastrostomy tube    History:     History reviewed. No pertinent past medical history.    Social History     Socioeconomic History    Marital status: Significant Other   Tobacco Use    Smoking status: Never       Precautions:     Allergies as of 01/25/2023    (No Known Allergies)       WOC Assessment Details/Treatment        01/27/23 1215   WOCN Assessment   WOCN Total Time (mins) 45   Visit Date 01/27/23   Visit Time 1215   Consult Type New   WOCN Speciality Wound   Intervention assessed;applied;chart review;changed;coordination of care;orders   Teaching on-going        Altered Skin Integrity 01/10/23 2300 Sacral spine   Date First Assessed/Time First Assessed: 01/10/23 2300   Altered Skin Integrity Present on Admission: yes  Location: Sacral spine  Is this injury device related?: No   Wound Image    Dressing Appearance Intact;Moist drainage   Drainage Amount Scant   Drainage Characteristics/Odor Serous   Appearance Red;Yellow   Red (%), Wound Tissue Color 50 %   Yellow (%), Wound Tissue Color 50 %   Periwound Area Scar tissue   Wound Edges Irregular   Wound Length (cm) 11 cm   Wound Width (cm) 8 cm   Wound Depth (cm) 0.4 cm   Wound Volume (cm^3) 35.2 cm^3   Wound Surface Area (cm^2) 88 cm^2   Care Soap and water;Cleansed with:  (Triad ordered)        Altered Skin Integrity 01/27/23 1215 Right Buttocks   Date First Assessed/Time First Assessed: 01/27/23 1215   Altered Skin Integrity Present on Admission: yes  Side: Right  Location: Buttocks   Wound Image    Dressing Appearance Open to air   Drainage Amount None   Appearance [place]   Periwound Area Intact;Other (see comments)  (Dark discoloration from that point to ischium)   Wound Edges Irregular   Wound Length (cm) 5 cm   Wound Width (cm) 3 cm   Wound Depth (cm) 0.2 cm   Wound Volume (cm^3) 3 cm^3   Wound Surface Area (cm^2) 15 cm^2   Care  Cleansed with:;Soap and water   Dressing Applied  (Triad)        Altered Skin Integrity 01/13/23 0800 Right lower;medial Leg Abrasion(s)   Date First Assessed/Time First Assessed: 01/13/23 0800   Side: Right  Orientation: lower;medial  Location: Leg  Primary Wound Type: Abrasion(s)   Wound Image    Dressing Appearance Intact   Drainage Amount None   Appearance Pink;Dry   Red (%), Wound Tissue Color 100 %   Periwound Area Dry   Wound Edges Irregular   Wound Length (cm) 1.5 cm   Wound Width (cm) 1 cm   Wound Depth (cm) 0.1 cm   Wound Volume (cm^3) 0.15 cm^3   Wound Surface Area (cm^2) 1.5 cm^2   Care Cleansed with:;Soap and water   Dressing Applied  (Triad)        Altered Skin Integrity 01/10/23 1437 Left posterior Heel #3   Date First Assessed/Time First Assessed: 01/10/23 1437   Altered Skin Integrity Present on Admission: yes  Side: Left  Orientation: posterior  Location: Heel  Wound Number: #3  Is this injury device related?: Yes   Wound Image    Dressing Appearance Dry;Intact   Drainage Amount None   Appearance Black   Black (%), Wound Tissue Color 100 %   Periwound Area Redness   Wound Length (cm) 1.5 cm   Wound Width (cm) 1.1 cm   Wound Depth (cm) 0 cm   Wound Volume (cm^3) 0 cm^3   Wound Surface Area (cm^2) 1.65 cm^2   Care Cleansed with:;Soap and water   Dressing Applied  (Triad)        Altered Skin Integrity 01/13/23 0800 Left medial;anterior Toe, first   Date First Assessed/Time First Assessed: 01/13/23 0800   Side: Left  Orientation: medial;anterior  Location: Toe, first   Wound Image    Dressing Appearance Intact;Moist drainage   Drainage Amount Scant   Drainage Characteristics/Odor Serous   Appearance Red;Yellow   Red (%), Wound Tissue Color 50 %   Yellow (%), Wound Tissue Color 50 %   Periwound Area Intact   Wound Edges Defined   Wound Length (cm) 3 cm   Wound Width (cm) 2.5 cm   Wound Depth (cm) 0.4 cm   Wound Volume (cm^3) 3 cm^3   Wound Surface Area (cm^2) 7.5 cm^2   Care Cleansed with:;Soap and  water   Dressing Applied  (Triad)        Altered Skin Integrity 01/27/23 1215 Right lateral Foot   Date First Assessed/Time First Assessed: 01/27/23 1215   Altered Skin Integrity Present on Admission: yes  Side: Right  Orientation: lateral  Location: Foot  Is this injury device related?: No   Wound Image    Dressing Appearance Dry;Intact   Drainage Amount None   Appearance Red;Yellow   Red (%), Wound Tissue Color 25 %   Yellow (%), Wound Tissue Color 75 %   Periwound Area Intact   Wound Edges Defined   Wound Length (cm) 3.5 cm   Wound Width (cm) 2 cm   Wound Depth (cm) 0.7 cm   Wound Volume (cm^3) 4.9 cm^3   Wound Surface Area (cm^2) 7 cm^2   Care Cleansed with:;Soap and water   Dressing Applied  (Triad)        Altered Skin Integrity 01/27/23 1215 Left medial Heel   Date First Assessed/Time First Assessed: 01/27/23 1215   Altered Skin Integrity Present on Admission: yes  Side: Left  Orientation: medial  Location: Heel  Is this injury device related?: No   Wound Image    Dressing Appearance Dry;Intact   Drainage Amount None   Appearance Black   Red (%), Wound Tissue Color 100 %   Periwound Area Redness   Wound Length (cm) 1.5 cm   Wound Width (cm) 1 cm   Wound Depth (cm) 0.1 cm   Wound Volume (cm^3) 0.15 cm^3   Wound Surface Area (cm^2) 1.5 cm^2   Care Cleansed with:;Soap and water   Dressing Applied  (Triad)   Wound consult performed for multiple areas.  Not able to make needs known.  Bilateral lower extremities contracted.  Abdominal binder in place.  New PEG tube noted.  Recommendations made to primary team for Triad to all open areas, bilateral heel lift boots, waffle overlay, and turn every 2 hours. Orders placed.  Skin integrity NPIvonne made aware.    01/27/2023

## 2023-01-27 NOTE — CONSULTS
[facility]  Infectious Disease  Consult Note    Patient Name: Hao Medrano  MRN: [id number]  Admission Date: 1/25/2023  Hospital Length of Stay: 1 days  Attending Physician: Tommy Rhodes MD  Primary Care Provider: Keara Yanes NP     Isolation Status: No active isolations    Patient information was obtained from ER records and primary team.      Inpatient consult to Infectious Diseases  Consult performed by: Harish Trotter DO  Consult ordered by: Tommy Rhodes MD  Reason for consult: Managemnt of gram positive bacteremia      Assessment/Plan:     Bacteremia  Patient is a 63 yo male with leukocytosis and bacteremia found on blood cultures. Afebrile. CRP and sed rate elevated. UA also suspicious for UTI.    Blood cultures positive for enterococcus bacteremia. Likely as a result of his dysfunctional PEG tube which has been replaced.    Recommend:  -Conitinuing vancomycin  -Discontinuing ceftriaxone and flagyl  -F/u speciation and sensitivities  -Echo to look for possible endocarditis  -Repeat surveillance cultures, if positive, may need HARDEEP in addition to echo  -F/u urine cultures        Thank you for your consult. I will follow-up with patient. Please contact us if you have any additional questions.    Harish Trotter DO  Infectious Disease  [facility]    Subjective:     Principal Problem: Dislodged gastrostomy tube    HPI: Patient is a 63 yo male with a PMH of CVA with trach and G-tube, CKD, HTN, and HLD who initially presented with a dislodged PEG tube that has been replaced by surgery. During his admission, he was found with a leukocytosis of 23.17 and had blood cultures drawn that are now positive for enterococcus species. He also had a UA that was suspisious for UTI with 3+ leukocytes, many WBC, bacteria, and yeast seen.     ID was consulted to manage his bacteremia.      History reviewed. No pertinent past medical history.    Past Surgical History:   Procedure  Laterality Date    ESOPHAGOGASTRODUODENOSCOPY N/A 1/11/2023    Procedure: EGD (ESOPHAGOGASTRODUODENOSCOPY);  Surgeon: Francisco Javier Holt MD;  Location: [facility] ([facility]);  Service: Endoscopy;  Laterality: N/A;    ESOPHAGOGASTRODUODENOSCOPY N/A 1/12/2023    Procedure: EGD (ESOPHAGOGASTRODUODENOSCOPY);  Surgeon: Francisco Javier Holt MD;  Location: [facility] ([facility]);  Service: Endoscopy;  Laterality: N/A;    ESOPHAGOGASTRODUODENOSCOPY N/A 1/26/2023    Procedure: EGD (ESOPHAGOGASTRODUODENOSCOPY);  Surgeon: Manju Whitman MD;  [facility]  Service: Endoscopy;  Laterality: N/A;       Review of patient's allergies indicates:  No Known Allergies    Medications:  Medications Prior to Admission   Medication Sig    arginine-glutamine-calcium HMB (SHEREE) 7-7-1.5 gram PwPk 1 packet by Per G Tube route 2 (two) times daily.    aspirin 81 MG Chew 81 mg by Per G Tube route once daily.    baclofen (LIORESAL) 5 mg Tab tablet 15 mg by Per G Tube route 3 (three) times daily.    calcium carbonate (OS-OLGA) 500 mg calcium (1,250 mg) tablet 1,250 mg by Per G Tube route once daily.    carvediloL (COREG) 3.125 MG tablet 3.125 mg by Per G Tube route 2 (two) times daily.    esomeprazole (NEXIUM) 40 mg GrPS 40 mg by Per G Tube route once daily.    insulin glargine-yfgn 100 unit/mL (3 mL) InPn Inject 15 Units into the skin once daily.    Lactobacillus rhamnosus GG (CULTURELLE) 10 billion cell capsule 2 capsules by Per G Tube route 2 (two) times a day.    levETIRAcetam (KEPPRA) 100 mg/mL Soln 750 mg by Per G Tube route 2 (two) times daily.    menthol/zinc oxide (CALMOSEPTINE TOP) Apply to the affected area twice daily as needed    nystatin (MYCOSTATIN) powder Apply to the affected area daily    predniSONE (DELTASONE) 10 MG tablet 10 mg by Per G Tube route once daily.     Antibiotics (From admission, onward)      Start     Stop Route Frequency Ordered    01/28/23 1300  vancomycin (VANCOCIN) 1,000 mg in dextrose 5 % (D5W) 250 mL  IVPB (Vial-Mate)         -- IV Every 24 hours (non-standard times) 01/27/23 1312    01/25/23 1801  vancomycin - pharmacy to dose  (vancomycin IVPB)        See Marv for full Linked Orders Report.    -- IV pharmacy to manage frequency 01/25/23 1702          Antifungals (From admission, onward)      Start     Stop Route Frequency Ordered    01/26/23 2100  nystatin powder         -- Top 2 times daily 01/26/23 1658          Antivirals (From admission, onward)      None             Immunization History   Administered Date(s) Administered    COVID-19, MRNA, LN-S, PF (Pfizer) (Purple Cap) 06/25/2021       Family History    None       Social History     Socioeconomic History    Marital status: Significant Other   Tobacco Use    Smoking status: Never     Review of Systems   Unable to perform ROS: Patient nonverbal   Objective:     Vital Signs (Most Recent):  Temp: 98.6 °F (37 °C) (01/27/23 1100)  Pulse: 89 (01/27/23 1310)  Resp: 14 (01/27/23 1310)  BP: 112/70 (01/27/23 1100)  SpO2: 97 % (01/27/23 1310) Vital Signs (24h Range):  Temp:  [97.2 °F (36.2 °C)-99.5 °F (37.5 °C)] 98.6 °F (37 °C)  Pulse:  [] 89  Resp:  [14-20] 14  SpO2:  [96 %-100 %] 97 %  BP: (112-146)/(63-90) 112/70     Weight: 71.7 kg (158 lb)  Body mass index is 21.43 kg/m².    Estimated Creatinine Clearance: 76.7 mL/min (based on SCr of 1 mg/dL).    Physical Exam  Constitutional:       General: He is not in acute distress.     Appearance: He is ill-appearing.      Comments: Cachectic and chronically ill appearing.    HENT:      Head: Normocephalic and atraumatic.      Mouth/Throat:      Mouth: Mucous membranes are moist.   Eyes:      Pupils: Pupils are equal, round, and reactive to light.      Comments: Tracks with eyes   Cardiovascular:      Rate and Rhythm: Normal rate and regular rhythm.      Pulses: Normal pulses.      Heart sounds: Normal heart sounds. No murmur heard.  Abdominal:      General: Abdomen is flat. There is no distension.       Palpations: Abdomen is soft.      Comments: Breath sounds difficult to appreciate with trach   Musculoskeletal:      Right lower leg: No edema.      Left lower leg: No edema.   Skin:     General: Skin is warm and dry.      Capillary Refill: Capillary refill takes less than 2 seconds.      Findings: Bruising and lesion present.   Neurological:      Mental Status: Mental status is at baseline.      Comments: Will blink and nod when asked questions       Significant Labs: CBC:   Recent Labs   Lab 01/26/23  0452 01/27/23  1113   WBC 17.72* 14.82*   HGB 10.2* 10.0*   HCT 34.0* 32.4*   * 436     CMP:   Recent Labs   Lab 01/25/23  1556 01/26/23  0452 01/27/23  0548    142 139   K 4.4 3.6 3.9    105 109   CO2 26 25 20*   * 102 86   BUN 40* 33* 23   CREATININE 1.3 1.0 1.0   CALCIUM 10.3 9.7 9.3   PROT 9.1* 7.3 7.0   ALBUMIN 2.4* 2.0* 2.0*   BILITOT 0.2 0.2 0.2   ALKPHOS 250* 202* 182*   AST 30 16 15   ALT 50* 36 25   ANIONGAP 14 12 10       Significant Imaging: I have reviewed all pertinent imaging results/findings within the past 24 hours.

## 2023-01-27 NOTE — HPI
Patient is a 61 yo male with a PMH of CVA with trach and G-tube, CKD, HTN, and HLD who initially presented with a dislodged PEG tube that has been replaced by surgery. During his admission, he was found with a leukocytosis of 23.17 and had blood cultures drawn that are now positive for enterococcus species. He also had a UA that was suspisious for UTI with 3+ leukocytes, many WBC, bacteria, and yeast seen.     ID was consulted to manage his bacteremia.

## 2023-01-27 NOTE — ASSESSMENT & PLAN NOTE
Patient is a 61 yo male with leukocytosis and bacteremia found on blood cultures. Afebrile. CRP and sed rate elevated. UA also suspicious for UTI. Urine cx show candida, likely due to his chronic rajput and does not warrant treatment.    Blood cultures positive for enterococcus Faecalis bacteremia as well a coagulase negative staph. Likely as a result of his dysfunctional PEG tube which has been replaced.     Recommend:  -Conitinuing vancomycin  -F/u speciation and sensitivities  -Echo to look for possible endocarditis  -If repeat surveillance cultures are positive, may need HARDEEP in addition to echo  -F/u urine cultures

## 2023-01-27 NOTE — PROGRESS NOTES
[facility] [address] [place] [address] Medicine  Progress Note    Patient Name: Hao Medrano  MRN: [id number]  Patient Class: IP- Inpatient   Admission Date: 1/25/2023  Length of Stay: 1 days  Attending Physician: Tommy Rhodes MD  Primary Care Provider: Keara Yanes NP        Subjective:     Principal Problem:Dislodged gastrostomy tube        HPI:  Hao Medrano is a 62 y.o. male with a past medical history of CVA with trach and G-tube, CKD, HTN, and HLD who is being palced in observation for further management of dislodged peg tube. Patient presented to the ED from [facility]. Patient is nonverbal, history obtained from chart and ED staff. Per the ED, the nursing home reports they noticed the dislodged ped tube around 0400 this am. Patient was recently admitted on 1/10 for same issue with a new peg placed on 1/12.    ED: vital signs stable, afebrile. Leukocytosis of 23K. Hb 11.4. PT/INR 10/9/1.0. Furhter labs pending at time of admission. CXR with inconclusive findings due to patient position. Blood cultures obtained. GI consulted and plan for EGD with G-tube placement tomorrow.       Overview/Hospital Course:  S/p 20 F PEG tube placed without any immediate complications.   External bumper at 3 cm shelby.  Positive blood Cx with gram positive Cocci  2D echo ordered  Cardiology consult for HARDEEP per ID recommendation        Interval History:   Alert, non verbal, with scattered decub ulcer and open wound, post dislodged and replacement of PEG, no spike of fever, with trach in place no worsening of SOB.    Review of Systems   Unable to perform ROS: Patient nonverbal   Constitutional:  Negative for activity change.   All other systems reviewed and are negative.  Objective:     Vital Signs (Most Recent):  Temp: 98.6 °F (37 °C) (01/27/23 1100)  Pulse: 89 (01/27/23 1310)  Resp: 14 (01/27/23 1310)  BP: 112/70 (01/27/23 1100)  SpO2: 97 % (01/27/23 1310) Vital Signs (24h Range):  Temp:  [97.2 °F  (36.2 °C)-99.5 °F (37.5 °C)] 98.6 °F (37 °C)  Pulse:  [] 89  Resp:  [14-20] 14  SpO2:  [96 %-100 %] 97 %  BP: (112-146)/(63-90) 112/70     Weight: 71.7 kg (158 lb)  Body mass index is 21.43 kg/m².    Intake/Output Summary (Last 24 hours) at 1/27/2023 1704  Last data filed at 1/27/2023 1540  Gross per 24 hour   Intake 480 ml   Output 500 ml   Net -20 ml      Physical Exam  Vitals and nursing note reviewed.   Constitutional:       Appearance: He is ill-appearing.   HENT:      Head: Normocephalic and atraumatic.      Comments: Trach in place     Nose: No congestion.      Mouth/Throat:      Pharynx: No oropharyngeal exudate.   Eyes:      Extraocular Movements: Extraocular movements intact.      Pupils: Pupils are equal, round, and reactive to light.   Cardiovascular:      Rate and Rhythm: Normal rate. Rhythm irregular.      Heart sounds: No murmur heard.    No friction rub. No gallop.   Pulmonary:      Effort: Pulmonary effort is normal. No respiratory distress.      Breath sounds: No rales.   Chest:      Chest wall: No tenderness.   Abdominal:      General: Bowel sounds are normal. There is no distension.      Palpations: Abdomen is soft.      Tenderness: There is no abdominal tenderness. There is no guarding or rebound.      Comments: PEG in place   Musculoskeletal:         General: Swelling, tenderness and deformity present.      Cervical back: No rigidity.      Right lower leg: Edema present.      Left lower leg: Edema present.   Skin:     General: Skin is dry.      Findings: Erythema, lesion and rash present.   Neurological:      Mental Status: He is alert.      Comments: B/L lower ext atrophy, bed bound, able to move left upper ext, don't follow command, eyes opened, non verbal       Significant Labs: All pertinent labs within the past 24 hours have been reviewed.  BMP:   Recent Labs   Lab 01/27/23  0548   GLU 86      K 3.9      CO2 20*   BUN 23   CREATININE 1.0   CALCIUM 9.3   MG 1.7     CBC:    Recent Labs   Lab 01/26/23  0452 01/27/23  1113   WBC 17.72* 14.82*   HGB 10.2* 10.0*   HCT 34.0* 32.4*   * 436     CMP:   Recent Labs   Lab 01/26/23  0452 01/27/23  0548    139   K 3.6 3.9    109   CO2 25 20*    86   BUN 33* 23   CREATININE 1.0 1.0   CALCIUM 9.7 9.3   PROT 7.3 7.0   ALBUMIN 2.0* 2.0*   BILITOT 0.2 0.2   ALKPHOS 202* 182*   AST 16 15   ALT 36 25   ANIONGAP 12 10     Recent Lab Results  (Last 5 results in the past 24 hours)        01/27/23  1113   01/27/23  1111   01/27/23  0852   01/27/23  0814   01/27/23  0548        Albumin         2.0       Alkaline Phosphatase         182       ALT         25       Anion Gap         10       Aniso Slight               AST         15       Baso # 0.04               Basophil % 0.3               BILIRUBIN TOTAL         0.2  Comment: For infants and newborns, interpretation of results should be based  on gestational age, weight and in agreement with clinical  observations.    Premature Infant recommended reference ranges:  Up to 24 hours.............<8.0 mg/dL  Up to 48 hours............<12.0 mg/dL  3-5 days..................<15.0 mg/dL  6-29 days.................<15.0 mg/dL         BUN         23       Calcium         9.3       Chloride         109       CO2         20       Creatinine         1.0       Differential Method Automated               eGFR         >60.0       Eos # 2.5               Eosinophil % 17.0               Glucose         86       Gran # (ANC) 10.2               Gran % 68.7               Hematocrit 32.4               Hemoglobin 10.0               Hypo Occasional               Immature Grans (Abs) 0.10  Comment: Mild elevation in immature granulocytes is non specific and   can be seen in a variety of conditions including stress response,   acute inflammation, trauma and pregnancy. Correlation with other   laboratory and clinical findings is essential.                 Immature Granulocytes 0.7               Lymph #  1.2               Lymph % 8.2               Magnesium         1.7       MCH 27.5               MCHC 30.9               MCV 89               Mono # 0.8               Mono % 5.1               MPV 9.9               nRBC 0               Phosphorus         4.1       Platelet Estimate Appears normal               Platelets 436               POCT Glucose   111     101         Potassium         3.9       PROTEIN TOTAL         7.0       RBC 3.64               RDW 16.5               Sodium         139       Vancomycin-Trough     24.8           WBC 14.82                                      Significant Imaging: I have reviewed all pertinent imaging results/findings within the past 24 hours.      Assessment/Plan:      * Dislodged gastrostomy tube  Oropharyngeal dysphagia    - s/p 20 F PEG tube placed without any immediate complications.   External bumper at 3 cm shelby.-  -resume feeding tube with Isosource 1.5 nathaniel and dietician consult    Bacteremia  With positive blood Cx gram positive cocci  DC rocephin, and metronidazole  Cont vanco  ID consultedappreciated  Will get 2D echo and cardiology consult for possible HARDEEP        Multiple wounds of skin  With scattered lesions with dry skin  Encourage moisturize skin, skin care  Avoid skin debridement, changing position q 2Hrs  Cont Antibiotic ointment at the PEG site      On antiepileptic therapy  - continue keppra 750 mg BID once peg tube is replaced      HTN (hypertension)  - BP well controlled upon admission  - continue coreg 3.125 mg BID once peg tube is replaced      Leukocytosis  - VSSAF  - leukocytosis of 23K upon admission  - g-tube site is clean without discharge or erythma  - patient on prednisone 10 mg daily, unclear why per chart review - could be contributing to significant leukocytosis  - CXR pending  - UA pending  - blood cultures obtained  - procal ordered  - will start empirical ceftriaxone and flagyl in the meantime  - follow up UA, CXR, and blood cultures    Type 2  diabetes mellitus, with long-term current use of insulin  Patient's FSGs are controlled on current medication regimen.  Last A1c reviewed-   Lab Results   Component Value Date    HGBA1C 5.9 (H) 07/12/2018     Most recent fingerstick glucose reviewed- No results for input(s): POCTGLUCOSE in the last 24 hours.  Current correctional scale  Low  Maintain anti-hyperglycemic dose as follows-   Antihyperglycemics (From admission, onward)    Start     Stop Route Frequency Ordered    01/25/23 1511  insulin aspart U-100 pen 0-5 Units         -- SubQ Before meals & nightly PRN 01/25/23 1411        Hold Oral hypoglycemics while patient is in the hospital.  - patient is on insulin glargine 15U daily at the nursing home  - resume when tube feedings are resumed    Anemia  - patient's anemia is currently controlled  - etiology likely due to anemia of chronic disease  - current CBC reviewed -   Lab Results   Component Value Date    HGB 10.2 (L) 01/26/2023    HCT 34.0 (L) 01/26/2023     - monitor serial CBC and transfuse if patient becomes hemodynamically unstable, symptomatic, or H/H drops below 7/21.         Chronic kidney disease, stage III (moderate)  - baseline ~1.3  - cont monitor      VTE Risk Mitigation (From admission, onward)         Ordered     Place sequential compression device  Until discontinued         01/25/23 1411     IP VTE HIGH RISK PATIENT  Once         01/25/23 1411     Reason for No Pharmacological VTE Prophylaxis  Once        Question:  Reasons:  Answer:  Risk of Bleeding  Comment:  pre-op    01/25/23 1411                Discharge Planning   FAINA: 1/30/2023     Code Status: Full Code   Is the patient medically ready for discharge?: No    Reason for patient still in hospital (select all that apply): Patient trending condition, Treatment and Consult recommendations  Discharge Plan A: Return to nursing home            Time spent >30 minutes      Tommy Rhodes MD  Department of Hospital Medicine   [facility]

## 2023-01-27 NOTE — ASSESSMENT & PLAN NOTE
Patient is a 61 yo male with leukocytosis and bacteremia found on blood cultures. Afebrile. CRP and sed rate elevated. UA also suspicious for UTI with yeast.     Blood cultures positive for enterococcus bacteremia.    Recommend:  -Conitinuing vancomycin  -Discontinuing ceftriaxone and flagyl  -F/u speciation and sensitivities  -Echo to look for possible endocarditis  -Repeat surveillance cultures, if positive, may need HARDEEP in addition to echo

## 2023-01-27 NOTE — ASSESSMENT & PLAN NOTE
With positive blood Cx gram positive cocci  DC rocephin, and metronidazole  Cont vanco  ID consultedappreciated  Will get 2D echo and cardiology consult for possible HARDEEP

## 2023-01-27 NOTE — SUBJECTIVE & OBJECTIVE
History reviewed. No pertinent past medical history.    Past Surgical History:   Procedure Laterality Date    ESOPHAGOGASTRODUODENOSCOPY N/A 1/11/2023    Procedure: EGD (ESOPHAGOGASTRODUODENOSCOPY);  Surgeon: Francisco Javier Holt MD;  Location: [address]);  Service: Endoscopy;  Laterality: N/A;    ESOPHAGOGASTRODUODENOSCOPY N/A 1/12/2023    Procedure: EGD (ESOPHAGOGASTRODUODENOSCOPY);  Surgeon: Francisco Javier Holt MD;  Location: [address]);  Service: Endoscopy;  Laterality: N/A;    ESOPHAGOGASTRODUODENOSCOPY N/A 1/26/2023    Procedure: EGD (ESOPHAGOGASTRODUODENOSCOPY);  Surgeon: Manju Whitman MD;  Location: [address]);  Service: Endoscopy;  Laterality: N/A;       Review of patient's allergies indicates:  No Known Allergies    Medications:  Medications Prior to Admission   Medication Sig    arginine-glutamine-calcium HMB (SHEREE) 7-7-1.5 gram PwPk 1 packet by Per G Tube route 2 (two) times daily.    aspirin 81 MG Chew 81 mg by Per G Tube route once daily.    baclofen (LIORESAL) 5 mg Tab tablet 15 mg by Per G Tube route 3 (three) times daily.    calcium carbonate (OS-OLGA) 500 mg calcium (1,250 mg) tablet 1,250 mg by Per G Tube route once daily.    carvediloL (COREG) 3.125 MG tablet 3.125 mg by Per G Tube route 2 (two) times daily.    esomeprazole (NEXIUM) 40 mg GrPS 40 mg by Per G Tube route once daily.    insulin glargine-yfgn 100 unit/mL (3 mL) InPn Inject 15 Units into the skin once daily.    Lactobacillus rhamnosus GG (CULTURELLE) 10 billion cell capsule 2 capsules by Per G Tube route 2 (two) times a day.    levETIRAcetam (KEPPRA) 100 mg/mL Soln 750 mg by Per G Tube route 2 (two) times daily.    menthol/zinc oxide (CALMOSEPTINE TOP) Apply to the affected area twice daily as needed    nystatin (MYCOSTATIN) powder Apply to the affected area daily    predniSONE (DELTASONE) 10 MG tablet 10 mg by Per G Tube route once daily.     Antibiotics (From admission, onward)      Start     Stop Route  Frequency Ordered    01/28/23 1300  vancomycin (VANCOCIN) 1,000 mg in dextrose 5 % (D5W) 250 mL IVPB (Vial-Mate)         -- IV Every 24 hours (non-standard times) 01/27/23 1312    01/25/23 1801  vancomycin - pharmacy to dose  (vancomycin IVPB)        See Marv for full Linked Orders Report.    -- IV pharmacy to manage frequency 01/25/23 1702          Antifungals (From admission, onward)      Start     Stop Route Frequency Ordered    01/26/23 2100  nystatin powder         -- Top 2 times daily 01/26/23 1658          Antivirals (From admission, onward)      None             Immunization History   Administered Date(s) Administered    COVID-19, MRNA, LN-S, PF (Pfizer) (Purple Cap) 06/25/2021       Family History    None       Social History     Socioeconomic History    Marital status: Significant Other   Tobacco Use    Smoking status: Never     Review of Systems   Unable to perform ROS: Patient nonverbal   Objective:     Vital Signs (Most Recent):  Temp: 98.6 °F (37 °C) (01/27/23 1100)  Pulse: 89 (01/27/23 1310)  Resp: 14 (01/27/23 1310)  BP: 112/70 (01/27/23 1100)  SpO2: 97 % (01/27/23 1310) Vital Signs (24h Range):  Temp:  [97.2 °F (36.2 °C)-99.5 °F (37.5 °C)] 98.6 °F (37 °C)  Pulse:  [] 89  Resp:  [14-20] 14  SpO2:  [96 %-100 %] 97 %  BP: (112-146)/(63-90) 112/70     Weight: 71.7 kg (158 lb)  Body mass index is 21.43 kg/m².    Estimated Creatinine Clearance: 76.7 mL/min (based on SCr of 1 mg/dL).    Physical Exam  Constitutional:       General: He is not in acute distress.     Appearance: He is ill-appearing.      Comments: Cachectic and chronically ill appearing.    HENT:      Head: Normocephalic and atraumatic.      Mouth/Throat:      Mouth: Mucous membranes are moist.   Eyes:      Pupils: Pupils are equal, round, and reactive to light.      Comments: Tracks with eyes   Cardiovascular:      Rate and Rhythm: Normal rate and regular rhythm.      Pulses: Normal pulses.      Heart sounds: Normal heart sounds.  No murmur heard.  Abdominal:      General: Abdomen is flat. There is no distension.      Palpations: Abdomen is soft.      Comments: Breath sounds difficult to appreciate with trach   Musculoskeletal:      Right lower leg: No edema.      Left lower leg: No edema.   Skin:     General: Skin is warm and dry.      Capillary Refill: Capillary refill takes less than 2 seconds.      Findings: Bruising and lesion present.   Neurological:      Mental Status: Mental status is at baseline.      Comments: Will blink and nod when asked questions       Significant Labs: CBC:   Recent Labs   Lab 01/26/23  0452 01/27/23  1113   WBC 17.72* 14.82*   HGB 10.2* 10.0*   HCT 34.0* 32.4*   * 436     CMP:   Recent Labs   Lab 01/25/23  1556 01/26/23  0452 01/27/23  0548    142 139   K 4.4 3.6 3.9    105 109   CO2 26 25 20*   * 102 86   BUN 40* 33* 23   CREATININE 1.3 1.0 1.0   CALCIUM 10.3 9.7 9.3   PROT 9.1* 7.3 7.0   ALBUMIN 2.4* 2.0* 2.0*   BILITOT 0.2 0.2 0.2   ALKPHOS 250* 202* 182*   AST 30 16 15   ALT 50* 36 25   ANIONGAP 14 12 10       Significant Imaging: I have reviewed all pertinent imaging results/findings within the past 24 hours.

## 2023-01-27 NOTE — SUBJECTIVE & OBJECTIVE
Interval History:   Alert, non verbal, with scattered decub ulcer and open wound, post dislodged and replacement of PEG, no spike of fever, with trach in place no worsening of SOB.    Review of Systems   Unable to perform ROS: Patient nonverbal   Constitutional:  Negative for activity change.   All other systems reviewed and are negative.  Objective:     Vital Signs (Most Recent):  Temp: 98.6 °F (37 °C) (01/27/23 1100)  Pulse: 89 (01/27/23 1310)  Resp: 14 (01/27/23 1310)  BP: 112/70 (01/27/23 1100)  SpO2: 97 % (01/27/23 1310) Vital Signs (24h Range):  Temp:  [97.2 °F (36.2 °C)-99.5 °F (37.5 °C)] 98.6 °F (37 °C)  Pulse:  [] 89  Resp:  [14-20] 14  SpO2:  [96 %-100 %] 97 %  BP: (112-146)/(63-90) 112/70     Weight: 71.7 kg (158 lb)  Body mass index is 21.43 kg/m².    Intake/Output Summary (Last 24 hours) at 1/27/2023 1704  Last data filed at 1/27/2023 1540  Gross per 24 hour   Intake 480 ml   Output 500 ml   Net -20 ml      Physical Exam  Vitals and nursing note reviewed.   Constitutional:       Appearance: He is ill-appearing.   HENT:      Head: Normocephalic and atraumatic.      Comments: Trach in place     Nose: No congestion.      Mouth/Throat:      Pharynx: No oropharyngeal exudate.   Eyes:      Extraocular Movements: Extraocular movements intact.      Pupils: Pupils are equal, round, and reactive to light.   Cardiovascular:      Rate and Rhythm: Normal rate. Rhythm irregular.      Heart sounds: No murmur heard.    No friction rub. No gallop.   Pulmonary:      Effort: Pulmonary effort is normal. No respiratory distress.      Breath sounds: No rales.   Chest:      Chest wall: No tenderness.   Abdominal:      General: Bowel sounds are normal. There is no distension.      Palpations: Abdomen is soft.      Tenderness: There is no abdominal tenderness. There is no guarding or rebound.      Comments: PEG in place   Musculoskeletal:         General: Swelling, tenderness and deformity present.      Cervical back: No  rigidity.      Right lower leg: Edema present.      Left lower leg: Edema present.   Skin:     General: Skin is dry.      Findings: Erythema, lesion and rash present.   Neurological:      Mental Status: He is alert.      Comments: B/L lower ext atrophy, bed bound, able to move left upper ext, don't follow command, eyes opened, non verbal       Significant Labs: All pertinent labs within the past 24 hours have been reviewed.  BMP:   Recent Labs   Lab 01/27/23  0548   GLU 86      K 3.9      CO2 20*   BUN 23   CREATININE 1.0   CALCIUM 9.3   MG 1.7     CBC:   Recent Labs   Lab 01/26/23  0452 01/27/23  1113   WBC 17.72* 14.82*   HGB 10.2* 10.0*   HCT 34.0* 32.4*   * 436     CMP:   Recent Labs   Lab 01/26/23  0452 01/27/23  0548    139   K 3.6 3.9    109   CO2 25 20*    86   BUN 33* 23   CREATININE 1.0 1.0   CALCIUM 9.7 9.3   PROT 7.3 7.0   ALBUMIN 2.0* 2.0*   BILITOT 0.2 0.2   ALKPHOS 202* 182*   AST 16 15   ALT 36 25   ANIONGAP 12 10     Recent Lab Results  (Last 5 results in the past 24 hours)        01/27/23  1113   01/27/23  1111   01/27/23  0852   01/27/23  0814   01/27/23  0548        Albumin         2.0       Alkaline Phosphatase         182       ALT         25       Anion Gap         10       Aniso Slight               AST         15       Baso # 0.04               Basophil % 0.3               BILIRUBIN TOTAL         0.2  Comment: For infants and newborns, interpretation of results should be based  on gestational age, weight and in agreement with clinical  observations.    Premature Infant recommended reference ranges:  Up to 24 hours.............<8.0 mg/dL  Up to 48 hours............<12.0 mg/dL  3-5 days..................<15.0 mg/dL  6-29 days.................<15.0 mg/dL         BUN         23       Calcium         9.3       Chloride         109       CO2         20       Creatinine         1.0       Differential Method Automated               eGFR         >60.0       Eos #  2.5               Eosinophil % 17.0               Glucose         86       Gran # (ANC) 10.2               Gran % 68.7               Hematocrit 32.4               Hemoglobin 10.0               Hypo Occasional               Immature Grans (Abs) 0.10  Comment: Mild elevation in immature granulocytes is non specific and   can be seen in a variety of conditions including stress response,   acute inflammation, trauma and pregnancy. Correlation with other   laboratory and clinical findings is essential.                 Immature Granulocytes 0.7               Lymph # 1.2               Lymph % 8.2               Magnesium         1.7       MCH 27.5               MCHC 30.9               MCV 89               Mono # 0.8               Mono % 5.1               MPV 9.9               nRBC 0               Phosphorus         4.1       Platelet Estimate Appears normal               Platelets 436               POCT Glucose   111     101         Potassium         3.9       PROTEIN TOTAL         7.0       RBC 3.64               RDW 16.5               Sodium         139       Vancomycin-Trough     24.8           WBC 14.82                                      Significant Imaging: I have reviewed all pertinent imaging results/findings within the past 24 hours.

## 2023-01-28 LAB
POCT GLUCOSE: 162 MG/DL (ref 70–110)
POCT GLUCOSE: 179 MG/DL (ref 70–110)
POCT GLUCOSE: 185 MG/DL (ref 70–110)
POCT GLUCOSE: 200 MG/DL (ref 70–110)

## 2023-01-28 PROCEDURE — 99232 SBSQ HOSP IP/OBS MODERATE 35: CPT | Mod: ,,, | Performed by: FAMILY MEDICINE

## 2023-01-28 PROCEDURE — 11000001 HC ACUTE MED/SURG PRIVATE ROOM

## 2023-01-28 PROCEDURE — 94761 N-INVAS EAR/PLS OXIMETRY MLT: CPT

## 2023-01-28 PROCEDURE — 99900026 HC AIRWAY MAINTENANCE (STAT)

## 2023-01-28 PROCEDURE — 99232 PR SUBSEQUENT HOSPITAL CARE,LEVL II: ICD-10-PCS | Mod: ,,, | Performed by: FAMILY MEDICINE

## 2023-01-28 PROCEDURE — 99233 SBSQ HOSP IP/OBS HIGH 50: CPT | Mod: GC,,, | Performed by: INTERNAL MEDICINE

## 2023-01-28 PROCEDURE — 99233 PR SUBSEQUENT HOSPITAL CARE,LEVL III: ICD-10-PCS | Mod: GC,,, | Performed by: INTERNAL MEDICINE

## 2023-01-28 PROCEDURE — 99900035 HC TECH TIME PER 15 MIN (STAT)

## 2023-01-28 PROCEDURE — 27000221 HC OXYGEN, UP TO 24 HOURS

## 2023-01-28 PROCEDURE — 25000003 PHARM REV CODE 250: Performed by: FAMILY MEDICINE

## 2023-01-28 PROCEDURE — 27200966 HC CLOSED SUCTION SYSTEM

## 2023-01-28 PROCEDURE — 63600175 PHARM REV CODE 636 W HCPCS: Performed by: FAMILY MEDICINE

## 2023-01-28 RX ADMIN — CALCIUM CARBONATE (ANTACID) CHEW TAB 500 MG 500 MG: 500 CHEW TAB at 09:01

## 2023-01-28 RX ADMIN — CARVEDILOL 3.12 MG: 3.12 TABLET, FILM COATED ORAL at 09:01

## 2023-01-28 RX ADMIN — BACLOFEN 15 MG: 5 TABLET ORAL at 09:01

## 2023-01-28 RX ADMIN — BACLOFEN 15 MG: 5 TABLET ORAL at 04:01

## 2023-01-28 RX ADMIN — LEVETIRACETAM 750 MG: 100 SOLUTION ORAL at 09:01

## 2023-01-28 RX ADMIN — NYSTATIN: 100000 POWDER TOPICAL at 09:01

## 2023-01-28 RX ADMIN — ESOMEPRAZOLE MAGNESIUM 40 MG: 10 GRANULE, FOR SUSPENSION, EXTENDED RELEASE ORAL at 09:01

## 2023-01-28 RX ADMIN — Medication: at 09:01

## 2023-01-28 RX ADMIN — VANCOMYCIN HYDROCHLORIDE 1000 MG: 1 INJECTION, POWDER, LYOPHILIZED, FOR SOLUTION INTRAVENOUS at 01:01

## 2023-01-28 RX ADMIN — ASPIRIN 81 MG CHEWABLE TABLET 81 MG: 81 TABLET CHEWABLE at 09:01

## 2023-01-28 RX ADMIN — PREDNISONE 10 MG: 10 TABLET ORAL at 09:01

## 2023-01-28 NOTE — SUBJECTIVE & OBJECTIVE
Interval History:   Non verbal, unable to assess for acute concern. No acute events overnight, no spike of fever, repeat blood cx no growth to date.    Review of Systems   Unable to perform ROS: Patient nonverbal   Objective:     Vital Signs (Most Recent):  Temp: 98.1 °F (36.7 °C) (01/28/23 1548)  Pulse: 84 (01/28/23 1548)  Resp: 18 (01/28/23 1548)  BP: 124/65 (01/28/23 1548)  SpO2: 95 % (01/28/23 1548) Vital Signs (24h Range):  Temp:  [97.5 °F (36.4 °C)-98.7 °F (37.1 °C)] 98.1 °F (36.7 °C)  Pulse:  [80-98] 84  Resp:  [16-18] 18  SpO2:  [95 %-100 %] 95 %  BP: (110-132)/(58-73) 124/65     Weight: 71.7 kg (158 lb)  Body mass index is 21.43 kg/m².    Intake/Output Summary (Last 24 hours) at 1/28/2023 1740  Last data filed at 1/28/2023 0800  Gross per 24 hour   Intake 1027 ml   Output --   Net 1027 ml      Physical Exam  Vitals and nursing note reviewed.   Constitutional:       General: He is not in acute distress.     Appearance: He is not toxic-appearing.   HENT:      Head: Normocephalic and atraumatic.      Mouth/Throat:      Pharynx: No oropharyngeal exudate.   Eyes:      Extraocular Movements: Extraocular movements intact.      Pupils: Pupils are equal, round, and reactive to light.   Cardiovascular:      Rate and Rhythm: Normal rate. Rhythm irregular.      Heart sounds: Murmur heard.     No friction rub. No gallop.   Pulmonary:      Effort: Pulmonary effort is normal. No respiratory distress.      Breath sounds: No wheezing or rhonchi.      Comments: With trach in place  Abdominal:      General: Bowel sounds are normal. There is no distension.      Tenderness: There is no abdominal tenderness. There is no guarding or rebound.      Comments: PEG in place with minimal secretion around   Musculoskeletal:         General: Swelling and deformity present.      Cervical back: No rigidity or tenderness.      Right lower leg: Edema present.      Left lower leg: Edema present.   Skin:     General: Skin is dry.       Findings: Erythema, lesion and rash present.      Comments: Scattered open wound and sacral decub ulcer.   Neurological:      Mental Status: He is alert.      Motor: Weakness present.      Gait: Gait abnormal.      Comments: Aphasic, with B/L lower extremities deformation and atrophic       Significant Labs: All pertinent labs within the past 24 hours have been reviewed.  BMP:   Recent Labs   Lab 01/27/23  0548   GLU 86      K 3.9      CO2 20*   BUN 23   CREATININE 1.0   CALCIUM 9.3   MG 1.7     CBC:   Recent Labs   Lab 01/27/23  1113   WBC 14.82*   HGB 10.0*   HCT 32.4*        CMP:   Recent Labs   Lab 01/27/23  0548      K 3.9      CO2 20*   GLU 86   BUN 23   CREATININE 1.0   CALCIUM 9.3   PROT 7.0   ALBUMIN 2.0*   BILITOT 0.2   ALKPHOS 182*   AST 15   ALT 25   ANIONGAP 10     Recent Lab Results  (Last 5 results in the past 24 hours)        01/28/23  1549   01/28/23  1154   01/28/23  0748   01/27/23  2037   01/27/23  1812        Blood Culture, Routine         No Growth to date  [P]       POCT Glucose 200   179   185   142                                 [P] - Preliminary Result               Significant Imaging: I have reviewed all pertinent imaging results/findings within the past 24 hours.

## 2023-01-28 NOTE — SUBJECTIVE & OBJECTIVE
History reviewed. No pertinent past medical history.    Past Surgical History:   Procedure Laterality Date    ESOPHAGOGASTRODUODENOSCOPY N/A 1/11/2023    Procedure: EGD (ESOPHAGOGASTRODUODENOSCOPY);  Surgeon: Francisco Javier Holt MD;  Location: [address]);  Service: Endoscopy;  Laterality: N/A;    ESOPHAGOGASTRODUODENOSCOPY N/A 1/12/2023    Procedure: EGD (ESOPHAGOGASTRODUODENOSCOPY);  Surgeon: Francisco Javier Holt MD;  Location: [address]);  Service: Endoscopy;  Laterality: N/A;    ESOPHAGOGASTRODUODENOSCOPY N/A 1/26/2023    Procedure: EGD (ESOPHAGOGASTRODUODENOSCOPY);  Surgeon: Manju Whitman MD;  Location: [address]);  Service: Endoscopy;  Laterality: N/A;       Review of patient's allergies indicates:  No Known Allergies    Medications:  Medications Prior to Admission   Medication Sig    arginine-glutamine-calcium HMB (SHEREE) 7-7-1.5 gram PwPk 1 packet by Per G Tube route 2 (two) times daily.    aspirin 81 MG Chew 81 mg by Per G Tube route once daily.    baclofen (LIORESAL) 5 mg Tab tablet 15 mg by Per G Tube route 3 (three) times daily.    calcium carbonate (OS-OLGA) 500 mg calcium (1,250 mg) tablet 1,250 mg by Per G Tube route once daily.    carvediloL (COREG) 3.125 MG tablet 3.125 mg by Per G Tube route 2 (two) times daily.    esomeprazole (NEXIUM) 40 mg GrPS 40 mg by Per G Tube route once daily.    insulin glargine-yfgn 100 unit/mL (3 mL) InPn Inject 15 Units into the skin once daily.    Lactobacillus rhamnosus GG (CULTURELLE) 10 billion cell capsule 2 capsules by Per G Tube route 2 (two) times a day.    levETIRAcetam (KEPPRA) 100 mg/mL Soln 750 mg by Per G Tube route 2 (two) times daily.    menthol/zinc oxide (CALMOSEPTINE TOP) Apply to the affected area twice daily as needed    nystatin (MYCOSTATIN) powder Apply to the affected area daily    predniSONE (DELTASONE) 10 MG tablet 10 mg by Per G Tube route once daily.     Antibiotics (From admission, onward)      Start     Stop Route  Frequency Ordered    01/28/23 1300  vancomycin (VANCOCIN) 1,000 mg in dextrose 5 % (D5W) 250 mL IVPB (Vial-Mate)         -- IV Every 24 hours (non-standard times) 01/27/23 1312    01/25/23 1801  vancomycin - pharmacy to dose  (vancomycin IVPB)        See Marv for full Linked Orders Report.    -- IV pharmacy to manage frequency 01/25/23 1702          Antifungals (From admission, onward)      Start     Stop Route Frequency Ordered    01/26/23 2100  nystatin powder         -- Top 2 times daily 01/26/23 1658          Antivirals (From admission, onward)      None             Immunization History   Administered Date(s) Administered    COVID-19, MRNA, LN-S, PF (Pfizer) (Purple Cap) 06/25/2021       Family History    None       Social History     Socioeconomic History    Marital status: Significant Other   Tobacco Use    Smoking status: Never     Review of Systems   Unable to perform ROS: Patient nonverbal   Objective:     Vital Signs (Most Recent):  Temp: 97.9 °F (36.6 °C) (01/28/23 1153)  Pulse: 80 (01/28/23 1153)  Resp: 18 (01/28/23 1153)  BP: (!) 110/58 (01/28/23 1153)  SpO2: 95 % (01/28/23 1153)   Vital Signs (24h Range):  Temp:  [97.5 °F (36.4 °C)-98.7 °F (37.1 °C)] 97.9 °F (36.6 °C)  Pulse:  [] 80  Resp:  [16-18] 18  SpO2:  [95 %-100 %] 95 %  BP: (110-132)/(58-73) 110/58     Weight: 71.7 kg (158 lb)  Body mass index is 21.43 kg/m².    Estimated Creatinine Clearance: 76.7 mL/min (based on SCr of 1 mg/dL).    Physical Exam  Constitutional:       General: He is not in acute distress.     Appearance: He is ill-appearing.      Comments: Cachectic and chronically ill appearing.    HENT:      Head: Normocephalic and atraumatic.      Mouth/Throat:      Mouth: Mucous membranes are moist.   Eyes:      Pupils: Pupils are equal, round, and reactive to light.      Comments: Tracks with eyes   Cardiovascular:      Rate and Rhythm: Normal rate and regular rhythm.      Pulses: Normal pulses.      Heart sounds: Normal  heart sounds. No murmur heard.  Abdominal:      General: Abdomen is flat. There is no distension.      Palpations: Abdomen is soft.      Comments: Breath sounds difficult to appreciate with trach   Musculoskeletal:      Right lower leg: No edema.      Left lower leg: No edema.   Skin:     General: Skin is warm and dry.      Capillary Refill: Capillary refill takes less than 2 seconds.      Findings: Bruising and lesion present.   Neurological:      Mental Status: Mental status is at baseline.      Comments: Will blink and nod when asked questions       Significant Labs: CBC:   Recent Labs   Lab 01/27/23  1113   WBC 14.82*   HGB 10.0*   HCT 32.4*        CMP:   Recent Labs   Lab 01/27/23  0548      K 3.9      CO2 20*   GLU 86   BUN 23   CREATININE 1.0   CALCIUM 9.3   PROT 7.0   ALBUMIN 2.0*   BILITOT 0.2   ALKPHOS 182*   AST 15   ALT 25   ANIONGAP 10       Significant Imaging: I have reviewed all pertinent imaging results/findings within the past 24 hours.

## 2023-01-28 NOTE — PROGRESS NOTES
[facility] [address] [place] [address] Medicine  Progress Note    Patient Name: Hao Medrano  MRN: [id number]  Patient Class: IP- Inpatient   Admission Date: 1/25/2023  Length of Stay: 2 days  Attending Physician: Tommy Rhodes MD  Primary Care Provider: Keara Yanes NP        Subjective:     Principal Problem:Dislodged gastrostomy tube        HPI:  Hao Medrano is a 62 y.o. male with a past medical history of CVA with trach and G-tube, CKD, HTN, and HLD who is being palced in observation for further management of dislodged peg tube. Patient presented to the ED from [facility]. Patient is nonverbal, history obtained from chart and ED staff. Per the ED, the nursing home reports they noticed the dislodged ped tube around 0400 this am. Patient was recently admitted on 1/10 for same issue with a new peg placed on 1/12.    ED: vital signs stable, afebrile. Leukocytosis of 23K. Hb 11.4. PT/INR 10/9/1.0. Furhter labs pending at time of admission. CXR with inconclusive findings due to patient position. Blood cultures obtained. GI consulted and plan for EGD with G-tube placement tomorrow.       Overview/Hospital Course:  S/p 20 F PEG tube placed without any immediate complications.   External bumper at 3 cm shelby.  Positive blood Cx with gram positive Cocci  2D echo ordered  Cardiology consult for HARDEEP per ID recommendation        Interval History:   Non verbal, unable to assess for acute concern. No acute events overnight, no spike of fever, repeat blood cx no growth to date.    Review of Systems   Unable to perform ROS: Patient nonverbal   Objective:     Vital Signs (Most Recent):  Temp: 98.1 °F (36.7 °C) (01/28/23 1548)  Pulse: 84 (01/28/23 1548)  Resp: 18 (01/28/23 1548)  BP: 124/65 (01/28/23 1548)  SpO2: 95 % (01/28/23 1548) Vital Signs (24h Range):  Temp:  [97.5 °F (36.4 °C)-98.7 °F (37.1 °C)] 98.1 °F (36.7 °C)  Pulse:  [80-98] 84  Resp:  [16-18] 18  SpO2:  [95 %-100 %] 95 %  BP:  (110-132)/(58-73) 124/65     Weight: 71.7 kg (158 lb)  Body mass index is 21.43 kg/m².    Intake/Output Summary (Last 24 hours) at 1/28/2023 1740  Last data filed at 1/28/2023 0800  Gross per 24 hour   Intake 1027 ml   Output --   Net 1027 ml      Physical Exam  Vitals and nursing note reviewed.   Constitutional:       General: He is not in acute distress.     Appearance: He is not toxic-appearing.   HENT:      Head: Normocephalic and atraumatic.      Mouth/Throat:      Pharynx: No oropharyngeal exudate.   Eyes:      Extraocular Movements: Extraocular movements intact.      Pupils: Pupils are equal, round, and reactive to light.   Cardiovascular:      Rate and Rhythm: Normal rate. Rhythm irregular.      Heart sounds: Murmur heard.     No friction rub. No gallop.   Pulmonary:      Effort: Pulmonary effort is normal. No respiratory distress.      Breath sounds: No wheezing or rhonchi.      Comments: With trach in place  Abdominal:      General: Bowel sounds are normal. There is no distension.      Tenderness: There is no abdominal tenderness. There is no guarding or rebound.      Comments: PEG in place with minimal secretion around   Musculoskeletal:         General: Swelling and deformity present.      Cervical back: No rigidity or tenderness.      Right lower leg: Edema present.      Left lower leg: Edema present.   Skin:     General: Skin is dry.      Findings: Erythema, lesion and rash present.      Comments: Scattered open wound and sacral decub ulcer.   Neurological:      Mental Status: He is alert.      Motor: Weakness present.      Gait: Gait abnormal.      Comments: Aphasic, with B/L lower extremities deformation and atrophic       Significant Labs: All pertinent labs within the past 24 hours have been reviewed.  BMP:   Recent Labs   Lab 01/27/23  0548   GLU 86      K 3.9      CO2 20*   BUN 23   CREATININE 1.0   CALCIUM 9.3   MG 1.7     CBC:   Recent Labs   Lab 01/27/23  1113   WBC 14.82*   HGB  10.0*   HCT 32.4*        CMP:   Recent Labs   Lab 01/27/23  0548      K 3.9      CO2 20*   GLU 86   BUN 23   CREATININE 1.0   CALCIUM 9.3   PROT 7.0   ALBUMIN 2.0*   BILITOT 0.2   ALKPHOS 182*   AST 15   ALT 25   ANIONGAP 10     Recent Lab Results  (Last 5 results in the past 24 hours)        01/28/23  1549   01/28/23  1154   01/28/23  0748   01/27/23  2037   01/27/23  1812        Blood Culture, Routine         No Growth to date  [P]       POCT Glucose 200   179   185   142                                 [P] - Preliminary Result               Significant Imaging: I have reviewed all pertinent imaging results/findings within the past 24 hours.      Assessment/Plan:      * Dislodged gastrostomy tube  Oropharyngeal dysphagia    - s/p 20 F PEG tube placed without any immediate complications.   External bumper at 3 cm shelby.-  -resume feeding tube with Isosource 1.5 nathaniel and dietician consult    Bacteremia  With positive blood Cx gram positive cocci  DC rocephin, and metronidazole  Cont vanco  ID consultedappreciated  Will get 2D echo and cardiology consult for possible HARDEEP        Multiple wounds of skin  With scattered lesions with dry skin  Encourage moisturize skin, skin care  Avoid skin debridement, changing position q 2Hrs  Cont Antibiotic ointment at the PEG site      On antiepileptic therapy  - continue keppra 750 mg BID once peg tube is replaced      HTN (hypertension)  - BP well controlled upon admission  - continue coreg 3.125 mg BID once peg tube is replaced      Leukocytosis  - VSSAF  - leukocytosis of 23K upon admission  - g-tube site is clean without discharge or erythma  - patient on prednisone 10 mg daily, unclear why per chart review - could be contributing to significant leukocytosis  - CXR pending  - UA pending  - blood cultures obtained  - procal ordered  - will start empirical ceftriaxone and flagyl in the meantime  - follow up UA, CXR, and blood cultures    Type 2 diabetes  mellitus, with long-term current use of insulin  Patient's FSGs are controlled on current medication regimen.  Last A1c reviewed-   Lab Results   Component Value Date    HGBA1C 5.9 (H) 07/12/2018     Most recent fingerstick glucose reviewed- No results for input(s): POCTGLUCOSE in the last 24 hours.  Current correctional scale  Low  Maintain anti-hyperglycemic dose as follows-   Antihyperglycemics (From admission, onward)    Start     Stop Route Frequency Ordered    01/25/23 1511  insulin aspart U-100 pen 0-5 Units         -- SubQ Before meals & nightly PRN 01/25/23 1411        Hold Oral hypoglycemics while patient is in the hospital.  - patient is on insulin glargine 15U daily at the nursing home  - resume when tube feedings are resumed    Anemia  - patient's anemia is currently controlled  - etiology likely due to anemia of chronic disease  - current CBC reviewed -   Lab Results   Component Value Date    HGB 10.2 (L) 01/26/2023    HCT 34.0 (L) 01/26/2023     - monitor serial CBC and transfuse if patient becomes hemodynamically unstable, symptomatic, or H/H drops below 7/21.         Chronic kidney disease, stage III (moderate)  - baseline ~1.3  - cont monitor      VTE Risk Mitigation (From admission, onward)         Ordered     Place sequential compression device  Until discontinued         01/25/23 1411     IP VTE HIGH RISK PATIENT  Once         01/25/23 1411     Reason for No Pharmacological VTE Prophylaxis  Once        Question:  Reasons:  Answer:  Risk of Bleeding  Comment:  pre-op    01/25/23 1411                Discharge Planning   FAINA: 1/30/2023     Code Status: Full Code   Is the patient medically ready for discharge?: No    Reason for patient still in hospital (select all that apply): Patient new problem, Patient trending condition, Treatment and Pending disposition  Discharge Plan A: Return to nursing home          Time spent >30 minutes        Tommy Rhodes MD  Department of Hospital Medicine   [facility]

## 2023-01-28 NOTE — PROGRESS NOTES
[facility] - Observation 11H  Infectious Disease  Progress Note    Patient Name: Hao Medrano  MRN: [id number]  Admission Date: 1/25/2023  Length of Stay: 2 days  Attending Physician: Tommy Rhodes MD  Primary Care Provider: Keara Yanes NP    Isolation Status: No active isolations  Assessment/Plan:      Bacteremia  Patient is a 61 yo male with leukocytosis and bacteremia found on blood cultures. Afebrile. CRP and sed rate elevated. UA also suspicious for UTI. Urine cx show candida, likely due to his chronic rajput and does not warrant treatment.    Blood cultures positive for enterococcus Faecalis bacteremia as well a coagulase negative staph. Likely as a result of his dysfunctional PEG tube which has been replaced.     Recommend:  -Conitinuing vancomycin  -F/u speciation and sensitivities  -Echo to look for possible endocarditis  -If repeat surveillance cultures are positive, may need HARDEEP in addition to echo  -F/u urine cultures          Thank you for your consult. I will follow-up with patient. Please contact us if you have any additional questions.    Harish Trotter, DO  Infectious Disease  [facility] - Observation 11H    Subjective:     Principal Problem:Dislodged gastrostomy tube    HPI: Patient is a 61 yo male with a PMH of CVA with trach and G-tube, CKD, HTN, and HLD who initially presented with a dislodged PEG tube that has been replaced by surgery. During his admission, he was found with a leukocytosis of 23.17 and had blood cultures drawn that are now positive for enterococcus species. He also had a UA that was suspisious for UTI with 3+ leukocytes, many WBC, bacteria, and yeast seen.     ID was consulted to manage his bacteremia.    History reviewed. No pertinent past medical history.    Past Surgical History:   Procedure Laterality Date    ESOPHAGOGASTRODUODENOSCOPY N/A 1/11/2023    Procedure: EGD (ESOPHAGOGASTRODUODENOSCOPY);  Surgeon: Francisco Javier Holt MD;  Location: [facility] ([address] [place] [address]);   Service: Endoscopy;  Laterality: N/A;    ESOPHAGOGASTRODUODENOSCOPY N/A 1/12/2023    Procedure: EGD (ESOPHAGOGASTRODUODENOSCOPY);  Surgeon: Francisco Javier Holt MD;  Location: [facility] ([facility]  Service: Endoscopy;  Laterality: N/A;    ESOPHAGOGASTRODUODENOSCOPY N/A 1/26/2023    Procedure: EGD (ESOPHAGOGASTRODUODENOSCOPY);  Surgeon: Manju Whitman MD;  Location: [facility] ([facility]);  Service: Endoscopy;  Laterality: N/A;       Review of patient's allergies indicates:  No Known Allergies    Medications:  Medications Prior to Admission   Medication Sig    arginine-glutamine-calcium HMB (SHEREE) 7-7-1.5 gram PwPk 1 packet by Per G Tube route 2 (two) times daily.    aspirin 81 MG Chew 81 mg by Per G Tube route once daily.    baclofen (LIORESAL) 5 mg Tab tablet 15 mg by Per G Tube route 3 (three) times daily.    calcium carbonate (OS-OLGA) 500 mg calcium (1,250 mg) tablet 1,250 mg by Per G Tube route once daily.    carvediloL (COREG) 3.125 MG tablet 3.125 mg by Per G Tube route 2 (two) times daily.    esomeprazole (NEXIUM) 40 mg GrPS 40 mg by Per G Tube route once daily.    insulin glargine-yfgn 100 unit/mL (3 mL) InPn Inject 15 Units into the skin once daily.    Lactobacillus rhamnosus GG (CULTURELLE) 10 billion cell capsule 2 capsules by Per G Tube route 2 (two) times a day.    levETIRAcetam (KEPPRA) 100 mg/mL Soln 750 mg by Per G Tube route 2 (two) times daily.    menthol/zinc oxide (CALMOSEPTINE TOP) Apply to the affected area twice daily as needed    nystatin (MYCOSTATIN) powder Apply to the affected area daily    predniSONE (DELTASONE) 10 MG tablet 10 mg by Per G Tube route once daily.     Antibiotics (From admission, onward)      Start     Stop Route Frequency Ordered    01/28/23 1300  vancomycin (VANCOCIN) 1,000 mg in dextrose 5 % (D5W) 250 mL IVPB (Vial-Mate)         -- IV Every 24 hours (non-standard times) 01/27/23 1312    01/25/23 1801  vancomycin - pharmacy to dose  (vancomycin IVPB)        See  Hyperspace for full Linked Orders Report.    -- IV pharmacy to manage frequency 01/25/23 1702          Antifungals (From admission, onward)      Start     Stop Route Frequency Ordered    01/26/23 2100  nystatin powder         -- Top 2 times daily 01/26/23 1658          Antivirals (From admission, onward)      None             Immunization History   Administered Date(s) Administered    COVID-19, MRNA, LN-S, PF (Pfizer) (Purple Cap) 06/25/2021       Family History    None       Social History     Socioeconomic History    Marital status: Significant Other   Tobacco Use    Smoking status: Never     Review of Systems   Unable to perform ROS: Patient nonverbal   Objective:     Vital Signs (Most Recent):  Temp: 97.9 °F (36.6 °C) (01/28/23 1153)  Pulse: 80 (01/28/23 1153)  Resp: 18 (01/28/23 1153)  BP: (!) 110/58 (01/28/23 1153)  SpO2: 95 % (01/28/23 1153)   Vital Signs (24h Range):  Temp:  [97.5 °F (36.4 °C)-98.7 °F (37.1 °C)] 97.9 °F (36.6 °C)  Pulse:  [] 80  Resp:  [16-18] 18  SpO2:  [95 %-100 %] 95 %  BP: (110-132)/(58-73) 110/58     Weight: 71.7 kg (158 lb)  Body mass index is 21.43 kg/m².    Estimated Creatinine Clearance: 76.7 mL/min (based on SCr of 1 mg/dL).    Physical Exam  Constitutional:       General: He is not in acute distress.     Appearance: He is ill-appearing.      Comments: Cachectic and chronically ill appearing.    HENT:      Head: Normocephalic and atraumatic.      Mouth/Throat:      Mouth: Mucous membranes are moist.   Eyes:      Pupils: Pupils are equal, round, and reactive to light.      Comments: Tracks with eyes   Cardiovascular:      Rate and Rhythm: Normal rate and regular rhythm.      Pulses: Normal pulses.      Heart sounds: Normal heart sounds. No murmur heard.  Abdominal:      General: Abdomen is flat. There is no distension.      Palpations: Abdomen is soft.      Comments: Breath sounds difficult to appreciate with trach   Musculoskeletal:      Right lower leg: No edema.       Left lower leg: No edema.   Skin:     General: Skin is warm and dry.      Capillary Refill: Capillary refill takes less than 2 seconds.      Findings: Bruising and lesion present.   Neurological:      Mental Status: Mental status is at baseline.      Comments: Will blink and nod when asked questions       Significant Labs: CBC:   Recent Labs   Lab 01/27/23  1113   WBC 14.82*   HGB 10.0*   HCT 32.4*        CMP:   Recent Labs   Lab 01/27/23  0548      K 3.9      CO2 20*   GLU 86   BUN 23   CREATININE 1.0   CALCIUM 9.3   PROT 7.0   ALBUMIN 2.0*   BILITOT 0.2   ALKPHOS 182*   AST 15   ALT 25   ANIONGAP 10       Significant Imaging: I have reviewed all pertinent imaging results/findings within the past 24 hours.

## 2023-01-28 NOTE — RESPIRATORY THERAPY
RAPID RESPONSE RESPIRATORY THERAPY PROACTIVE NOTE           Time of visit: 919     Code Status: Full Code   : 1960  Bed: 1155/1155 A:   MRN: [id number]  Time spent at the bedside: < 15 min    SITUATION    Evaluated patient for: LDA Check     BACKGROUND    Patient has no past medical history on file.  Clinically Significant Surgical Hx: tracheostomy    24 Hours Vitals Range:  Temp:  [97.5 °F (36.4 °C)-98.7 °F (37.1 °C)]   Pulse:  []   Resp:  [16-18]   BP: (110-132)/(58-73)   SpO2:  [95 %-100 %]     Labs:    Recent Labs     23  1556 23  0452 23  0548    142 139   K 4.4 3.6 3.9    105 109   CO2 26 25 20*   CREATININE 1.3 1.0 1.0   * 102 86   PHOS  --  3.8 4.1   MG  --  1.8 1.7        No results for input(s): PH, PCO2, PO2, HCO3, POCSATURATED, BE in the last 72 hours.    ASSESSMENT/INTERVENTIONS  Pt resting comfortably with no respiratory needs at this time      Last VS   Temp: 97.9 °F (36.6 °C) (1153)  Pulse: 80 (1153)  Resp: 18 (1153)  BP: 110/58 (1153)  SpO2: 95 % (1153)      Extra trachs at bedside: y  Level of Consciousness: Level of Consciousness (AVPU): alert  Respiratory Effort: Respiratory Effort: Normal, Unlabored Expansion/Accessory Muscle Usage: Expansion/Accessory Muscles/Retractions: no use of accessory muscles, no retractions  All Lung Field Breath Sounds: All Lung Fields Breath Sounds: Anterior:, Posterior:, Lateral:, diminished  O2 Device/Concentration: 5L/28% T.C.  Surgical airway: Yes, Type: Shiley Size: 6, cuffed  Ambu at bedside: Ambu bag with the patient?: Yes, Adult Ambu     Active Orders   Respiratory Care    Inhalation Treatment Q4H PRN     Frequency: Q4H PRN     Number of Occurrences: Until Specified    Oxygen PRN     Frequency: PRN     Number of Occurrences: Until Specified     Order Questions:      Device type: Low flow      Device: Trach Collar      Titrate O2 per Oxygen Titration Protocol: Yes      To maintain  SpO2 goal of: >= 90%      Notify MD of: Inability to achieve desired SpO2; Sudden change in patient status and requires 20% increase in FiO2; Patient requires >60% FiO2    Pulse Oximetry Q4H     Frequency: Q4H     Number of Occurrences: Until Specified    Routine tracheostomy care     Frequency: BID     Number of Occurrences: Until Specified       RECOMMENDATIONS    We recommend: RRT Recs: Continue POC per primary team.      FOLLOW-UP    Please call back the Rapid Response RT, Ayad Herbert RRT at x [phone] for any questions or concerns.

## 2023-01-28 NOTE — PLAN OF CARE
Problem: Infection  Goal: Absence of Infection Signs and Symptoms  Outcome: Ongoing, Progressing     Problem: Adult Inpatient Plan of Care  Goal: Plan of Care Review  Outcome: Ongoing, Progressing     Problem: Diabetes Comorbidity  Goal: Blood Glucose Level Within Targeted Range  Outcome: Ongoing, Progressing     Problem: Impaired Wound Healing  Goal: Optimal Wound Healing  Outcome: Ongoing, Progressing     Problem: Skin Injury Risk Increased  Goal: Skin Health and Integrity  Outcome: Ongoing, Progressing

## 2023-01-28 NOTE — CARE UPDATE
Patient is a 63 yo male with leukocytosis and enterococci bacteremia found on blood cultures. Afebrile. CRP and sed rate elevated. UA also suspicious for UTI.     1/25/23  Blood cultures positive for enterococcus bacteremia.    1/27/23 Blood cultures: pending     ID was consulted by primary team, as per ID note, recommendation is to perform Echo to look for possible endocarditis and repeat surveillance cultures, if positive, may need HARDEEP in addition to echo. Cardiology was contacted today for HARDEEP.     Case was discussed with ID (Dr. Brandon Gifford)  and Dr Tommy Rhodes. ID has recommended waiting for today blood culture before proceeding with HARDEEP.     Plan:  Following discussion with primary team and ID, will hold  HARDEEP for now.       We appreciate the opportunity to assist in the care of Mr Medrano Hao ALVAREZ. Should you have any further questions, please do not hesitate to call.

## 2023-01-29 LAB
ALBUMIN SERPL BCP-MCNC: 2.1 G/DL (ref 3.5–5.2)
ALP SERPL-CCNC: 177 U/L (ref 55–135)
ALT SERPL W/O P-5'-P-CCNC: 18 U/L (ref 10–44)
ANION GAP SERPL CALC-SCNC: 13 MMOL/L (ref 8–16)
ANISOCYTOSIS BLD QL SMEAR: SLIGHT
AST SERPL-CCNC: 18 U/L (ref 10–40)
BACTERIA BLD CULT: ABNORMAL
BASOPHILS # BLD AUTO: 0.06 K/UL (ref 0–0.2)
BASOPHILS NFR BLD: 0.4 % (ref 0–1.9)
BILIRUB SERPL-MCNC: 0.2 MG/DL (ref 0.1–1)
BUN SERPL-MCNC: 16 MG/DL (ref 8–23)
CALCIUM SERPL-MCNC: 9.6 MG/DL (ref 8.7–10.5)
CHLORIDE SERPL-SCNC: 106 MMOL/L (ref 95–110)
CO2 SERPL-SCNC: 24 MMOL/L (ref 23–29)
CREAT SERPL-MCNC: 0.9 MG/DL (ref 0.5–1.4)
DIFFERENTIAL METHOD: ABNORMAL
EOSINOPHIL # BLD AUTO: 2.5 K/UL (ref 0–0.5)
EOSINOPHIL NFR BLD: 15.5 % (ref 0–8)
ERYTHROCYTE [DISTWIDTH] IN BLOOD BY AUTOMATED COUNT: 16.1 % (ref 11.5–14.5)
EST. GFR  (NO RACE VARIABLE): >60 ML/MIN/1.73 M^2
GLUCOSE SERPL-MCNC: 126 MG/DL (ref 70–110)
HCT VFR BLD AUTO: 33.1 % (ref 40–54)
HGB BLD-MCNC: 9.6 G/DL (ref 14–18)
IMM GRANULOCYTES # BLD AUTO: 0.11 K/UL (ref 0–0.04)
IMM GRANULOCYTES NFR BLD AUTO: 0.7 % (ref 0–0.5)
LYMPHOCYTES # BLD AUTO: 2.2 K/UL (ref 1–4.8)
LYMPHOCYTES NFR BLD: 13.7 % (ref 18–48)
MAGNESIUM SERPL-MCNC: 1.7 MG/DL (ref 1.6–2.6)
MCH RBC QN AUTO: 26.6 PG (ref 27–31)
MCHC RBC AUTO-ENTMCNC: 29 G/DL (ref 32–36)
MCV RBC AUTO: 92 FL (ref 82–98)
MONOCYTES # BLD AUTO: 1 K/UL (ref 0.3–1)
MONOCYTES NFR BLD: 6.1 % (ref 4–15)
NEUTROPHILS # BLD AUTO: 10.4 K/UL (ref 1.8–7.7)
NEUTROPHILS NFR BLD: 63.6 % (ref 38–73)
NRBC BLD-RTO: 0 /100 WBC
OVALOCYTES BLD QL SMEAR: ABNORMAL
PHOSPHATE SERPL-MCNC: 2.7 MG/DL (ref 2.7–4.5)
PLATELET # BLD AUTO: 462 K/UL (ref 150–450)
PMV BLD AUTO: 10.2 FL (ref 9.2–12.9)
POCT GLUCOSE: 162 MG/DL (ref 70–110)
POCT GLUCOSE: 175 MG/DL (ref 70–110)
POIKILOCYTOSIS BLD QL SMEAR: SLIGHT
POLYCHROMASIA BLD QL SMEAR: ABNORMAL
POTASSIUM SERPL-SCNC: 3.9 MMOL/L (ref 3.5–5.1)
PROT SERPL-MCNC: 7.2 G/DL (ref 6–8.4)
RBC # BLD AUTO: 3.61 M/UL (ref 4.6–6.2)
SODIUM SERPL-SCNC: 143 MMOL/L (ref 136–145)
WBC # BLD AUTO: 16.34 K/UL (ref 3.9–12.7)

## 2023-01-29 PROCEDURE — 63600175 PHARM REV CODE 636 W HCPCS: Performed by: FAMILY MEDICINE

## 2023-01-29 PROCEDURE — 85025 COMPLETE CBC W/AUTO DIFF WBC: CPT | Performed by: STUDENT IN AN ORGANIZED HEALTH CARE EDUCATION/TRAINING PROGRAM

## 2023-01-29 PROCEDURE — 36415 COLL VENOUS BLD VENIPUNCTURE: CPT | Performed by: STUDENT IN AN ORGANIZED HEALTH CARE EDUCATION/TRAINING PROGRAM

## 2023-01-29 PROCEDURE — 83735 ASSAY OF MAGNESIUM: CPT | Performed by: STUDENT IN AN ORGANIZED HEALTH CARE EDUCATION/TRAINING PROGRAM

## 2023-01-29 PROCEDURE — 99233 PR SUBSEQUENT HOSPITAL CARE,LEVL III: ICD-10-PCS | Mod: ,,, | Performed by: INTERNAL MEDICINE

## 2023-01-29 PROCEDURE — 94761 N-INVAS EAR/PLS OXIMETRY MLT: CPT

## 2023-01-29 PROCEDURE — 25000003 PHARM REV CODE 250: Performed by: FAMILY MEDICINE

## 2023-01-29 PROCEDURE — 99900026 HC AIRWAY MAINTENANCE (STAT)

## 2023-01-29 PROCEDURE — 27000221 HC OXYGEN, UP TO 24 HOURS

## 2023-01-29 PROCEDURE — 11000001 HC ACUTE MED/SURG PRIVATE ROOM

## 2023-01-29 PROCEDURE — 27200966 HC CLOSED SUCTION SYSTEM

## 2023-01-29 PROCEDURE — 99900035 HC TECH TIME PER 15 MIN (STAT)

## 2023-01-29 PROCEDURE — 84100 ASSAY OF PHOSPHORUS: CPT | Performed by: STUDENT IN AN ORGANIZED HEALTH CARE EDUCATION/TRAINING PROGRAM

## 2023-01-29 PROCEDURE — 99233 SBSQ HOSP IP/OBS HIGH 50: CPT | Mod: ,,, | Performed by: INTERNAL MEDICINE

## 2023-01-29 PROCEDURE — 80053 COMPREHEN METABOLIC PANEL: CPT | Performed by: STUDENT IN AN ORGANIZED HEALTH CARE EDUCATION/TRAINING PROGRAM

## 2023-01-29 PROCEDURE — 99232 PR SUBSEQUENT HOSPITAL CARE,LEVL II: ICD-10-PCS | Mod: ,,, | Performed by: FAMILY MEDICINE

## 2023-01-29 PROCEDURE — 99232 SBSQ HOSP IP/OBS MODERATE 35: CPT | Mod: ,,, | Performed by: FAMILY MEDICINE

## 2023-01-29 RX ADMIN — CARVEDILOL 3.12 MG: 3.12 TABLET, FILM COATED ORAL at 09:01

## 2023-01-29 RX ADMIN — VANCOMYCIN HYDROCHLORIDE 1000 MG: 1 INJECTION, POWDER, LYOPHILIZED, FOR SOLUTION INTRAVENOUS at 02:01

## 2023-01-29 RX ADMIN — CALCIUM CARBONATE (ANTACID) CHEW TAB 500 MG 500 MG: 500 CHEW TAB at 09:01

## 2023-01-29 RX ADMIN — BACLOFEN 15 MG: 5 TABLET ORAL at 04:01

## 2023-01-29 RX ADMIN — LEVETIRACETAM 750 MG: 100 SOLUTION ORAL at 09:01

## 2023-01-29 RX ADMIN — ASPIRIN 81 MG CHEWABLE TABLET 81 MG: 81 TABLET CHEWABLE at 09:01

## 2023-01-29 RX ADMIN — ESOMEPRAZOLE MAGNESIUM 40 MG: 10 GRANULE, FOR SUSPENSION, EXTENDED RELEASE ORAL at 09:01

## 2023-01-29 RX ADMIN — PREDNISONE 10 MG: 10 TABLET ORAL at 09:01

## 2023-01-29 RX ADMIN — Medication: at 09:01

## 2023-01-29 RX ADMIN — BACLOFEN 15 MG: 5 TABLET ORAL at 09:01

## 2023-01-29 RX ADMIN — NYSTATIN: 100000 POWDER TOPICAL at 10:01

## 2023-01-29 RX ADMIN — NYSTATIN: 100000 POWDER TOPICAL at 09:01

## 2023-01-29 NOTE — RESPIRATORY THERAPY
RAPID RESPONSE RESPIRATORY THERAPY PROACTIVE NOTE           Time of visit: 951     Code Status: Full Code   : 1960  Bed: 1155/1155 A:   MRN: [id number]  Time spent at the bedside: < 15 min    SITUATION    Evaluated patient for: LDA Check     BACKGROUND    Patient has no past medical history on file.  Clinically Significant Surgical Hx: tracheostomy    24 Hours Vitals Range:  Temp:  [97.1 °F (36.2 °C)-98.2 °F (36.8 °C)]   Pulse:  [79-92]   Resp:  [16-18]   BP: (124-160)/(63-81)   SpO2:  [95 %-100 %]     Labs:    Recent Labs     23  0548 23  0232    143   K 3.9 3.9    106   CO2 20* 24   CREATININE 1.0 0.9   GLU 86 126*   PHOS 4.1 2.7   MG 1.7 1.7        No results for input(s): PH, PCO2, PO2, HCO3, POCSATURATED, BE in the last 72 hours.    ASSESSMENT/INTERVENTIONS  Pt resting comfortably with no respiratory needs at this time.      Last VS   Temp: 97.7 °F (36.5 °C) (1133)  Pulse: 92 (1133)  Resp: 17 (1133)  BP: 160/81 (1133)  SpO2: 98 % (1133)      Extra trachs at bedside: y  Level of Consciousness: Level of Consciousness (AVPU): alert  Respiratory Effort: Respiratory Effort: Normal, Unlabored Expansion/Accessory Muscle Usage: Expansion/Accessory Muscles/Retractions: no use of accessory muscles, no retractions, expansion symmetric  All Lung Field Breath Sounds: All Lung Fields Breath Sounds: coarse  O2 Device/Concentration:  T.C.  Surgical airway: Yes, Type: Shiley Size: 6 XLT, cuffed  Ambu at bedside: Ambu bag with the patient?: Yes, Adult Ambu     Active Orders   Respiratory Care    Inhalation Treatment Q4H PRN     Frequency: Q4H PRN     Number of Occurrences: Until Specified    Oxygen PRN     Frequency: PRN     Number of Occurrences: Until Specified     Order Questions:      Device type: Low flow      Device: Trach Collar      Titrate O2 per Oxygen Titration Protocol: Yes      To maintain SpO2 goal of: >= 90%      Notify MD of: Inability to achieve  desired SpO2; Sudden change in patient status and requires 20% increase in FiO2; Patient requires >60% FiO2    Pulse Oximetry Q4H     Frequency: Q4H     Number of Occurrences: Until Specified    Routine tracheostomy care     Frequency: BID     Number of Occurrences: Until Specified       RECOMMENDATIONS    We recommend: RRT Recs: Continue POC per primary team.      FOLLOW-UP    Please call back the Rapid Response RT, Ayad Herbert RRT at x [phone] for any questions or concerns.

## 2023-01-29 NOTE — PROGRESS NOTES
[facility] - [facility]ation [address] [place] [address] Medicine  Progress Note    Patient Name: Hao Medrano  MRN: [id number]  Patient Class: IP- Inpatient   Admission Date: 1/25/2023  Length of Stay: 3 days  Attending Physician: Tommy Rhodes MD  Primary Care Provider: Keara Yanes NP        Subjective:     Principal Problem:Dislodged gastrostomy tube        HPI:  Hao Medrano is a 62 y.o. male with a past medical history of CVA with trach and G-tube, CKD, HTN, and HLD who is being palced in observation for further management of dislodged peg tube. Patient presented to the ED from [facility]. Patient is nonverbal, history obtained from chart and ED staff. Per the ED, the nursing home reports they noticed the dislodged ped tube around 0400 this am. Patient was recently admitted on 1/10 for same issue with a new peg placed on 1/12.    ED: vital signs stable, afebrile. Leukocytosis of 23K. Hb 11.4. PT/INR 10/9/1.0. Furhter labs pending at time of admission. CXR with inconclusive findings due to patient position. Blood cultures obtained. GI consulted and plan for EGD with G-tube placement tomorrow.       Overview/Hospital Course:  S/p 20 F PEG tube placed without any immediate complications.   External bumper at 3 cm shelby.  Positive blood Cx with gram positive Cocci  2D echo ordered  Cardiology consult for HARDEEP per ID recommendation        Interval History:   Aphasic, more alert, with smiling face this morning, no acute event overnight, no spike of fever, repeat blood Cx still no growth.    Review of Systems   Unable to perform ROS: Patient nonverbal   Objective:     Vital Signs (Most Recent):  Temp: 97.7 °F (36.5 °C) (01/29/23 1133)  Pulse: 92 (01/29/23 1133)  Resp: 17 (01/29/23 1133)  BP: (!) 160/81 (01/29/23 1133)  SpO2: 98 % (01/29/23 1133)   Vital Signs (24h Range):  Temp:  [97.1 °F (36.2 °C)-98.2 °F (36.8 °C)] 97.7 °F (36.5 °C)  Pulse:  [79-92] 92  Resp:  [16-18] 17  SpO2:  [95 %-100 %] 98 %  BP:  (124-160)/(63-81) 160/81     Weight: 71.7 kg (158 lb)  Body mass index is 21.43 kg/m².    Intake/Output Summary (Last 24 hours) at 1/29/2023 1223  Last data filed at 1/29/2023 0953  Gross per 24 hour   Intake 1000 ml   Output 1 ml   Net 999 ml      Physical Exam  Vitals and nursing note reviewed.   Constitutional:       General: He is not in acute distress.  HENT:      Head: Normocephalic and atraumatic.      Comments: Trach in place with a lot mucus secretion     Nose: Congestion present.      Mouth/Throat:      Mouth: Mucous membranes are moist.   Eyes:      Extraocular Movements: Extraocular movements intact.      Pupils: Pupils are equal, round, and reactive to light.   Cardiovascular:      Rate and Rhythm: Normal rate. Rhythm irregular.      Heart sounds: Murmur heard.     No friction rub. No gallop.   Pulmonary:      Effort: Pulmonary effort is normal. No respiratory distress.      Breath sounds: Rhonchi present. No wheezing or rales.   Chest:      Chest wall: No tenderness.   Abdominal:      General: Bowel sounds are normal. There is no distension.      Tenderness: There is no abdominal tenderness. There is no guarding or rebound.   Musculoskeletal:         General: Swelling and tenderness present.      Cervical back: No rigidity or tenderness.      Right lower leg: Edema present.      Left lower leg: Edema present.   Skin:     Findings: Erythema and rash present.      Comments: Scattered open wound, feet and sacral decub   Neurological:      Mental Status: He is alert.      Motor: Weakness present.      Coordination: Coordination abnormal.      Gait: Gait abnormal.       Significant Labs: All pertinent labs within the past 24 hours have been reviewed.  BMP:   Recent Labs   Lab 01/29/23  0232   *      K 3.9      CO2 24   BUN 16   CREATININE 0.9   CALCIUM 9.6   MG 1.7     CBC:   Recent Labs   Lab 01/29/23  0232   WBC 16.34*   HGB 9.6*   HCT 33.1*   *     CMP:   Recent Labs   Lab  01/29/23  0232      K 3.9      CO2 24   *   BUN 16   CREATININE 0.9   CALCIUM 9.6   PROT 7.2   ALBUMIN 2.1*   BILITOT 0.2   ALKPHOS 177*   AST 18   ALT 18   ANIONGAP 13     Recent Lab Results         01/29/23  0717   01/29/23  0232   01/28/23 2025 01/28/23  1549        Albumin   2.1           Alkaline Phosphatase   177           ALT   18           Anion Gap   13           Aniso   Slight           AST   18           Baso #   0.06           Basophil %   0.4           BILIRUBIN TOTAL   0.2  Comment: For infants and newborns, interpretation of results should be based  on gestational age, weight and in agreement with clinical  observations.    Premature Infant recommended reference ranges:  Up to 24 hours.............<8.0 mg/dL  Up to 48 hours............<12.0 mg/dL  3-5 days..................<15.0 mg/dL  6-29 days.................<15.0 mg/dL             BUN   16           Calcium   9.6           Chloride   106           CO2   24           Creatinine   0.9           Differential Method   Automated           eGFR   >60.0           Eos #   2.5           Eosinophil %   15.5           Glucose   126           Gran # (ANC)   10.4           Gran %   63.6           Hematocrit   33.1           Hemoglobin   9.6           Immature Grans (Abs)   0.11  Comment: Mild elevation in immature granulocytes is non specific and   can be seen in a variety of conditions including stress response,   acute inflammation, trauma and pregnancy. Correlation with other   laboratory and clinical findings is essential.             Immature Granulocytes   0.7           Lymph #   2.2           Lymph %   13.7           Magnesium   1.7           MCH   26.6           MCHC   29.0           MCV   92           Mono #   1.0           Mono %   6.1           MPV   10.2           nRBC   0           Ovalocytes   Occasional           Phosphorus   2.7           Platelets   462           POCT Glucose 175     162   200       Poikilocytosis    Slight           Poly   Occasional           Potassium   3.9           PROTEIN TOTAL   7.2           RBC   3.61           RDW   16.1           Sodium   143           WBC   16.34                   Significant Imaging: I have reviewed all pertinent imaging results/findings within the past 24 hours.      Assessment/Plan:      * Dislodged gastrostomy tube  Oropharyngeal dysphagia    - s/p 20 F PEG tube placed without any immediate complications.   External bumper at 3 cm shelby.-  -resume feeding tube with Isosource 1.5 nathaniel and dietician consult    Bacteremia  With positive blood Cx gram positive cocci  DC rocephin, and metronidazole  Cont vanco  ID consultedappreciated  Will get 2D echo and cardiology consult for possible HARDEEP        Multiple wounds of skin  With scattered lesions with dry skin  Encourage moisturize skin, skin care  Avoid skin debridement, changing position q 2Hrs  Cont Antibiotic ointment at the PEG site      On antiepileptic therapy  - continue keppra 750 mg BID once peg tube is replaced      HTN (hypertension)  - BP well controlled upon admission  - continue coreg 3.125 mg BID once peg tube is replaced      Leukocytosis  - VSSAF  - leukocytosis of 23K upon admission  - g-tube site is clean without discharge or erythma  - patient on prednisone 10 mg daily, unclear why per chart review - could be contributing to significant leukocytosis  - CXR pending  - UA pending  - blood cultures obtained  - procal ordered  - will start empirical ceftriaxone and flagyl in the meantime  - follow up UA, CXR, and blood cultures    Type 2 diabetes mellitus, with long-term current use of insulin  Patient's FSGs are controlled on current medication regimen.  Last A1c reviewed-   Lab Results   Component Value Date    HGBA1C 5.9 (H) 07/12/2018     Most recent fingerstick glucose reviewed- No results for input(s): POCTGLUCOSE in the last 24 hours.  Current correctional scale  Low  Maintain anti-hyperglycemic dose as follows-    Antihyperglycemics (From admission, onward)      Start     Stop Route Frequency Ordered    01/25/23 1511  insulin aspart U-100 pen 0-5 Units         -- SubQ Before meals & nightly PRN 01/25/23 1411          Hold Oral hypoglycemics while patient is in the hospital.  - patient is on insulin glargine 15U daily at the nursing home  - resume when tube feedings are resumed    Anemia  - patient's anemia is currently controlled  - etiology likely due to anemia of chronic disease  - current CBC reviewed -   Lab Results   Component Value Date    HGB 10.2 (L) 01/26/2023    HCT 34.0 (L) 01/26/2023     - monitor serial CBC and transfuse if patient becomes hemodynamically unstable, symptomatic, or H/H drops below 7/21.         Chronic kidney disease, stage III (moderate)  - baseline ~1.3  - cont monitor      VTE Risk Mitigation (From admission, onward)           Ordered     Place sequential compression device  Until discontinued         01/25/23 1411     IP VTE HIGH RISK PATIENT  Once         01/25/23 1411     Reason for No Pharmacological VTE Prophylaxis  Once        Question:  Reasons:  Answer:  Risk of Bleeding  Comment:  pre-op    01/25/23 1411                    Discharge Planning   FAINA: 1/30/2023     Code Status: Full Code   Is the patient medically ready for discharge?: No    Reason for patient still in hospital (select all that apply): Patient new problem and Treatment  Discharge Plan A: Return to nursing home          Time spent >30 minutes         Tommy Rhodes MD  Department of Hospital Medicine   [facility] - [facility] 11H

## 2023-01-29 NOTE — PLAN OF CARE
Problem: Infection  Goal: Absence of Infection Signs and Symptoms  Outcome: Ongoing, Progressing     Problem: Adult Inpatient Plan of Care  Goal: Plan of Care Review  Outcome: Ongoing, Progressing  Goal: Patient-Specific Goal (Individualized)  Outcome: Ongoing, Progressing     Problem: Diabetes Comorbidity  Goal: Blood Glucose Level Within Targeted Range  Outcome: Ongoing, Progressing     Problem: Skin Injury Risk Increased  Goal: Skin Health and Integrity  Outcome: Ongoing, Progressing

## 2023-01-29 NOTE — SUBJECTIVE & OBJECTIVE
Interval History:   Aphasic, more alert, with smiling face this morning, no acute event overnight, no spike of fever, repeat blood Cx still no growth.    Review of Systems   Unable to perform ROS: Patient nonverbal   Objective:     Vital Signs (Most Recent):  Temp: 97.7 °F (36.5 °C) (01/29/23 1133)  Pulse: 92 (01/29/23 1133)  Resp: 17 (01/29/23 1133)  BP: (!) 160/81 (01/29/23 1133)  SpO2: 98 % (01/29/23 1133)   Vital Signs (24h Range):  Temp:  [97.1 °F (36.2 °C)-98.2 °F (36.8 °C)] 97.7 °F (36.5 °C)  Pulse:  [79-92] 92  Resp:  [16-18] 17  SpO2:  [95 %-100 %] 98 %  BP: (124-160)/(63-81) 160/81     Weight: 71.7 kg (158 lb)  Body mass index is 21.43 kg/m².    Intake/Output Summary (Last 24 hours) at 1/29/2023 1223  Last data filed at 1/29/2023 0953  Gross per 24 hour   Intake 1000 ml   Output 1 ml   Net 999 ml      Physical Exam  Vitals and nursing note reviewed.   Constitutional:       General: He is not in acute distress.  HENT:      Head: Normocephalic and atraumatic.      Comments: Trach in place with a lot mucus secretion     Nose: Congestion present.      Mouth/Throat:      Mouth: Mucous membranes are moist.   Eyes:      Extraocular Movements: Extraocular movements intact.      Pupils: Pupils are equal, round, and reactive to light.   Cardiovascular:      Rate and Rhythm: Normal rate. Rhythm irregular.      Heart sounds: Murmur heard.     No friction rub. No gallop.   Pulmonary:      Effort: Pulmonary effort is normal. No respiratory distress.      Breath sounds: Rhonchi present. No wheezing or rales.   Chest:      Chest wall: No tenderness.   Abdominal:      General: Bowel sounds are normal. There is no distension.      Tenderness: There is no abdominal tenderness. There is no guarding or rebound.   Musculoskeletal:         General: Swelling and tenderness present.      Cervical back: No rigidity or tenderness.      Right lower leg: Edema present.      Left lower leg: Edema present.   Skin:     Findings: Erythema  and rash present.      Comments: Scattered open wound, feet and sacral decub   Neurological:      Mental Status: He is alert.      Motor: Weakness present.      Coordination: Coordination abnormal.      Gait: Gait abnormal.       Significant Labs: All pertinent labs within the past 24 hours have been reviewed.  BMP:   Recent Labs   Lab 01/29/23 0232   *      K 3.9      CO2 24   BUN 16   CREATININE 0.9   CALCIUM 9.6   MG 1.7     CBC:   Recent Labs   Lab 01/29/23 0232   WBC 16.34*   HGB 9.6*   HCT 33.1*   *     CMP:   Recent Labs   Lab 01/29/23 0232      K 3.9      CO2 24   *   BUN 16   CREATININE 0.9   CALCIUM 9.6   PROT 7.2   ALBUMIN 2.1*   BILITOT 0.2   ALKPHOS 177*   AST 18   ALT 18   ANIONGAP 13     Recent Lab Results         01/29/23  0717   01/29/23  0232   01/28/23 2025 01/28/23  1549        Albumin   2.1           Alkaline Phosphatase   177           ALT   18           Anion Gap   13           Aniso   Slight           AST   18           Baso #   0.06           Basophil %   0.4           BILIRUBIN TOTAL   0.2  Comment: For infants and newborns, interpretation of results should be based  on gestational age, weight and in agreement with clinical  observations.    Premature Infant recommended reference ranges:  Up to 24 hours.............<8.0 mg/dL  Up to 48 hours............<12.0 mg/dL  3-5 days..................<15.0 mg/dL  6-29 days.................<15.0 mg/dL             BUN   16           Calcium   9.6           Chloride   106           CO2   24           Creatinine   0.9           Differential Method   Automated           eGFR   >60.0           Eos #   2.5           Eosinophil %   15.5           Glucose   126           Gran # (ANC)   10.4           Gran %   63.6           Hematocrit   33.1           Hemoglobin   9.6           Immature Grans (Abs)   0.11  Comment: Mild elevation in immature granulocytes is non specific and   can be seen in a variety of  conditions including stress response,   acute inflammation, trauma and pregnancy. Correlation with other   laboratory and clinical findings is essential.             Immature Granulocytes   0.7           Lymph #   2.2           Lymph %   13.7           Magnesium   1.7           MCH   26.6           MCHC   29.0           MCV   92           Mono #   1.0           Mono %   6.1           MPV   10.2           nRBC   0           Ovalocytes   Occasional           Phosphorus   2.7           Platelets   462           POCT Glucose 175     162   200       Poikilocytosis   Slight           Poly   Occasional           Potassium   3.9           PROTEIN TOTAL   7.2           RBC   3.61           RDW   16.1           Sodium   143           WBC   16.34                   Significant Imaging: I have reviewed all pertinent imaging results/findings within the past 24 hours.

## 2023-01-30 LAB
ALBUMIN SERPL BCP-MCNC: 2.1 G/DL (ref 3.5–5.2)
ALP SERPL-CCNC: 152 U/L (ref 55–135)
ALT SERPL W/O P-5'-P-CCNC: 14 U/L (ref 10–44)
ANION GAP SERPL CALC-SCNC: 10 MMOL/L (ref 8–16)
ASCENDING AORTA: 3.06 CM
AST SERPL-CCNC: 13 U/L (ref 10–40)
AV INDEX (PROSTH): 0.85
AV MEAN GRADIENT: 3 MMHG
AV PEAK GRADIENT: 5 MMHG
AV VALVE AREA: 3.31 CM2
AV VELOCITY RATIO: 0.7
BASOPHILS # BLD AUTO: 0.04 K/UL (ref 0–0.2)
BASOPHILS NFR BLD: 0.2 % (ref 0–1.9)
BILIRUB SERPL-MCNC: 0.2 MG/DL (ref 0.1–1)
BSA FOR ECHO PROCEDURE: 1.91 M2
BUN SERPL-MCNC: 20 MG/DL (ref 8–23)
CALCIUM SERPL-MCNC: 9.5 MG/DL (ref 8.7–10.5)
CHLORIDE SERPL-SCNC: 101 MMOL/L (ref 95–110)
CO2 SERPL-SCNC: 25 MMOL/L (ref 23–29)
CREAT SERPL-MCNC: 0.8 MG/DL (ref 0.5–1.4)
CV ECHO LV RWT: 0.42 CM
DIFFERENTIAL METHOD: ABNORMAL
DOP CALC AO PEAK VEL: 1.17 M/S
DOP CALC AO VTI: 21.12 CM
DOP CALC LVOT AREA: 3.9 CM2
DOP CALC LVOT DIAMETER: 2.23 CM
DOP CALC LVOT PEAK VEL: 0.82 M/S
DOP CALC LVOT STROKE VOLUME: 69.92 CM3
DOP CALCLVOT PEAK VEL VTI: 17.91 CM
E WAVE DECELERATION TIME: 272.82 MSEC
E/A RATIO: 0.88
E/E' RATIO: 14.55 M/S
ECHO LV POSTERIOR WALL: 1 CM (ref 0.6–1.1)
EJECTION FRACTION: 55 %
EOSINOPHIL # BLD AUTO: 1.8 K/UL (ref 0–0.5)
EOSINOPHIL NFR BLD: 11 % (ref 0–8)
ERYTHROCYTE [DISTWIDTH] IN BLOOD BY AUTOMATED COUNT: 16 % (ref 11.5–14.5)
EST. GFR  (NO RACE VARIABLE): >60 ML/MIN/1.73 M^2
FRACTIONAL SHORTENING: 30 % (ref 28–44)
GLUCOSE SERPL-MCNC: 134 MG/DL (ref 70–110)
HCT VFR BLD AUTO: 33.4 % (ref 40–54)
HGB BLD-MCNC: 10.1 G/DL (ref 14–18)
IMM GRANULOCYTES # BLD AUTO: 0.09 K/UL (ref 0–0.04)
IMM GRANULOCYTES NFR BLD AUTO: 0.5 % (ref 0–0.5)
INTERVENTRICULAR SEPTUM: 1.2 CM (ref 0.6–1.1)
LA MAJOR: 5.06 CM
LA MINOR: 5.07 CM
LA WIDTH: 3.86 CM
LEFT ATRIUM SIZE: 3.27 CM
LEFT ATRIUM VOLUME INDEX MOD: 30.1 ML/M2
LEFT ATRIUM VOLUME INDEX: 28.2 ML/M2
LEFT ATRIUM VOLUME MOD: 58 CM3
LEFT ATRIUM VOLUME: 54.34 CM3
LEFT INTERNAL DIMENSION IN SYSTOLE: 3.36 CM (ref 2.1–4)
LEFT VENTRICLE DIASTOLIC VOLUME INDEX: 58.21 ML/M2
LEFT VENTRICLE DIASTOLIC VOLUME: 112.35 ML
LEFT VENTRICLE MASS INDEX: 100 G/M2
LEFT VENTRICLE SYSTOLIC VOLUME INDEX: 23.8 ML/M2
LEFT VENTRICLE SYSTOLIC VOLUME: 45.98 ML
LEFT VENTRICULAR INTERNAL DIMENSION IN DIASTOLE: 4.8 CM (ref 3.5–6)
LEFT VENTRICULAR MASS: 193.96 G
LV LATERAL E/E' RATIO: 16 M/S
LV SEPTAL E/E' RATIO: 13.33 M/S
LYMPHOCYTES # BLD AUTO: 2.1 K/UL (ref 1–4.8)
LYMPHOCYTES NFR BLD: 12.6 % (ref 18–48)
MAGNESIUM SERPL-MCNC: 1.5 MG/DL (ref 1.6–2.6)
MCH RBC QN AUTO: 27.2 PG (ref 27–31)
MCHC RBC AUTO-ENTMCNC: 30.2 G/DL (ref 32–36)
MCV RBC AUTO: 90 FL (ref 82–98)
MONOCYTES # BLD AUTO: 0.9 K/UL (ref 0.3–1)
MONOCYTES NFR BLD: 5.2 % (ref 4–15)
MV A" WAVE DURATION": 7.99 MSEC
MV PEAK A VEL: 0.91 M/S
MV PEAK E VEL: 0.8 M/S
MV STENOSIS PRESSURE HALF TIME: 79.12 MS
MV VALVE AREA P 1/2 METHOD: 2.78 CM2
NEUTROPHILS # BLD AUTO: 11.8 K/UL (ref 1.8–7.7)
NEUTROPHILS NFR BLD: 70.5 % (ref 38–73)
NRBC BLD-RTO: 0 /100 WBC
PHOSPHATE SERPL-MCNC: 2.3 MG/DL (ref 2.7–4.5)
PISA MRMAX VEL: 0.05 M/S
PLATELET # BLD AUTO: 459 K/UL (ref 150–450)
PMV BLD AUTO: 10 FL (ref 9.2–12.9)
POCT GLUCOSE: 169 MG/DL (ref 70–110)
POCT GLUCOSE: 191 MG/DL (ref 70–110)
POCT GLUCOSE: 200 MG/DL (ref 70–110)
POCT GLUCOSE: 201 MG/DL (ref 70–110)
POCT GLUCOSE: 214 MG/DL (ref 70–110)
POCT GLUCOSE: 243 MG/DL (ref 70–110)
POTASSIUM SERPL-SCNC: 4 MMOL/L (ref 3.5–5.1)
PROT SERPL-MCNC: 7.1 G/DL (ref 6–8.4)
PULM VEIN S/D RATIO: 1.31
PV PEAK D VEL: 0.29 M/S
PV PEAK S VEL: 0.38 M/S
RA MAJOR: 4.9 CM
RA WIDTH: 2.51 CM
RBC # BLD AUTO: 3.72 M/UL (ref 4.6–6.2)
RIGHT VENTRICULAR END-DIASTOLIC DIMENSION: 2.63 CM
SINUS: 3.4 CM
SODIUM SERPL-SCNC: 136 MMOL/L (ref 136–145)
STJ: 3.1 CM
TDI LATERAL: 0.05 M/S
TDI SEPTAL: 0.06 M/S
TDI: 0.06 M/S
TRICUSPID ANNULAR PLANE SYSTOLIC EXCURSION: 1.8 CM
WBC # BLD AUTO: 16.71 K/UL (ref 3.9–12.7)

## 2023-01-30 PROCEDURE — 99900026 HC AIRWAY MAINTENANCE (STAT)

## 2023-01-30 PROCEDURE — 83735 ASSAY OF MAGNESIUM: CPT | Performed by: STUDENT IN AN ORGANIZED HEALTH CARE EDUCATION/TRAINING PROGRAM

## 2023-01-30 PROCEDURE — 99900035 HC TECH TIME PER 15 MIN (STAT)

## 2023-01-30 PROCEDURE — 99239 PR HOSPITAL DISCHARGE DAY,>30 MIN: ICD-10-PCS | Mod: ,,, | Performed by: FAMILY MEDICINE

## 2023-01-30 PROCEDURE — 99239 HOSP IP/OBS DSCHRG MGMT >30: CPT | Mod: ,,, | Performed by: FAMILY MEDICINE

## 2023-01-30 PROCEDURE — 94761 N-INVAS EAR/PLS OXIMETRY MLT: CPT

## 2023-01-30 PROCEDURE — 11000001 HC ACUTE MED/SURG PRIVATE ROOM

## 2023-01-30 PROCEDURE — 25000003 PHARM REV CODE 250: Performed by: FAMILY MEDICINE

## 2023-01-30 PROCEDURE — 84100 ASSAY OF PHOSPHORUS: CPT | Performed by: STUDENT IN AN ORGANIZED HEALTH CARE EDUCATION/TRAINING PROGRAM

## 2023-01-30 PROCEDURE — 27000221 HC OXYGEN, UP TO 24 HOURS

## 2023-01-30 PROCEDURE — 99232 SBSQ HOSP IP/OBS MODERATE 35: CPT | Mod: ,,, | Performed by: FAMILY MEDICINE

## 2023-01-30 PROCEDURE — 36415 COLL VENOUS BLD VENIPUNCTURE: CPT | Performed by: STUDENT IN AN ORGANIZED HEALTH CARE EDUCATION/TRAINING PROGRAM

## 2023-01-30 PROCEDURE — 85025 COMPLETE CBC W/AUTO DIFF WBC: CPT | Performed by: STUDENT IN AN ORGANIZED HEALTH CARE EDUCATION/TRAINING PROGRAM

## 2023-01-30 PROCEDURE — 99232 PR SUBSEQUENT HOSPITAL CARE,LEVL II: ICD-10-PCS | Mod: ,,, | Performed by: FAMILY MEDICINE

## 2023-01-30 PROCEDURE — 63600175 PHARM REV CODE 636 W HCPCS: Performed by: FAMILY MEDICINE

## 2023-01-30 PROCEDURE — 80053 COMPREHEN METABOLIC PANEL: CPT | Performed by: STUDENT IN AN ORGANIZED HEALTH CARE EDUCATION/TRAINING PROGRAM

## 2023-01-30 RX ORDER — BISACODYL 10 MG
10 SUPPOSITORY, RECTAL RECTAL DAILY PRN
Qty: 30 SUPPOSITORY | Refills: 0 | Status: SHIPPED | OUTPATIENT
Start: 2023-01-30

## 2023-01-30 RX ADMIN — AMPICILLIN 2 G: 2 INJECTION, POWDER, FOR SOLUTION INTRAMUSCULAR; INTRAVENOUS at 04:01

## 2023-01-30 RX ADMIN — ESOMEPRAZOLE MAGNESIUM 40 MG: 10 GRANULE, FOR SUSPENSION, EXTENDED RELEASE ORAL at 08:01

## 2023-01-30 RX ADMIN — NYSTATIN: 100000 POWDER TOPICAL at 09:01

## 2023-01-30 RX ADMIN — CARVEDILOL 3.12 MG: 3.12 TABLET, FILM COATED ORAL at 08:01

## 2023-01-30 RX ADMIN — ASPIRIN 81 MG CHEWABLE TABLET 81 MG: 81 TABLET CHEWABLE at 08:01

## 2023-01-30 RX ADMIN — CALCIUM CARBONATE (ANTACID) CHEW TAB 500 MG 500 MG: 500 CHEW TAB at 08:01

## 2023-01-30 RX ADMIN — CARVEDILOL 3.12 MG: 3.12 TABLET, FILM COATED ORAL at 10:01

## 2023-01-30 RX ADMIN — BACLOFEN 15 MG: 5 TABLET ORAL at 03:01

## 2023-01-30 RX ADMIN — AMPICILLIN 2 G: 2 INJECTION, POWDER, FOR SOLUTION INTRAMUSCULAR; INTRAVENOUS at 05:01

## 2023-01-30 RX ADMIN — BACLOFEN 15 MG: 5 TABLET ORAL at 10:01

## 2023-01-30 RX ADMIN — PREDNISONE 10 MG: 10 TABLET ORAL at 08:01

## 2023-01-30 RX ADMIN — AMPICILLIN 2 G: 2 INJECTION, POWDER, FOR SOLUTION INTRAMUSCULAR; INTRAVENOUS at 09:01

## 2023-01-30 RX ADMIN — Medication: at 09:01

## 2023-01-30 RX ADMIN — LEVETIRACETAM 750 MG: 100 SOLUTION ORAL at 08:01

## 2023-01-30 RX ADMIN — AMPICILLIN 2 G: 2 INJECTION, POWDER, FOR SOLUTION INTRAMUSCULAR; INTRAVENOUS at 08:01

## 2023-01-30 RX ADMIN — AMPICILLIN 2 G: 2 INJECTION, POWDER, FOR SOLUTION INTRAMUSCULAR; INTRAVENOUS at 01:01

## 2023-01-30 RX ADMIN — LEVETIRACETAM 750 MG: 100 SOLUTION ORAL at 10:01

## 2023-01-30 NOTE — PLAN OF CARE
[facility]    FACILITY TRANSFER ORDERS      Patient Name: Hao Medrano  YOB: 1960    PCP: Keara Yanes NP   PCP Address: [address] [place] [address]  PCP Phone Number: [phone]  PCP Fax: [phone]    Encounter Date: 01/30/2023    Admit to: NH    Vital Signs:  Routine    Diagnoses:   Active Hospital Problems    Diagnosis  POA    *Dislodged gastrostomy tube [T85.528A]  Yes    Bacteremia [R78.81]  Yes    Type 2 diabetes mellitus, with long-term current use of insulin [E11.9, Z79.4]  Not Applicable    Leukocytosis [D72.829]  Yes    HTN (hypertension) [I10]  Yes    On antiepileptic therapy [Z79.899]  Not Applicable    UTI (urinary tract infection) [N39.0]  Yes    Multiple wounds of skin [T14.8XXA]  Yes    Anemia [D64.9]  Yes    Oropharyngeal dysphagia [R13.12]  Yes    Chronic kidney disease, stage III (moderate) [N18.30]  Yes      Resolved Hospital Problems   No resolved problems to display.       Allergies:Review of patient's allergies indicates:  No Known Allergies    Diet:  TUBE FEEDING  1. Continue EN recommendations of Isosource 1.5 @ goal of 55 ml/hr to provide 1980 kcals, 90 g of protein, and 1008 ml of fluid.   2. Add Beneprotein through tube for optimization of protein and calorie intake     Activities: Activity as tolerated    Goals of Care Treatment Preferences:  Code Status: Full Code      IV Antibiotic see dose and recommendation below    Nursing: ROUTINE     Labs: CBC and BMP  TWICE WEEKLY  and report to ID    Antibiotic Therapy Plan:       1) Infection: E faecalis bacteremia     2) Discharge Antibiotics:     Intravenous antibiotics:  Ampicillin 2 grams IV q 4 hours (may be given as continuous infusion)        3) Therapy Duration:  14 days     Estimated end date of IV antibiotics: 02/12/23     4) Outpatient Weekly Labs:     Order the following labs to be drawn on Mondays:   CBC  CMP      5) Fax Lab Results to Infectious Diseases Provider: Dr. Price      [facility] Fax Number: [phone]    CONSULTS:    Speech Therapy to evaluate and treat for Swallowing. and  to evaluate for community resources/long-range planning.    MISCELLANEOUS CARE:  PEG Care: Clean site every 24 hours. , Sheets Care: Empty Sheets bag every shift. Change Sheets every month., and Routine Skin for Bedridden Patients: Apply moisture barrier cream to all skin folds and wet areas in perineal area daily and after baths and all bowel movements.    WOUND CARE ORDERS  Yes: Pressure Ulcer(s) Stage III:  Location: [facility]    Consult ET nurse        Apply the following to wound:   Wet to Damp dressing: SEE wound care recommendation (frequency) and Foot Ulcer:  Location: toes    Dry or minimal exudate wound: Apply wound gel daily (supplied by [facility]), cover with telfa dressing  Triad to all open areas    Medications: Review discharge medications with patient and family and provide education.      Current Discharge Medication List        START taking these medications    Details   bisacodyL (DULCOLAX) 10 mg Supp Place 1 suppository (10 mg total) rectally daily as needed (Until bowel movement if patient has no bowel movement for 2 days).  Qty: 30 suppository, Refills: 0      sodium chloride 0.9 % PgBk 100 mL with ampicillin 2 gram SolR 2 g Inject 2 g into the vein every 4 (four) hours. for 15 days  Qty: 168 g, Refills: 0           CONTINUE these medications which have NOT CHANGED    Details   arginine-glutamine-calcium HMB (SHEREE) 7-7-1.5 gram PwPk 1 packet by Per G Tube route 2 (two) times daily.      aspirin 81 MG Chew 81 mg by Per G Tube route once daily.      baclofen (LIORESAL) 5 mg Tab tablet 15 mg by Per G Tube route 3 (three) times daily.      calcium carbonate (OS-OGLA) 500 mg calcium (1,250 mg) tablet 1,250 mg by Per G Tube route once daily.      carvediloL (COREG) 3.125 MG tablet 3.125 mg by Per G Tube route 2 (two) times daily.      esomeprazole (NEXIUM) 40 mg GrPS 40 mg by Per G  Tube route once daily.      insulin glargine-yfgn 100 unit/mL (3 mL) InPn Inject 15 Units into the skin once daily.      Lactobacillus rhamnosus GG (CULTURELLE) 10 billion cell capsule 2 capsules by Per G Tube route 2 (two) times a day.      levETIRAcetam (KEPPRA) 100 mg/mL Soln 750 mg by Per G Tube route 2 (two) times daily.      menthol/zinc oxide (CALMOSEPTINE TOP) Apply to the affected area twice daily as needed      nystatin (MYCOSTATIN) powder Apply to the affected area daily      predniSONE (DELTASONE) 10 MG tablet 10 mg by Per G Tube route once daily.                Immunizations Administered as of 1/30/2023       Name Date Dose VIS Date Route Exp Date    COVID-19, MRNA, LN-S, PF (Pfizer) (Purple Cap) 6/25/2021 -- 5/10/2021 Intramuscular --    Site: Right arm     : [employer]     Lot: [id number]     External: Auto Reconciled From Outside Source     Comment: Adminis             This patient has had both covid vaccinations    Some patients may experience side effects after vaccination.  These may include fever, headache, muscle or joint aches.  Most symptoms resolve with 24-48 hours and do not require urgent medical evaluation unless they persist for more than 72 hours or symptoms are concerning for an unrelated medical condition.          _________________________________  Divya Galvin MD  01/30/2023

## 2023-01-30 NOTE — PROGRESS NOTES
[facility]  Infectious Disease  Progress Note    Patient Name: Hao Medrano  MRN: [id number]  Admission Date: 1/25/2023  Length of Stay: 3 days  Attending Physician: Tommy Rhodes MD  Primary Care Provider: Keara Yanes NP    Isolation Status: No active isolations  Assessment/Plan:      Bacteremia  Patient is a 63 yo male with leukocytosis and bacteremia found on blood cultures. Blood cultures positive for Enterococcus faecalis with dislodged PEG tube the likely source.  Sensitivities reviewed.    Plan    1. Discontinue vancomycin.    2. Start IV ampicillin.  Will recommend 2 weeks of therapy.    3. Check TTE.  If positive for vegetation, please re-consult ID for recommendations.        Anticipated Disposition: TBD    Thank you for your consult. I will sign off. Please contact us if you have any additional questions.    Rogelio Price MD  Infectious Disease  [facility]    Subjective:     Principal Problem:Dislodged gastrostomy tube    HPI: Patient is a 63 yo male with a PMH of CVA with trach and G-tube, CKD, HTN, and HLD who initially presented with a dislodged PEG tube that has been replaced by surgery. During his admission, he was found with a leukocytosis of 23.17 and had blood cultures drawn that are now positive for enterococcus species. He also had a UA that was suspisious for UTI with 3+ leukocytes, many WBC, bacteria, and yeast seen.     ID was consulted to manage his bacteremia.    Interval History: No adverse events    Review of Systems   Unable to perform ROS: Patient nonverbal   Objective:     Vital Signs (Most Recent):  Temp: 98.1 °F (36.7 °C) (01/29/23 1707)  Pulse: 89 (01/29/23 2149)  Resp: 20 (01/29/23 2149)  BP: (!) 148/83 (01/29/23 1949)  SpO2: 99 % (01/29/23 2149)   Vital Signs (24h Range):  Temp:  [97.1 °F (36.2 °C)-98.2 °F (36.8 °C)] 98.1 °F (36.7 °C)  Pulse:  [79-92] 89  Resp:  [16-20] 20  SpO2:  [98 %-100 %] 99 %  BP: (128-160)/(63-84) 148/83      Weight: 71.7 kg (158 lb)  Body mass index is 21.43 kg/m².    Estimated Creatinine Clearance: 85.2 mL/min (based on SCr of 0.9 mg/dL).    Physical Exam  Constitutional:       General: He is not in acute distress.     Comments: Cachectic and chronically ill appearing.    HENT:      Head: Normocephalic and atraumatic.      Mouth/Throat:      Mouth: Mucous membranes are moist.   Eyes:      Pupils: Pupils are equal, round, and reactive to light.      Comments: Tracks with eyes   Cardiovascular:      Rate and Rhythm: Normal rate and regular rhythm.      Pulses: Normal pulses.      Heart sounds: Normal heart sounds. No murmur heard.  Abdominal:      General: Abdomen is flat. There is no distension.      Palpations: Abdomen is soft.      Comments: Breath sounds difficult to appreciate with trach   Musculoskeletal:      Right lower leg: No edema.      Left lower leg: No edema.   Skin:     General: Skin is warm and dry.      Capillary Refill: Capillary refill takes less than 2 seconds.   Neurological:      Mental Status: Mental status is at baseline.      Comments: Will blink and nod when asked questions       Significant Labs:   Microbiology Results (last 7 days)       Procedure Component Value Units Date/Time    Blood culture [[id number]] Collected: 01/27/23 1812    Order Status: Completed Specimen: Blood Updated: 01/29/23 2012     Blood Culture, Routine No Growth to date      No Growth to date      No Growth to date    Blood culture [[id number]] Collected: 01/27/23 1811    Order Status: Completed Specimen: Blood Updated: 01/29/23 2012     Blood Culture, Routine No Growth to date      No Growth to date      No Growth to date    Blood Culture #2 **CANNOT BE ORDERED STAT** [[id number]]  (Abnormal) Collected: 01/25/23 1556    Order Status: Completed Specimen: Blood from Peripheral, Forearm, Left Updated: 01/29/23 0908     Blood Culture, Routine Gram stain omar bottle: Gram positive cocci in chains resembling Strep      Results  called to and read back by: Marcy Hernández RN  01/26/2023  10:32      Gram stain aer bottle: Gram positive cocci      Positive results previously called 01/26/2023  11:57      ENTEROCOCCUS FAECALIS  For susceptibility see order #[id number]      Blood Culture #1 **CANNOT BE ORDERED STAT** [[id number]]  (Abnormal)  (Susceptibility) Collected: 01/25/23 1556    Order Status: Completed Specimen: Blood from Peripheral, Upper Arm, Right Updated: 01/29/23 0907     Blood Culture, Routine Gram stain aer bottle: Gram positive cocci in chains resembling Strep      Results called to and read back by: Marcy Hernández RN  01/26/2023  10:31      Gram stain omar bottle: Gram positive cocci      Positive results previously called 01/26/2023  11:58      ENTEROCOCCUS FAECALIS      COAGULASE-NEGATIVE STAPHYLOCOCCUS SPECIES  Organism is a probable contaminant      Urine culture [[id number]]  (Abnormal) Collected: 01/25/23 1550    Order Status: Completed Specimen: Urine Updated: 01/27/23 0231     Urine Culture, Routine KIRILL ALBICANS  > 100,000 cfu/ml  Treatment of asymptomatic candiduria is not recommended (except for   specific populations). Candida isolated in the urine typically   represents colonization. If an indwelling urinary catheter is present  it should be removed or replaced.      Narrative:      Specimen Source->Urine    Blood culture [[id number]]     Order Status: Canceled Specimen: Blood     Blood culture [[id number]]     Order Status: Canceled Specimen: Blood             Significant Imaging: I have reviewed all pertinent imaging results/findings within the past 24 hours.

## 2023-01-30 NOTE — ASSESSMENT & PLAN NOTE
Patient is a 63 yo male with leukocytosis and bacteremia found on blood cultures. Blood cultures positive for Enterococcus faecalis with dislodged PEG tube the likely source.  Sensitivities reviewed.    Plan    1. Discontinue vancomycin.    2. Start IV ampicillin.  Will recommend 2 weeks of therapy.    3. Check TTE.  If positive for vegetation, please re-consult ID for recommendations.

## 2023-01-30 NOTE — PLAN OF CARE
Recommendations  1. Continue EN recommendations of Isosource 1.5 @ goal of 55 ml/hr to provide 1980 kcals, 90 g of protein, and 1008 ml of fluid.   2. Add Beneprotein through tube for optimization of protein and calorie intake    3.  RD following     Goals: Meet % of EEN/EPN by RD f/u date  Nutrition Goal Status: goal met  Communication of RD Recs: POC

## 2023-01-30 NOTE — CONSULTS
[facility]  Adult Nutrition  Consult Note    SUMMARY     Recommendations  1. Continue EN recommendations of Isosource 1.5 @ goal of 55 ml/hr to provide 1980 kcals, 90 g of protein, and 1008 ml of fluid.   2. Add Beneprotein through tube for optimization of protein and calorie intake    3.  RD following    Goals: Meet % of EEN/EPN by RD f/u date  Nutrition Goal Status: goal met  Communication of RD Recs: POC    Assessment and Plan    Nutrition Problem  Increased protein/energy needs     Related to (etiology):   Physiological needs     Signs and Symptoms (as evidenced by):   G tube and wounds    Interventions (treatment strategy):  Collaboration of nutrition care w/ other providers     Nutrition Diagnosis Status:   New      Reason for Assessment    Reason For Assessment: consult  Diagnosis: dislodged gastrostomy tube  Relevant Medical History: No PMHx documented  Interdisciplinary Rounds: did not attend  General Information Comments: RD consulted for TF recommendations. G tube present, pt tolerating Isosource 1.5 @ goal rate @ 55 ml/hr provide 1980 kcals, 90 g of protein, and 1008 ml of fluid- meeting pt's needs. Unable to complete, NFPE, RD will complete NFPE at RD f/u. LBM noted-1/30/22  Nutrition Discharge Planning: pending clinical course    Nutrition Risk Screen    Nutrition Risk Screen: tube feeding or parenteral nutrition    Nutrition/Diet History    Spiritual, Cultural Beliefs, Scientology Practices, Values that Affect Care: no  Food Allergies: NKFA    Anthropometrics    Temp: 98.3 °F (36.8 °C)  Height Method: Estimated  Height: 6' (182.9 cm)  Height (inches): 72 in  Weight Method: Bed Scale  Weight: 71.7 kg (158 lb)  Weight (lb): 158 lb  Ideal Body Weight (IBW), Male: 178 lb  % Ideal Body Weight, Male (lb): 88.76 %  BMI (Calculated): 21.4       Lab/Procedures/Meds    Pertinent Labs Reviewed: reviewed  Pertinent Labs Comments: Glucose 134  Pertinent Medications Reviewed:  reviewed  Pertinent Medications Comments: calcium carbonate      Estimated/Assessed Needs    Weight Used For Calorie Calculations: 71.7 kg (158 lb 1.1 oz)  Energy Calorie Requirements (kcal): 1935  Energy Need Method: Kcal/kg (27 kcal/kg)  Protein Requirements: 93 g (1.3 g/kg)  Weight Used For Protein Calculations: 71.7 kg (158 lb 1.1 oz)        RDA Method (mL): 1935         Nutrition Prescription Ordered    Current Diet Order: NPO status    Evaluation of Received Nutrient/Fluid Intake    I/O: +3.5 L since admit  Energy Calories Required: meeting needs  Protein Required: meeting needs  Fluid Required: meeting needs  Total Fluid Intake (mL/kg): 1 ml or fluid per MD  Tolerance: tolerating  % Intake of Estimated Energy Needs: 0 - 25 %  % Meal Intake: NPO    Nutrition Risk    Level of Risk/Frequency of Follow-up: low ((1 x/week))       Monitor and Evaluation    Food and Nutrient Intake: energy intake, food and beverage intake  Food and Nutrient Adminstration: diet order, enteral and parenteral nutrition administration  Knowledge/Beliefs/Attitudes: food and nutrition knowledge/skill, beliefs and attitudes  Physical Activity and Function: nutrition-related ADLs and IADLs, factors affecting access to physical activity  Anthropometric Measurements: height/length, weight, body mass index, growth pattern indices/percentile ranks, weight change  Biochemical Data, Medical Tests and Procedures: electrolyte and renal panel, gastrointestinal profile, glucose/endocrine profile, inflammatory profile, lipid profile  Nutrition-Focused Physical Findings: overall appearance, extremities, muscles and bones, head and eyes, skin       Nutrition Follow-Up    RD Follow-up?: Yes

## 2023-01-30 NOTE — CONSULTS
Vancomycin order and consult has been discontinued. Pharmacy will sign off. Please re-consult if needed.     Thanks  Priyanka Portillo PharmD

## 2023-01-30 NOTE — RESPIRATORY THERAPY
RAPID RESPONSE RESPIRATORY THERAPY PROACTIVE NOTE           Time of visit: 842     Code Status: Full Code   : 1960  Bed: 1155/1155 A:   MRN: [id number]  Time spent at the bedside: < 15 min    SITUATION    Evaluated patient for: LDA Check     BACKGROUND    Patient has no past medical history on file.  Clinically Significant Surgical Hx: tracheostomy    24 Hours Vitals Range:  Temp:  [97.3 °F (36.3 °C)-98.3 °F (36.8 °C)]   Pulse:  [83-92]   Resp:  [17-20]   BP: (124-160)/(68-84)   SpO2:  [96 %-100 %]     Labs:    Recent Labs     23  0232 23  0224    136   K 3.9 4.0    101   CO2 24 25   CREATININE 0.9 0.8   * 134*   PHOS 2.7 2.3*   MG 1.7 1.5*        No results for input(s): PH, PCO2, PO2, HCO3, POCSATURATED, BE in the last 72 hours.    ASSESSMENT/INTERVENTIONS  Pt resting in bed, no s/s of distress noted upon exam. All emergency supplies available.      Last VS   Temp: 98.3 °F (36.8 °C) (852)  Pulse: 90 (852)  Resp: 18 (852)  BP: 124/68 (852)  SpO2: 96 % (852)      Extra trachs at bedside: 6.0 & 5.0 Shiley XLT cuffed  Level of Consciousness: Level of Consciousness (AVPU): alert  Respiratory Effort: Respiratory Effort: Unlabored Expansion/Accessory Muscle Usage: Expansion/Accessory Muscles/Retractions: expansion symmetric  All Lung Field Breath Sounds: All Lung Fields Breath Sounds: Anterior:, coarse  O2 Device/Concentration: trach collar 5L/28%  Surgical airway: Yes, Type: Shiley Size: 6 XLT, cuffed  Ambu at bedside: Ambu bag with the patient?: Yes, Adult Ambu     Active Orders   Respiratory Care    Inhalation Treatment Q4H PRN     Frequency: Q4H PRN     Number of Occurrences: Until Specified    Oxygen PRN     Frequency: PRN     Number of Occurrences: Until Specified     Order Questions:      Device type: Low flow      Device: Trach Collar      Titrate O2 per Oxygen Titration Protocol: Yes      To maintain SpO2 goal of: >= 90%      Notify MD of:  Inability to achieve desired SpO2; Sudden change in patient status and requires 20% increase in FiO2; Patient requires >60% FiO2    Pulse Oximetry Q4H     Frequency: Q4H     Number of Occurrences: Until Specified    Routine tracheostomy care     Frequency: BID     Number of Occurrences: Until Specified       RECOMMENDATIONS    We recommend: RRT Recs: Continue POC per primary team.      FOLLOW-UP    Please call back the Rapid Response RT, Stef Benites, RRT at x [phone] for any questions or concerns.

## 2023-01-30 NOTE — DISCHARGE SUMMARY
[facility] Observ[facility] [address] [place] [address] Medicine  Discharge Summary      Patient Name: Hao Medrano  MRN: [id number]  MIGUELINA: [id number]  Patient Class: IP- Inpatient  Admission Date: 1/25/2023  Hospital Length of Stay: 17 days  Discharge Date and Time:  02/12/2023 10:30 AM  Attending Physician: Marcello Vinson III,*   Discharging Provider: Marcello Vinson III, MD  Primary Care Provider: Keara Yanes NP  Hospital Medicine Team: [facility] [place] [facility] MED  Tommy Rhodes MD  Primary Care Team: [facility]    HPI:   Hao Medrano is a 62 y.o. male with a past medical history of CVA with trach and G-tube, CKD, HTN, and HLD who is being palced in observation for further management of dislodged peg tube. Patient presented to the ED from [facility]. Patient is nonverbal, history obtained from chart and ED staff. Per the ED, the nursing home reports they noticed the dislodged ped tube around 0400 this am. Patient was recently admitted on 1/10 for same issue with a new peg placed on 1/12.    ED: vital signs stable, afebrile. Leukocytosis of 23K. Hb 11.4. PT/INR 10/9/1.0. Furhter labs pending at time of admission. CXR with inconclusive findings due to patient position. Blood cultures obtained. GI consulted and plan for EGD with G-tube placement tomorrow.       Procedure(s) (LRB):  EGD (ESOPHAGOGASTRODUODENOSCOPY) (N/A)      Hospital Course:   Patient was admitted for dislodged PEG tube. GI was consulted and he underwent 20 F PEG tube placed without any immediate complications. External bumper at 3 cm shelby. During the course of this admission serial routine labs with mild hyperglycemia, mild anemia, persistent leukocytosis. He was found to have positive blood Culture with enterococcus Faecalis and coagulase negative staph. No vegetation noted on TTE. ID was consulted and was started on IV antibiotic and outpt recs made by ID. Unable to get outpt Abx covered by insurance so last day of IV ampicillin  2/11/23. Leukocytosis through most of admission but likely related to steroids. WBC WNL on discharge labs. Morning of 2/9, pt found with large amount of bright red blood in bed. Source thought to be multiple excoriations in gluteal/perineal region oozing blood actively on exam. Pressure dressing applied with hemostasis. Hb and vitals monitored closely and stable. Rectal exam with no hemorrhoids noted or palpated. Brown stool on glove, no evidence of GI bleed. Aspirin held temporarily held for active bleeding. Wound care consulted. Vistaril ordered due to pruritis. No further bleeding noted. Hb trended down but likely represented blood loss from 2/9/23. I called the pt's wife morning of 2/12/23 and reviewed pt's hospitalization in detail. Discussed risk of bleeding with aspirin vs benefit of further stroke prevention. Wife wishes to proceed with Aspirin therapy. Discussed code status and goals of care at length with wife including Full code vs DNR vs Palliative or Hospice. Currently, wife has elected to proceed with full code. Plan for discharge back to the nursing home and follow up with ID, PCP. Recommend close monitoring for bleeding on discharge. Repeat labs 2/14/23.    Of note, pt was started on prednisone previous admission on 1/12/23. I was unable to identify indication. Contacted pharmacy and they were unable to identify indication either. Will start to titrate down dose and recommend further taper at facility. Will provide instructions.       Pt completed IV antibiotics in the hospital. Labs ordered for 2/14/23    Fax Lab Results to Infectious Diseases Provider: Dr. Price     [facility] Fax Number: [phone]     Outpatient Infectious Diseases Follow-up      Follow-up appointment will be arranged by the ID clinic and will be found in the patient's appointments tab.   Prior to discharge, please ensure the patient's follow-up has been scheduled.     If there is still no follow-up scheduled prior to  discharge, please send an EPIC message to Lauren Stiles in Infectious Diseases.    The patient's chronic medical conditions were managed appropriately. Discharge plan of care was discussed at length with patient's wife.            Goals of Care Treatment Preferences:  Code Status: Full Code      Consults:   Consults (From admission, onward)        Status Ordering Provider     Inpatient consult to Midline team  Once        Provider:  (Not yet assigned)    Completed KIRA RINALDI     [facility]  Once        Provider:  (Not yet assigned)    Completed ROMERO UP     Inpatient consult to Registered Dietitian/Nutritionist  Once        Provider:  (Not yet assigned)    Completed ROMERO UP     Inpatient consult to Infectious Diseases  Once        Provider:  (Not yet assigned)    [facility]     [facility] PharmD Consult  Once        Provider:  (Not yet assigned)    Completed EDMUNDO JAY     Inpatient consult to Gastroenterology  Once        Provider:  (Not yet assigned)    Completed ZACHARY REYNOLDS          Multiple wounds of skin  EXCORIATIONS, improved and bleeding resolved    Cont wound care on dc, see Nursing orders note.      On antiepileptic therapy  - continue keppra 750 mg BID.      HTN (hypertension)  - BP well controlled upon admission  - continue coreg, increased to 6.25 mg b.i.d. but bp low nml. Resumed previous dose of 3.125mg BID, continue 3.125mg bid on dc      Type 2 diabetes mellitus, with long-term current use of insulin  A1c 7.1. Cont Home glagine 15 units and SSI    Anemia  - etiology likely due to anemia of chronic disease.  - monitor skin excoriations closely and follow wound care recs.  - Pt remains on ASA for stroke prevention. Monitor closely for bleeding. Repeat labs ordered 2/14/23        Final Active Diagnoses:    Diagnosis Date Noted POA    Multiple wounds of skin [T14.8XXA] 01/25/2023 Yes    Type 2 diabetes mellitus, with  long-term current use of insulin [E11.9, Z79.4] 01/25/2023 Not Applicable    HTN (hypertension) [I10] 01/25/2023 Yes    On antiepileptic therapy [Z79.899] 01/25/2023 Not Applicable    Anemia [D64.9] 01/10/2023 Yes    Oropharyngeal dysphagia [R13.12] 01/10/2023 Yes    Chronic kidney disease, stage III (moderate) [N18.30] 07/12/2018 Yes      Problems Resolved During this Admission:    Diagnosis Date Noted Date Resolved POA    PRINCIPAL PROBLEM:  Dislodged gastrostomy tube [T85.528A] 01/10/2023 02/12/2023 Yes    Bacteremia [R78.81] 01/26/2023 02/12/2023 Yes    Leukocytosis [D72.829] 01/25/2023 02/12/2023 Yes    UTI (urinary tract infection) [N39.0] 01/25/2023 02/12/2023 Yes       Discharged Condition: stable    Disposition: Home or Self Care    Follow Up:   Follow-up Information     Keara Yanes NP Follow up in 1 week(s).    Specialty: Family Medicine  Contact information:  [address] [place] [id number]  [phone]             INFECTIOUS DISEASE Follow up in 1 week(s).                     Patient Instructions:      CBC W/ AUTO DIFFERENTIAL   Standing Status: Future Standing Exp. Date: 04/12/24     Notify your health care provider if you experience any of the following:  temperature >100.4     Notify your health care provider if you experience any of the following:  persistent nausea and vomiting or diarrhea     Notify your health care provider if you experience any of the following:  difficulty breathing or increased cough     Notify your health care provider if you experience any of the following:  increased confusion or weakness     Notify your health care provider if you experience any of the following:  redness, tenderness, or signs of infection (pain, swelling, redness, odor or green/yellow discharge around incision site)     Notify your health care provider if you experience any of the following:  worsening rash     Leave dressing on - Keep it clean, dry, and intact until clinic visit      Change dressing (specify)   Order Comments: See Nursing home orders note     Tube Feedings/Formulas   Order Comments: 250 ml free water q 6 hours     Order Specific Question Answer Comments   Select Adult Formula: Isosource 1.5 nathaniel    Route: Gastrostomy    Formula Rate (mL/hr): 55      Activity as tolerated       Significant Diagnostic Studies: See hospital course    Pending Diagnostic Studies:     None         Medications:  Reconciled Home Medications:      Medication List      START taking these medications    atorvastatin 40 MG tablet  Commonly known as: LIPITOR  1 tablet (40 mg total) by Per G Tube route every evening.     bisacodyL 10 mg Supp  Commonly known as: DULCOLAX  Place 1 suppository (10 mg total) rectally daily as needed (Until bowel movement if patient has no bowel movement for 2 days).     fenofibrate 160 MG Tab  Take 1 tablet (160 mg total) by mouth once daily.     hydrOXYzine 10 mg/5 mL syrup  Commonly known as: ATARAX  12.5 mLs (25 mg total) by Per G Tube route 3 (three) times daily.        CHANGE how you take these medications    predniSONE 5 MG tablet  Commonly known as: DELTASONE  Take 1 tablet (5 mg total) by Per G Tube route once daily for 3 days, THEN 0.5 tablets (2.5 mg total) once daily for 3 days.  Start taking on: February 12, 2023  What changed:   · medication strength  · See the new instructions.        CONTINUE taking these medications    aspirin 81 MG Chew  81 mg by Per G Tube route once daily.     baclofen 5 mg Tab tablet  Commonly known as: LIORESAL  15 mg by Per G Tube route 3 (three) times daily.     calcium carbonate 500 mg calcium (1,250 mg) tablet  Commonly known as: OS-NATHANIEL  1,250 mg by Per G Tube route once daily.     CALMOSEPTINE TOP  Apply to the affected area twice daily as needed     carvediloL 3.125 MG tablet  Commonly known as: COREG  3.125 mg by Per G Tube route 2 (two) times daily.     esomeprazole 40 mg Grps  Commonly known as: NEXIUM  40 mg by Per G Tube route  once daily.     insulin glargine-yfgn 100 unit/mL (3 mL) Inpn  Inject 15 Units into the skin once daily.     SHEREE 7-7-1.5 gram Pwpk  Generic drug: arginine-glutamine-calcium HMB  1 packet by Per G Tube route 2 (two) times daily.     Lactobacillus rhamnosus GG 10 billion cell capsule  Commonly known as: CULTURELLE  2 capsules by Per G Tube route 2 (two) times a day.     levETIRAcetam 100 mg/mL Soln  Commonly known as: KEPPRA  750 mg by Per G Tube route 2 (two) times daily.     nystatin powder  Commonly known as: MYCOSTATIN  Apply to the affected area daily            Indwelling Lines/Drains at time of discharge:   Lines/Drains/Airways     Drain  Duration                Gastrostomy/Enterostomy 01/12/23 1055 Percutaneous endoscopic gastrostomy (PEG) midline feeding 18 days         Gastrostomy/Enterostomy 01/26/23 LUQ 4 days          Airway  Duration           Adult Surgical Airway 01/10/23 0800 [place] Extra Large Cuffed Proximal 6.0/ 75mm 20 days                Time spent on the discharge of patient: 45 minutes         Marcello Vinson III, MD  Department of Hospital Medicine  [facility] 11H

## 2023-01-30 NOTE — SUBJECTIVE & OBJECTIVE
Interval History: No adverse events    Review of Systems   Unable to perform ROS: Patient nonverbal   Objective:     Vital Signs (Most Recent):  Temp: 98.1 °F (36.7 °C) (01/29/23 1707)  Pulse: 89 (01/29/23 2149)  Resp: 20 (01/29/23 2149)  BP: (!) 148/83 (01/29/23 1949)  SpO2: 99 % (01/29/23 2149)   Vital Signs (24h Range):  Temp:  [97.1 °F (36.2 °C)-98.2 °F (36.8 °C)] 98.1 °F (36.7 °C)  Pulse:  [79-92] 89  Resp:  [16-20] 20  SpO2:  [98 %-100 %] 99 %  BP: (128-160)/(63-84) 148/83     Weight: 71.7 kg (158 lb)  Body mass index is 21.43 kg/m².    Estimated Creatinine Clearance: 85.2 mL/min (based on SCr of 0.9 mg/dL).    Physical Exam  Constitutional:       General: He is not in acute distress.     Comments: Cachectic and chronically ill appearing.    HENT:      Head: Normocephalic and atraumatic.      Mouth/Throat:      Mouth: Mucous membranes are moist.   Eyes:      Pupils: Pupils are equal, round, and reactive to light.      Comments: Tracks with eyes   Cardiovascular:      Rate and Rhythm: Normal rate and regular rhythm.      Pulses: Normal pulses.      Heart sounds: Normal heart sounds. No murmur heard.  Abdominal:      General: Abdomen is flat. There is no distension.      Palpations: Abdomen is soft.      Comments: Breath sounds difficult to appreciate with trach   Musculoskeletal:      Right lower leg: No edema.      Left lower leg: No edema.   Skin:     General: Skin is warm and dry.      Capillary Refill: Capillary refill takes less than 2 seconds.   Neurological:      Mental Status: Mental status is at baseline.      Comments: Will blink and nod when asked questions       Significant Labs:   Microbiology Results (last 7 days)       Procedure Component Value Units Date/Time    Blood culture [[id number]] Collected: 01/27/23 1812    Order Status: Completed Specimen: Blood Updated: 01/29/23 2012     Blood Culture, Routine No Growth to date      No Growth to date      No Growth to date    Blood culture [[id number]]  Collected: 01/27/23 1811    Order Status: Completed Specimen: Blood Updated: 01/29/23 2012     Blood Culture, Routine No Growth to date      No Growth to date      No Growth to date    Blood Culture #2 **CANNOT BE ORDERED STAT** [[id number]]  (Abnormal) Collected: 01/25/23 1556    Order Status: Completed Specimen: Blood from Peripheral, Forearm, Left Updated: 01/29/23 0908     Blood Culture, Routine Gram stain omar bottle: Gram positive cocci in chains resembling Strep      Results called to and read back by: Marcy Hernández RN  01/26/2023  10:32      Gram stain aer bottle: Gram positive cocci      Positive results previously called 01/26/2023  11:57      ENTEROCOCCUS FAECALIS  For susceptibility see order #[id number]      Blood Culture #1 **CANNOT BE ORDERED STAT** [[id number]]  (Abnormal)  (Susceptibility) Collected: 01/25/23 1556    Order Status: Completed Specimen: Blood from Peripheral, Upper Arm, Right Updated: 01/29/23 0907     Blood Culture, Routine Gram stain aer bottle: Gram positive cocci in chains resembling Strep      Results called to and read back by: Marcy Hernández RN  01/26/2023  10:31      Gram stain omar bottle: Gram positive cocci      Positive results previously called 01/26/2023  11:58      ENTEROCOCCUS FAECALIS      COAGULASE-NEGATIVE STAPHYLOCOCCUS SPECIES  Organism is a probable contaminant      Urine culture [[id number]]  (Abnormal) Collected: 01/25/23 1550    Order Status: Completed Specimen: Urine Updated: 01/27/23 1759     Urine Culture, Routine KIRILL ALBICANS  > 100,000 cfu/ml  Treatment of asymptomatic candiduria is not recommended (except for   specific populations). Candida isolated in the urine typically   represents colonization. If an indwelling urinary catheter is present  it should be removed or replaced.      Narrative:      Specimen Source->Urine    Blood culture [[id number]]     Order Status: Canceled Specimen: Blood     Blood culture [[id number]]     Order Status: Canceled  Specimen: Blood             Significant Imaging: I have reviewed all pertinent imaging results/findings within the past 24 hours.

## 2023-01-30 NOTE — PLAN OF CARE
Outpatient Antibiotic Therapy Plan:    Please send referral to [facility].    1) Infection: E faecalis bacteremia    2) Discharge Antibiotics:    Intravenous antibiotics:  Ampicillin 2 grams IV q 4 hours (may be given as continuous infusion)      3) Therapy Duration:  14 days    Estimated end date of IV antibiotics: 02/24/23    4) Outpatient Weekly Labs:    Order the following labs to be drawn on Mondays:   CBC  CMP     5) Fax Lab Results to Infectious Diseases Provider: Dr. Price    [facility] Fax Number: [phone]    6) Outpatient Infectious Diseases Follow-up    Follow-up appointment will be arranged by the ID clinic and will be found in the patient's appointments tab.    Prior to discharge, please ensure the patient's follow-up has been scheduled.    If there is still no follow-up scheduled prior to discharge, please send an EPIC message to Lauren Stiles in Infectious Diseases.

## 2023-01-30 NOTE — PLAN OF CARE
[facility] not [facility] pt on IV antibiotics. Referrals sent to LTACs via careport filtered by payer.      01/30/23 1218   Post-Acute Status   Post-Acute Authorization Placement   Post-Acute Placement Status Referrals Sent   Discharge Plan   Discharge Plan A Long-term acute care facility (LTAC)   Discharge Plan B Long-term acute care facility (LTAC)     Elie Burgos RN,BSN

## 2023-01-30 NOTE — PLAN OF CARE
Pt accepted to [facility] pending authorization. Pt's wife called and updated.      01/30/23 8412   Post-Acute Status   Post-Acute Authorization Placement   Post-Acute Placement Status Pending payor review/awaiting authorization (if required)   Discharge Plan   Discharge Plan A Long-term acute care facility (LTAC)   Discharge Plan B Long-term acute care facility (LTAC)     Elie Burgos RN,BSN

## 2023-01-31 LAB
ALBUMIN SERPL BCP-MCNC: 2 G/DL (ref 3.5–5.2)
ALP SERPL-CCNC: 149 U/L (ref 55–135)
ALT SERPL W/O P-5'-P-CCNC: 13 U/L (ref 10–44)
ANION GAP SERPL CALC-SCNC: 7 MMOL/L (ref 8–16)
AST SERPL-CCNC: 17 U/L (ref 10–40)
BASOPHILS # BLD AUTO: 0.05 K/UL (ref 0–0.2)
BASOPHILS NFR BLD: 0.3 % (ref 0–1.9)
BILIRUB SERPL-MCNC: 0.1 MG/DL (ref 0.1–1)
BUN SERPL-MCNC: 20 MG/DL (ref 8–23)
CALCIUM SERPL-MCNC: 9.4 MG/DL (ref 8.7–10.5)
CHLORIDE SERPL-SCNC: 100 MMOL/L (ref 95–110)
CO2 SERPL-SCNC: 28 MMOL/L (ref 23–29)
CREAT SERPL-MCNC: 0.8 MG/DL (ref 0.5–1.4)
DIFFERENTIAL METHOD: ABNORMAL
EOSINOPHIL # BLD AUTO: 1.8 K/UL (ref 0–0.5)
EOSINOPHIL NFR BLD: 11.7 % (ref 0–8)
ERYTHROCYTE [DISTWIDTH] IN BLOOD BY AUTOMATED COUNT: 15.9 % (ref 11.5–14.5)
EST. GFR  (NO RACE VARIABLE): >60 ML/MIN/1.73 M^2
GLUCOSE SERPL-MCNC: 100 MG/DL (ref 70–110)
HCT VFR BLD AUTO: 32.2 % (ref 40–54)
HGB BLD-MCNC: 9.9 G/DL (ref 14–18)
IMM GRANULOCYTES # BLD AUTO: 0.1 K/UL (ref 0–0.04)
IMM GRANULOCYTES NFR BLD AUTO: 0.7 % (ref 0–0.5)
LYMPHOCYTES # BLD AUTO: 2.2 K/UL (ref 1–4.8)
LYMPHOCYTES NFR BLD: 14.5 % (ref 18–48)
MAGNESIUM SERPL-MCNC: 1.6 MG/DL (ref 1.6–2.6)
MCH RBC QN AUTO: 27 PG (ref 27–31)
MCHC RBC AUTO-ENTMCNC: 30.7 G/DL (ref 32–36)
MCV RBC AUTO: 88 FL (ref 82–98)
MONOCYTES # BLD AUTO: 0.9 K/UL (ref 0.3–1)
MONOCYTES NFR BLD: 5.7 % (ref 4–15)
NEUTROPHILS # BLD AUTO: 10.1 K/UL (ref 1.8–7.7)
NEUTROPHILS NFR BLD: 67.1 % (ref 38–73)
NRBC BLD-RTO: 0 /100 WBC
PHOSPHATE SERPL-MCNC: 2.4 MG/DL (ref 2.7–4.5)
PLATELET # BLD AUTO: 478 K/UL (ref 150–450)
PMV BLD AUTO: 9.8 FL (ref 9.2–12.9)
POCT GLUCOSE: 165 MG/DL (ref 70–110)
POCT GLUCOSE: 229 MG/DL (ref 70–110)
POCT GLUCOSE: 281 MG/DL (ref 70–110)
POCT GLUCOSE: 93 MG/DL (ref 70–110)
POTASSIUM SERPL-SCNC: 3.9 MMOL/L (ref 3.5–5.1)
PROT SERPL-MCNC: 7.2 G/DL (ref 6–8.4)
RBC # BLD AUTO: 3.67 M/UL (ref 4.6–6.2)
SODIUM SERPL-SCNC: 135 MMOL/L (ref 136–145)
WBC # BLD AUTO: 15 K/UL (ref 3.9–12.7)

## 2023-01-31 PROCEDURE — 25000003 PHARM REV CODE 250: Performed by: FAMILY MEDICINE

## 2023-01-31 PROCEDURE — 99232 SBSQ HOSP IP/OBS MODERATE 35: CPT | Mod: ,,, | Performed by: FAMILY MEDICINE

## 2023-01-31 PROCEDURE — 80053 COMPREHEN METABOLIC PANEL: CPT | Performed by: STUDENT IN AN ORGANIZED HEALTH CARE EDUCATION/TRAINING PROGRAM

## 2023-01-31 PROCEDURE — 84100 ASSAY OF PHOSPHORUS: CPT | Performed by: STUDENT IN AN ORGANIZED HEALTH CARE EDUCATION/TRAINING PROGRAM

## 2023-01-31 PROCEDURE — 85025 COMPLETE CBC W/AUTO DIFF WBC: CPT | Performed by: STUDENT IN AN ORGANIZED HEALTH CARE EDUCATION/TRAINING PROGRAM

## 2023-01-31 PROCEDURE — 63600175 PHARM REV CODE 636 W HCPCS: Performed by: FAMILY MEDICINE

## 2023-01-31 PROCEDURE — 99900035 HC TECH TIME PER 15 MIN (STAT)

## 2023-01-31 PROCEDURE — 99232 PR SUBSEQUENT HOSPITAL CARE,LEVL II: ICD-10-PCS | Mod: ,,, | Performed by: FAMILY MEDICINE

## 2023-01-31 PROCEDURE — 36415 COLL VENOUS BLD VENIPUNCTURE: CPT | Performed by: STUDENT IN AN ORGANIZED HEALTH CARE EDUCATION/TRAINING PROGRAM

## 2023-01-31 PROCEDURE — 27000221 HC OXYGEN, UP TO 24 HOURS

## 2023-01-31 PROCEDURE — 11000001 HC ACUTE MED/SURG PRIVATE ROOM

## 2023-01-31 PROCEDURE — 83735 ASSAY OF MAGNESIUM: CPT | Performed by: STUDENT IN AN ORGANIZED HEALTH CARE EDUCATION/TRAINING PROGRAM

## 2023-01-31 PROCEDURE — 94761 N-INVAS EAR/PLS OXIMETRY MLT: CPT

## 2023-01-31 PROCEDURE — 99900026 HC AIRWAY MAINTENANCE (STAT)

## 2023-01-31 RX ADMIN — CALCIUM CARBONATE (ANTACID) CHEW TAB 500 MG 500 MG: 500 CHEW TAB at 08:01

## 2023-01-31 RX ADMIN — CARVEDILOL 3.12 MG: 3.12 TABLET, FILM COATED ORAL at 08:01

## 2023-01-31 RX ADMIN — ESOMEPRAZOLE MAGNESIUM 40 MG: 10 GRANULE, FOR SUSPENSION, EXTENDED RELEASE ORAL at 08:01

## 2023-01-31 RX ADMIN — AMPICILLIN 2 G: 2 INJECTION, POWDER, FOR SOLUTION INTRAMUSCULAR; INTRAVENOUS at 08:01

## 2023-01-31 RX ADMIN — Medication: at 08:01

## 2023-01-31 RX ADMIN — AMPICILLIN 2 G: 2 INJECTION, POWDER, FOR SOLUTION INTRAMUSCULAR; INTRAVENOUS at 04:01

## 2023-01-31 RX ADMIN — ASPIRIN 81 MG CHEWABLE TABLET 81 MG: 81 TABLET CHEWABLE at 08:01

## 2023-01-31 RX ADMIN — LEVETIRACETAM 750 MG: 100 SOLUTION ORAL at 08:01

## 2023-01-31 RX ADMIN — NYSTATIN: 100000 POWDER TOPICAL at 08:01

## 2023-01-31 RX ADMIN — AMPICILLIN 2 G: 2 INJECTION, POWDER, FOR SOLUTION INTRAMUSCULAR; INTRAVENOUS at 12:01

## 2023-01-31 RX ADMIN — AMPICILLIN 2 G: 2 INJECTION, POWDER, FOR SOLUTION INTRAMUSCULAR; INTRAVENOUS at 05:01

## 2023-01-31 RX ADMIN — PREDNISONE 10 MG: 10 TABLET ORAL at 08:01

## 2023-01-31 RX ADMIN — BACLOFEN 15 MG: 5 TABLET ORAL at 09:01

## 2023-01-31 RX ADMIN — BACLOFEN 15 MG: 5 TABLET ORAL at 10:01

## 2023-01-31 RX ADMIN — BACLOFEN 15 MG: 5 TABLET ORAL at 05:01

## 2023-01-31 NOTE — DISCHARGE SUMMARY
DISCHARGE SUMMARY  Hospital Medicine    Team: [facility]    Patient Name: Hao Medrano  YOB: 1960    Admit Date: 1/10/2023    Discharge Date: 1/16/2023    Discharge Attending Physician: Suyapa Guzmán     Admitting Resident    Diagnoses:  Active Hospital Problems    Diagnosis  POA    *Dislodged gastrostomy tube [T85.528A]  Yes    HLD (hyperlipidemia) [E78.5]  Yes    Anemia [D64.9]  Yes    Oropharyngeal dysphagia [R13.12]  Yes    Chronic kidney disease, stage III (moderate) [N18.30]  Yes      Resolved Hospital Problems   No resolved problems to display.       Discharged Condition: admit problems have stabilized       HOSPITAL COURSE:      Initial Presentation:    Hao Medrano is a 62 y.o. male with a past medical history of CVA with subsequent trach/PEG, CKD3b, and HLD who was transferred from his nursing home for evaluation of PEG tube removal. Per ED note, patient's friend reports that they are unsure how log the peg tube has been dislodged and they are unsure how it happened. They stated that patient is at his baseline which is non-communicative.      ED: tachycardic up to 117, otherwise vital signs stable, afebile. CBC with anemia, Hb 12.2. No leukocytosis. Cr 1.3, glucose 122.  XR of g-tube ordered and pending. ED provider attempted to replace PEG tube with a Sheets but unsuccessful after four attempts with three different sized 3 tubes. GI consulted.        Course of Principle Problem for Admission:    Dislodged gastrostomy tube  Patient transferred from nursing home due to dislodged PEG tube. Unsure of how long the peg tube has been dislodged.  ED providers attempted four times to replaced peg tube with three different sized g tubes and were unsuccessful  - GI consulted and replaced percutaneously      CONSULTS: GI    Last CBC/BMP/HgbA1c (if applicable):  Recent Results (from the past 336 hour(s))   CBC Auto Differential    Collection Time: 01/31/23  2:31 AM   Result Value Ref Range     WBC 15.00 (H) 3.90 - 12.70 K/uL    Hemoglobin 9.9 (L) 14.0 - 18.0 g/dL    Hematocrit 32.2 (L) 40.0 - 54.0 %    Platelets 478 (H) 150 - 450 K/uL   CBC Auto Differential    Collection Time: 01/30/23  2:24 AM   Result Value Ref Range    WBC 16.71 (H) 3.90 - 12.70 K/uL    Hemoglobin 10.1 (L) 14.0 - 18.0 g/dL    Hematocrit 33.4 (L) 40.0 - 54.0 %    Platelets 459 (H) 150 - 450 K/uL   CBC Auto Differential    Collection Time: 01/29/23  2:32 AM   Result Value Ref Range    WBC 16.34 (H) 3.90 - 12.70 K/uL    Hemoglobin 9.6 (L) 14.0 - 18.0 g/dL    Hematocrit 33.1 (L) 40.0 - 54.0 %    Platelets 462 (H) 150 - 450 K/uL     No results found for this or any previous visit (from the past 336 hour(s)).  Lab Results   Component Value Date    HGBA1C 5.9 (H) 07/12/2018         Disposition:  NH      Future Scheduled Appointments:  No future appointments.    Follow-up Plans from This Hospitalization:   PCP    Discharge Medication List:        Medication List        CONTINUE taking these medications      aspirin 81 MG Chew     baclofen 5 mg Tab tablet  Commonly known as: LIORESAL     calcium carbonate 500 mg calcium (1,250 mg) tablet  Commonly known as: OS-OLGA     CALMOSEPTINE TOP     carvediloL 3.125 MG tablet  Commonly known as: COREG     esomeprazole 40 mg Grps  Commonly known as: NEXIUM     insulin glargine-yfgn 100 unit/mL (3 mL) Inpn     SHEREE 7-7-1.5 gram Pwpk  Generic drug: arginine-glutamine-calcium HMB     Lactobacillus rhamnosus GG 10 billion cell capsule  Commonly known as: CULTURELLE     levETIRAcetam 100 mg/mL Soln  Commonly known as: KEPPRA     nystatin powder  Commonly known as: MYCOSTATIN     predniSONE 10 MG tablet  Commonly known as: DELTASONE             Patient Instructions:  Discharge Procedure Orders   Ambulatory referral/consult to Wound Clinic   Standing Status: Future   Referral Priority: Routine Referral Type: Consultation   Referral Reason: Specialty Services Required   Requested Specialty: Wound Care   Number  of Visits Requested: 1       Signing Physician:  Suyapa Guzmán MD

## 2023-01-31 NOTE — SUBJECTIVE & OBJECTIVE
Interval History:   No acute event, no spike of fever, non verbal, unable to assess for acute concern.    Review of Systems   Unable to perform ROS: Patient nonverbal   Objective:     Vital Signs (Most Recent):  Temp: 98.2 °F (36.8 °C) (01/31/23 0308)  Pulse: 89 (01/31/23 0328)  Resp: 18 (01/31/23 0328)  BP: 130/64 (01/31/23 0308)  SpO2: 98 % (01/31/23 0328) Vital Signs (24h Range):  Temp:  [98 °F (36.7 °C)-98.5 °F (36.9 °C)] 98.2 °F (36.8 °C)  Pulse:  [82-92] 89  Resp:  [16-20] 18  SpO2:  [96 %-100 %] 98 %  BP: (111-140)/(59-77) 130/64     Weight: 71.7 kg (158 lb)  Body mass index is 21.43 kg/m².    Intake/Output Summary (Last 24 hours) at 1/31/2023 0800  Last data filed at 1/31/2023 0554  Gross per 24 hour   Intake 850 ml   Output 1500 ml   Net -650 ml      Physical Exam  Vitals and nursing note reviewed.   Constitutional:       General: He is not in acute distress.  HENT:      Head: Normocephalic and atraumatic.      Mouth/Throat:      Pharynx: No oropharyngeal exudate.   Eyes:      Extraocular Movements: Extraocular movements intact.      Pupils: Pupils are equal, round, and reactive to light.   Cardiovascular:      Rate and Rhythm: Normal rate. Rhythm irregular.      Heart sounds:     No friction rub. No gallop.   Abdominal:      General: Bowel sounds are normal. There is no distension.      Palpations: Abdomen is soft.      Tenderness: There is no abdominal tenderness. There is no guarding or rebound.      Comments: PEG in place   Musculoskeletal:         General: Swelling present.      Cervical back: No rigidity or tenderness.      Right lower leg: Edema present.      Left lower leg: Edema present.   Skin:     Findings: Erythema, lesion and rash present.   Neurological:      Mental Status: He is alert.      Cranial Nerves: Cranial nerve deficit present.      Motor: Weakness present.      Gait: Gait abnormal.       Significant Labs: BMP:   Recent Labs   Lab 01/31/23  0231      *   K 3.9       CO2 28   BUN 20   CREATININE 0.8   CALCIUM 9.4   MG 1.6     CBC:   Recent Labs   Lab 01/30/23  0224 01/31/23  0231   WBC 16.71* 15.00*   HGB 10.1* 9.9*   HCT 33.4* 32.2*   * 478*       Significant Imaging: I have reviewed all pertinent imaging results/findings within the past 24 hours.

## 2023-01-31 NOTE — PROGRESS NOTES
[facility] - Observation [address] [place] [address] Medicine  Progress Note    Patient Name: Hao Medrano  MRN: [id number]  Patient Class: IP- Inpatient   Admission Date: 1/25/2023  Length of Stay: 5 days  Attending Physician: Tommy Rhodes MD  Primary Care Provider: Keara Yanes NP        Subjective:     Principal Problem:Dislodged gastrostomy tube        HPI:  Hao Medrano is a 62 y.o. male with a past medical history of CVA with trach and G-tube, CKD, HTN, and HLD who is being palced in observation for further management of dislodged peg tube. Patient presented to the ED from [facility]. Patient is nonverbal, history obtained from chart and ED staff. Per the ED, the nursing home reports they noticed the dislodged ped tube around 0400 this am. Patient was recently admitted on 1/10 for same issue with a new peg placed on 1/12.    ED: vital signs stable, afebrile. Leukocytosis of 23K. Hb 11.4. PT/INR 10/9/1.0. Furhter labs pending at time of admission. CXR with inconclusive findings due to patient position. Blood cultures obtained. GI consulted and plan for EGD with G-tube placement tomorrow.       Overview/Hospital Course:  S/p 20 F PEG tube placed without any immediate complications.   External bumper at 3 cm shelby.  Positive blood Cx with gram positive Cocci  2D echo ordered  Cardiology consult for HARDEEP per ID recommendation  Outpatient Antibiotic Therapy Plan:     Please send referral to [facility].     1) Infection: E faecalis bacteremia     2) Discharge Antibiotics:     Intravenous antibiotics:   Ampicillin 2 grams IV q 4 hours (may be given as continuous infusion)        3) Therapy Duration:  14 days     Estimated end date of IV antibiotics: 02/24/23     4) Outpatient Weekly Labs:     Order the following labs to be drawn on Mondays:    CBC   CMP      5) Fax Lab Results to Infectious Diseases Provider: Dr. Price     [facility] Fax Number:  [phone]     6) Outpatient Infectious Diseases Follow-up      Follow-up appointment will be arranged by the ID clinic and will be found in the patient's appointments tab.      Prior to discharge, please ensure the patient's follow-up has been scheduled.     If there is still no follow-up scheduled prior to discharge, please send an EPIC message to Lauren Stiles in Infectious Diseases.    Patient was admitted for dislodged PEG tube. GI was consulted and he underwent 20 F PEG tube placed without any immediate complications. External bumper at 3 cm shelby. During the course of this admission serial routine labs with mild hyperglycemia, mild anemia, persistence leukocytosis. He was found to have positive blood Culture with enterococcus Faecalis and coagulase negative staph. ID was consulted and was started on IV antibiotic and outpatient antibiotic see recommendation above. Throughout hospital stays, no new acute changes. He will be transferred back to the nursing home and follow up with ID, PCP.      Interval History:   No acute event, no spike of fever, non verbal, unable to assess for acute concern.    Review of Systems   Unable to perform ROS: Patient nonverbal   Objective:     Vital Signs (Most Recent):  Temp: 98.2 °F (36.8 °C) (01/31/23 0308)  Pulse: 89 (01/31/23 0328)  Resp: 18 (01/31/23 0328)  BP: 130/64 (01/31/23 0308)  SpO2: 98 % (01/31/23 0328) Vital Signs (24h Range):  Temp:  [98 °F (36.7 °C)-98.5 °F (36.9 °C)] 98.2 °F (36.8 °C)  Pulse:  [82-92] 89  Resp:  [16-20] 18  SpO2:  [96 %-100 %] 98 %  BP: (111-140)/(59-77) 130/64     Weight: 71.7 kg (158 lb)  Body mass index is 21.43 kg/m².    Intake/Output Summary (Last 24 hours) at 1/31/2023 0800  Last data filed at 1/31/2023 0554  Gross per 24 hour   Intake 850 ml   Output 1500 ml   Net -650 ml      Physical Exam  Vitals and nursing note reviewed.   Constitutional:       General: He is not in acute distress.  HENT:      Head: Normocephalic and atraumatic.       Mouth/Throat:      Pharynx: No oropharyngeal exudate.   Eyes:      Extraocular Movements: Extraocular movements intact.      Pupils: Pupils are equal, round, and reactive to light.   Cardiovascular:      Rate and Rhythm: Normal rate. Rhythm irregular.      Heart sounds:     No friction rub. No gallop.   Abdominal:      General: Bowel sounds are normal. There is no distension.      Palpations: Abdomen is soft.      Tenderness: There is no abdominal tenderness. There is no guarding or rebound.      Comments: PEG in place   Musculoskeletal:         General: Swelling present.      Cervical back: No rigidity or tenderness.      Right lower leg: Edema present.      Left lower leg: Edema present.   Skin:     Findings: Erythema, lesion and rash present.   Neurological:      Mental Status: He is alert.      Cranial Nerves: Cranial nerve deficit present.      Motor: Weakness present.      Gait: Gait abnormal.       Significant Labs: BMP:   Recent Labs   Lab 01/31/23  0231      *   K 3.9      CO2 28   BUN 20   CREATININE 0.8   CALCIUM 9.4   MG 1.6     CBC:   Recent Labs   Lab 01/30/23 0224 01/31/23 0231   WBC 16.71* 15.00*   HGB 10.1* 9.9*   HCT 33.4* 32.2*   * 478*       Significant Imaging: I have reviewed all pertinent imaging results/findings within the past 24 hours.      Assessment/Plan:      * Dislodged gastrostomy tube  Oropharyngeal dysphagia    - s/p 20 F PEG tube placed without any immediate complications.   External bumper at 3 cm shelby.-  -resume feeding tube with Isosource 1.5 nathaniel and dietician consult    Bacteremia  With positive blood Cx gram positive cocci  DC rocephin, and metronidazole  Cont vanco  ID consultedappreciated  Will get 2D echo and cardiology consult for possible HARDEEP        Multiple wounds of skin  With scattered lesions with dry skin  Encourage moisturize skin, skin care  Avoid skin debridement, changing position q 2Hrs  Cont Antibiotic ointment at the PEG  site      On antiepileptic therapy  - continue keppra 750 mg BID once peg tube is replaced      HTN (hypertension)  - BP well controlled upon admission  - continue coreg 3.125 mg BID once peg tube is replaced      Leukocytosis  - VSSAF  - leukocytosis of 23K upon admission  - g-tube site is clean without discharge or erythma  - patient on prednisone 10 mg daily, unclear why per chart review - could be contributing to significant leukocytosis  - CXR pending  - UA pending  - blood cultures obtained  - procal ordered  - will start empirical ceftriaxone and flagyl in the meantime  - follow up UA, CXR, and blood cultures    Type 2 diabetes mellitus, with long-term current use of insulin  Patient's FSGs are controlled on current medication regimen.  Last A1c reviewed-   Lab Results   Component Value Date    HGBA1C 5.9 (H) 07/12/2018     Most recent fingerstick glucose reviewed- No results for input(s): POCTGLUCOSE in the last 24 hours.  Current correctional scale  Low  Maintain anti-hyperglycemic dose as follows-   Antihyperglycemics (From admission, onward)    Start     Stop Route Frequency Ordered    01/25/23 1511  insulin aspart U-100 pen 0-5 Units         -- SubQ Before meals & nightly PRN 01/25/23 1411        Hold Oral hypoglycemics while patient is in the hospital.  - patient is on insulin glargine 15U daily at the nursing home  - resume when tube feedings are resumed    Anemia  - patient's anemia is currently controlled  - etiology likely due to anemia of chronic disease  - current CBC reviewed -   Lab Results   Component Value Date    HGB 10.2 (L) 01/26/2023    HCT 34.0 (L) 01/26/2023     - monitor serial CBC and transfuse if patient becomes hemodynamically unstable, symptomatic, or H/H drops below 7/21.         Chronic kidney disease, stage III (moderate)  - baseline ~1.3  - cont monitor      VTE Risk Mitigation (From admission, onward)         Ordered     Place sequential compression device  Until discontinued          01/25/23 1411     IP VTE HIGH RISK PATIENT  Once         01/25/23 1411     Reason for No Pharmacological VTE Prophylaxis  Once        Question:  Reasons:  Answer:  Risk of Bleeding  Comment:  pre-op    01/25/23 1411                Discharge Planning   FAINA: 1/31/2023     Code Status: Full Code   Is the patient medically ready for discharge?: No    Reason for patient still in hospital (select all that apply): Pending disposition  Discharge Plan A: Long-term acute care facility (LTAC)          Time spent >30 minutes        Tommy Rhodes MD  Department of Hospital Medicine   [facility] - Observation 11H

## 2023-01-31 NOTE — RESPIRATORY THERAPY
RAPID RESPONSE RESPIRATORY THERAPY PROACTIVE NOTE           Time of visit: 857     Code Status: Full Code   : 1960  Bed: 1155/1155 A:   MRN: [id number]  Time spent at the bedside: < 15 min    SITUATION    Evaluated patient for: LDA Check     BACKGROUND    Patient has no past medical history on file.  Clinically Significant Surgical Hx: tracheostomy    24 Hours Vitals Range:  Temp:  [98 °F (36.7 °C)-98.5 °F (36.9 °C)]   Pulse:  [82-92]   Resp:  [16-20]   BP: (111-140)/(59-77)   SpO2:  [96 %-100 %]     Labs:    Recent Labs     23    136 135*   K 3.9 4.0 3.9    101 100   CO2 24 25 28   CREATININE 0.9 0.8 0.8   * 134* 100   PHOS 2.7 2.3* 2.4*   MG 1.7 1.5* 1.6        No results for input(s): PH, PCO2, PO2, HCO3, POCSATURATED, BE in the last 72 hours.    ASSESSMENT/INTERVENTIONS  All supplies available. Obturator HOB.      Last VS   Temp: 98.2 °F (36.8 °C) (839)  Pulse: 89 (839)  Resp: 18 (839)  BP: 126/67 (839)  SpO2: 97 % (839)      Extra trachs at bedside: yes 5,6 XLT    Level of Consciousness: Level of Consciousness (AVPU): alert  Respiratory Effort: Respiratory Effort: Normal, Unlabored Expansion/Accessory Muscle Usage: Expansion/Accessory Muscles/Retractions: no use of accessory muscles  All Lung Field Breath Sounds: All Lung Fields Breath Sounds: coarse  O2 Device/Concentration: Trach collar 5l/28%  Surgical airway: Yes, Type: Shiley Size: 6 XLT, cuffed  Ambu at bedside: Ambu bag with the patient?: Yes, Adult Ambu     Active Orders   Respiratory Care    Inhalation Treatment Q4H PRN     Frequency: Q4H PRN     Number of Occurrences: Until Specified    Oxygen PRN     Frequency: PRN     Number of Occurrences: Until Specified     Order Questions:      Device type: Low flow      Device: Trach Collar      Titrate O2 per Oxygen Titration Protocol: Yes      To maintain SpO2 goal of: >= 90%      Notify MD of: Inability to  achieve desired SpO2; Sudden change in patient status and requires 20% increase in FiO2; Patient requires >60% FiO2    Pulse Oximetry Q4H     Frequency: Q4H     Number of Occurrences: Until Specified    Routine tracheostomy care     Frequency: BID     Number of Occurrences: Until Specified       RECOMMENDATIONS    We recommend: RRT Recs: Continue POC per primary team.      FOLLOW-UP    Please call back the Rapid Response RT, Kaleigh Davila, RRT at x [phone] for any questions or concerns.

## 2023-02-01 LAB
ALBUMIN SERPL BCP-MCNC: 2 G/DL (ref 3.5–5.2)
ALP SERPL-CCNC: 148 U/L (ref 55–135)
ALT SERPL W/O P-5'-P-CCNC: 14 U/L (ref 10–44)
ANION GAP SERPL CALC-SCNC: 10 MMOL/L (ref 8–16)
ANISOCYTOSIS BLD QL SMEAR: SLIGHT
AST SERPL-CCNC: 17 U/L (ref 10–40)
BACTERIA BLD CULT: NORMAL
BACTERIA BLD CULT: NORMAL
BASOPHILS # BLD AUTO: 0.05 K/UL (ref 0–0.2)
BASOPHILS NFR BLD: 0.3 % (ref 0–1.9)
BILIRUB SERPL-MCNC: 0.1 MG/DL (ref 0.1–1)
BUN SERPL-MCNC: 20 MG/DL (ref 8–23)
CALCIUM SERPL-MCNC: 9.3 MG/DL (ref 8.7–10.5)
CHLORIDE SERPL-SCNC: 101 MMOL/L (ref 95–110)
CO2 SERPL-SCNC: 26 MMOL/L (ref 23–29)
CREAT SERPL-MCNC: 0.9 MG/DL (ref 0.5–1.4)
DIFFERENTIAL METHOD: ABNORMAL
EOSINOPHIL # BLD AUTO: 2.2 K/UL (ref 0–0.5)
EOSINOPHIL NFR BLD: 13.1 % (ref 0–8)
ERYTHROCYTE [DISTWIDTH] IN BLOOD BY AUTOMATED COUNT: 16.1 % (ref 11.5–14.5)
EST. GFR  (NO RACE VARIABLE): >60 ML/MIN/1.73 M^2
GLUCOSE SERPL-MCNC: 175 MG/DL (ref 70–110)
HCT VFR BLD AUTO: 33.4 % (ref 40–54)
HGB BLD-MCNC: 9.8 G/DL (ref 14–18)
HYPOCHROMIA BLD QL SMEAR: ABNORMAL
IMM GRANULOCYTES # BLD AUTO: 0.08 K/UL (ref 0–0.04)
IMM GRANULOCYTES NFR BLD AUTO: 0.5 % (ref 0–0.5)
LYMPHOCYTES # BLD AUTO: 2.4 K/UL (ref 1–4.8)
LYMPHOCYTES NFR BLD: 14.3 % (ref 18–48)
MAGNESIUM SERPL-MCNC: 1.7 MG/DL (ref 1.6–2.6)
MCH RBC QN AUTO: 27.1 PG (ref 27–31)
MCHC RBC AUTO-ENTMCNC: 29.3 G/DL (ref 32–36)
MCV RBC AUTO: 92 FL (ref 82–98)
MONOCYTES # BLD AUTO: 1 K/UL (ref 0.3–1)
MONOCYTES NFR BLD: 5.9 % (ref 4–15)
NEUTROPHILS # BLD AUTO: 11.1 K/UL (ref 1.8–7.7)
NEUTROPHILS NFR BLD: 65.9 % (ref 38–73)
NRBC BLD-RTO: 0 /100 WBC
OVALOCYTES BLD QL SMEAR: ABNORMAL
PHOSPHATE SERPL-MCNC: 2.6 MG/DL (ref 2.7–4.5)
PLATELET # BLD AUTO: 430 K/UL (ref 150–450)
PMV BLD AUTO: 9.9 FL (ref 9.2–12.9)
POCT GLUCOSE: 175 MG/DL (ref 70–110)
POCT GLUCOSE: 183 MG/DL (ref 70–110)
POCT GLUCOSE: 184 MG/DL (ref 70–110)
POCT GLUCOSE: 195 MG/DL (ref 70–110)
POCT GLUCOSE: 225 MG/DL (ref 70–110)
POIKILOCYTOSIS BLD QL SMEAR: SLIGHT
POLYCHROMASIA BLD QL SMEAR: ABNORMAL
POTASSIUM SERPL-SCNC: 4.7 MMOL/L (ref 3.5–5.1)
PROT SERPL-MCNC: 7.1 G/DL (ref 6–8.4)
RBC # BLD AUTO: 3.62 M/UL (ref 4.6–6.2)
SODIUM SERPL-SCNC: 137 MMOL/L (ref 136–145)
WBC # BLD AUTO: 16.87 K/UL (ref 3.9–12.7)

## 2023-02-01 PROCEDURE — 27000221 HC OXYGEN, UP TO 24 HOURS

## 2023-02-01 PROCEDURE — 94761 N-INVAS EAR/PLS OXIMETRY MLT: CPT

## 2023-02-01 PROCEDURE — 63600175 PHARM REV CODE 636 W HCPCS

## 2023-02-01 PROCEDURE — 36415 COLL VENOUS BLD VENIPUNCTURE: CPT | Performed by: STUDENT IN AN ORGANIZED HEALTH CARE EDUCATION/TRAINING PROGRAM

## 2023-02-01 PROCEDURE — 99232 PR SUBSEQUENT HOSPITAL CARE,LEVL II: ICD-10-PCS | Mod: ,,, | Performed by: FAMILY MEDICINE

## 2023-02-01 PROCEDURE — 85025 COMPLETE CBC W/AUTO DIFF WBC: CPT | Performed by: STUDENT IN AN ORGANIZED HEALTH CARE EDUCATION/TRAINING PROGRAM

## 2023-02-01 PROCEDURE — 84100 ASSAY OF PHOSPHORUS: CPT | Performed by: STUDENT IN AN ORGANIZED HEALTH CARE EDUCATION/TRAINING PROGRAM

## 2023-02-01 PROCEDURE — 25000003 PHARM REV CODE 250: Performed by: FAMILY MEDICINE

## 2023-02-01 PROCEDURE — 99900035 HC TECH TIME PER 15 MIN (STAT)

## 2023-02-01 PROCEDURE — 11000001 HC ACUTE MED/SURG PRIVATE ROOM

## 2023-02-01 PROCEDURE — 99232 SBSQ HOSP IP/OBS MODERATE 35: CPT | Mod: ,,, | Performed by: FAMILY MEDICINE

## 2023-02-01 PROCEDURE — 83735 ASSAY OF MAGNESIUM: CPT | Performed by: STUDENT IN AN ORGANIZED HEALTH CARE EDUCATION/TRAINING PROGRAM

## 2023-02-01 PROCEDURE — 63600175 PHARM REV CODE 636 W HCPCS: Performed by: FAMILY MEDICINE

## 2023-02-01 PROCEDURE — 80053 COMPREHEN METABOLIC PANEL: CPT | Performed by: STUDENT IN AN ORGANIZED HEALTH CARE EDUCATION/TRAINING PROGRAM

## 2023-02-01 PROCEDURE — 99900026 HC AIRWAY MAINTENANCE (STAT)

## 2023-02-01 RX ADMIN — Medication: at 08:02

## 2023-02-01 RX ADMIN — CARVEDILOL 3.12 MG: 3.12 TABLET, FILM COATED ORAL at 11:02

## 2023-02-01 RX ADMIN — BACLOFEN 15 MG: 5 TABLET ORAL at 11:02

## 2023-02-01 RX ADMIN — AMPICILLIN 2 G: 2 INJECTION, POWDER, FOR SOLUTION INTRAMUSCULAR; INTRAVENOUS at 11:02

## 2023-02-01 RX ADMIN — NYSTATIN: 100000 POWDER TOPICAL at 08:02

## 2023-02-01 RX ADMIN — INSULIN ASPART 2 UNITS: 100 INJECTION, SOLUTION INTRAVENOUS; SUBCUTANEOUS at 12:02

## 2023-02-01 RX ADMIN — LEVETIRACETAM 750 MG: 100 SOLUTION ORAL at 11:02

## 2023-02-01 RX ADMIN — AMPICILLIN 2 G: 2 INJECTION, POWDER, FOR SOLUTION INTRAMUSCULAR; INTRAVENOUS at 06:02

## 2023-02-01 RX ADMIN — AMPICILLIN 2 G: 2 INJECTION, POWDER, FOR SOLUTION INTRAMUSCULAR; INTRAVENOUS at 01:02

## 2023-02-01 RX ADMIN — AMPICILLIN 2 G: 2 INJECTION, POWDER, FOR SOLUTION INTRAMUSCULAR; INTRAVENOUS at 12:02

## 2023-02-01 RX ADMIN — LEVETIRACETAM 750 MG: 100 SOLUTION ORAL at 08:02

## 2023-02-01 RX ADMIN — CALCIUM CARBONATE (ANTACID) CHEW TAB 500 MG 500 MG: 500 CHEW TAB at 08:02

## 2023-02-01 RX ADMIN — BACLOFEN 15 MG: 5 TABLET ORAL at 04:02

## 2023-02-01 RX ADMIN — ESOMEPRAZOLE MAGNESIUM 40 MG: 10 GRANULE, FOR SUSPENSION, EXTENDED RELEASE ORAL at 08:02

## 2023-02-01 RX ADMIN — PREDNISONE 10 MG: 10 TABLET ORAL at 08:02

## 2023-02-01 RX ADMIN — ASPIRIN 81 MG CHEWABLE TABLET 81 MG: 81 TABLET CHEWABLE at 08:02

## 2023-02-01 RX ADMIN — CARVEDILOL 3.12 MG: 3.12 TABLET, FILM COATED ORAL at 08:02

## 2023-02-01 RX ADMIN — NYSTATIN: 100000 POWDER TOPICAL at 11:02

## 2023-02-01 RX ADMIN — BACLOFEN 15 MG: 5 TABLET ORAL at 12:02

## 2023-02-01 NOTE — SUBJECTIVE & OBJECTIVE
Interval History:   Non verbal, awaiting for placement for IV antibiotic, no acute changes or ne acute concern.    Review of Systems   Unable to perform ROS: Patient nonverbal   Objective:     Vital Signs (Most Recent):  Temp: 97.8 °F (36.6 °C) (01/31/23 1641)  Pulse: 109 (01/31/23 1641)  Resp: 18 (01/31/23 1641)  BP: 130/88 (01/31/23 1641)  SpO2: 97 % (01/31/23 1641) Vital Signs (24h Range):  Temp:  [97.8 °F (36.6 °C)-98.5 °F (36.9 °C)] 97.8 °F (36.6 °C)  Pulse:  [] 109  Resp:  [16-20] 18  SpO2:  [96 %-100 %] 97 %  BP: (111-138)/(59-88) 130/88     Weight: 71.7 kg (158 lb)  Body mass index is 21.43 kg/m².    Intake/Output Summary (Last 24 hours) at 1/31/2023 1822  Last data filed at 1/31/2023 0554  Gross per 24 hour   Intake 500 ml   Output 650 ml   Net -150 ml      Physical Exam  Vitals and nursing note reviewed.   Constitutional:       General: He is not in acute distress.  HENT:      Head: Normocephalic and atraumatic.   Eyes:      Extraocular Movements: Extraocular movements intact.      Pupils: Pupils are equal, round, and reactive to light.   Neck:      Comments: Track in place  Cardiovascular:      Rate and Rhythm: Tachycardia present. Rhythm irregular.      Heart sounds: Murmur heard.     No friction rub. No gallop.   Pulmonary:      Effort: Pulmonary effort is normal. No respiratory distress.      Breath sounds: Rhonchi present.   Chest:      Chest wall: No tenderness.   Abdominal:      General: Bowel sounds are normal. There is no distension.      Palpations: Abdomen is soft.      Tenderness: There is no abdominal tenderness. There is no guarding or rebound.      Comments: PEG tube in place   Musculoskeletal:         General: Swelling and deformity present.      Cervical back: No rigidity or tenderness.      Right lower leg: Edema present.      Left lower leg: Edema present.   Skin:     Findings: Erythema, lesion and rash present.   Neurological:      Mental Status: He is alert.      Comments:  Hemiplegia with B/L lower ext atrophy, bed bound       Significant Labs: All pertinent labs within the past 24 hours have been reviewed.  BMP:   Recent Labs   Lab 01/31/23  0231      *   K 3.9      CO2 28   BUN 20   CREATININE 0.8   CALCIUM 9.4   MG 1.6     CBC:   Recent Labs   Lab 01/30/23  0224 01/31/23  0231   WBC 16.71* 15.00*   HGB 10.1* 9.9*   HCT 33.4* 32.2*   * 478*     Recent Lab Results  (Last 5 results in the past 24 hours)        01/31/23  1641   01/31/23  1025   01/31/23  0839   01/31/23  0231   01/30/23  2139        Albumin       2.0         Alkaline Phosphatase       149         ALT       13         Anion Gap       7         AST       17         Baso #       0.05         Basophil %       0.3         BILIRUBIN TOTAL       0.1  Comment: For infants and newborns, interpretation of results should be based  on gestational age, weight and in agreement with clinical  observations.    Premature Infant recommended reference ranges:  Up to 24 hours.............<8.0 mg/dL  Up to 48 hours............<12.0 mg/dL  3-5 days..................<15.0 mg/dL  6-29 days.................<15.0 mg/dL           BUN       20         Calcium       9.4         Chloride       100         CO2       28         Creatinine       0.8         Differential Method       Automated         eGFR       >60.0         Eos #       1.8         Eosinophil %       11.7         Glucose       100         Gran # (ANC)       10.1         Gran %       67.1         Hematocrit       32.2         Hemoglobin       9.9         Immature Grans (Abs)       0.10  Comment: Mild elevation in immature granulocytes is non specific and   can be seen in a variety of conditions including stress response,   acute inflammation, trauma and pregnancy. Correlation with other   laboratory and clinical findings is essential.           Immature Granulocytes       0.7         Lymph #       2.2         Lymph %       14.5         Magnesium       1.6          MCH       27.0         MCHC       30.7         MCV       88         Mono #       0.9         Mono %       5.7         MPV       9.8         nRBC       0         Phosphorus       2.4         Platelets       478         POCT Glucose 229   93   165     201       Potassium       3.9         PROTEIN TOTAL       7.2         RBC       3.67         RDW       15.9         Sodium       135         WBC       15.00                                Significant Imaging: I have reviewed all pertinent imaging results/findings within the past 24 hours.

## 2023-02-01 NOTE — RESPIRATORY THERAPY
RAPID RESPONSE RESPIRATORY THERAPY PROACTIVE NOTE           Time of visit: 1001     Code Status: Full Code   : 1960  Bed: 1155/1155 A:   MRN: [id number]  Time spent at the bedside: < 15 min    SITUATION    Evaluated patient for: LDA Check     BACKGROUND    Patient has no past medical history on file.  Clinically Significant Surgical Hx: tracheostomy    24 Hours Vitals Range:  Temp:  [97.5 °F (36.4 °C)-98.2 °F (36.8 °C)]   Pulse:  []   Resp:  [14-20]   BP: (125-136)/(63-88)   SpO2:  [97 %-100 %]     Labs:    Recent Labs     23  0224 23  0231 23  0356    135* 137   K 4.0 3.9 4.7    100 101   CO2 25 28 26   CREATININE 0.8 0.8 0.9   * 100 175*   PHOS 2.3* 2.4* 2.6*   MG 1.5* 1.6 1.7        No results for input(s): PH, PCO2, PO2, HCO3, POCSATURATED, BE in the last 72 hours.    ASSESSMENT/INTERVENTIONS  Patient resting comfortably. No respiratory concerns at this time.      Last VS   Temp: 97.9 °F (36.6 °C) (812)  Pulse: 99 (936)  Resp: 14 (936)  BP: 128/68 (812)  SpO2: 98 % (937)      Extra trachs at bedside: Summer notified to place 50XLT proximal cuffed Shiley and 60XLT proximal cuffed Shiley  Level of Consciousness: Level of Consciousness (AVPU): alert  Respiratory Effort: Respiratory Effort: Unlabored Expansion/Accessory Muscle Usage: Expansion/Accessory Muscles/Retractions: expansion symmetric  All Lung Field Breath Sounds: All Lung Fields Breath Sounds: Anterior:, Lateral:, diminished, equal bilaterally  O2 Device/Concentration: 5l/28%  Surgical airway: Yes, Type: Shiley Size: 6 XLT, cuffed  Ambu at bedside: Ambu bag with the patient?: Yes, Adult Ambu     Active Orders   Respiratory Care    Inhalation Treatment Q4H PRN     Frequency: Q4H PRN     Number of Occurrences: Until Specified    Oxygen PRN     Frequency: PRN     Number of Occurrences: Until Specified     Order Questions:      Device type: Low flow      Device: Trach Collar       Titrate O2 per Oxygen Titration Protocol: Yes      To maintain SpO2 goal of: >= 90%      Notify MD of: Inability to achieve desired SpO2; Sudden change in patient status and requires 20% increase in FiO2; Patient requires >60% FiO2    Pulse Oximetry Q4H     Frequency: Q4H     Number of Occurrences: Until Specified    Routine tracheostomy care     Frequency: BID     Number of Occurrences: Until Specified       RECOMMENDATIONS    We recommend: RRT Recs: Continue POC per primary team.      FOLLOW-UP    Please call back the Rapid Response RT, Patricia Khan RRT at x [phone] for any questions or concerns.

## 2023-02-01 NOTE — ASSESSMENT & PLAN NOTE
Cont ampicillin will be DC on ampicillin for 14 days  ID consulted appreciated   2D echo done no vegetations cont medical management

## 2023-02-01 NOTE — PLAN OF CARE
Problem: Infection  Goal: Absence of Infection Signs and Symptoms  Outcome: Ongoing, Progressing     Problem: Adult Inpatient Plan of Care  Goal: Plan of Care Review  Outcome: Ongoing, Progressing  Goal: Patient-Specific Goal (Individualized)  Outcome: Ongoing, Progressing  Goal: Absence of Hospital-Acquired Illness or Injury  Outcome: Ongoing, Progressing  Goal: Optimal Comfort and Wellbeing  Outcome: Ongoing, Progressing  Goal: Readiness for Transition of Care  Outcome: Ongoing, Progressing     Problem: Diabetes Comorbidity  Goal: Blood Glucose Level Within Targeted Range  Outcome: Ongoing, Progressing     Problem: Impaired Wound Healing  Goal: Optimal Wound Healing  Outcome: Ongoing, Progressing     Problem: Skin Injury Risk Increased  Goal: Skin Health and Integrity  Outcome: Ongoing, Progressing

## 2023-02-01 NOTE — AI DETERIORATION ALERT
Evaluation of Early Detection of Sepsis Risk     Patient Name: Hao Medrano  MRN:  [id number]  Primary Care Team: [facility] MED D  Date of Admission:  1/25/2023     Principal Problem:  Dislodged gastrostomy tube   Date of Review: 02/01/2023    Patient reviewed for elevated sepsis risk score. Sepsis Screen Positive  Patient already receiving appropriate sepsis care. Please notify Rapid Response Team for any hypotension or decompensation.    Sepsis Screen Results     IP Sepsis Screen (most recent)       Sepsis Screen (IP) - 02/01/23 1048       Is the patient's history or complaint suggestive of a possible infection? Yes  -SS    Are there at least two of the following signs and symptoms present? Yes  -SS    Sepsis signs/symptoms - Tachycardia Tachycardia     >90  -SS    Sepsis signs/symptoms - WBC WBC < 4,000 or WBC > 12,000  -SS    Are any of the following organ dysfunction criteria present and not considered to be due to a chronic condition? Yes  -SS    Organ Dysfunction Criteria - Resp Comp Respiratory Compromise: Requiring > 5L NC  -SS    Initiate Sepsis Protocol No  -SS    Reason sepsis not considered Pt. receiving appropriate management  -SS              User Key  (r) = Recorded By, (t) = Taken By, (c) = Cosigned By      Initials Name    SS EBRNADINE Del Rosario PA-C  Sepsis

## 2023-02-01 NOTE — PLAN OF CARE
Pt was denied authorization for LTAC by [employer], a Peer to Peer was offered, called  [phone] option 3 to set up. A Dr Laurita Ash  will be contacting Dr Rhodes before 1700 today at . Notified Team and DR Rhodes via secure chat and pt and wife in pt's room.   Will continue to update plan as needed.  Elie Burgos RN,BSN

## 2023-02-01 NOTE — ASSESSMENT & PLAN NOTE
- VSSAF  - leukocytosis improving R/O steroids induced  - g-tube site is clean without discharge or erythma  - blood cultures positive for gram + cocci, repeat no growth  Cont IV ABx with ampicillin

## 2023-02-02 LAB
ACANTHOCYTES BLD QL SMEAR: ABNORMAL
ANION GAP SERPL CALC-SCNC: 10 MMOL/L (ref 8–16)
ANISOCYTOSIS BLD QL SMEAR: ABNORMAL
AUER BODIES BLD QL SMEAR: ABNORMAL
BASO STIPL BLD QL SMEAR: ABNORMAL
BASOPHILS # BLD AUTO: 0.06 K/UL (ref 0–0.2)
BASOPHILS # BLD AUTO: ABNORMAL K/UL (ref 0–0.2)
BASOPHILS NFR BLD: 0.4 % (ref 0–1.9)
BASOPHILS NFR BLD: ABNORMAL % (ref 0–1.9)
BLASTS NFR BLD MANUAL: ABNORMAL %
BUN SERPL-MCNC: 20 MG/DL (ref 8–23)
BURR CELLS BLD QL SMEAR: ABNORMAL
CABOT RINGS BLD QL SMEAR: ABNORMAL
CALCIUM SERPL-MCNC: 9.3 MG/DL (ref 8.7–10.5)
CHLORIDE SERPL-SCNC: 99 MMOL/L (ref 95–110)
CO2 SERPL-SCNC: 24 MMOL/L (ref 23–29)
CREAT SERPL-MCNC: 0.9 MG/DL (ref 0.5–1.4)
DACRYOCYTES BLD QL SMEAR: ABNORMAL
DIFFERENTIAL METHOD: ABNORMAL
DIFFERENTIAL METHOD: ABNORMAL
DOHLE BOD BLD QL SMEAR: ABNORMAL
EOSINOPHIL # BLD AUTO: 1.9 K/UL (ref 0–0.5)
EOSINOPHIL # BLD AUTO: ABNORMAL K/UL (ref 0–0.5)
EOSINOPHIL NFR BLD: 11.9 % (ref 0–8)
EOSINOPHIL NFR BLD: ABNORMAL % (ref 0–8)
ERYTHROCYTE [DISTWIDTH] IN BLOOD BY AUTOMATED COUNT: 16.4 % (ref 11.5–14.5)
ERYTHROCYTE [DISTWIDTH] IN BLOOD BY AUTOMATED COUNT: ABNORMAL % (ref 11.5–14.5)
EST. GFR  (NO RACE VARIABLE): >60 ML/MIN/1.73 M^2
GIANT PLATELETS BLD QL SMEAR: ABNORMAL
GLUCOSE SERPL-MCNC: 176 MG/DL (ref 70–110)
HCT VFR BLD AUTO: 32.1 % (ref 40–54)
HCT VFR BLD AUTO: ABNORMAL % (ref 40–54)
HEINZ BOD BLD QL SMEAR: ABNORMAL
HGB BLD-MCNC: 9.5 G/DL (ref 14–18)
HGB BLD-MCNC: ABNORMAL G/DL (ref 14–18)
HGB C CRY RBC QL MICRO: ABNORMAL
HOWELL-JOLLY BOD BLD QL SMEAR: ABNORMAL
HYPOCHROMIA BLD QL SMEAR: ABNORMAL
IMM GRANULOCYTES # BLD AUTO: 0.06 K/UL (ref 0–0.04)
IMM GRANULOCYTES # BLD AUTO: ABNORMAL K/UL (ref 0–0.04)
IMM GRANULOCYTES NFR BLD AUTO: 0.4 % (ref 0–0.5)
IMM GRANULOCYTES NFR BLD AUTO: ABNORMAL % (ref 0–0.5)
LYMPHOCYTES # BLD AUTO: 2.3 K/UL (ref 1–4.8)
LYMPHOCYTES # BLD AUTO: ABNORMAL K/UL (ref 1–4.8)
LYMPHOCYTES NFR BLD: 14.3 % (ref 18–48)
LYMPHOCYTES NFR BLD: ABNORMAL % (ref 18–48)
MAGNESIUM SERPL-MCNC: 1.7 MG/DL (ref 1.6–2.6)
MCH RBC QN AUTO: 26.9 PG (ref 27–31)
MCH RBC QN AUTO: ABNORMAL PG (ref 27–31)
MCHC RBC AUTO-ENTMCNC: 29.6 G/DL (ref 32–36)
MCHC RBC AUTO-ENTMCNC: ABNORMAL G/DL (ref 32–36)
MCV RBC AUTO: 91 FL (ref 82–98)
MCV RBC AUTO: ABNORMAL FL (ref 82–98)
MEGAKARYOCYTIC FRAGMENTS: ABNORMAL
METAMYELOCYTES NFR BLD MANUAL: ABNORMAL %
MONOCYTES # BLD AUTO: 1 K/UL (ref 0.3–1)
MONOCYTES # BLD AUTO: ABNORMAL K/UL (ref 0.3–1)
MONOCYTES NFR BLD: 6 % (ref 4–15)
MONOCYTES NFR BLD: ABNORMAL % (ref 4–15)
MYELOCYTES NFR BLD MANUAL: ABNORMAL %
NEUTROPHILS # BLD AUTO: 10.9 K/UL (ref 1.8–7.7)
NEUTROPHILS # BLD AUTO: ABNORMAL K/UL (ref 1.8–7.7)
NEUTROPHILS NFR BLD: 67 % (ref 38–73)
NEUTROPHILS NFR BLD: ABNORMAL % (ref 38–73)
NEUTS BAND NFR BLD MANUAL: ABNORMAL %
NRBC BLD-RTO: 0 /100 WBC
NRBC BLD-RTO: ABNORMAL /100 WBC
OVALOCYTES BLD QL SMEAR: ABNORMAL
PAPPENHEIMER BOD BLD QL SMEAR: ABNORMAL
PLASMODIUM BLD QL SMEAR: ABNORMAL
PLATELET # BLD AUTO: 482 K/UL (ref 150–450)
PLATELET # BLD AUTO: ABNORMAL K/UL (ref 150–450)
PLATELET BLD QL SMEAR: ABNORMAL
PMV BLD AUTO: 9.9 FL (ref 9.2–12.9)
PMV BLD AUTO: ABNORMAL FL (ref 9.2–12.9)
POCT GLUCOSE: 148 MG/DL (ref 70–110)
POCT GLUCOSE: 188 MG/DL (ref 70–110)
POCT GLUCOSE: 196 MG/DL (ref 70–110)
POCT GLUCOSE: 202 MG/DL (ref 70–110)
POIKILOCYTOSIS BLD QL SMEAR: ABNORMAL
POLYCHROMASIA BLD QL SMEAR: ABNORMAL
POTASSIUM SERPL-SCNC: 4.6 MMOL/L (ref 3.5–5.1)
PROMYELOCYTES NFR BLD MANUAL: ABNORMAL %
RBC # BLD AUTO: 3.53 M/UL (ref 4.6–6.2)
RBC # BLD AUTO: ABNORMAL M/UL (ref 4.6–6.2)
RBC AGGLUT BLD QL: ABNORMAL
ROULEAUX BLD QL SMEAR: ABNORMAL
SCHISTOCYTES BLD QL SMEAR: ABNORMAL
SCHISTOCYTES BLD QL SMEAR: ABNORMAL
SICKLE CELLS BLD QL SMEAR: ABNORMAL
SMUDGE CELLS BLD QL SMEAR: ABNORMAL
SODIUM SERPL-SCNC: 133 MMOL/L (ref 136–145)
SPHEROCYTES BLD QL SMEAR: ABNORMAL
STOMATOCYTES BLD QL SMEAR: ABNORMAL
TARGETS BLD QL SMEAR: ABNORMAL
TOXIC GRANULES BLD QL SMEAR: ABNORMAL
WBC # BLD AUTO: 16.19 K/UL (ref 3.9–12.7)
WBC # BLD AUTO: 17.89 K/UL (ref 3.9–12.7)
WBC NRBC COR # BLD: ABNORMAL K/UL (ref 3.9–12.7)
WBC OTHER NFR BLD MANUAL: ABNORMAL %
WBC TOXIC VACUOLES BLD QL SMEAR: ABNORMAL

## 2023-02-02 PROCEDURE — 99900035 HC TECH TIME PER 15 MIN (STAT)

## 2023-02-02 PROCEDURE — 99900026 HC AIRWAY MAINTENANCE (STAT)

## 2023-02-02 PROCEDURE — 11000001 HC ACUTE MED/SURG PRIVATE ROOM

## 2023-02-02 PROCEDURE — 27000221 HC OXYGEN, UP TO 24 HOURS

## 2023-02-02 PROCEDURE — 63600175 PHARM REV CODE 636 W HCPCS

## 2023-02-02 PROCEDURE — 36415 COLL VENOUS BLD VENIPUNCTURE: CPT | Performed by: FAMILY MEDICINE

## 2023-02-02 PROCEDURE — 27200966 HC CLOSED SUCTION SYSTEM

## 2023-02-02 PROCEDURE — 99231 PR SUBSEQUENT HOSPITAL CARE,LEVL I: ICD-10-PCS | Mod: ,,, | Performed by: STUDENT IN AN ORGANIZED HEALTH CARE EDUCATION/TRAINING PROGRAM

## 2023-02-02 PROCEDURE — 80048 BASIC METABOLIC PNL TOTAL CA: CPT | Performed by: FAMILY MEDICINE

## 2023-02-02 PROCEDURE — 85025 COMPLETE CBC W/AUTO DIFF WBC: CPT | Performed by: FAMILY MEDICINE

## 2023-02-02 PROCEDURE — 99231 SBSQ HOSP IP/OBS SF/LOW 25: CPT | Mod: ,,, | Performed by: STUDENT IN AN ORGANIZED HEALTH CARE EDUCATION/TRAINING PROGRAM

## 2023-02-02 PROCEDURE — 63600175 PHARM REV CODE 636 W HCPCS: Performed by: FAMILY MEDICINE

## 2023-02-02 PROCEDURE — 94761 N-INVAS EAR/PLS OXIMETRY MLT: CPT

## 2023-02-02 PROCEDURE — 25000003 PHARM REV CODE 250: Performed by: FAMILY MEDICINE

## 2023-02-02 PROCEDURE — 83735 ASSAY OF MAGNESIUM: CPT | Performed by: FAMILY MEDICINE

## 2023-02-02 RX ADMIN — ASPIRIN 81 MG CHEWABLE TABLET 81 MG: 81 TABLET CHEWABLE at 09:02

## 2023-02-02 RX ADMIN — PREDNISONE 10 MG: 10 TABLET ORAL at 09:02

## 2023-02-02 RX ADMIN — CARVEDILOL 3.12 MG: 3.12 TABLET, FILM COATED ORAL at 09:02

## 2023-02-02 RX ADMIN — AMPICILLIN 2 G: 2 INJECTION, POWDER, FOR SOLUTION INTRAMUSCULAR; INTRAVENOUS at 03:02

## 2023-02-02 RX ADMIN — BACLOFEN 15 MG: 5 TABLET ORAL at 10:02

## 2023-02-02 RX ADMIN — CALCIUM CARBONATE (ANTACID) CHEW TAB 500 MG 500 MG: 500 CHEW TAB at 09:02

## 2023-02-02 RX ADMIN — AMPICILLIN 2 G: 2 INJECTION, POWDER, FOR SOLUTION INTRAMUSCULAR; INTRAVENOUS at 11:02

## 2023-02-02 RX ADMIN — INSULIN ASPART 2 UNITS: 100 INJECTION, SOLUTION INTRAVENOUS; SUBCUTANEOUS at 05:02

## 2023-02-02 RX ADMIN — ESOMEPRAZOLE MAGNESIUM 40 MG: 10 GRANULE, FOR SUSPENSION, EXTENDED RELEASE ORAL at 09:02

## 2023-02-02 RX ADMIN — NYSTATIN: 100000 POWDER TOPICAL at 09:02

## 2023-02-02 RX ADMIN — Medication: at 09:02

## 2023-02-02 RX ADMIN — AMPICILLIN 2 G: 2 INJECTION, POWDER, FOR SOLUTION INTRAMUSCULAR; INTRAVENOUS at 07:02

## 2023-02-02 RX ADMIN — BACLOFEN 15 MG: 5 TABLET ORAL at 05:02

## 2023-02-02 RX ADMIN — LEVETIRACETAM 750 MG: 100 SOLUTION ORAL at 09:02

## 2023-02-02 RX ADMIN — NYSTATIN: 100000 POWDER TOPICAL at 10:02

## 2023-02-02 RX ADMIN — CARVEDILOL 3.12 MG: 3.12 TABLET, FILM COATED ORAL at 10:02

## 2023-02-02 RX ADMIN — LEVETIRACETAM 750 MG: 100 SOLUTION ORAL at 10:02

## 2023-02-02 RX ADMIN — BACLOFEN 15 MG: 5 TABLET ORAL at 11:02

## 2023-02-02 NOTE — PROGRESS NOTES
[facility] [address] [place] [address] Medicine  Progress Note    Patient Name: Hao Medrano  MRN: [id number]  Patient Class: IP- Inpatient   Admission Date: 1/25/2023  Length of Stay: 7 days  Attending Physician: Divya Galvin MD  Primary Care Provider: Keara Yanes NP        Subjective:     Principal Problem:Dislodged gastrostomy tube        HPI:  Hao Medrano is a 62 y.o. male with a past medical history of CVA with trach and G-tube, CKD, HTN, and HLD who is being palced in observation for further management of dislodged peg tube. Patient presented to the ED from [facility]. Patient is nonverbal, history obtained from chart and ED staff. Per the ED, the nursing home reports they noticed the dislodged ped tube around 0400 this am. Patient was recently admitted on 1/10 for same issue with a new peg placed on 1/12.    ED: vital signs stable, afebrile. Leukocytosis of 23K. Hb 11.4. PT/INR 10/9/1.0. Furhter labs pending at time of admission. CXR with inconclusive findings due to patient position. Blood cultures obtained. GI consulted and plan for EGD with G-tube placement tomorrow.       Overview/Hospital Course:  S/p 20 F PEG tube placed without any immediate complications.   External bumper at 3 cm shelby.  Positive blood Cx with gram positive Cocci  2D echo ordered, unremarkable for vegetation  ID consulted:    Outpatient Antibiotic Therapy Plan:     Please send referral to [facility].     1) Infection: E faecalis bacteremia     2) Discharge Antibiotics:     Intravenous antibiotics:   Ampicillin 2 grams IV q 4 hours (may be given as continuous infusion)        3) Therapy Duration:  14 days     Estimated end date of IV antibiotics: 02/24/23     4) Outpatient Weekly Labs:     Order the following labs to be drawn on Mondays:    CBC   CMP      5) Fax Lab Results to Infectious Diseases Provider: Dr. Price     [facility] Fax Number: [phone]     6)  Outpatient Infectious Diseases Follow-up      Follow-up appointment will be arranged by the ID clinic and will be found in the patient's appointments tab.      Prior to discharge, please ensure the patient's follow-up has been scheduled.     If there is still no follow-up scheduled prior to discharge, please send an EPIC message to Lauren Stiles in Infectious Diseases.    Patient was admitted for dislodged PEG tube. GI was consulted and he underwent 20 F PEG tube placed without any immediate complications. External bumper at 3 cm shelby. During the course of this admission serial routine labs with mild hyperglycemia, mild anemia, persistence leukocytosis. He was found to have positive blood Culture with enterococcus Faecalis and coagulase negative staph. ID was consulted and was started on IV antibiotic and outpatient antibiotic see recommendation above. Throughout hospital stays, no new acute changes. He will be transferred back to the nursing home and follow up with ID, PCP.      Interval History:   No acute changes, no spike of fever, non verbal, awaiting for placement    Review of Systems   Unable to perform ROS: Patient nonverbal   Objective:     Vital Signs (Most Recent):  Temp: 98.1 °F (36.7 °C) (02/02/23 1208)  Pulse: (!) 116 (02/02/23 1208)  Resp: 18 (02/02/23 1208)  BP: (!) 149/83 (02/02/23 1208)  SpO2: 100 % (02/02/23 1221) Vital Signs (24h Range):  Temp:  [96.6 °F (35.9 °C)-98.8 °F (37.1 °C)] 98.1 °F (36.7 °C)  Pulse:  [] 116  Resp:  [14-20] 18  SpO2:  [98 %-100 %] 100 %  BP: (113-149)/(58-83) 149/83     Weight: 71.7 kg (158 lb)  Body mass index is 21.43 kg/m².    Intake/Output Summary (Last 24 hours) at 2/2/2023 1520  Last data filed at 2/2/2023 0435  Gross per 24 hour   Intake 500 ml   Output 900 ml   Net -400 ml        Physical Exam  Vitals and nursing note reviewed.   Constitutional:       General: He is not in acute distress.  HENT:      Head: Normocephalic and atraumatic.      Nose: No  congestion.   Eyes:      Extraocular Movements: Extraocular movements intact.      Pupils: Pupils are equal, round, and reactive to light.   Cardiovascular:      Rate and Rhythm: Normal rate. Rhythm irregular.      Heart sounds:     No friction rub. No gallop.   Pulmonary:      Effort: Pulmonary effort is normal. No respiratory distress.      Breath sounds: No wheezing or rales.      Comments: Trach in place  Abdominal:      General: Bowel sounds are normal. There is no distension.      Tenderness: There is no abdominal tenderness. There is no guarding or rebound.      Comments: PEG in place   Musculoskeletal:         General: Swelling and deformity present.      Cervical back: No rigidity or tenderness.      Right lower leg: Edema present.      Left lower leg: Edema present.   Skin:     Findings: Erythema and rash present.   Neurological:      Mental Status: He is alert.      Comments: Hemiplegic with B/L lower extremities, atrophy, bed bound       Significant Labs: All pertinent labs within the past 24 hours have been reviewed.  Blood Culture: No results for input(s): LABBLOO in the last 48 hours.  BMP:   Recent Labs   Lab 02/02/23  0525   *   *   K 4.6   CL 99   CO2 24   BUN 20   CREATININE 0.9   CALCIUM 9.3   MG 1.7       CBC:   Recent Labs   Lab 02/01/23  0356 02/02/23  0525 02/02/23  0740   WBC 16.87* 17.89* 16.19*   HGB 9.8* SEE COMMENT 9.5*   HCT 33.4* SEE COMMENT 32.1*    SEE COMMENT 482*       CMP:   Recent Labs   Lab 02/01/23  0356 02/02/23  0525    133*   K 4.7 4.6    99   CO2 26 24   * 176*   BUN 20 20   CREATININE 0.9 0.9   CALCIUM 9.3 9.3   PROT 7.1  --    ALBUMIN 2.0*  --    BILITOT 0.1  --    ALKPHOS 148*  --    AST 17  --    ALT 14  --    ANIONGAP 10 10       Recent Lab Results  (Last 5 results in the past 24 hours)        02/02/23  1206   02/02/23  0800   02/02/23  0740   02/02/23  0525   02/01/23  1930        WBC-Corrected for NRBC's       SEE  COMMENT  Comment: Do not report         Acanthocytes       SEE COMMENT  Comment: Do not report         Agglutination       SEE COMMENT  Comment: Do not report         Anion Gap       10         Aniso       SEE COMMENT  Comment: Do not report         Alem Rods       SEE COMMENT  Comment: Do not report         Bands       SEE COMMENT  Comment: Do not report         Baso #     0.06   Test Not Performed  Comment: CORRECTED RESULT; previously reported as 0.06 on 02/02/2023 at 06:15.  [C]         Basophilic Stippling       SEE COMMENT  Comment: Do not report         Basophil %     0.4   SEE COMMENT  Comment: Do not report  CORRECTED RESULT; previously reported as 0.3 on 02/02/2023 at 06:15.    [C]         Blasts       SEE COMMENT  Comment: Do not report         BUN       20         Dorado/Echinocytes       SEE COMMENT  Comment: Do not report         Cabot Rings       SEE COMMENT  Comment: Do not report         Calcium       9.3         Chloride       99         CO2       24         Creatinine       0.9         Differential Method     Automated   SEE COMMENT  Comment: Do not report  CORRECTED RESULT; previously reported as Automated on 02/02/2023 at   06:15.    [C]         Dohle Bodies       SEE COMMENT  Comment: Do not report         eGFR       >60.0         Eos #     1.9   Test Not Performed  Comment: CORRECTED RESULT; previously reported as 2.0 on 02/02/2023 at 06:15.  [C]         Eosinophil %     11.9   SEE COMMENT  Comment: Do not report  CORRECTED RESULT; previously reported as 11.3 on 02/02/2023 at 06:15.    [C]         Fragmented Cells       SEE COMMENT  Comment: Do not report         Giant Platelets       SEE COMMENT  Comment: Do not report         Glucose       176         Gran # (ANC)     10.9   SEE COMMENT  Comment: Do not report  CORRECTED RESULT; previously reported as 12.2 on 02/02/2023 at 06:15.    [C]         Gran %     67.0   SEE COMMENT  Comment: Do not report  CORRECTED RESULT; previously reported as 68.3  on 02/02/2023 at 06:15.    [C]         David Bodies       SEE COMMENT  Comment: Do not report         Hematocrit     32.1   SEE COMMENT  Comment: Do not report  CORRECTED RESULT; previously reported as 32.6 on 02/02/2023 at 06:15.    [C]         Hemoglobin     9.5   SEE COMMENT  Comment: Do not report  CORRECTED RESULT; previously reported as 9.4 on 02/02/2023 at 06:15.    [C]         HGB C Crystals       SEE COMMENT  Comment: Do not report         Batres-Ayden Bodies       SEE COMMENT  Comment: Do not report         Hypo       SEE COMMENT  Comment: Do not report         Immature Grans (Abs)     0.06  Comment: Mild elevation in immature granulocytes is non specific and   can be seen in a variety of conditions including stress response,   acute inflammation, trauma and pregnancy. Correlation with other   laboratory and clinical findings is essential.     Test Not Performed  Comment: Mild elevation in immature granulocytes is non specific and   can be seen in a variety of conditions including stress response,   acute inflammation, trauma and pregnancy. Correlation with other   laboratory and clinical findings is essential.  CORRECTED RESULT; previously reported as 0.08 on 02/02/2023 at 06:15.    [C]         Immature Granulocytes     0.4   Test Not Performed  Comment: CORRECTED RESULT; previously reported as 0.4 on 02/02/2023 at 06:15.  [C]         Lymph #     2.3   Test Not Performed  Comment: CORRECTED RESULT; previously reported as 2.5 on 02/02/2023 at 06:15.  [C]         Lymph %     14.3   SEE COMMENT  Comment: Do not report  CORRECTED RESULT; previously reported as 13.9 on 02/02/2023 at 06:15.    [C]         Magnesium       1.7         Malarial Forms       SEE COMMENT  Comment: Do not report         MCH     26.9   SEE COMMENT  Comment: Do not report  CORRECTED RESULT; previously reported as 27.3 on 02/02/2023 at 06:15.    [C]         MCHC     29.6   SEE COMMENT  Comment: Do not report  CORRECTED RESULT; previously  reported as 28.8 on 02/02/2023 at 06:15.    [C]         MCV     91   SEE COMMENT  Comment: Do not report  CORRECTED RESULT; previously reported as 95 on 02/02/2023 at 06:15.    [C]         Megakaryocytic Fragments       SEE COMMENT  Comment: Do not report         Metamyelocytes       SEE COMMENT  Comment: Do not report         Mono #     1.0   Test Not Performed  Comment: CORRECTED RESULT; previously reported as 1.0 on 02/02/2023 at 06:15.  [C]         Mono %     6.0   SEE COMMENT  Comment: Do not report  CORRECTED RESULT; previously reported as 5.8 on 02/02/2023 at 06:15.    [C]         MPV     9.9   SEE COMMENT  Comment: Do not report  CORRECTED RESULT; previously reported as 11.4 on 02/02/2023 at 06:15.    [C]         Myelocytes       SEE COMMENT  Comment: Do not report         nRBC     0   SEE COMMENT  Comment: Do not report  CORRECTED RESULT; previously reported as 0 on 02/02/2023 at 06:15.    [C]         Other       SEE COMMENT  Comment: Do not report         Ovalocytes       SEE COMMENT  Comment: Do not report         Pappenheimer Bodies       SEE COMMENT  Comment: Do not report         Platelet Estimate       SEE COMMENT  Comment: Do not report         Platelets     482   SEE COMMENT  Comment: Do not report  CORRECTED RESULT; previously reported as 116 on 02/02/2023 at 06:15.    [C]         POCT Glucose 188   148       184       Poikilocytosis       SEE COMMENT  Comment: Do not report         Poly       SEE COMMENT  Comment: Do not report         Potassium       4.6         Promyelocytes       SEE COMMENT  Comment: Do not report         RBC     3.53   SEE COMMENT  Comment: Do not report  CORRECTED RESULT; previously reported as 3.44 on 02/02/2023 at 06:15.    [C]         RDW     16.4   SEE COMMENT  Comment: Do not report  CORRECTED RESULT; previously reported as 16.4 on 02/02/2023 at 06:15.    [C]         Rouleaux       SEE COMMENT  Comment: Do not report         Schistocytes       SEE COMMENT  Comment: Do  not report         Sickle Cells       SEE COMMENT  Comment: Do not report         Smudge Cells       SEE COMMENT  Comment: Do not report         Sodium       133         Spherocytes       SEE COMMENT  Comment: Do not report         Stomatocytes       SEE COMMENT  Comment: Do not report         Target Cells       SEE COMMENT  Comment: Do not report         Teardrop Cells       SEE COMMENT  Comment: Do not report         Toxic Granulation       SEE COMMENT  Comment: Do not report         Vacuolated Granulocytes       SEE COMMENT  Comment: Do not report         WBC     16.19   17.89                                 [C] - Corrected Result               Significant Imaging: I have reviewed all pertinent imaging results/findings within the past 24 hours.      Assessment/Plan:      * Dislodged gastrostomy tube  Oropharyngeal dysphagia    - s/p 20 F PEG tube placed without any immediate complications.   External bumper at 3 cm shelby.-  -resume feeding tube with Isosource 1.5 nathaniel and dietician consult    Multiple wounds of skin  With scattered lesions with dry skin  Encourage moisturize skin, skin care  Avoid skin debridement, changing position q 2Hrs  Cont Antibiotic ointment at the PEG site      Leukocytosis  - VSSAF  - leukocytosis improving R/O steroids induced  - g-tube site is clean without discharge or erythma  - blood cultures positive for gram + cocci, repeat no growth  Cont IV ABx with ampicillin    Bacteremia  Cont ampicillin will be DC on ampicillin for 14 days  ID consulted appreciated   2D echo done no vegetations cont medical management        On antiepileptic therapy  - continue keppra 750 mg BID once peg tube is replaced      HTN (hypertension)  - BP well controlled upon admission  - continue coreg 3.125 mg BID once peg tube is replaced      Type 2 diabetes mellitus, with long-term current use of insulin  Patient's FSGs are controlled on current medication regimen.  Last A1c reviewed-   Lab Results   Component  Value Date    HGBA1C 5.9 (H) 07/12/2018     Most recent fingerstick glucose reviewed-   Recent Labs   Lab 02/01/23  1526 02/01/23  1930 02/02/23  0800 02/02/23  1206   POCTGLUCOSE 195* 184* 148* 188*     Current correctional scale  Low  Maintain anti-hyperglycemic dose as follows-   Antihyperglycemics (From admission, onward)    Start     Stop Route Frequency Ordered    01/25/23 1511  insulin aspart U-100 pen 0-5 Units         -- SubQ Before meals & nightly PRN 01/25/23 1411        Hold Oral hypoglycemics while patient is in the hospital.  - patient is on insulin glargine 15U daily at the nursing home  - resume when tube feedings are resumed    Oropharyngeal dysphagia  -Cont tube feeding      Anemia  - patient's anemia is currently controlled  - etiology likely due to anemia of chronic disease  - current CBC reviewed -   Lab Results   Component Value Date    HGB 10.2 (L) 01/26/2023    HCT 34.0 (L) 01/26/2023     - monitor serial CBC and transfuse if patient becomes hemodynamically unstable, symptomatic, or H/H drops below 7/21.         Chronic kidney disease, stage III (moderate)  - baseline ~1.3  - cont monitor      VTE Risk Mitigation (From admission, onward)         Ordered     Place sequential compression device  Until discontinued         01/25/23 1411     IP VTE HIGH RISK PATIENT  Once         01/25/23 1411     Reason for No Pharmacological VTE Prophylaxis  Once        Question:  Reasons:  Answer:  Risk of Bleeding  Comment:  pre-op    01/25/23 1411                Discharge Planning   FAINA: 2/3/2023     Code Status: Full Code   Is the patient medically ready for discharge?: Yes    Reason for patient still in hospital (select all that apply): Other (specify) placement  Discharge Plan A: Long-term acute care facility (LTAC)   Discharge Delays: None known at this time              Divya Galvin MD  Department of Hospital Medicine   [facility] 11H

## 2023-02-02 NOTE — PLAN OF CARE
No acute events during shift. VSS. Patient on 5L O2. NADN respirations even and unlabored. Trach care complete. Suction set up at bedside. Fall and safety precautions maintained. Bed in lowest locked position. Call light with reach. POC discussed with patient. Will continue to monitor.

## 2023-02-02 NOTE — PROGRESS NOTES
[facility] [address] [place] [address] Medicine  Progress Note    Patient Name: Hao Medrano  MRN: [id number]  Patient Class: IP- Inpatient   Admission Date: 1/25/2023  Length of Stay: 6 days  Attending Physician: oTmmy Rhodes MD  Primary Care Provider: Keara Yanes NP        Subjective:     Principal Problem:Dislodged gastrostomy tube        HPI:  Hao Medrano is a 62 y.o. male with a past medical history of CVA with trach and G-tube, CKD, HTN, and HLD who is being palced in observation for further management of dislodged peg tube. Patient presented to the ED from [facility], [place]. Patient is nonverbal, history obtained from chart and ED staff. Per the ED, the nursing home reports they noticed the dislodged ped tube around 0400 this am. Patient was recently admitted on 1/10 for same issue with a new peg placed on 1/12.    ED: vital signs stable, afebrile. Leukocytosis of 23K. Hb 11.4. PT/INR 10/9/1.0. Furhter labs pending at time of admission. CXR with inconclusive findings due to patient position. Blood cultures obtained. GI consulted and plan for EGD with G-tube placement tomorrow.       Overview/Hospital Course:  S/p 20 F PEG tube placed without any immediate complications.   External bumper at 3 cm shelby.  Positive blood Cx with gram positive Cocci  2D echo ordered  Cardiology consult for HARDEEP per ID recommendation  Outpatient Antibiotic Therapy Plan:     Please send referral to [facility].     1) Infection: E faecalis bacteremia     2) Discharge Antibiotics:     Intravenous antibiotics:   Ampicillin 2 grams IV q 4 hours (may be given as continuous infusion)        3) Therapy Duration:  14 days     Estimated end date of IV antibiotics: 02/24/23     4) Outpatient Weekly Labs:     Order the following labs to be drawn on Mondays:    CBC   CMP      5) Fax Lab Results to Infectious Diseases Provider: Dr. Price     [facility] Fax Number:  [phone]     6) Outpatient Infectious Diseases Follow-up      Follow-up appointment will be arranged by the ID clinic and will be found in the patient's appointments tab.      Prior to discharge, please ensure the patient's follow-up has been scheduled.     If there is still no follow-up scheduled prior to discharge, please send an EPIC message to Lauren Stiles in Infectious Diseases.    Patient was admitted for dislodged PEG tube. GI was consulted and he underwent 20 F PEG tube placed without any immediate complications. External bumper at 3 cm shelby. During the course of this admission serial routine labs with mild hyperglycemia, mild anemia, persistence leukocytosis. He was found to have positive blood Culture with enterococcus Faecalis and coagulase negative staph. ID was consulted and was started on IV antibiotic and outpatient antibiotic see recommendation above. Throughout hospital stays, no new acute changes. He will be transferred back to the nursing home and follow up with ID, PCP.      Interval History:   No acute changes, no spike of fever, non verbal, awaiting for placement    Review of Systems   Unable to perform ROS: Patient nonverbal   Objective:     Vital Signs (Most Recent):  Temp: 97.1 °F (36.2 °C) (02/01/23 1528)  Pulse: 92 (02/01/23 1622)  Resp: 14 (02/01/23 1622)  BP: 134/77 (02/01/23 1528)  SpO2: 100 % (02/01/23 1622) Vital Signs (24h Range):  Temp:  [97.1 °F (36.2 °C)-98.2 °F (36.8 °C)] 97.1 °F (36.2 °C)  Pulse:  [] 92  Resp:  [14-20] 14  SpO2:  [98 %-100 %] 100 %  BP: (125-136)/(67-77) 134/77     Weight: 71.7 kg (158 lb)  Body mass index is 21.43 kg/m².    Intake/Output Summary (Last 24 hours) at 2/1/2023 1806  Last data filed at 2/1/2023 0800  Gross per 24 hour   Intake 1650 ml   Output 1400 ml   Net 250 ml      Physical Exam  Vitals and nursing note reviewed.   Constitutional:       General: He is not in acute distress.  HENT:      Head: Normocephalic and atraumatic.      Nose:  No congestion.   Eyes:      Extraocular Movements: Extraocular movements intact.      Pupils: Pupils are equal, round, and reactive to light.   Cardiovascular:      Rate and Rhythm: Normal rate. Rhythm irregular.      Heart sounds:     No friction rub. No gallop.   Pulmonary:      Effort: Pulmonary effort is normal. No respiratory distress.      Breath sounds: No wheezing or rales.      Comments: Trach in place  Abdominal:      General: Bowel sounds are normal. There is no distension.      Tenderness: There is no abdominal tenderness. There is no guarding or rebound.      Comments: PEG in place   Musculoskeletal:         General: Swelling and deformity present.      Cervical back: No rigidity or tenderness.      Right lower leg: Edema present.      Left lower leg: Edema present.   Skin:     Findings: Erythema and rash present.   Neurological:      Mental Status: He is alert.      Comments: Hemiplegic with B/L lower extremities, atrophy, bed bound       Significant Labs: All pertinent labs within the past 24 hours have been reviewed.  Blood Culture: No results for input(s): LABBLOO in the last 48 hours.  BMP:   Recent Labs   Lab 02/01/23  0356   *      K 4.7      CO2 26   BUN 20   CREATININE 0.9   CALCIUM 9.3   MG 1.7     CBC:   Recent Labs   Lab 01/31/23  0231 02/01/23  0356   WBC 15.00* 16.87*   HGB 9.9* 9.8*   HCT 32.2* 33.4*   * 430     CMP:   Recent Labs   Lab 01/31/23  0231 02/01/23  0356   * 137   K 3.9 4.7    101   CO2 28 26    175*   BUN 20 20   CREATININE 0.8 0.9   CALCIUM 9.4 9.3   PROT 7.2 7.1   ALBUMIN 2.0* 2.0*   BILITOT 0.1 0.1   ALKPHOS 149* 148*   AST 17 17   ALT 13 14   ANIONGAP 7* 10     Recent Lab Results  (Last 5 results in the past 24 hours)        02/01/23  1526   02/01/23  1144   02/01/23  0809   02/01/23  0356   01/31/23 2013        Albumin       2.0         Alkaline Phosphatase       148         ALT       14         Anion Gap       10          Aniso       Slight         AST       17         Baso #       0.05         Basophil %       0.3         BILIRUBIN TOTAL       0.1  Comment: For infants and newborns, interpretation of results should be based  on gestational age, weight and in agreement with clinical  observations.    Premature Infant recommended reference ranges:  Up to 24 hours.............<8.0 mg/dL  Up to 48 hours............<12.0 mg/dL  3-5 days..................<15.0 mg/dL  6-29 days.................<15.0 mg/dL           BUN       20         Calcium       9.3         Chloride       101         CO2       26         Creatinine       0.9         Differential Method       Automated         eGFR       >60.0         Eos #       2.2         Eosinophil %       13.1         Glucose       175         Gran # (ANC)       11.1         Gran %       65.9         Hematocrit       33.4         Hemoglobin       9.8         Hypo       Occasional         Immature Grans (Abs)       0.08  Comment: Mild elevation in immature granulocytes is non specific and   can be seen in a variety of conditions including stress response,   acute inflammation, trauma and pregnancy. Correlation with other   laboratory and clinical findings is essential.           Immature Granulocytes       0.5         Lymph #       2.4         Lymph %       14.3         Magnesium       1.7         MCH       27.1         MCHC       29.3         MCV       92         Mono #       1.0         Mono %       5.9         MPV       9.9         nRBC       0         Ovalocytes       Occasional         Phosphorus       2.6         Platelets       430         POCT Glucose 195   225   175     183       Poikilocytosis       Slight         Poly       Occasional         Potassium       4.7         PROTEIN TOTAL       7.1         RBC       3.62         RDW       16.1         Sodium       137         WBC       16.87                                Significant Imaging: I have reviewed all pertinent imaging  results/findings within the past 24 hours.      Assessment/Plan:      * Dislodged gastrostomy tube  Oropharyngeal dysphagia    - s/p 20 F PEG tube placed without any immediate complications.   External bumper at 3 cm shelby.-  -resume feeding tube with Isosource 1.5 nathaniel and dietician consult    Bacteremia  Cont ampicillin will be DC on ampicillin for 14 days  ID consulted appreciated   2D echo done no vegetations cont medical management        Multiple wounds of skin  With scattered lesions with dry skin  Encourage moisturize skin, skin care  Avoid skin debridement, changing position q 2Hrs  Cont Antibiotic ointment at the PEG site      On antiepileptic therapy  - continue keppra 750 mg BID once peg tube is replaced      HTN (hypertension)  - BP well controlled upon admission  - continue coreg 3.125 mg BID once peg tube is replaced      Leukocytosis  - VSSAF  - leukocytosis improving R/O steroids induced  - g-tube site is clean without discharge or erythma  - blood cultures positive for gram + cocci, repeat no growth  Cont IV ABx with ampicillin    Type 2 diabetes mellitus, with long-term current use of insulin  Patient's FSGs are controlled on current medication regimen.  Last A1c reviewed-   Lab Results   Component Value Date    HGBA1C 5.9 (H) 07/12/2018     Most recent fingerstick glucose reviewed- No results for input(s): POCTGLUCOSE in the last 24 hours.  Current correctional scale  Low  Maintain anti-hyperglycemic dose as follows-   Antihyperglycemics (From admission, onward)    Start     Stop Route Frequency Ordered    01/25/23 1511  insulin aspart U-100 pen 0-5 Units         -- SubQ Before meals & nightly PRN 01/25/23 1411        Hold Oral hypoglycemics while patient is in the hospital.  - patient is on insulin glargine 15U daily at the nursing home  - resume when tube feedings are resumed    Oropharyngeal dysphagia  -Cont tube feeding      Anemia  - patient's anemia is currently controlled  - etiology likely  due to anemia of chronic disease  - current CBC reviewed -   Lab Results   Component Value Date    HGB 10.2 (L) 01/26/2023    HCT 34.0 (L) 01/26/2023     - monitor serial CBC and transfuse if patient becomes hemodynamically unstable, symptomatic, or H/H drops below 7/21.         Chronic kidney disease, stage III (moderate)  - baseline ~1.3  - cont monitor      VTE Risk Mitigation (From admission, onward)         Ordered     Place sequential compression device  Until discontinued         01/25/23 1411     IP VTE HIGH RISK PATIENT  Once         01/25/23 1411     Reason for No Pharmacological VTE Prophylaxis  Once        Question:  Reasons:  Answer:  Risk of Bleeding  Comment:  pre-op    01/25/23 1411                Discharge Planning   FAINA: 2/2/2023     Code Status: Full Code   Is the patient medically ready for discharge?: No    Reason for patient still in hospital (select all that apply): Pending disposition  Discharge Plan A: Long-term acute care facility (LTAC)          Time spent 30 minutes      Tommy Rhodes MD  Department of Hospital Medicine   [facility] 11H

## 2023-02-02 NOTE — SUBJECTIVE & OBJECTIVE
Interval History:   No acute changes, no spike of fever, non verbal, awaiting for placement    Review of Systems   Unable to perform ROS: Patient nonverbal   Objective:     Vital Signs (Most Recent):  Temp: 98.1 °F (36.7 °C) (02/02/23 1208)  Pulse: (!) 116 (02/02/23 1208)  Resp: 18 (02/02/23 1208)  BP: (!) 149/83 (02/02/23 1208)  SpO2: 100 % (02/02/23 1221) Vital Signs (24h Range):  Temp:  [96.6 °F (35.9 °C)-98.8 °F (37.1 °C)] 98.1 °F (36.7 °C)  Pulse:  [] 116  Resp:  [14-20] 18  SpO2:  [98 %-100 %] 100 %  BP: (113-149)/(58-83) 149/83     Weight: 71.7 kg (158 lb)  Body mass index is 21.43 kg/m².    Intake/Output Summary (Last 24 hours) at 2/2/2023 1520  Last data filed at 2/2/2023 0435  Gross per 24 hour   Intake 500 ml   Output 900 ml   Net -400 ml        Physical Exam  Vitals and nursing note reviewed.   Constitutional:       General: He is not in acute distress.  HENT:      Head: Normocephalic and atraumatic.      Nose: No congestion.   Eyes:      Extraocular Movements: Extraocular movements intact.      Pupils: Pupils are equal, round, and reactive to light.   Cardiovascular:      Rate and Rhythm: Normal rate. Rhythm irregular.      Heart sounds:     No friction rub. No gallop.   Pulmonary:      Effort: Pulmonary effort is normal. No respiratory distress.      Breath sounds: No wheezing or rales.      Comments: Trach in place  Abdominal:      General: Bowel sounds are normal. There is no distension.      Tenderness: There is no abdominal tenderness. There is no guarding or rebound.      Comments: PEG in place   Musculoskeletal:         General: Swelling and deformity present.      Cervical back: No rigidity or tenderness.      Right lower leg: Edema present.      Left lower leg: Edema present.   Skin:     Findings: Erythema and rash present.   Neurological:      Mental Status: He is alert.      Comments: Hemiplegic with B/L lower extremities, atrophy, bed bound       Significant Labs: All pertinent labs  within the past 24 hours have been reviewed.  Blood Culture: No results for input(s): LABBLOO in the last 48 hours.  BMP:   Recent Labs   Lab 02/02/23  0525   *   *   K 4.6   CL 99   CO2 24   BUN 20   CREATININE 0.9   CALCIUM 9.3   MG 1.7       CBC:   Recent Labs   Lab 02/01/23  0356 02/02/23  0525 02/02/23  0740   WBC 16.87* 17.89* 16.19*   HGB 9.8* SEE COMMENT 9.5*   HCT 33.4* SEE COMMENT 32.1*    SEE COMMENT 482*       CMP:   Recent Labs   Lab 02/01/23  0356 02/02/23  0525    133*   K 4.7 4.6    99   CO2 26 24   * 176*   BUN 20 20   CREATININE 0.9 0.9   CALCIUM 9.3 9.3   PROT 7.1  --    ALBUMIN 2.0*  --    BILITOT 0.1  --    ALKPHOS 148*  --    AST 17  --    ALT 14  --    ANIONGAP 10 10       Recent Lab Results  (Last 5 results in the past 24 hours)        02/02/23  1206   02/02/23  0800   02/02/23  0740   02/02/23  0525   02/01/23  1930        WBC-Corrected for NRBC's       SEE COMMENT  Comment: Do not report         Acanthocytes       SEE COMMENT  Comment: Do not report         Agglutination       SEE COMMENT  Comment: Do not report         Anion Gap       10         Aniso       SEE COMMENT  Comment: Do not report         Alem Rods       SEE COMMENT  Comment: Do not report         Bands       SEE COMMENT  Comment: Do not report         Baso #     0.06   Test Not Performed  Comment: CORRECTED RESULT; previously reported as 0.06 on 02/02/2023 at 06:15.  [C]         Basophilic Stippling       SEE COMMENT  Comment: Do not report         Basophil %     0.4   SEE COMMENT  Comment: Do not report  CORRECTED RESULT; previously reported as 0.3 on 02/02/2023 at 06:15.    [C]         Blasts       SEE COMMENT  Comment: Do not report         BUN       20         Hadley/Echinocytes       SEE COMMENT  Comment: Do not report         Cabot Rings       SEE COMMENT  Comment: Do not report         Calcium       9.3         Chloride       99         CO2       24         Creatinine       0.9          Differential Method     Automated   SEE COMMENT  Comment: Do not report  CORRECTED RESULT; previously reported as Automated on 02/02/2023 at   06:15.    [C]         Dohle Bodies       SEE COMMENT  Comment: Do not report         eGFR       >60.0         Eos #     1.9   Test Not Performed  Comment: CORRECTED RESULT; previously reported as 2.0 on 02/02/2023 at 06:15.  [C]         Eosinophil %     11.9   SEE COMMENT  Comment: Do not report  CORRECTED RESULT; previously reported as 11.3 on 02/02/2023 at 06:15.    [C]         Fragmented Cells       SEE COMMENT  Comment: Do not report         Giant Platelets       SEE COMMENT  Comment: Do not report         Glucose       176         Gran # (ANC)     10.9   SEE COMMENT  Comment: Do not report  CORRECTED RESULT; previously reported as 12.2 on 02/02/2023 at 06:15.    [C]         Gran %     67.0   SEE COMMENT  Comment: Do not report  CORRECTED RESULT; previously reported as 68.3 on 02/02/2023 at 06:15.    [C]         David Bodies       SEE COMMENT  Comment: Do not report         Hematocrit     32.1   SEE COMMENT  Comment: Do not report  CORRECTED RESULT; previously reported as 32.6 on 02/02/2023 at 06:15.    [C]         Hemoglobin     9.5   SEE COMMENT  Comment: Do not report  CORRECTED RESULT; previously reported as 9.4 on 02/02/2023 at 06:15.    [C]         HGB C Crystals       SEE COMMENT  Comment: Do not report         [place] Bodies       SEE COMMENT  Comment: Do not report         Hypo       SEE COMMENT  Comment: Do not report         Immature Grans (Abs)     0.06  Comment: Mild elevation in immature granulocytes is non specific and   can be seen in a variety of conditions including stress response,   acute inflammation, trauma and pregnancy. Correlation with other   laboratory and clinical findings is essential.     Test Not Performed  Comment: Mild elevation in immature granulocytes is non specific and   can be seen in a variety of conditions including stress  response,   acute inflammation, trauma and pregnancy. Correlation with other   laboratory and clinical findings is essential.  CORRECTED RESULT; previously reported as 0.08 on 02/02/2023 at 06:15.    [C]         Immature Granulocytes     0.4   Test Not Performed  Comment: CORRECTED RESULT; previously reported as 0.4 on 02/02/2023 at 06:15.  [C]         Lymph #     2.3   Test Not Performed  Comment: CORRECTED RESULT; previously reported as 2.5 on 02/02/2023 at 06:15.  [C]         Lymph %     14.3   SEE COMMENT  Comment: Do not report  CORRECTED RESULT; previously reported as 13.9 on 02/02/2023 at 06:15.    [C]         Magnesium       1.7         Malarial Forms       SEE COMMENT  Comment: Do not report         MCH     26.9   SEE COMMENT  Comment: Do not report  CORRECTED RESULT; previously reported as 27.3 on 02/02/2023 at 06:15.    [C]         MCHC     29.6   SEE COMMENT  Comment: Do not report  CORRECTED RESULT; previously reported as 28.8 on 02/02/2023 at 06:15.    [C]         MCV     91   SEE COMMENT  Comment: Do not report  CORRECTED RESULT; previously reported as 95 on 02/02/2023 at 06:15.    [C]         Megakaryocytic Fragments       SEE COMMENT  Comment: Do not report         Metamyelocytes       SEE COMMENT  Comment: Do not report         Mono #     1.0   Test Not Performed  Comment: CORRECTED RESULT; previously reported as 1.0 on 02/02/2023 at 06:15.  [C]         Mono %     6.0   SEE COMMENT  Comment: Do not report  CORRECTED RESULT; previously reported as 5.8 on 02/02/2023 at 06:15.    [C]         MPV     9.9   SEE COMMENT  Comment: Do not report  CORRECTED RESULT; previously reported as 11.4 on 02/02/2023 at 06:15.    [C]         Myelocytes       SEE COMMENT  Comment: Do not report         nRBC     0   SEE COMMENT  Comment: Do not report  CORRECTED RESULT; previously reported as 0 on 02/02/2023 at 06:15.    [C]         Other       SEE COMMENT  Comment: Do not report         Ovalocytes       SEE  COMMENT  Comment: Do not report         Pappenheimer Bodies       SEE COMMENT  Comment: Do not report         Platelet Estimate       SEE COMMENT  Comment: Do not report         Platelets     482   SEE COMMENT  Comment: Do not report  CORRECTED RESULT; previously reported as 116 on 02/02/2023 at 06:15.    [C]         POCT Glucose 188   148       184       Poikilocytosis       SEE COMMENT  Comment: Do not report         Poly       SEE COMMENT  Comment: Do not report         Potassium       4.6         Promyelocytes       SEE COMMENT  Comment: Do not report         RBC     3.53   SEE COMMENT  Comment: Do not report  CORRECTED RESULT; previously reported as 3.44 on 02/02/2023 at 06:15.    [C]         RDW     16.4   SEE COMMENT  Comment: Do not report  CORRECTED RESULT; previously reported as 16.4 on 02/02/2023 at 06:15.    [C]         Rouleaux       SEE COMMENT  Comment: Do not report         Schistocytes       SEE COMMENT  Comment: Do not report         Sickle Cells       SEE COMMENT  Comment: Do not report         Smudge Cells       SEE COMMENT  Comment: Do not report         Sodium       133         Spherocytes       SEE COMMENT  Comment: Do not report         Stomatocytes       SEE COMMENT  Comment: Do not report         Target Cells       SEE COMMENT  Comment: Do not report         Teardrop Cells       SEE COMMENT  Comment: Do not report         Toxic Granulation       SEE COMMENT  Comment: Do not report         Vacuolated Granulocytes       SEE COMMENT  Comment: Do not report         WBC     16.19   17.89                                 [C] - Corrected Result               Significant Imaging: I have reviewed all pertinent imaging results/findings within the past 24 hours.

## 2023-02-02 NOTE — ASSESSMENT & PLAN NOTE
Patient's FSGs are controlled on current medication regimen.  Last A1c reviewed-   Lab Results   Component Value Date    HGBA1C 5.9 (H) 07/12/2018     Most recent fingerstick glucose reviewed-   Recent Labs   Lab 02/01/23  1526 02/01/23  1930 02/02/23  0800 02/02/23  1206   POCTGLUCOSE 195* 184* 148* 188*     Current correctional scale  Low  Maintain anti-hyperglycemic dose as follows-   Antihyperglycemics (From admission, onward)    Start     Stop Route Frequency Ordered    01/25/23 1511  insulin aspart U-100 pen 0-5 Units         -- SubQ Before meals & nightly PRN 01/25/23 1411        Hold Oral hypoglycemics while patient is in the hospital.  - patient is on insulin glargine 15U daily at the nursing home  - resume when tube feedings are resumed

## 2023-02-02 NOTE — RESPIRATORY THERAPY
[facility] RESPIRATORY THERAPY PROACTIVE NOTE           Time of visit: 936     Code Status: Full Code   : 1960  Bed: 1155/1155 A:   MRN: [id number]  Time spent at the bedside: < 15 min    SITUATION    Evaluated patient for: LDA Check     BACKGROUND    Patient has no past medical history on file.  Clinically Significant Surgical Hx: tracheostomy    24 Hours Vitals Range:  Temp:  [96.6 °F (35.9 °C)-98.8 °F (37.1 °C)]   Pulse:  []   Resp:  [14-20]   BP: (113-149)/(58-83)   SpO2:  [98 %-100 %]     Labs:    Recent Labs     23  0231 23  0356 23  0525   * 137 133*   K 3.9 4.7 4.6    101 99   CO2 28 26 24   CREATININE 0.8 0.9 0.9    175* 176*   PHOS 2.4* 2.6*  --    MG 1.6 1.7 1.7        No results for input(s): PH, PCO2, PO2, HCO3, POCSATURATED, BE in the last 72 hours.    ASSESSMENT/INTERVENTIONS  Patient sitting on edge of bed. Obturator HOB. All supplies bedside.       Last VS   Temp: 98.1 °F (36.7 °C) ( 120)  Pulse: 116 ( 1208)  Resp: 18 ( 1208)  BP: 149/83 ( 1208)  SpO2: 100 % ( 1221)      Extra trachs at bedside: 4,6  Level of Consciousness: Level of Consciousness (AVPU): alert  Respiratory Effort: Respiratory Effort: Unlabored Expansion/Accessory Muscle Usage: Expansion/Accessory Muscles/Retractions: no retractions, no use of accessory muscles  All Lung Field Breath Sounds: All Lung Fields Breath Sounds: diminished  O2 Device/Concentration: Trach collar 5L/28%  Surgical airway: Yes, Type: Shiley Size: 6, cuffed  Ambu at bedside: Ambu bag with the patient?: Yes, Adult Ambu     Active Orders   Respiratory Care    Inhalation Treatment Q4H PRN     Frequency: Q4H PRN     Number of Occurrences: Until Specified    Oxygen PRN     Frequency: PRN     Number of Occurrences: Until Specified     Order Questions:      Device type: Low flow      Device: Trach Collar      Titrate O2 per Oxygen Titration Protocol: Yes      To maintain SpO2 goal of: >= 90%       Notify MD of: Inability to achieve desired SpO2; Sudden change in patient status and requires 20% increase in FiO2; Patient requires >60% FiO2    Pulse Oximetry Q4H     Frequency: Q4H     Number of Occurrences: Until Specified    Routine tracheostomy care     Frequency: BID     Number of Occurrences: Until Specified       RECOMMENDATIONS    We recommend: RRT Recs: Continue POC per primary team.      FOLLOW-UP    Please call back the Rapid Response RT, Kaleigh Davila, RRT at x [phone] for any questions or concerns.

## 2023-02-02 NOTE — PLAN OF CARE
F/U on P2P. Per Yumiko Raymond MD called while he was in a Pt's room and he did not answer, he attempted to call back and left a VM. MARTINA called Yumiko to arrange for another P2P and the rep stated the MD called x2 and that is their limit and an appeal will have to be filed. MARTINA notified team via secure chat and email. Amanda M, [facility]  notified. She contacte Yumiko and will f/u with CM on how to  file appeal through a 3rd party company.   Will continue to update plan as needed.  Elie Burgos RN,BSN    8011: MARTINA called [employer] for [facility] to file a quick appeal. Spoke with Alexy. She took information and requested that request and clinicals including nFace Sheet be faxed to [phone]. Cm also faxed to number provided by leadership [phone]. Case # [id number] included on face sheet as well as pre auth number. Updated team via email and secure chat.   Will continue to update plan as needed.  Elie Burgos RN,BSN

## 2023-02-02 NOTE — SUBJECTIVE & OBJECTIVE
Interval History:   No acute changes, no spike of fever, non verbal, awaiting for placement    Review of Systems   Unable to perform ROS: Patient nonverbal   Objective:     Vital Signs (Most Recent):  Temp: 97.1 °F (36.2 °C) (02/01/23 1528)  Pulse: 92 (02/01/23 1622)  Resp: 14 (02/01/23 1622)  BP: 134/77 (02/01/23 1528)  SpO2: 100 % (02/01/23 1622) Vital Signs (24h Range):  Temp:  [97.1 °F (36.2 °C)-98.2 °F (36.8 °C)] 97.1 °F (36.2 °C)  Pulse:  [] 92  Resp:  [14-20] 14  SpO2:  [98 %-100 %] 100 %  BP: (125-136)/(67-77) 134/77     Weight: 71.7 kg (158 lb)  Body mass index is 21.43 kg/m².    Intake/Output Summary (Last 24 hours) at 2/1/2023 1806  Last data filed at 2/1/2023 0800  Gross per 24 hour   Intake 1650 ml   Output 1400 ml   Net 250 ml      Physical Exam  Vitals and nursing note reviewed.   Constitutional:       General: He is not in acute distress.  HENT:      Head: Normocephalic and atraumatic.      Nose: No congestion.   Eyes:      Extraocular Movements: Extraocular movements intact.      Pupils: Pupils are equal, round, and reactive to light.   Cardiovascular:      Rate and Rhythm: Normal rate. Rhythm irregular.      Heart sounds:     No friction rub. No gallop.   Pulmonary:      Effort: Pulmonary effort is normal. No respiratory distress.      Breath sounds: No wheezing or rales.      Comments: Trach in place  Abdominal:      General: Bowel sounds are normal. There is no distension.      Tenderness: There is no abdominal tenderness. There is no guarding or rebound.      Comments: PEG in place   Musculoskeletal:         General: Swelling and deformity present.      Cervical back: No rigidity or tenderness.      Right lower leg: Edema present.      Left lower leg: Edema present.   Skin:     Findings: Erythema and rash present.   Neurological:      Mental Status: He is alert.      Comments: Hemiplegic with B/L lower extremities, atrophy, bed bound       Significant Labs: All pertinent labs within the  past 24 hours have been reviewed.  Blood Culture: No results for input(s): LABBLOO in the last 48 hours.  BMP:   Recent Labs   Lab 02/01/23  0356   *      K 4.7      CO2 26   BUN 20   CREATININE 0.9   CALCIUM 9.3   MG 1.7     CBC:   Recent Labs   Lab 01/31/23  0231 02/01/23  0356   WBC 15.00* 16.87*   HGB 9.9* 9.8*   HCT 32.2* 33.4*   * 430     CMP:   Recent Labs   Lab 01/31/23  0231 02/01/23  0356   * 137   K 3.9 4.7    101   CO2 28 26    175*   BUN 20 20   CREATININE 0.8 0.9   CALCIUM 9.4 9.3   PROT 7.2 7.1   ALBUMIN 2.0* 2.0*   BILITOT 0.1 0.1   ALKPHOS 149* 148*   AST 17 17   ALT 13 14   ANIONGAP 7* 10     Recent Lab Results  (Last 5 results in the past 24 hours)        02/01/23  1526   02/01/23  1144   02/01/23  0809   02/01/23  0356   01/31/23 2013        Albumin       2.0         Alkaline Phosphatase       148         ALT       14         Anion Gap       10         Aniso       Slight         AST       17         Baso #       0.05         Basophil %       0.3         BILIRUBIN TOTAL       0.1  Comment: For infants and newborns, interpretation of results should be based  on gestational age, weight and in agreement with clinical  observations.    Premature Infant recommended reference ranges:  Up to 24 hours.............<8.0 mg/dL  Up to 48 hours............<12.0 mg/dL  3-5 days..................<15.0 mg/dL  6-29 days.................<15.0 mg/dL           BUN       20         Calcium       9.3         Chloride       101         CO2       26         Creatinine       0.9         Differential Method       Automated         eGFR       >60.0         Eos #       2.2         Eosinophil %       13.1         Glucose       175         Gran # (ANC)       11.1         Gran %       65.9         Hematocrit       33.4         Hemoglobin       9.8         Hypo       Occasional         Immature Grans (Abs)       0.08  Comment: Mild elevation in immature granulocytes is non specific  and   can be seen in a variety of conditions including stress response,   acute inflammation, trauma and pregnancy. Correlation with other   laboratory and clinical findings is essential.           Immature Granulocytes       0.5         Lymph #       2.4         Lymph %       14.3         Magnesium       1.7         MCH       27.1         MCHC       29.3         MCV       92         Mono #       1.0         Mono %       5.9         MPV       9.9         nRBC       0         Ovalocytes       Occasional         Phosphorus       2.6         Platelets       430         POCT Glucose 195   225   175     183       Poikilocytosis       Slight         Poly       Occasional         Potassium       4.7         PROTEIN TOTAL       7.1         RBC       3.62         RDW       16.1         Sodium       137         WBC       16.87                                Significant Imaging: I have reviewed all pertinent imaging results/findings within the past 24 hours.

## 2023-02-02 NOTE — PLAN OF CARE
[facility]  Discharge Assessment    Primary Care Provider: Keara Yanes NP     Discharge Assessment (most recent)       BRIEF DISCHARGE ASSESSMENT - 01/26/23 0936          Discharge Planning    Assessment Type Discharge Planning Brief Assessment     Resource/Environmental Concerns none     Support Systems Family members;Spouse/significant other     Assistance Needed Full     Equipment Currently Used at Home wheelchair;hospital bed     Current Living Arrangements residential facility     Care Facility Name [facility]     Patient/Family Anticipates Transition to long-term care facility     Patient/Family Anticipated Services at Transition none     DME Needed Upon Discharge  none     Discharge Plan A Return to nursing home     Discharge Plan B Return to Nursing Home                   Patient was admitted for dislodged PEG tube. GI was consulted and he underwent 20 F PEG tube placed without any immediate complications. External bumper at 3 cm shelby. During the course of this admission serial routine labs with mild hyperglycemia, mild anemia, persistence leukocytosis. He was found to have positive blood Culture with enterococcus Faecalis and coagulase negative staph. ID was consulted and was started on IV antibiotic and outpatient antibiotic see recommendation above. Throughout hospital stays, no new acute changes. He will be transferred back to the nursing home and follow up with ID, PCP.        Interval History:   No acute changes, no spike of fever, non verbal, awaiting for placement.  Fast Appeal filed tp [employer] for denial of authorization.   Will continue to update plan as needed.  Elie Burgos, RN,BSN

## 2023-02-03 LAB
ANION GAP SERPL CALC-SCNC: 12 MMOL/L (ref 8–16)
BUN SERPL-MCNC: 21 MG/DL (ref 8–23)
CALCIUM SERPL-MCNC: 9.3 MG/DL (ref 8.7–10.5)
CHLORIDE SERPL-SCNC: 97 MMOL/L (ref 95–110)
CO2 SERPL-SCNC: 26 MMOL/L (ref 23–29)
CREAT SERPL-MCNC: 1 MG/DL (ref 0.5–1.4)
EST. GFR  (NO RACE VARIABLE): >60 ML/MIN/1.73 M^2
GLUCOSE SERPL-MCNC: 168 MG/DL (ref 70–110)
OSMOLALITY SERPL: 298 MOSM/KG (ref 280–300)
POCT GLUCOSE: 159 MG/DL (ref 70–110)
POCT GLUCOSE: 172 MG/DL (ref 70–110)
POCT GLUCOSE: 196 MG/DL (ref 70–110)
POCT GLUCOSE: 210 MG/DL (ref 70–110)
POTASSIUM SERPL-SCNC: 4.5 MMOL/L (ref 3.5–5.1)
SODIUM SERPL-SCNC: 135 MMOL/L (ref 136–145)

## 2023-02-03 PROCEDURE — 63600175 PHARM REV CODE 636 W HCPCS: Performed by: FAMILY MEDICINE

## 2023-02-03 PROCEDURE — 99900035 HC TECH TIME PER 15 MIN (STAT)

## 2023-02-03 PROCEDURE — 99900026 HC AIRWAY MAINTENANCE (STAT)

## 2023-02-03 PROCEDURE — 27000221 HC OXYGEN, UP TO 24 HOURS

## 2023-02-03 PROCEDURE — 25000003 PHARM REV CODE 250: Performed by: STUDENT IN AN ORGANIZED HEALTH CARE EDUCATION/TRAINING PROGRAM

## 2023-02-03 PROCEDURE — 27200966 HC CLOSED SUCTION SYSTEM

## 2023-02-03 PROCEDURE — 83930 ASSAY OF BLOOD OSMOLALITY: CPT | Performed by: STUDENT IN AN ORGANIZED HEALTH CARE EDUCATION/TRAINING PROGRAM

## 2023-02-03 PROCEDURE — 94761 N-INVAS EAR/PLS OXIMETRY MLT: CPT

## 2023-02-03 PROCEDURE — 25000003 PHARM REV CODE 250: Performed by: FAMILY MEDICINE

## 2023-02-03 PROCEDURE — 99232 SBSQ HOSP IP/OBS MODERATE 35: CPT | Mod: ,,, | Performed by: STUDENT IN AN ORGANIZED HEALTH CARE EDUCATION/TRAINING PROGRAM

## 2023-02-03 PROCEDURE — 36415 COLL VENOUS BLD VENIPUNCTURE: CPT | Performed by: STUDENT IN AN ORGANIZED HEALTH CARE EDUCATION/TRAINING PROGRAM

## 2023-02-03 PROCEDURE — 99232 PR SUBSEQUENT HOSPITAL CARE,LEVL II: ICD-10-PCS | Mod: ,,, | Performed by: STUDENT IN AN ORGANIZED HEALTH CARE EDUCATION/TRAINING PROGRAM

## 2023-02-03 PROCEDURE — 11000001 HC ACUTE MED/SURG PRIVATE ROOM

## 2023-02-03 PROCEDURE — 80048 BASIC METABOLIC PNL TOTAL CA: CPT | Performed by: STUDENT IN AN ORGANIZED HEALTH CARE EDUCATION/TRAINING PROGRAM

## 2023-02-03 RX ORDER — CARVEDILOL 6.25 MG/1
6.25 TABLET ORAL 2 TIMES DAILY
Status: DISCONTINUED | OUTPATIENT
Start: 2023-02-03 | End: 2023-02-10

## 2023-02-03 RX ADMIN — BACLOFEN 15 MG: 5 TABLET ORAL at 04:02

## 2023-02-03 RX ADMIN — LEVETIRACETAM 750 MG: 100 SOLUTION ORAL at 09:02

## 2023-02-03 RX ADMIN — ESOMEPRAZOLE MAGNESIUM 40 MG: 10 GRANULE, FOR SUSPENSION, EXTENDED RELEASE ORAL at 10:02

## 2023-02-03 RX ADMIN — LEVETIRACETAM 750 MG: 100 SOLUTION ORAL at 10:02

## 2023-02-03 RX ADMIN — NYSTATIN: 100000 POWDER TOPICAL at 10:02

## 2023-02-03 RX ADMIN — PREDNISONE 10 MG: 10 TABLET ORAL at 10:02

## 2023-02-03 RX ADMIN — ASPIRIN 81 MG CHEWABLE TABLET 81 MG: 81 TABLET CHEWABLE at 10:02

## 2023-02-03 RX ADMIN — CARVEDILOL 6.25 MG: 6.25 TABLET, FILM COATED ORAL at 10:02

## 2023-02-03 RX ADMIN — AMPICILLIN 2 G: 2 INJECTION, POWDER, FOR SOLUTION INTRAMUSCULAR; INTRAVENOUS at 04:02

## 2023-02-03 RX ADMIN — AMPICILLIN 2 G: 2 INJECTION, POWDER, FOR SOLUTION INTRAMUSCULAR; INTRAVENOUS at 11:02

## 2023-02-03 RX ADMIN — BACLOFEN 15 MG: 5 TABLET ORAL at 10:02

## 2023-02-03 RX ADMIN — Medication: at 10:02

## 2023-02-03 RX ADMIN — AMPICILLIN 2 G: 2 INJECTION, POWDER, FOR SOLUTION INTRAMUSCULAR; INTRAVENOUS at 03:02

## 2023-02-03 RX ADMIN — CALCIUM CARBONATE (ANTACID) CHEW TAB 500 MG 500 MG: 500 CHEW TAB at 10:02

## 2023-02-03 RX ADMIN — AMPICILLIN 2 G: 2 INJECTION, POWDER, FOR SOLUTION INTRAMUSCULAR; INTRAVENOUS at 07:02

## 2023-02-03 RX ADMIN — INSULIN ASPART 2 UNITS: 100 INJECTION, SOLUTION INTRAVENOUS; SUBCUTANEOUS at 11:02

## 2023-02-03 NOTE — PROGRESS NOTES
Wound follow up completed for multiple areas.  Bilateral heels, feet, sacrum, right buttocks, and perineum.  Wounds with slight improvement.  Continue with current orders.

## 2023-02-03 NOTE — PROGRESS NOTES
[facility] [address] [place] [address] Medicine  Progress Note    Patient Name: Hao Medrano  MRN: [id number]  Patient Class: IP- Inpatient   Admission Date: 1/25/2023  Length of Stay: 8 days  Attending Physician: Divya Galvin MD  Primary Care Provider: Keara Yanes NP        Subjective:     Principal Problem:Dislodged gastrostomy tube        HPI:  Hao Medrano is a 62 y.o. male with a past medical history of CVA with trach and G-tube, CKD, HTN, and HLD who is being palced in observation for further management of dislodged peg tube. Patient presented to the ED from [facility]. Patient is nonverbal, history obtained from chart and ED staff. Per the ED, the nursing home reports they noticed the dislodged ped tube around 0400 this am. Patient was recently admitted on 1/10 for same issue with a new peg placed on 1/12.    ED: vital signs stable, afebrile. Leukocytosis of 23K. Hb 11.4. PT/INR 10/9/1.0. Furhter labs pending at time of admission. CXR with inconclusive findings due to patient position. Blood cultures obtained. GI consulted and plan for EGD with G-tube placement tomorrow.       Overview/Hospital Course:  S/p 20 F PEG tube placed without any immediate complications.   External bumper at 3 cm shelby.  Positive blood Cx with gram positive Cocci  2D echo ordered, unremarkable for vegetation  ID consulted:    Outpatient Antibiotic Therapy Plan:     Please send referral to [facility].     1) Infection: E faecalis bacteremia     2) Discharge Antibiotics:     Intravenous antibiotics:   Ampicillin 2 grams IV q 4 hours (may be given as continuous infusion)        3) Therapy Duration:  14 days     Estimated end date of IV antibiotics: 02/24/23     4) Outpatient Weekly Labs:     Order the following labs to be drawn on Mondays:    CBC   CMP      5) Fax Lab Results to Infectious Diseases Provider: Dr. Price     [facility] Fax Number: [phone]     6)  Outpatient Infectious Diseases Follow-up      Follow-up appointment will be arranged by the ID clinic and will be found in the patient's appointments tab.      Prior to discharge, please ensure the patient's follow-up has been scheduled.     If there is still no follow-up scheduled prior to discharge, please send an EPIC message to Lauren Stiles in Infectious Diseases.    Patient was admitted for dislodged PEG tube. GI was consulted and he underwent 20 F PEG tube placed without any immediate complications. External bumper at 3 cm shelby. During the course of this admission serial routine labs with mild hyperglycemia, mild anemia, persistence leukocytosis. He was found to have positive blood Culture with enterococcus Faecalis and coagulase negative staph. ID was consulted and was started on IV antibiotic and outpatient antibiotic see recommendation above. Throughout hospital stays, no new acute changes. He will be transferred back to the nursing home and follow up with ID, PCP.      Interval History:   No acute changes, no spike of fever, non verbal, awaiting for placement.  D/w CM, fast appeal in works.     Review of Systems   Unable to perform ROS: Patient nonverbal   Objective:     Vital Signs (Most Recent):  Temp: 96.5 °F (35.8 °C) (02/03/23 1121)  Pulse: 99 (02/03/23 1153)  Resp: 20 (02/03/23 1153)  BP: 114/61 (02/03/23 1121)  SpO2: 97 % (02/03/23 1153) Vital Signs (24h Range):  Temp:  [96.3 °F (35.7 °C)-98.7 °F (37.1 °C)] 96.5 °F (35.8 °C)  Pulse:  [] 99  Resp:  [18-22] 20  SpO2:  [95 %-100 %] 97 %  BP: (114-137)/(61-82) 114/61     Weight: 71.7 kg (158 lb)  Body mass index is 21.43 kg/m².    Intake/Output Summary (Last 24 hours) at 2/3/2023 1232  Last data filed at 2/3/2023 1015  Gross per 24 hour   Intake 2270 ml   Output --   Net 2270 ml        Physical Exam  Vitals and nursing note reviewed.   Constitutional:       General: He is not in acute distress.  HENT:      Head: Normocephalic and atraumatic.       Nose: No congestion.   Eyes:      Extraocular Movements: Extraocular movements intact.      Pupils: Pupils are equal, round, and reactive to light.   Cardiovascular:      Rate and Rhythm: Normal rate. Rhythm irregular.      Heart sounds:     No friction rub. No gallop.   Pulmonary:      Effort: Pulmonary effort is normal. No respiratory distress.      Breath sounds: No wheezing or rales.      Comments: Trach in place  Abdominal:      General: Bowel sounds are normal. There is no distension.      Tenderness: There is no abdominal tenderness. There is no guarding or rebound.      Comments: PEG in place   Musculoskeletal:         General: Swelling and deformity present.      Cervical back: No rigidity or tenderness.      Right lower leg: Edema present.      Left lower leg: Edema present.   Skin:     Findings: Erythema and rash present.   Neurological:      Mental Status: He is alert.      Comments: Hemiplegic with B/L lower extremities, atrophy, bed bound       Significant Labs: All pertinent labs within the past 24 hours have been reviewed.  Blood Culture: No results for input(s): LABBLOO in the last 48 hours.  BMP:   Recent Labs   Lab 02/02/23  0525 02/03/23  0403   * 168*   * 135*   K 4.6 4.5   CL 99 97   CO2 24 26   BUN 20 21   CREATININE 0.9 1.0   CALCIUM 9.3 9.3   MG 1.7  --        CBC:   Recent Labs   Lab 02/02/23  0525 02/02/23  0740   WBC 17.89* 16.19*   HGB SEE COMMENT 9.5*   HCT SEE COMMENT 32.1*   PLT SEE COMMENT 482*       CMP:   Recent Labs   Lab 02/02/23  0525 02/03/23  0403   * 135*   K 4.6 4.5   CL 99 97   CO2 24 26   * 168*   BUN 20 21   CREATININE 0.9 1.0   CALCIUM 9.3 9.3   ANIONGAP 10 12       Recent Lab Results  (Last 5 results in the past 24 hours)        02/03/23  1120   02/03/23  0739   02/03/23  0403   02/02/23 2003 02/02/23  1728        Anion Gap     12           BUN     21           Calcium     9.3           Chloride     97           CO2     26            Creatinine     1.0           eGFR     >60.0           Glucose     168           Osmolality     298           POCT Glucose 210   159     196   202       Potassium     4.5           Sodium     135                                  Significant Imaging: I have reviewed all pertinent imaging results/findings within the past 24 hours.      Assessment/Plan:      * Dislodged gastrostomy tube  Oropharyngeal dysphagia    - s/p 20 F PEG tube placed without any immediate complications.   External bumper at 3 cm shelby.-  -resume feeding tube with Isosource 1.5 nathaniel and dietician consult    Multiple wounds of skin  With scattered lesions with dry skin  Encourage moisturize skin, skin care  Avoid skin debridement, changing position q 2Hrs  Cont Antibiotic ointment at the PEG site      Leukocytosis  - VSSAF  - leukocytosis improving R/O steroids induced  - g-tube site is clean without discharge or erythma  - blood cultures positive for gram + cocci, repeat no growth  Cont IV ABx with ampicillin    Bacteremia  Cont ampicillin will be DC on ampicillin for 14 days  ID consulted appreciated   2D echo done no vegetations cont medical management        On antiepileptic therapy  - continue keppra 750 mg BID once peg tube is replaced      HTN (hypertension)  - BP well controlled upon admission  - continue coreg, increased to 6.25 mg b.i.d.      Type 2 diabetes mellitus, with long-term current use of insulin  Patient's FSGs are controlled on current medication regimen.  Last A1c reviewed-   Lab Results   Component Value Date    HGBA1C 5.9 (H) 07/12/2018     Most recent fingerstick glucose reviewed-   Recent Labs   Lab 02/02/23  1728 02/02/23 2003 02/03/23  0739 02/03/23  1120   POCTGLUCOSE 202* 196* 159* 210*     Current correctional scale  Low  Maintain anti-hyperglycemic dose as follows-   Antihyperglycemics (From admission, onward)    Start     Stop Route Frequency Ordered    01/25/23 1511  insulin aspart U-100 pen 0-5 Units         --  SubQ Before meals & nightly PRN 01/25/23 1411        Hold Oral hypoglycemics while patient is in the hospital.  - patient is on insulin glargine 15U daily at the nursing home  - resume when tube feedings are resumed    Oropharyngeal dysphagia  -Cont tube feeding      Anemia  - patient's anemia is currently controlled  - etiology likely due to anemia of chronic disease  - current CBC reviewed -   Lab Results   Component Value Date    HGB 10.2 (L) 01/26/2023    HCT 34.0 (L) 01/26/2023     - monitor serial CBC and transfuse if patient becomes hemodynamically unstable, symptomatic, or H/H drops below 7/21.         Chronic kidney disease, stage III (moderate)  - baseline ~1.3  - cont monitor    VTE Risk Mitigation (From admission, onward)         Ordered     Place sequential compression device  Until discontinued         01/25/23 1411     IP VTE HIGH RISK PATIENT  Once         01/25/23 1411     Reason for No Pharmacological VTE Prophylaxis  Once        Question:  Reasons:  Answer:  Risk of Bleeding  Comment:  pre-op    01/25/23 1411                Discharge Planning   FAINA: 2/3/2023     Code Status: Full Code   Is the patient medically ready for discharge?: Yes    Reason for patient still in hospital (select all that apply): Pending disposition  Discharge Plan A: Long-term acute care facility (LTAC)   Discharge Delays: None known at this time              Divya Galvin MD  Department of Hospital Medicine   [facility]

## 2023-02-03 NOTE — SUBJECTIVE & OBJECTIVE
Interval History:   No acute changes, no spike of fever, non verbal, awaiting for placement.  D/w CM, fast appeal in works.     Review of Systems   Unable to perform ROS: Patient nonverbal   Objective:     Vital Signs (Most Recent):  Temp: 96.5 °F (35.8 °C) (02/03/23 1121)  Pulse: 99 (02/03/23 1153)  Resp: 20 (02/03/23 1153)  BP: 114/61 (02/03/23 1121)  SpO2: 97 % (02/03/23 1153) Vital Signs (24h Range):  Temp:  [96.3 °F (35.7 °C)-98.7 °F (37.1 °C)] 96.5 °F (35.8 °C)  Pulse:  [] 99  Resp:  [18-22] 20  SpO2:  [95 %-100 %] 97 %  BP: (114-137)/(61-82) 114/61     Weight: 71.7 kg (158 lb)  Body mass index is 21.43 kg/m².    Intake/Output Summary (Last 24 hours) at 2/3/2023 1232  Last data filed at 2/3/2023 1015  Gross per 24 hour   Intake 2270 ml   Output --   Net 2270 ml        Physical Exam  Vitals and nursing note reviewed.   Constitutional:       General: He is not in acute distress.  HENT:      Head: Normocephalic and atraumatic.      Nose: No congestion.   Eyes:      Extraocular Movements: Extraocular movements intact.      Pupils: Pupils are equal, round, and reactive to light.   Cardiovascular:      Rate and Rhythm: Normal rate. Rhythm irregular.      Heart sounds:     No friction rub. No gallop.   Pulmonary:      Effort: Pulmonary effort is normal. No respiratory distress.      Breath sounds: No wheezing or rales.      Comments: Trach in place  Abdominal:      General: Bowel sounds are normal. There is no distension.      Tenderness: There is no abdominal tenderness. There is no guarding or rebound.      Comments: PEG in place   Musculoskeletal:         General: Swelling and deformity present.      Cervical back: No rigidity or tenderness.      Right lower leg: Edema present.      Left lower leg: Edema present.   Skin:     Findings: Erythema and rash present.   Neurological:      Mental Status: He is alert.      Comments: Hemiplegic with B/L lower extremities, atrophy, bed bound       Significant Labs: All  pertinent labs within the past 24 hours have been reviewed.  Blood Culture: No results for input(s): LABBLOO in the last 48 hours.  BMP:   Recent Labs   Lab 02/02/23  0525 02/03/23  0403   * 168*   * 135*   K 4.6 4.5   CL 99 97   CO2 24 26   BUN 20 21   CREATININE 0.9 1.0   CALCIUM 9.3 9.3   MG 1.7  --        CBC:   Recent Labs   Lab 02/02/23  0525 02/02/23  0740   WBC 17.89* 16.19*   HGB SEE COMMENT 9.5*   HCT SEE COMMENT 32.1*   PLT SEE COMMENT 482*       CMP:   Recent Labs   Lab 02/02/23  0525 02/03/23  0403   * 135*   K 4.6 4.5   CL 99 97   CO2 24 26   * 168*   BUN 20 21   CREATININE 0.9 1.0   CALCIUM 9.3 9.3   ANIONGAP 10 12       Recent Lab Results  (Last 5 results in the past 24 hours)        02/03/23  1120   02/03/23  0739   02/03/23  0403   02/02/23 2003 02/02/23  1728        Anion Gap     12           BUN     21           Calcium     9.3           Chloride     97           CO2     26           Creatinine     1.0           eGFR     >60.0           Glucose     168           Osmolality     298           POCT Glucose 210   159     196   202       Potassium     4.5           Sodium     135                                  Significant Imaging: I have reviewed all pertinent imaging results/findings within the past 24 hours.

## 2023-02-03 NOTE — PLAN OF CARE
Received TC from [facility] [employer] that the fast appeal was received and the decision will be made within 72 hours. Team notified via secure chat.   Will continue to update plan as needed.    Elie Burgos RN,BSN

## 2023-02-03 NOTE — ASSESSMENT & PLAN NOTE
Patient's FSGs are controlled on current medication regimen.  Last A1c reviewed-   Lab Results   Component Value Date    HGBA1C 5.9 (H) 07/12/2018     Most recent fingerstick glucose reviewed-   Recent Labs   Lab 02/02/23  1728 02/02/23 2003 02/03/23  0739 02/03/23  1120   POCTGLUCOSE 202* 196* 159* 210*     Current correctional scale  Low  Maintain anti-hyperglycemic dose as follows-   Antihyperglycemics (From admission, onward)    Start     Stop Route Frequency Ordered    01/25/23 1511  insulin aspart U-100 pen 0-5 Units         -- SubQ Before meals & nightly PRN 01/25/23 1411        Hold Oral hypoglycemics while patient is in the hospital.  - patient is on insulin glargine 15U daily at the nursing home  - resume when tube feedings are resumed

## 2023-02-03 NOTE — RESPIRATORY THERAPY
RAPID RESPONSE RESPIRATORY THERAPY PROACTIVE NOTE           Time of visit: 911     Code Status: Full Code   : 1960  Bed: 1155/1155 A:   MRN: [id number]  Time spent at the bedside: < 15 min    SITUATION    Evaluated patient for: LDA Check     BACKGROUND    Patient has no past medical history on file.  Clinically Significant Surgical Hx: tracheostomy    24 Hours Vitals Range:  Temp:  [96.3 °F (35.7 °C)-98.7 °F (37.1 °C)]   Pulse:  []   Resp:  [18-22]   BP: (114-137)/(61-82)   SpO2:  [95 %-100 %]     Labs:    Recent Labs     23  0356 23  0525 23  0403    133* 135*   K 4.7 4.6 4.5    99 97   CO2 26 24 26   CREATININE 0.9 0.9 1.0   * 176* 168*   PHOS 2.6*  --   --    MG 1.7 1.7  --         No results for input(s): PH, PCO2, PO2, HCO3, POCSATURATED, BE in the last 72 hours.    ASSESSMENT/INTERVENTIONS  Pt on 5L 28% trach collar. Shiley cuffed XLT size 6 trach in place. All supplies in room. Extra cuffed XLT trachs sizes 5 and 6 at bedside.       Last VS   Temp: 96.5 °F (35.8 °C) ( 112)  Pulse: 99 ( 1153)  Resp: 20 ( 1153)  BP: 114/61 ( 1121)  SpO2: 97 % ( 1153)      Extra trachs at bedside: cuffed XLT's sizes 5 and 6.  Level of Consciousness: Level of Consciousness (AVPU): alert  Respiratory Effort: Respiratory Effort: Normal, Unlabored Expansion/Accessory Muscle Usage: Expansion/Accessory Muscles/Retractions: expansion symmetric, no retractions, no use of accessory muscles  All Lung Field Breath Sounds: All Lung Fields Breath Sounds: coarse  O2 Device/Concentration: 5L 28% TC.  Surgical airway: Yes, Type: Shiley Size: 6 XLT, cuffed  Ambu at bedside: Ambu bag with the patient?: Yes, Adult Ambu     Active Orders   Respiratory Care    Inhalation Treatment Q4H PRN     Frequency: Q4H PRN     Number of Occurrences: Until Specified    Oxygen PRN     Frequency: PRN     Number of Occurrences: Until Specified     Order Questions:      Device type: Low flow       Device: Trach Collar      Titrate O2 per Oxygen Titration Protocol: Yes      To maintain SpO2 goal of: >= 90%      Notify MD of: Inability to achieve desired SpO2; Sudden change in patient status and requires 20% increase in FiO2; Patient requires >60% FiO2    Pulse Oximetry Q4H     Frequency: Q4H     Number of Occurrences: Until Specified    Routine tracheostomy care     Frequency: BID     Number of Occurrences: Until Specified       RECOMMENDATIONS    We recommend: RRT Recs: Continue POC per primary team.      FOLLOW-UP    Please call back the Rapid Response RT, Isak Ly RRT at x [phone] for any questions or concerns.

## 2023-02-04 LAB
POCT GLUCOSE: 154 MG/DL (ref 70–110)
POCT GLUCOSE: 180 MG/DL (ref 70–110)
POCT GLUCOSE: 222 MG/DL (ref 70–110)
POCT GLUCOSE: 222 MG/DL (ref 70–110)

## 2023-02-04 PROCEDURE — 99900035 HC TECH TIME PER 15 MIN (STAT)

## 2023-02-04 PROCEDURE — 99231 PR SUBSEQUENT HOSPITAL CARE,LEVL I: ICD-10-PCS | Mod: ,,, | Performed by: STUDENT IN AN ORGANIZED HEALTH CARE EDUCATION/TRAINING PROGRAM

## 2023-02-04 PROCEDURE — 94761 N-INVAS EAR/PLS OXIMETRY MLT: CPT

## 2023-02-04 PROCEDURE — 25000003 PHARM REV CODE 250: Performed by: STUDENT IN AN ORGANIZED HEALTH CARE EDUCATION/TRAINING PROGRAM

## 2023-02-04 PROCEDURE — 11000001 HC ACUTE MED/SURG PRIVATE ROOM

## 2023-02-04 PROCEDURE — 27000221 HC OXYGEN, UP TO 24 HOURS

## 2023-02-04 PROCEDURE — 25000003 PHARM REV CODE 250: Performed by: FAMILY MEDICINE

## 2023-02-04 PROCEDURE — 99900026 HC AIRWAY MAINTENANCE (STAT)

## 2023-02-04 PROCEDURE — 99231 SBSQ HOSP IP/OBS SF/LOW 25: CPT | Mod: ,,, | Performed by: STUDENT IN AN ORGANIZED HEALTH CARE EDUCATION/TRAINING PROGRAM

## 2023-02-04 PROCEDURE — 63600175 PHARM REV CODE 636 W HCPCS: Performed by: FAMILY MEDICINE

## 2023-02-04 PROCEDURE — 63600175 PHARM REV CODE 636 W HCPCS

## 2023-02-04 RX ADMIN — BACLOFEN 15 MG: 5 TABLET ORAL at 02:02

## 2023-02-04 RX ADMIN — CALCIUM CARBONATE (ANTACID) CHEW TAB 500 MG 500 MG: 500 CHEW TAB at 08:02

## 2023-02-04 RX ADMIN — ASPIRIN 81 MG CHEWABLE TABLET 81 MG: 81 TABLET CHEWABLE at 08:02

## 2023-02-04 RX ADMIN — AMPICILLIN 2 G: 2 INJECTION, POWDER, FOR SOLUTION INTRAMUSCULAR; INTRAVENOUS at 11:02

## 2023-02-04 RX ADMIN — BACLOFEN 15 MG: 5 TABLET ORAL at 08:02

## 2023-02-04 RX ADMIN — Medication: at 08:02

## 2023-02-04 RX ADMIN — ESOMEPRAZOLE MAGNESIUM 40 MG: 10 GRANULE, FOR SUSPENSION, EXTENDED RELEASE ORAL at 08:02

## 2023-02-04 RX ADMIN — AMPICILLIN 2 G: 2 INJECTION, POWDER, FOR SOLUTION INTRAMUSCULAR; INTRAVENOUS at 07:02

## 2023-02-04 RX ADMIN — INSULIN ASPART 2 UNITS: 100 INJECTION, SOLUTION INTRAVENOUS; SUBCUTANEOUS at 12:02

## 2023-02-04 RX ADMIN — PREDNISONE 10 MG: 10 TABLET ORAL at 08:02

## 2023-02-04 RX ADMIN — LEVETIRACETAM 750 MG: 100 SOLUTION ORAL at 08:02

## 2023-02-04 RX ADMIN — NYSTATIN: 100000 POWDER TOPICAL at 08:02

## 2023-02-04 RX ADMIN — AMPICILLIN 2 G: 2 INJECTION, POWDER, FOR SOLUTION INTRAMUSCULAR; INTRAVENOUS at 10:02

## 2023-02-04 RX ADMIN — AMPICILLIN 2 G: 2 INJECTION, POWDER, FOR SOLUTION INTRAMUSCULAR; INTRAVENOUS at 03:02

## 2023-02-04 RX ADMIN — AMPICILLIN 2 G: 2 INJECTION, POWDER, FOR SOLUTION INTRAMUSCULAR; INTRAVENOUS at 02:02

## 2023-02-04 RX ADMIN — INSULIN ASPART 2 UNITS: 100 INJECTION, SOLUTION INTRAVENOUS; SUBCUTANEOUS at 06:02

## 2023-02-04 RX ADMIN — CARVEDILOL 6.25 MG: 6.25 TABLET, FILM COATED ORAL at 08:02

## 2023-02-04 RX ADMIN — AMPICILLIN 2 G: 2 INJECTION, POWDER, FOR SOLUTION INTRAMUSCULAR; INTRAVENOUS at 06:02

## 2023-02-04 NOTE — ASSESSMENT & PLAN NOTE
Oropharyngeal dysphagia    - s/p 20 F PEG tube placed without any immediate complications.   External bumper at 3 cm shelby.-  -resumed feeding tube with Isosource 1.5 nathaniel and dietician consult

## 2023-02-04 NOTE — ASSESSMENT & PLAN NOTE
- VSSAF  - leukocytosis improving R/O steroids induced  - g-tube site is clean without discharge or erythma  - blood cultures positive for gram + cocci, repeat no growth  - IV ABx with ampicillin

## 2023-02-04 NOTE — PROGRESS NOTES
[facility] [address] [place] [address] Medicine  Progress Note    Patient Name: Hao Medrano  MRN: [id number]  Patient Class: IP- Inpatient   Admission Date: 1/25/2023  Length of Stay: 9 days  Attending Physician: Divya Galvin MD  Primary Care Provider: Keara Yanes NP        Subjective:     Principal Problem:Dislodged gastrostomy tube        HPI:  Hao Medrano is a 62 y.o. male with a past medical history of CVA with trach and G-tube, CKD, HTN, and HLD who is being palced in observation for further management of dislodged peg tube. Patient presented to the ED from [facility]. Patient is nonverbal, history obtained from chart and ED staff. Per the ED, the nursing home reports they noticed the dislodged ped tube around 0400 this am. Patient was recently admitted on 1/10 for same issue with a new peg placed on 1/12.    ED: vital signs stable, afebrile. Leukocytosis of 23K. Hb 11.4. PT/INR 10/9/1.0. Furhter labs pending at time of admission. CXR with inconclusive findings due to patient position. Blood cultures obtained. GI consulted and plan for EGD with G-tube placement tomorrow.       Overview/Hospital Course:  S/p 20 F PEG tube placed without any immediate complications.   External bumper at 3 cm shelby.  Positive blood Cx with gram positive Cocci  2D echo ordered, unremarkable for vegetation  ID consulted:    Outpatient Antibiotic Therapy Plan:     Please send referral to [facility].     1) Infection: E faecalis bacteremia     2) Discharge Antibiotics:     Intravenous antibiotics:   Ampicillin 2 grams IV q 4 hours (may be given as continuous infusion)        3) Therapy Duration:  14 days     Estimated end date of IV antibiotics: 02/24/23     4) Outpatient Weekly Labs:     Order the following labs to be drawn on Mondays:    CBC   CMP      5) Fax Lab Results to Infectious Diseases Provider: Dr. Price     [facility] Fax Number: [phone]     6)  Outpatient Infectious Diseases Follow-up      Follow-up appointment will be arranged by the ID clinic and will be found in the patient's appointments tab.      Prior to discharge, please ensure the patient's follow-up has been scheduled.     If there is still no follow-up scheduled prior to discharge, please send an EPIC message to Lauren Stiles in Infectious Diseases.    Patient was admitted for dislodged PEG tube. GI was consulted and he underwent 20 F PEG tube placed without any immediate complications. External bumper at 3 cm shelby. During the course of this admission serial routine labs with mild hyperglycemia, mild anemia, persistence leukocytosis. He was found to have positive blood Culture with enterococcus Faecalis and coagulase negative staph. ID was consulted and was started on IV antibiotic and outpatient antibiotic see recommendation above. Throughout hospital stays, no new acute changes. He will be transferred back to the nursing home and follow up with ID, PCP.      Interval History:   No acute changes, no spike of fever, non verbal, awaiting for placement.      Review of Systems   Unable to perform ROS: Patient nonverbal   Objective:     Vital Signs (Most Recent):  Temp: 98.7 °F (37.1 °C) (02/04/23 1127)  Pulse: 94 (02/04/23 1127)  Resp: 18 (02/04/23 1127)  BP: 127/62 (02/04/23 1127)  SpO2: 100 % (02/04/23 1127) Vital Signs (24h Range):  Temp:  [97.9 °F (36.6 °C)-98.7 °F (37.1 °C)] 98.7 °F (37.1 °C)  Pulse:  [] 94  Resp:  [16-20] 18  SpO2:  [96 %-100 %] 100 %  BP: (115-133)/(61-73) 127/62     Weight: 71.7 kg (158 lb)  Body mass index is 21.43 kg/m².    Intake/Output Summary (Last 24 hours) at 2/4/2023 1153  Last data filed at 2/4/2023 1112  Gross per 24 hour   Intake 2050 ml   Output 2300 ml   Net -250 ml        Physical Exam  Vitals and nursing note reviewed.   Constitutional:       General: He is not in acute distress.  HENT:      Head: Normocephalic and atraumatic.      Nose: No  congestion.   Eyes:      Extraocular Movements: Extraocular movements intact.      Pupils: Pupils are equal, round, and reactive to light.   Cardiovascular:      Rate and Rhythm: Normal rate. Rhythm irregular.      Heart sounds:     No friction rub. No gallop.   Pulmonary:      Effort: Pulmonary effort is normal. No respiratory distress.      Breath sounds: No wheezing or rales.      Comments: Trach in place  Abdominal:      General: Bowel sounds are normal. There is no distension.      Tenderness: There is no abdominal tenderness. There is no guarding or rebound.      Comments: PEG in place   Musculoskeletal:         General: Swelling and deformity present.      Cervical back: No rigidity or tenderness.      Right lower leg: Edema present.      Left lower leg: Edema present.   Skin:     Findings: Erythema and rash present.   Neurological:      Mental Status: He is alert.      Comments: Hemiplegic with B/L lower extremities, atrophy, bed bound       Significant Labs: All pertinent labs within the past 24 hours have been reviewed.  Blood Culture: No results for input(s): LABBLOO in the last 48 hours.  BMP:   Recent Labs   Lab 02/03/23  0403   *   *   K 4.5   CL 97   CO2 26   BUN 21   CREATININE 1.0   CALCIUM 9.3       CBC:   No results for input(s): WBC, HGB, HCT, PLT in the last 48 hours.    CMP:   Recent Labs   Lab 02/03/23  0403   *   K 4.5   CL 97   CO2 26   *   BUN 21   CREATININE 1.0   CALCIUM 9.3   ANIONGAP 12       Recent Lab Results         02/04/23  1125   02/04/23  0847   02/03/23  2103   02/03/23  1644        POCT Glucose 222   180   196   172               Significant Imaging: I have reviewed all pertinent imaging results/findings within the past 24 hours.    Imaging Results              X-Ray Chest 1 View (Final result)  Result time 01/25/23 14:28:09      Final result by Elie Manrique MD (01/25/23 14:28:09)                   Impression:      See above      Electronically  signed by: Elie Manrique MD  Date:    01/25/2023  Time:    14:28               Narrative:    EXAMINATION:  XR CHEST 1 VIEW    CLINICAL HISTORY:  Elevated white blood cell count, unspecified    TECHNIQUE:  Single frontal view of the chest was performed.    COMPARISON:  None    FINDINGS:  Patient is significantly rotated to the right and the left forearm and hand overlies the left chest.  Therefore the left lung cannot be evaluated.  Repeat examination recommended.  Right lung is clear.  Tracheostomy tube noted.                                          Assessment/Plan:      * Dislodged gastrostomy tube  Oropharyngeal dysphagia    - s/p 20 F PEG tube placed without any immediate complications.   External bumper at 3 cm shelby.-  -resumed feeding tube with Isosource 1.5 nathaniel and dietician consult    Multiple wounds of skin  With scattered lesions with dry skin  Encourage moisturize skin, skin care  Avoid skin debridement, changing position q 2Hrs  Cont Antibiotic ointment at the PEG site      Leukocytosis  - VSSAF  - leukocytosis improving R/O steroids induced  - g-tube site is clean without discharge or erythma  - blood cultures positive for gram + cocci, repeat no growth  - IV ABx with ampicillin    Bacteremia  Cont ampicillin will be DC on ampicillin for 14 days  ID consulted appreciated   2D echo done no vegetations cont medical management        On antiepileptic therapy  - continue keppra 750 mg BID once peg tube is replaced      HTN (hypertension)  - BP well controlled upon admission  - continue coreg, increased to 6.25 mg b.i.d.      Type 2 diabetes mellitus, with long-term current use of insulin  Patient's FSGs are controlled on current medication regimen.  Last A1c reviewed-   Lab Results   Component Value Date    HGBA1C 5.9 (H) 07/12/2018     Most recent fingerstick glucose reviewed-   Recent Labs   Lab 02/03/23  1644 02/03/23  2103 02/04/23  0847 02/04/23  1125   POCTGLUCOSE 172* 196* 180* 222*     Current  correctional scale  Low  Maintain anti-hyperglycemic dose as follows-   Antihyperglycemics (From admission, onward)    Start     Stop Route Frequency Ordered    01/25/23 1511  insulin aspart U-100 pen 0-5 Units         -- SubQ Before meals & nightly PRN 01/25/23 1411        Hold Oral hypoglycemics while patient is in the hospital.  - patient is on insulin glargine 15U daily at the nursing home  - resume when tube feedings are resumed    Oropharyngeal dysphagia  -Cont tube feeding      Anemia  - patient's anemia is currently controlled  - etiology likely due to anemia of chronic disease  - current CBC reviewed -   Lab Results   Component Value Date    HGB 10.2 (L) 01/26/2023    HCT 34.0 (L) 01/26/2023     - monitor serial CBC and transfuse if patient becomes hemodynamically unstable, symptomatic, or H/H drops below 7/21.         Chronic kidney disease, stage III (moderate)  - baseline ~1.3  - cont monitor      VTE Risk Mitigation (From admission, onward)         Ordered     Place sequential compression device  Until discontinued         01/25/23 1411     IP VTE HIGH RISK PATIENT  Once         01/25/23 1411     Reason for No Pharmacological VTE Prophylaxis  Once        Question:  Reasons:  Answer:  Risk of Bleeding  Comment:  pre-op    01/25/23 1411                Discharge Planning   FAINA: 2/6/2023     Code Status: Full Code   Is the patient medically ready for discharge?: Yes    Reason for patient still in hospital (select all that apply): Pending disposition  Discharge Plan A: Long-term acute care facility (LTAC)   Discharge Delays: None known at this time              Divya Galvin MD  Department of Hospital Medicine   [facility] 11H

## 2023-02-04 NOTE — PLAN OF CARE
Problem: Adult Inpatient Plan of Care  Goal: Optimal Comfort and Wellbeing  2/4/2023 0615 by Glo Pinto LPN  Outcome: Ongoing, Progressing  2/4/2023 0612 by Glo Pinto LPN  Outcome: Ongoing, Progressing     Problem: Adult Inpatient Plan of Care  Goal: Absence of Hospital-Acquired Illness or Injury  2/4/2023 0615 by Glo Pinto LPN  Outcome: Ongoing, Progressing  2/4/2023 0612 by Glo Pinto LPN  Outcome: Ongoing, Progressing     Problem: Diabetes Comorbidity  Goal: Blood Glucose Level Within Targeted Range  2/4/2023 0615 by Glo Pinto LPN  Outcome: Ongoing, Progressing  2/4/2023 0612 by Glo Pinto LPN  Outcome: Ongoing, Progressing     Problem: Impaired Wound Healing  Goal: Optimal Wound Healing  2/4/2023 0615 by Glo Pinto LPN  Outcome: Ongoing, Progressing  2/4/2023 0612 by Glo Pinto LPN  Outcome: Ongoing, Progressing     Problem: Skin Injury Risk Increased  Goal: Skin Health and Integrity  2/4/2023 0615 by Glo Pinto LPN  Outcome: Ongoing, Progressing  2/4/2023 0612 by Glo Pinto LPN  Outcome: Ongoing, Progressing     Problem: Adult Inpatient Plan of Care  Goal: Optimal Comfort and Wellbeing  2/4/2023 0615 by Glo Pinto LPN  Outcome: Ongoing, Progressing  2/4/2023 0612 by Glo Pinto LPN  Outcome: Ongoing, Progressing

## 2023-02-04 NOTE — RESPIRATORY THERAPY
[facility]ID RESPONSE RESPIRATORY THERAPY PROACTIVE NOTE           Time of visit: 925     Code Status: Full Code   : 1960  Bed: 1155/1155 A:   MRN: [id number]  Time spent at the bedside: < 15 min    SITUATION    Evaluated patient for: LDA Check     BACKGROUND    Patient has no past medical history on file.  Clinically Significant Surgical Hx: tracheostomy    24 Hours Vitals Range:  Temp:  [96.5 °F (35.8 °C)-98.3 °F (36.8 °C)]   Pulse:  []   Resp:  [16-20]   BP: (114-133)/(61-73)   SpO2:  [96 %-100 %]     Labs:    Recent Labs     23  0525 23  0403   * 135*   K 4.6 4.5   CL 99 97   CO2 24 26   CREATININE 0.9 1.0   * 168*   MG 1.7  --         No results for input(s): PH, PCO2, PO2, HCO3, POCSATURATED, BE in the last 72 hours.    ASSESSMENT/INTERVENTIONS  Pt resting in bed, no s/s of distress on exam. All supplies at bedside.      Last VS   Temp: 98.2 °F (36.8 °C) (843)  Pulse: 91 (843)  Resp: 16 (843)  BP: 115/61 (843)  SpO2: 99 % (843)      Extra trachs at bedside: 6.0 & 5.0 Shiley XLT cuffed  Level of Consciousness: Level of Consciousness (AVPU): alert  Respiratory Effort: Respiratory Effort: Normal, Unlabored Expansion/Accessory Muscle Usage: Expansion/Accessory Muscles/Retractions: expansion symmetric, no retractions, no use of accessory muscles  All Lung Field Breath Sounds: All Lung Fields Breath Sounds: Anterior:, equal bilaterally, coarse  O2 Device/Concentration: trach collar 5L/28%  Surgical airway: Yes, Type: Shiley Size: 6 XLT, cuffed  Ambu at bedside: Ambu bag with the patient?: Yes, Adult Ambu     Active Orders   Respiratory Care    Inhalation Treatment Q4H PRN     Frequency: Q4H PRN     Number of Occurrences: Until Specified    Oxygen PRN     Frequency: PRN     Number of Occurrences: Until Specified     Order Questions:      Device type: Low flow      Device: Trach Collar      Titrate O2 per Oxygen Titration Protocol: Yes      To maintain  SpO2 goal of: >= 90%      Notify MD of: Inability to achieve desired SpO2; Sudden change in patient status and requires 20% increase in FiO2; Patient requires >60% FiO2    Pulse Oximetry Q4H     Frequency: Q4H     Number of Occurrences: Until Specified    Routine tracheostomy care     Frequency: BID     Number of Occurrences: Until Specified       RECOMMENDATIONS    We recommend: RRT Recs: Continue POC per primary team.      FOLLOW-UP    Please call back the Rapid Response RT, Stef Benites, RRT at x [phone] for any questions or concerns.

## 2023-02-04 NOTE — ASSESSMENT & PLAN NOTE
Patient's FSGs are controlled on current medication regimen.  Last A1c reviewed-   Lab Results   Component Value Date    HGBA1C 5.9 (H) 07/12/2018     Most recent fingerstick glucose reviewed-   Recent Labs   Lab 02/03/23  1644 02/03/23  2103 02/04/23  0847 02/04/23  1125   POCTGLUCOSE 172* 196* 180* 222*     Current correctional scale  Low  Maintain anti-hyperglycemic dose as follows-   Antihyperglycemics (From admission, onward)    Start     Stop Route Frequency Ordered    01/25/23 1511  insulin aspart U-100 pen 0-5 Units         -- SubQ Before meals & nightly PRN 01/25/23 1411        Hold Oral hypoglycemics while patient is in the hospital.  - patient is on insulin glargine 15U daily at the nursing home  - resume when tube feedings are resumed

## 2023-02-04 NOTE — SUBJECTIVE & OBJECTIVE
Interval History:   No acute changes, no spike of fever, non verbal, awaiting for placement.      Review of Systems   Unable to perform ROS: Patient nonverbal   Objective:     Vital Signs (Most Recent):  Temp: 98.7 °F (37.1 °C) (02/04/23 1127)  Pulse: 94 (02/04/23 1127)  Resp: 18 (02/04/23 1127)  BP: 127/62 (02/04/23 1127)  SpO2: 100 % (02/04/23 1127) Vital Signs (24h Range):  Temp:  [97.9 °F (36.6 °C)-98.7 °F (37.1 °C)] 98.7 °F (37.1 °C)  Pulse:  [] 94  Resp:  [16-20] 18  SpO2:  [96 %-100 %] 100 %  BP: (115-133)/(61-73) 127/62     Weight: 71.7 kg (158 lb)  Body mass index is 21.43 kg/m².    Intake/Output Summary (Last 24 hours) at 2/4/2023 1153  Last data filed at 2/4/2023 1112  Gross per 24 hour   Intake 2050 ml   Output 2300 ml   Net -250 ml        Physical Exam  Vitals and nursing note reviewed.   Constitutional:       General: He is not in acute distress.  HENT:      Head: Normocephalic and atraumatic.      Nose: No congestion.   Eyes:      Extraocular Movements: Extraocular movements intact.      Pupils: Pupils are equal, round, and reactive to light.   Cardiovascular:      Rate and Rhythm: Normal rate. Rhythm irregular.      Heart sounds:     No friction rub. No gallop.   Pulmonary:      Effort: Pulmonary effort is normal. No respiratory distress.      Breath sounds: No wheezing or rales.      Comments: Trach in place  Abdominal:      General: Bowel sounds are normal. There is no distension.      Tenderness: There is no abdominal tenderness. There is no guarding or rebound.      Comments: PEG in place   Musculoskeletal:         General: Swelling and deformity present.      Cervical back: No rigidity or tenderness.      Right lower leg: Edema present.      Left lower leg: Edema present.   Skin:     Findings: Erythema and rash present.   Neurological:      Mental Status: He is alert.      Comments: Hemiplegic with B/L lower extremities, atrophy, bed bound       Significant Labs: All pertinent labs within  the past 24 hours have been reviewed.  Blood Culture: No results for input(s): LABBLOO in the last 48 hours.  BMP:   Recent Labs   Lab 02/03/23  0403   *   *   K 4.5   CL 97   CO2 26   BUN 21   CREATININE 1.0   CALCIUM 9.3       CBC:   No results for input(s): WBC, HGB, HCT, PLT in the last 48 hours.    CMP:   Recent Labs   Lab 02/03/23  0403   *   K 4.5   CL 97   CO2 26   *   BUN 21   CREATININE 1.0   CALCIUM 9.3   ANIONGAP 12       Recent Lab Results         02/04/23  1125   02/04/23  0847   02/03/23  2103   02/03/23  1644        POCT Glucose 222   180   196   172               Significant Imaging: I have reviewed all pertinent imaging results/findings within the past 24 hours.    Imaging Results              X-Ray Chest 1 View (Final result)  Result time 01/25/23 14:28:09      Final result by Elie Manrique MD (01/25/23 14:28:09)                   Impression:      See above      Electronically signed by: Elie Manrique MD  Date:    01/25/2023  Time:    14:28               Narrative:    EXAMINATION:  XR CHEST 1 VIEW    CLINICAL HISTORY:  Elevated white blood cell count, unspecified    TECHNIQUE:  Single frontal view of the chest was performed.    COMPARISON:  None    FINDINGS:  Patient is significantly rotated to the right and the left forearm and hand overlies the left chest.  Therefore the left lung cannot be evaluated.  Repeat examination recommended.  Right lung is clear.  Tracheostomy tube noted.

## 2023-02-05 LAB
ALBUMIN SERPL BCP-MCNC: 1.9 G/DL (ref 3.5–5.2)
ALP SERPL-CCNC: 124 U/L (ref 55–135)
ALT SERPL W/O P-5'-P-CCNC: 18 U/L (ref 10–44)
ANION GAP SERPL CALC-SCNC: 11 MMOL/L (ref 8–16)
ANISOCYTOSIS BLD QL SMEAR: SLIGHT
AST SERPL-CCNC: 15 U/L (ref 10–40)
BASOPHILS # BLD AUTO: 0.04 K/UL (ref 0–0.2)
BASOPHILS NFR BLD: 0.3 % (ref 0–1.9)
BILIRUB SERPL-MCNC: 0.1 MG/DL (ref 0.1–1)
BUN SERPL-MCNC: 25 MG/DL (ref 8–23)
CALCIUM SERPL-MCNC: 9.4 MG/DL (ref 8.7–10.5)
CHLORIDE SERPL-SCNC: 101 MMOL/L (ref 95–110)
CO2 SERPL-SCNC: 27 MMOL/L (ref 23–29)
CREAT SERPL-MCNC: 1 MG/DL (ref 0.5–1.4)
DIFFERENTIAL METHOD: ABNORMAL
EOSINOPHIL # BLD AUTO: 2.3 K/UL (ref 0–0.5)
EOSINOPHIL NFR BLD: 16.3 % (ref 0–8)
ERYTHROCYTE [DISTWIDTH] IN BLOOD BY AUTOMATED COUNT: 16.3 % (ref 11.5–14.5)
EST. GFR  (NO RACE VARIABLE): >60 ML/MIN/1.73 M^2
GLUCOSE SERPL-MCNC: 184 MG/DL (ref 70–110)
HCT VFR BLD AUTO: 31 % (ref 40–54)
HGB BLD-MCNC: 9.2 G/DL (ref 14–18)
HYPOCHROMIA BLD QL SMEAR: ABNORMAL
IMM GRANULOCYTES # BLD AUTO: 0.03 K/UL (ref 0–0.04)
IMM GRANULOCYTES NFR BLD AUTO: 0.2 % (ref 0–0.5)
LYMPHOCYTES # BLD AUTO: 1.9 K/UL (ref 1–4.8)
LYMPHOCYTES NFR BLD: 13.7 % (ref 18–48)
MAGNESIUM SERPL-MCNC: 2 MG/DL (ref 1.6–2.6)
MCH RBC QN AUTO: 26.8 PG (ref 27–31)
MCHC RBC AUTO-ENTMCNC: 29.7 G/DL (ref 32–36)
MCV RBC AUTO: 90 FL (ref 82–98)
MONOCYTES # BLD AUTO: 0.7 K/UL (ref 0.3–1)
MONOCYTES NFR BLD: 5.1 % (ref 4–15)
NEUTROPHILS # BLD AUTO: 9.1 K/UL (ref 1.8–7.7)
NEUTROPHILS NFR BLD: 64.4 % (ref 38–73)
NRBC BLD-RTO: 0 /100 WBC
OVALOCYTES BLD QL SMEAR: ABNORMAL
PHOSPHATE SERPL-MCNC: 3.2 MG/DL (ref 2.7–4.5)
PLATELET # BLD AUTO: 504 K/UL (ref 150–450)
PMV BLD AUTO: 9.8 FL (ref 9.2–12.9)
POCT GLUCOSE: 134 MG/DL (ref 70–110)
POCT GLUCOSE: 179 MG/DL (ref 70–110)
POCT GLUCOSE: 217 MG/DL (ref 70–110)
POIKILOCYTOSIS BLD QL SMEAR: SLIGHT
POLYCHROMASIA BLD QL SMEAR: ABNORMAL
POTASSIUM SERPL-SCNC: 4.1 MMOL/L (ref 3.5–5.1)
PROT SERPL-MCNC: 7.3 G/DL (ref 6–8.4)
RBC # BLD AUTO: 3.43 M/UL (ref 4.6–6.2)
SODIUM SERPL-SCNC: 139 MMOL/L (ref 136–145)
SPHEROCYTES BLD QL SMEAR: ABNORMAL
WBC # BLD AUTO: 14.1 K/UL (ref 3.9–12.7)

## 2023-02-05 PROCEDURE — 25000003 PHARM REV CODE 250: Performed by: FAMILY MEDICINE

## 2023-02-05 PROCEDURE — 99900026 HC AIRWAY MAINTENANCE (STAT)

## 2023-02-05 PROCEDURE — 80053 COMPREHEN METABOLIC PANEL: CPT | Performed by: STUDENT IN AN ORGANIZED HEALTH CARE EDUCATION/TRAINING PROGRAM

## 2023-02-05 PROCEDURE — 11000001 HC ACUTE MED/SURG PRIVATE ROOM

## 2023-02-05 PROCEDURE — 99231 PR SUBSEQUENT HOSPITAL CARE,LEVL I: ICD-10-PCS | Mod: ,,, | Performed by: STUDENT IN AN ORGANIZED HEALTH CARE EDUCATION/TRAINING PROGRAM

## 2023-02-05 PROCEDURE — 83735 ASSAY OF MAGNESIUM: CPT | Performed by: STUDENT IN AN ORGANIZED HEALTH CARE EDUCATION/TRAINING PROGRAM

## 2023-02-05 PROCEDURE — 84100 ASSAY OF PHOSPHORUS: CPT | Performed by: STUDENT IN AN ORGANIZED HEALTH CARE EDUCATION/TRAINING PROGRAM

## 2023-02-05 PROCEDURE — 85025 COMPLETE CBC W/AUTO DIFF WBC: CPT | Performed by: STUDENT IN AN ORGANIZED HEALTH CARE EDUCATION/TRAINING PROGRAM

## 2023-02-05 PROCEDURE — 63600175 PHARM REV CODE 636 W HCPCS: Performed by: FAMILY MEDICINE

## 2023-02-05 PROCEDURE — 94761 N-INVAS EAR/PLS OXIMETRY MLT: CPT

## 2023-02-05 PROCEDURE — 27000221 HC OXYGEN, UP TO 24 HOURS

## 2023-02-05 PROCEDURE — 25000003 PHARM REV CODE 250: Performed by: STUDENT IN AN ORGANIZED HEALTH CARE EDUCATION/TRAINING PROGRAM

## 2023-02-05 PROCEDURE — 99900035 HC TECH TIME PER 15 MIN (STAT)

## 2023-02-05 PROCEDURE — 99231 SBSQ HOSP IP/OBS SF/LOW 25: CPT | Mod: ,,, | Performed by: STUDENT IN AN ORGANIZED HEALTH CARE EDUCATION/TRAINING PROGRAM

## 2023-02-05 PROCEDURE — 36415 COLL VENOUS BLD VENIPUNCTURE: CPT | Performed by: STUDENT IN AN ORGANIZED HEALTH CARE EDUCATION/TRAINING PROGRAM

## 2023-02-05 RX ADMIN — CARVEDILOL 6.25 MG: 6.25 TABLET, FILM COATED ORAL at 09:02

## 2023-02-05 RX ADMIN — INSULIN ASPART 2 UNITS: 100 INJECTION, SOLUTION INTRAVENOUS; SUBCUTANEOUS at 04:02

## 2023-02-05 RX ADMIN — BACLOFEN 15 MG: 5 TABLET ORAL at 10:02

## 2023-02-05 RX ADMIN — AMPICILLIN 2 G: 2 INJECTION, POWDER, FOR SOLUTION INTRAMUSCULAR; INTRAVENOUS at 10:02

## 2023-02-05 RX ADMIN — AMPICILLIN 2 G: 2 INJECTION, POWDER, FOR SOLUTION INTRAMUSCULAR; INTRAVENOUS at 02:02

## 2023-02-05 RX ADMIN — AMPICILLIN 2 G: 2 INJECTION, POWDER, FOR SOLUTION INTRAMUSCULAR; INTRAVENOUS at 06:02

## 2023-02-05 RX ADMIN — NYSTATIN: 100000 POWDER TOPICAL at 10:02

## 2023-02-05 RX ADMIN — CARVEDILOL 6.25 MG: 6.25 TABLET, FILM COATED ORAL at 10:02

## 2023-02-05 RX ADMIN — LEVETIRACETAM 750 MG: 100 SOLUTION ORAL at 09:02

## 2023-02-05 RX ADMIN — AMPICILLIN 2 G: 2 INJECTION, POWDER, FOR SOLUTION INTRAMUSCULAR; INTRAVENOUS at 03:02

## 2023-02-05 RX ADMIN — BACLOFEN 15 MG: 5 TABLET ORAL at 09:02

## 2023-02-05 RX ADMIN — PREDNISONE 10 MG: 10 TABLET ORAL at 10:02

## 2023-02-05 RX ADMIN — AMPICILLIN 2 G: 2 INJECTION, POWDER, FOR SOLUTION INTRAMUSCULAR; INTRAVENOUS at 08:02

## 2023-02-05 RX ADMIN — ASPIRIN 81 MG CHEWABLE TABLET 81 MG: 81 TABLET CHEWABLE at 09:02

## 2023-02-05 RX ADMIN — CALCIUM CARBONATE (ANTACID) CHEW TAB 500 MG 500 MG: 500 CHEW TAB at 09:02

## 2023-02-05 RX ADMIN — BACLOFEN 15 MG: 5 TABLET ORAL at 04:02

## 2023-02-05 RX ADMIN — Medication: at 10:02

## 2023-02-05 RX ADMIN — LEVETIRACETAM 750 MG: 100 SOLUTION ORAL at 10:02

## 2023-02-05 RX ADMIN — ESOMEPRAZOLE MAGNESIUM 40 MG: 10 GRANULE, FOR SUSPENSION, EXTENDED RELEASE ORAL at 09:02

## 2023-02-05 NOTE — ASSESSMENT & PLAN NOTE
Patient's FSGs are controlled on current medication regimen.  Last A1c reviewed-   Lab Results   Component Value Date    HGBA1C 5.9 (H) 07/12/2018     Most recent fingerstick glucose reviewed-   Recent Labs   Lab 02/04/23  1630 02/04/23  2103 02/05/23  1030   POCTGLUCOSE 222* 154* 179*     Current correctional scale  Low  Maintain anti-hyperglycemic dose as follows-   Antihyperglycemics (From admission, onward)    Start     Stop Route Frequency Ordered    01/25/23 1511  insulin aspart U-100 pen 0-5 Units         -- SubQ Before meals & nightly PRN 01/25/23 1411        Hold Oral hypoglycemics while patient is in the hospital.  - patient is on insulin glargine 15U daily at the nursing home  - resume when tube feedings are resumed

## 2023-02-05 NOTE — ASSESSMENT & PLAN NOTE
- baseline ~1.3  - cont monitor  Cr baseline  Renally dose all medications  Avoid nephrotoxins  Will continue to monitor on daily labs

## 2023-02-05 NOTE — ASSESSMENT & PLAN NOTE
- BP well controlled upon admission  - continue coreg, increased to 6.25 mg b.i.d. with improvement of BP trend

## 2023-02-05 NOTE — ASSESSMENT & PLAN NOTE
- patient's anemia is currently controlled  - etiology likely due to anemia of chronic disease  - current CBC reviewed -   Lab Results   Component Value Date    HGB 9.2 (L) 02/05/2023    HCT 31.0 (L) 02/05/2023     - monitor serial CBC and transfuse if patient becomes hemodynamically unstable, symptomatic, or H/H drops below 7/21.

## 2023-02-05 NOTE — SUBJECTIVE & OBJECTIVE
Interval History:   No acute changes, no spike of fever, non verbal, awaiting for placement.    Review of Systems   Unable to perform ROS: Patient nonverbal   Objective:     Vital Signs (Most Recent):  Temp: 98.5 °F (36.9 °C) (02/05/23 1100)  Pulse: 88 (02/05/23 1100)  Resp: 18 (02/05/23 1100)  BP: 135/78 (02/05/23 1100)  SpO2: 98 % (02/05/23 1100) Vital Signs (24h Range):  Temp:  [97.4 °F (36.3 °C)-98.5 °F (36.9 °C)] 98.5 °F (36.9 °C)  Pulse:  [87-92] 88  Resp:  [17-18] 18  SpO2:  [98 %-100 %] 98 %  BP: (121-146)/(57-80) 135/78     Weight: 71.7 kg (158 lb)  Body mass index is 21.43 kg/m².    Intake/Output Summary (Last 24 hours) at 2/5/2023 1310  Last data filed at 2/5/2023 0615  Gross per 24 hour   Intake 1980 ml   Output 1250 ml   Net 730 ml        Physical Exam  Vitals and nursing note reviewed.   Constitutional:       General: He is not in acute distress.  HENT:      Head: Normocephalic and atraumatic.      Nose: No congestion.   Eyes:      Extraocular Movements: Extraocular movements intact.      Pupils: Pupils are equal, round, and reactive to light.   Cardiovascular:      Rate and Rhythm: Normal rate. Rhythm irregular.      Heart sounds:     No friction rub. No gallop.   Pulmonary:      Effort: Pulmonary effort is normal. No respiratory distress.      Breath sounds: No wheezing or rales.      Comments: Trach in place  Abdominal:      General: Bowel sounds are normal. There is no distension.      Tenderness: There is no abdominal tenderness. There is no guarding or rebound.      Comments: PEG in place   Musculoskeletal:         General: Swelling and deformity present.      Cervical back: No rigidity or tenderness.      Right lower leg: Edema present.      Left lower leg: Edema present.   Skin:     Findings: Erythema and rash present.   Neurological:      Mental Status: He is alert.      Comments: Hemiplegic with B/L lower extremities, atrophy, bed bound       Significant Labs: All pertinent labs within the  past 24 hours have been reviewed.  Blood Culture: No results for input(s): LABBLOO in the last 48 hours.  BMP:   Recent Labs   Lab 02/05/23  1028   *      K 4.1      CO2 27   BUN 25*   CREATININE 1.0   CALCIUM 9.4   MG 2.0       CBC:   Recent Labs   Lab 02/05/23  1028   WBC 14.10*   HGB 9.2*   HCT 31.0*   *       CMP:   Recent Labs   Lab 02/05/23  1028      K 4.1      CO2 27   *   BUN 25*   CREATININE 1.0   CALCIUM 9.4   PROT 7.3   ALBUMIN 1.9*   BILITOT 0.1   ALKPHOS 124   AST 15   ALT 18   ANIONGAP 11       Recent Lab Results         02/05/23  1030   02/05/23  1028   02/04/23  2103   02/04/23  1630        Albumin   1.9           Alkaline Phosphatase   124           ALT   18           Anion Gap   11           Aniso   Slight           AST   15           Baso #   0.04           Basophil %   0.3           BILIRUBIN TOTAL   0.1  Comment: For infants and newborns, interpretation of results should be based  on gestational age, weight and in agreement with clinical  observations.    Premature Infant recommended reference ranges:  Up to 24 hours.............<8.0 mg/dL  Up to 48 hours............<12.0 mg/dL  3-5 days..................<15.0 mg/dL  6-29 days.................<15.0 mg/dL             BUN   25           Calcium   9.4           Chloride   101           CO2   27           Creatinine   1.0           Differential Method   Automated           eGFR   >60.0           Eos #   2.3           Eosinophil %   16.3           Glucose   184           Gran # (ANC)   9.1           Gran %   64.4           Hematocrit   31.0           Hemoglobin   9.2           Hypo   Occasional           Immature Grans (Abs)   0.03  Comment: Mild elevation in immature granulocytes is non specific and   can be seen in a variety of conditions including stress response,   acute inflammation, trauma and pregnancy. Correlation with other   laboratory and clinical findings is essential.             Immature  Granulocytes   0.2           Lymph #   1.9           Lymph %   13.7           Magnesium   2.0           MCH   26.8           MCHC   29.7           MCV   90           Mono #   0.7           Mono %   5.1           MPV   9.8           nRBC   0           Ovalocytes   Occasional           Phosphorus   3.2           Platelets   504           POCT Glucose 179     154   222       Poikilocytosis   Slight           Poly   Occasional           Potassium   4.1           PROTEIN TOTAL   7.3           RBC   3.43           RDW   16.3           Sodium   139           Spherocytes   Occasional           WBC   14.10                   Significant Imaging: I have reviewed all pertinent imaging results/findings within the past 24 hours.    Imaging Results              X-Ray Chest 1 View (Final result)  Result time 01/25/23 14:28:09      Final result by Elie Manrique MD (01/25/23 14:28:09)                   Impression:      See above      Electronically signed by: Elie Manrique MD  Date:    01/25/2023  Time:    14:28               Narrative:    EXAMINATION:  XR CHEST 1 VIEW    CLINICAL HISTORY:  Elevated white blood cell count, unspecified    TECHNIQUE:  Single frontal view of the chest was performed.    COMPARISON:  None    FINDINGS:  Patient is significantly rotated to the right and the left forearm and hand overlies the left chest.  Therefore the left lung cannot be evaluated.  Repeat examination recommended.  Right lung is clear.  Tracheostomy tube noted.

## 2023-02-05 NOTE — NURSING
Care provided as ordered during shift, Tube feeding infusing at 55ml/hr. No acute incidents noted during shift. Pt turn g2h during shift. Safety seizure and fall precautions maintained. Care continued

## 2023-02-05 NOTE — PROGRESS NOTES
[facility] [address] [place] [address] Medicine  Progress Note    Patient Name: Hao Medraon  MRN: [id number]  Patient Class: IP- Inpatient   Admission Date: 1/25/2023  Length of Stay: 10 days  Attending Physician: Divya Galvin MD  Primary Care Provider: Keara Yanes NP        Subjective:     Principal Problem:Dislodged gastrostomy tube        HPI:  Hao Medrano is a 62 y.o. male with a past medical history of CVA with trach and G-tube, CKD, HTN, and HLD who is being palced in observation for further management of dislodged peg tube. Patient presented to the ED from [facility]. Patient is nonverbal, history obtained from chart and ED staff. Per the ED, the nursing home reports they noticed the dislodged ped tube around 0400 this am. Patient was recently admitted on 1/10 for same issue with a new peg placed on 1/12.    ED: vital signs stable, afebrile. Leukocytosis of 23K. Hb 11.4. PT/INR 10/9/1.0. Furhter labs pending at time of admission. CXR with inconclusive findings due to patient position. Blood cultures obtained. GI consulted and plan for EGD with G-tube placement tomorrow.       Overview/Hospital Course:  S/p 20 F PEG tube placed without any immediate complications.   External bumper at 3 cm shelby.  Positive blood Cx with gram positive Cocci  2D echo ordered, unremarkable for vegetation  ID consulted:    Outpatient Antibiotic Therapy Plan:     Please send referral to [facility].     1) Infection: E faecalis bacteremia     2) Discharge Antibiotics:     Intravenous antibiotics:   Ampicillin 2 grams IV q 4 hours (may be given as continuous infusion)        3) Therapy Duration:  14 days     Estimated end date of IV antibiotics: 02/24/23     4) Outpatient Weekly Labs:     Order the following labs to be drawn on Mondays:    CBC   CMP      5) Fax Lab Results to Infectious Diseases Provider: Dr. Price     [facility] Fax Number: [phone]      6) Outpatient Infectious Diseases Follow-up      Follow-up appointment will be arranged by the ID clinic and will be found in the patient's appointments tab.      Prior to discharge, please ensure the patient's follow-up has been scheduled.     If there is still no follow-up scheduled prior to discharge, please send an EPIC message to Lauren Stiles in Infectious Diseases.    Patient was admitted for dislodged PEG tube. GI was consulted and he underwent 20 F PEG tube placed without any immediate complications. External bumper at 3 cm shelby. During the course of this admission serial routine labs with mild hyperglycemia, mild anemia, persistence leukocytosis. He was found to have positive blood Culture with enterococcus Faecalis and coagulase negative staph. ID was consulted and was started on IV antibiotic and outpatient antibiotic see recommendation above. Throughout hospital stays, no new acute changes. He will be transferred back to the nursing home and follow up with ID, PCP.      Interval History:   No acute changes, no spike of fever, non verbal, awaiting for placement.    Review of Systems   Unable to perform ROS: Patient nonverbal   Objective:     Vital Signs (Most Recent):  Temp: 98.5 °F (36.9 °C) (02/05/23 1100)  Pulse: 88 (02/05/23 1100)  Resp: 18 (02/05/23 1100)  BP: 135/78 (02/05/23 1100)  SpO2: 98 % (02/05/23 1100) Vital Signs (24h Range):  Temp:  [97.4 °F (36.3 °C)-98.5 °F (36.9 °C)] 98.5 °F (36.9 °C)  Pulse:  [87-92] 88  Resp:  [17-18] 18  SpO2:  [98 %-100 %] 98 %  BP: (121-146)/(57-80) 135/78     Weight: 71.7 kg (158 lb)  Body mass index is 21.43 kg/m².    Intake/Output Summary (Last 24 hours) at 2/5/2023 1310  Last data filed at 2/5/2023 0615  Gross per 24 hour   Intake 1980 ml   Output 1250 ml   Net 730 ml        Physical Exam  Vitals and nursing note reviewed.   Constitutional:       General: He is not in acute distress.  HENT:      Head: Normocephalic and atraumatic.      Nose: No congestion.    Eyes:      Extraocular Movements: Extraocular movements intact.      Pupils: Pupils are equal, round, and reactive to light.   Cardiovascular:      Rate and Rhythm: Normal rate. Rhythm irregular.      Heart sounds:     No friction rub. No gallop.   Pulmonary:      Effort: Pulmonary effort is normal. No respiratory distress.      Breath sounds: No wheezing or rales.      Comments: Trach in place  Abdominal:      General: Bowel sounds are normal. There is no distension.      Tenderness: There is no abdominal tenderness. There is no guarding or rebound.      Comments: PEG in place   Musculoskeletal:         General: Swelling and deformity present.      Cervical back: No rigidity or tenderness.      Right lower leg: Edema present.      Left lower leg: Edema present.   Skin:     Findings: Erythema and rash present.   Neurological:      Mental Status: He is alert.      Comments: Hemiplegic with B/L lower extremities, atrophy, bed bound       Significant Labs: All pertinent labs within the past 24 hours have been reviewed.  Blood Culture: No results for input(s): LABBLOO in the last 48 hours.  BMP:   Recent Labs   Lab 02/05/23  1028   *      K 4.1      CO2 27   BUN 25*   CREATININE 1.0   CALCIUM 9.4   MG 2.0       CBC:   Recent Labs   Lab 02/05/23  1028   WBC 14.10*   HGB 9.2*   HCT 31.0*   *       CMP:   Recent Labs   Lab 02/05/23  1028      K 4.1      CO2 27   *   BUN 25*   CREATININE 1.0   CALCIUM 9.4   PROT 7.3   ALBUMIN 1.9*   BILITOT 0.1   ALKPHOS 124   AST 15   ALT 18   ANIONGAP 11       Recent Lab Results         02/05/23  1030   02/05/23  1028   02/04/23  2103   02/04/23  1630        Albumin   1.9           Alkaline Phosphatase   124           ALT   18           Anion Gap   11           Aniso   Slight           AST   15           Baso #   0.04           Basophil %   0.3           BILIRUBIN TOTAL   0.1  Comment: For infants and newborns, interpretation of results  should be based  on gestational age, weight and in agreement with clinical  observations.    Premature Infant recommended reference ranges:  Up to 24 hours.............<8.0 mg/dL  Up to 48 hours............<12.0 mg/dL  3-5 days..................<15.0 mg/dL  6-29 days.................<15.0 mg/dL             BUN   25           Calcium   9.4           Chloride   101           CO2   27           Creatinine   1.0           Differential Method   Automated           eGFR   >60.0           Eos #   2.3           Eosinophil %   16.3           Glucose   184           Gran # (ANC)   9.1           Gran %   64.4           Hematocrit   31.0           Hemoglobin   9.2           Hypo   Occasional           Immature Grans (Abs)   0.03  Comment: Mild elevation in immature granulocytes is non specific and   can be seen in a variety of conditions including stress response,   acute inflammation, trauma and pregnancy. Correlation with other   laboratory and clinical findings is essential.             Immature Granulocytes   0.2           Lymph #   1.9           Lymph %   13.7           Magnesium   2.0           MCH   26.8           MCHC   29.7           MCV   90           Mono #   0.7           Mono %   5.1           MPV   9.8           nRBC   0           Ovalocytes   Occasional           Phosphorus   3.2           Platelets   504           POCT Glucose 179     154   222       Poikilocytosis   Slight           Poly   Occasional           Potassium   4.1           PROTEIN TOTAL   7.3           RBC   3.43           RDW   16.3           Sodium   139           Spherocytes   Occasional           WBC   14.10                   Significant Imaging: I have reviewed all pertinent imaging results/findings within the past 24 hours.    Imaging Results              X-Ray Chest 1 View (Final result)  Result time 01/25/23 14:28:09      Final result by Elie Manrique MD (01/25/23 14:28:09)                   Impression:      See above      Electronically  signed by: Elie Manrique MD  Date:    01/25/2023  Time:    14:28               Narrative:    EXAMINATION:  XR CHEST 1 VIEW    CLINICAL HISTORY:  Elevated white blood cell count, unspecified    TECHNIQUE:  Single frontal view of the chest was performed.    COMPARISON:  None    FINDINGS:  Patient is significantly rotated to the right and the left forearm and hand overlies the left chest.  Therefore the left lung cannot be evaluated.  Repeat examination recommended.  Right lung is clear.  Tracheostomy tube noted.                                          Assessment/Plan:      * Dislodged gastrostomy tube  Oropharyngeal dysphagia    - s/p 20 F PEG tube placed without any immediate complications.   External bumper at 3 cm shelby.-  -resumed feeding tube with Isosource 1.5 nathaniel and dietician consult    Multiple wounds of skin  With scattered lesions with dry skin  Encourage moisturize skin, skin care  Avoid skin debridement, changing position q 2Hrs  Cont Antibiotic ointment at the PEG site      Leukocytosis  - VSSAF  - leukocytosis improving R/O steroids induced  - g-tube site is clean without discharge or erythma  - blood cultures positive for gram + cocci, repeat no growth  - IV ABx with ampicillin    Bacteremia  Cont ampicillin will be DC on ampicillin for 14 days  ID consulted appreciated   2D echo done no vegetations cont medical management        On antiepileptic therapy  - continue keppra 750 mg BID once peg tube is replaced      HTN (hypertension)  - BP well controlled upon admission  - continue coreg, increased to 6.25 mg b.i.d. with improvement of BP trend      Type 2 diabetes mellitus, with long-term current use of insulin  Patient's FSGs are controlled on current medication regimen.  Last A1c reviewed-   Lab Results   Component Value Date    HGBA1C 5.9 (H) 07/12/2018     Most recent fingerstick glucose reviewed-   Recent Labs   Lab 02/04/23  1630 02/04/23  2103 02/05/23  1030   POCTGLUCOSE 222* 154* 179*      Current correctional scale  Low  Maintain anti-hyperglycemic dose as follows-   Antihyperglycemics (From admission, onward)    Start     Stop Route Frequency Ordered    01/25/23 1511  insulin aspart U-100 pen 0-5 Units         -- SubQ Before meals & nightly PRN 01/25/23 1411        Hold Oral hypoglycemics while patient is in the hospital.  - patient is on insulin glargine 15U daily at the nursing home  - resume when tube feedings are resumed    Oropharyngeal dysphagia  -Cont tube feeding      Anemia  - patient's anemia is currently controlled  - etiology likely due to anemia of chronic disease  - current CBC reviewed -   Lab Results   Component Value Date    HGB 9.2 (L) 02/05/2023    HCT 31.0 (L) 02/05/2023     - monitor serial CBC and transfuse if patient becomes hemodynamically unstable, symptomatic, or H/H drops below 7/21.         Chronic kidney disease, stage III (moderate)  - baseline ~1.3  - cont monitor  Cr baseline  Renally dose all medications  Avoid nephrotoxins  Will continue to monitor on daily labs        VTE Risk Mitigation (From admission, onward)         Ordered     Place sequential compression device  Until discontinued         01/25/23 1411     IP VTE HIGH RISK PATIENT  Once         01/25/23 1411     Reason for No Pharmacological VTE Prophylaxis  Once        Question:  Reasons:  Answer:  Risk of Bleeding  Comment:  pre-op    01/25/23 1411                Discharge Planning   FAINA: 2/6/2023     Code Status: Full Code   Is the patient medically ready for discharge?: Yes    Reason for patient still in hospital (select all that apply): Pending disposition  Discharge Plan A: Long-term acute care facility (LTAC)   Discharge Delays: None known at this time              Divya Galvin MD  Department of Hospital Medicine   [facility] 11H

## 2023-02-06 LAB
POCT GLUCOSE: 158 MG/DL (ref 70–110)
POCT GLUCOSE: 169 MG/DL (ref 70–110)
POCT GLUCOSE: 195 MG/DL (ref 70–110)
POCT GLUCOSE: 219 MG/DL (ref 70–110)
POCT GLUCOSE: 240 MG/DL (ref 70–110)

## 2023-02-06 PROCEDURE — 11000001 HC ACUTE MED/SURG PRIVATE ROOM

## 2023-02-06 PROCEDURE — 99900026 HC AIRWAY MAINTENANCE (STAT)

## 2023-02-06 PROCEDURE — 27000221 HC OXYGEN, UP TO 24 HOURS

## 2023-02-06 PROCEDURE — 25000003 PHARM REV CODE 250: Performed by: FAMILY MEDICINE

## 2023-02-06 PROCEDURE — 63600175 PHARM REV CODE 636 W HCPCS: Performed by: FAMILY MEDICINE

## 2023-02-06 PROCEDURE — 99231 SBSQ HOSP IP/OBS SF/LOW 25: CPT | Mod: ,,, | Performed by: STUDENT IN AN ORGANIZED HEALTH CARE EDUCATION/TRAINING PROGRAM

## 2023-02-06 PROCEDURE — 99231 PR SUBSEQUENT HOSPITAL CARE,LEVL I: ICD-10-PCS | Mod: ,,, | Performed by: STUDENT IN AN ORGANIZED HEALTH CARE EDUCATION/TRAINING PROGRAM

## 2023-02-06 PROCEDURE — 99900035 HC TECH TIME PER 15 MIN (STAT)

## 2023-02-06 PROCEDURE — 25000003 PHARM REV CODE 250: Performed by: STUDENT IN AN ORGANIZED HEALTH CARE EDUCATION/TRAINING PROGRAM

## 2023-02-06 PROCEDURE — 94761 N-INVAS EAR/PLS OXIMETRY MLT: CPT

## 2023-02-06 RX ADMIN — AMPICILLIN 2 G: 2 INJECTION, POWDER, FOR SOLUTION INTRAMUSCULAR; INTRAVENOUS at 08:02

## 2023-02-06 RX ADMIN — AMPICILLIN 2 G: 2 INJECTION, POWDER, FOR SOLUTION INTRAMUSCULAR; INTRAVENOUS at 04:02

## 2023-02-06 RX ADMIN — CARVEDILOL 6.25 MG: 6.25 TABLET, FILM COATED ORAL at 09:02

## 2023-02-06 RX ADMIN — CALCIUM CARBONATE (ANTACID) CHEW TAB 500 MG 500 MG: 500 CHEW TAB at 09:02

## 2023-02-06 RX ADMIN — PREDNISONE 10 MG: 10 TABLET ORAL at 09:02

## 2023-02-06 RX ADMIN — ASPIRIN 81 MG CHEWABLE TABLET 81 MG: 81 TABLET CHEWABLE at 09:02

## 2023-02-06 RX ADMIN — AMPICILLIN 2 G: 2 INJECTION, POWDER, FOR SOLUTION INTRAMUSCULAR; INTRAVENOUS at 12:02

## 2023-02-06 RX ADMIN — Medication: at 09:02

## 2023-02-06 RX ADMIN — INSULIN ASPART 2 UNITS: 100 INJECTION, SOLUTION INTRAVENOUS; SUBCUTANEOUS at 12:02

## 2023-02-06 RX ADMIN — NYSTATIN: 100000 POWDER TOPICAL at 08:02

## 2023-02-06 RX ADMIN — BACLOFEN 15 MG: 5 TABLET ORAL at 09:02

## 2023-02-06 RX ADMIN — AMPICILLIN 2 G: 2 INJECTION, POWDER, FOR SOLUTION INTRAMUSCULAR; INTRAVENOUS at 09:02

## 2023-02-06 RX ADMIN — CARVEDILOL 6.25 MG: 6.25 TABLET, FILM COATED ORAL at 08:02

## 2023-02-06 RX ADMIN — LEVETIRACETAM 750 MG: 100 SOLUTION ORAL at 09:02

## 2023-02-06 RX ADMIN — BACLOFEN 15 MG: 5 TABLET ORAL at 02:02

## 2023-02-06 RX ADMIN — LEVETIRACETAM 750 MG: 100 SOLUTION ORAL at 08:02

## 2023-02-06 RX ADMIN — BACLOFEN 15 MG: 5 TABLET ORAL at 08:02

## 2023-02-06 RX ADMIN — ESOMEPRAZOLE MAGNESIUM 40 MG: 10 GRANULE, FOR SUSPENSION, EXTENDED RELEASE ORAL at 09:02

## 2023-02-06 RX ADMIN — NYSTATIN: 100000 POWDER TOPICAL at 09:02

## 2023-02-06 NOTE — PLAN OF CARE
Pt progressing towards goals.Shiley Trach intact.o2 via trach collar maintained,28%,with o2 sats of 100%The current medical regimen is effective;  continue present plan and medications.continue iv antibiotics.afebrile.continue with wound and skin care.TF in progress.residual 2ml.continue free water 250ml qid.safety precautions maintained.bed in low position.rails up x4.bed alarm in use for pt safety.continue plan of care.

## 2023-02-06 NOTE — PLAN OF CARE
Problem: Infection  Goal: Absence of Infection Signs and Symptoms  Outcome: Ongoing, Progressing     Problem: Adult Inpatient Plan of Care  Goal: Absence of Hospital-Acquired Illness or Injury  Outcome: Ongoing, Progressing  Goal: Optimal Comfort and Wellbeing  Outcome: Ongoing, Progressing     Problem: Diabetes Comorbidity  Goal: Blood Glucose Level Within Targeted Range  Outcome: Ongoing, Progressing     Problem: Impaired Wound Healing  Goal: Optimal Wound Healing  Outcome: Ongoing, Progressing     Problem: Skin Injury Risk Increased  Goal: Skin Health and Integrity  Outcome: Ongoing, Progressing

## 2023-02-06 NOTE — RESPIRATORY THERAPY
RAPID RESPON[facility] RESPIRATORY THERAPY PROACTIVE NOTE           Time of visit: 939     Code Status: Full Code   : 1960  Bed: 1155/1155 A:   MRN: [id number]  Time spent at the bedside: < 15 min    SITUATION    Evaluated patient for: LDA Check     BACKGROUND    Patient has no past medical history on file.  Clinically Significant Surgical Hx: tracheostomy    24 Hours Vitals Range:  Temp:  [96.7 °F (35.9 °C)-98.8 °F (37.1 °C)]   Pulse:  [86-95]   Resp:  [17-19]   BP: (132-165)/(63-79)   SpO2:  [98 %-100 %]     Labs:    Recent Labs     23  1028      K 4.1      CO2 27   CREATININE 1.0   *   PHOS 3.2   MG 2.0        No results for input(s): PH, PCO2, PO2, HCO3, POCSATURATED, BE in the last 72 hours.    ASSESSMENT/INTERVENTIONS  Pt on 5L 28% trach collar. Shiley size 6 XLT cuffed trach in place. All supplies in room. Extra cuffed XLT trachs sizes 5 and 6 at bedside.      Last VS   Temp: 97.9 °F (36.6 °C) ( 1213)  Pulse: 90 ( 1231)  Resp: 17 ( 1231)  BP: 135/64 ( 1213)  SpO2: 98 % ( 1231)      Extra trachs at bedside: cuffed XLT sizes 5 and 6.  Level of Consciousness: Level of Consciousness (AVPU): alert  Respiratory Effort: Respiratory Effort: Unlabored Expansion/Accessory Muscle Usage: Expansion/Accessory Muscles/Retractions: expansion symmetric  All Lung Field Breath Sounds: All Lung Fields Breath Sounds: Anterior:, Posterior:, Lateral:, diminished, coarse  ELSIE Breath Sounds: continuous  O2 Device/Concentration: 5L 28% TC.  Surgical airway: Yes, Type: Shiley Size: 6 XLT, cuffed  Ambu at bedside: Ambu bag with the patient?: Yes, Adult Ambu     Active Orders   Respiratory Care    Inhalation Treatment Q4H PRN     Frequency: Q4H PRN     Number of Occurrences: Until Specified    Oxygen PRN     Frequency: PRN     Number of Occurrences: Until Specified     Order Questions:      Device type: Low flow      Device: Trach Collar      Titrate O2 per Oxygen Titration Protocol: Yes       To maintain SpO2 goal of: >= 90%      Notify MD of: Inability to achieve desired SpO2; Sudden change in patient status and requires 20% increase in FiO2; Patient requires >60% FiO2    Pulse Oximetry Q4H     Frequency: Q4H     Number of Occurrences: Until Specified    Routine tracheostomy care     Frequency: BID     Number of Occurrences: Until Specified       RECOMMENDATIONS    We recommend: RRT Recs: Continue POC per primary team.      FOLLOW-UP    Please call back the Rapid Response RT, Isak Ly RRT at x [phone] for any questions or concerns.

## 2023-02-06 NOTE — PROGRESS NOTES
[facility]  Adult Nutrition  Progress Note    SUMMARY       Recommendations  1. Continue EN recommendations of Isosource 1.5 @ goal of 55 ml/hr to provide 1980 kcals, 90 g of protein, and 1008 ml of fluid.   2. RD following    Goals: Meet % of EEN/EPN by RD f/u date  Nutrition Goal Status: goal met  Communication of RD Recs: POC    Assessment and Plan    Nutrition Problem  Increased protein/energy needs      Related to (etiology):   Physiological needs      Signs and Symptoms (as evidenced by):   G tube and wounds     Interventions (treatment strategy):  Collaboration of nutrition care w/ other providers      Nutrition Diagnosis Status:   Continues       Reason for Assessment    Reason For Assessment: RD follow-up  Diagnosis: other (see comments)  Relevant Medical History: No PMHx documented  Interdisciplinary Rounds: did not attend  General Information Comments: Pt tolerating Isosource 1.5 @ goal rate @ 55 ml/hr provide 1980 kcals, 90 g of protein, and 1008 ml of fluid- meeting pt's needs. LBM noted-2/5/22  Nutrition Discharge Planning: pending clinical course    Nutrition Risk Screen    Nutrition Risk Screen: tube feeding or parenteral nutrition    Nutrition/Diet History    Spiritual, Cultural Beliefs, Islam Practices, Values that Affect Care: no  Food Allergies: NKFA    Anthropometrics    Temp: 98.1 °F (36.7 °C)  Height Method: Estimated  Height: 6' (182.9 cm)  Height (inches): 72 in  Weight Method: Bed Scale  Weight: 71.7 kg (158 lb)  Weight (lb): 158 lb  Ideal Body Weight (IBW), Male: 178 lb  % Ideal Body Weight, Male (lb): 88.76 %  BMI (Calculated): 21.4       Lab/Procedures/Meds    Pertinent Labs Reviewed: reviewed  Pertinent Labs Comments: Glucose 134  Pertinent Medications Reviewed: reviewed  Pertinent Medications Comments: calcium carbonate    Estimated/Assessed Needs    Weight Used For Calorie Calculations: 71.7 kg (158 lb 1.1 oz)  Energy Calorie Requirements (kcal): 1935  Energy  Need Method: Kcal/kg (27 kcal/kg)  Protein Requirements: 93 g (1.3 g/kg)  Weight Used For Protein Calculations: 71.7 kg (158 lb 1.1 oz)   RDA Method (mL): 1935       Nutrition Prescription Ordered    Current Diet Order: NPO status    Evaluation of Received Nutrient/Fluid Intake    I/O: +3.5 L since admit  Energy Calories Required: meeting needs  Protein Required: meeting needs  Fluid Required: meeting needs  Total Fluid Intake (mL/kg): 1 ml or fluid per MD  Tolerance: tolerating  % Intake of Estimated Energy Needs: 0 - 25 %  % Meal Intake: NPO    Nutrition Risk    Level of Risk/Frequency of Follow-up: low ((1 x/week))     Monitor and Evaluation    Food and Nutrient Intake: energy intake, food and beverage intake  Food and Nutrient Adminstration: diet order, enteral and parenteral nutrition administration  Knowledge/Beliefs/Attitudes: food and nutrition knowledge/skill, beliefs and attitudes  Physical Activity and Function: nutrition-related ADLs and IADLs, factors affecting access to physical activity  Anthropometric Measurements: height/length, weight, body mass index, growth pattern indices/percentile ranks, weight change  Biochemical Data, Medical Tests and Procedures: electrolyte and renal panel, gastrointestinal profile, glucose/endocrine profile, inflammatory profile, lipid profile  Nutrition-Focused Physical Findings: overall appearance, extremities, muscles and bones, head and eyes, skin     Nutrition Follow-Up    RD Follow-up?: Yes  By Adriana Nelson, Registration Eligible, Provisional LDN

## 2023-02-06 NOTE — SUBJECTIVE & OBJECTIVE
Interval History:   No acute changes, no spike of fever, non verbal, awaiting for placement.    Review of Systems   Unable to perform ROS: Patient nonverbal   Objective:     Vital Signs (Most Recent):  Temp: 98.1 °F (36.7 °C) (02/06/23 0831)  Pulse: 89 (02/06/23 0858)  Resp: 17 (02/06/23 0858)  BP: 132/63 (02/06/23 0831)  SpO2: 99 % (02/06/23 0858) Vital Signs (24h Range):  Temp:  [96.7 °F (35.9 °C)-98.8 °F (37.1 °C)] 98.1 °F (36.7 °C)  Pulse:  [86-93] 89  Resp:  [17-19] 17  SpO2:  [98 %-100 %] 99 %  BP: (132-165)/(63-79) 132/63     Weight: 71.7 kg (158 lb)  Body mass index is 21.43 kg/m².    Intake/Output Summary (Last 24 hours) at 2/6/2023 1137  Last data filed at 2/6/2023 0827  Gross per 24 hour   Intake 1410 ml   Output 1100 ml   Net 310 ml        Physical Exam  Vitals and nursing note reviewed.   Constitutional:       General: He is not in acute distress.  HENT:      Head: Normocephalic and atraumatic.      Nose: No congestion.   Eyes:      Extraocular Movements: Extraocular movements intact.      Pupils: Pupils are equal, round, and reactive to light.   Cardiovascular:      Rate and Rhythm: Normal rate. Rhythm irregular.      Heart sounds:     No friction rub. No gallop.   Pulmonary:      Effort: Pulmonary effort is normal. No respiratory distress.      Breath sounds: No wheezing or rales.      Comments: Trach in place  Abdominal:      General: Bowel sounds are normal. There is no distension.      Tenderness: There is no abdominal tenderness. There is no guarding or rebound.      Comments: PEG in place   Musculoskeletal:         General: Swelling and deformity present.      Cervical back: No rigidity or tenderness.      Right lower leg: No edema.      Left lower leg: No edema.   Skin:     Findings: Erythema and rash present.   Neurological:      Mental Status: He is alert.      Comments: Hemiplegic with B/L lower extremities, atrophy, bed bound       Significant Labs: All pertinent labs within the past 24  hours have been reviewed.  Blood Culture: No results for input(s): LABBLOO in the last 48 hours.  BMP:   Recent Labs   Lab 02/05/23  1028   *      K 4.1      CO2 27   BUN 25*   CREATININE 1.0   CALCIUM 9.4   MG 2.0       CBC:   Recent Labs   Lab 02/05/23  1028   WBC 14.10*   HGB 9.2*   HCT 31.0*   *       CMP:   Recent Labs   Lab 02/05/23  1028      K 4.1      CO2 27   *   BUN 25*   CREATININE 1.0   CALCIUM 9.4   PROT 7.3   ALBUMIN 1.9*   BILITOT 0.1   ALKPHOS 124   AST 15   ALT 18   ANIONGAP 11       Recent Lab Results         02/06/23  0833   02/06/23  0612   02/05/23  2236   02/05/23  1657        POCT Glucose 195   169   134   217               Significant Imaging: I have reviewed all pertinent imaging results/findings within the past 24 hours.    Imaging Results              X-Ray Chest 1 View (Final result)  Result time 01/25/23 14:28:09      Final result by Elie Manrique MD (01/25/23 14:28:09)                   Impression:      See above      Electronically signed by: Elie Manrique MD  Date:    01/25/2023  Time:    14:28               Narrative:    EXAMINATION:  XR CHEST 1 VIEW    CLINICAL HISTORY:  Elevated white blood cell count, unspecified    TECHNIQUE:  Single frontal view of the chest was performed.    COMPARISON:  None    FINDINGS:  Patient is significantly rotated to the right and the left forearm and hand overlies the left chest.  Therefore the left lung cannot be evaluated.  Repeat examination recommended.  Right lung is clear.  Tracheostomy tube noted.

## 2023-02-06 NOTE — ASSESSMENT & PLAN NOTE
Patient's FSGs are controlled on current medication regimen.  Last A1c reviewed-   Lab Results   Component Value Date    HGBA1C 5.9 (H) 07/12/2018     Most recent fingerstick glucose reviewed-   Recent Labs   Lab 02/05/23  1657 02/05/23  2236 02/06/23  0612 02/06/23  0833   POCTGLUCOSE 217* 134* 169* 195*     Current correctional scale  Low  Maintain anti-hyperglycemic dose as follows-   Antihyperglycemics (From admission, onward)    Start     Stop Route Frequency Ordered    01/25/23 1511  insulin aspart U-100 pen 0-5 Units         -- SubQ Before meals & nightly PRN 01/25/23 1411        Hold Oral hypoglycemics while patient is in the hospital.  - patient is on insulin glargine 15U daily at the nursing home  - resume when tube feedings are resumed

## 2023-02-06 NOTE — PROGRESS NOTES
[facility] [address] [place] [address] Medicine  Progress Note    Patient Name: Hao Medrano  MRN: [id number]  Patient Class: IP- Inpatient   Admission Date: 1/25/2023  Length of Stay: 11 days  Attending Physician: Divya Galvin MD  Primary Care Provider: Keara Yanes NP        Subjective:     Principal Problem:Dislodged gastrostomy tube        HPI:  Hao Medrano is a 62 y.o. male with a past medical history of CVA with trach and G-tube, CKD, HTN, and HLD who is being palced in observation for further management of dislodged peg tube. Patient presented to the ED from [facility]. Patient is nonverbal, history obtained from chart and ED staff. Per the ED, the nursing home reports they noticed the dislodged ped tube around 0400 this am. Patient was recently admitted on 1/10 for same issue with a new peg placed on 1/12.    ED: vital signs stable, afebrile. Leukocytosis of 23K. Hb 11.4. PT/INR 10/9/1.0. Furhter labs pending at time of admission. CXR with inconclusive findings due to patient position. Blood cultures obtained. GI consulted and plan for EGD with G-tube placement tomorrow.       Overview/Hospital Course:  S/p 20 F PEG tube placed without any immediate complications.   External bumper at 3 cm shelby.  Positive blood Cx with gram positive Cocci  2D echo ordered, unremarkable for vegetation  ID consulted:    Outpatient Antibiotic Therapy Plan:     Please send referral to [facility].     1) Infection: E faecalis bacteremia     2) Discharge Antibiotics:     Intravenous antibiotics:  Ampicillin 2 grams IV q 4 hours (may be given as continuous infusion)        3) Therapy Duration:  14 days     4) Outpatient Weekly Labs:     Order the following labs to be drawn on Mondays:   CBC  CMP      5) Fax Lab Results to Infectious Diseases Provider: Dr. Price     [facility] Fax Number: [phone]     6) Outpatient Infectious Diseases Follow-up     Follow-up  appointment will be arranged by the ID clinic and will be found in the patient's appointments tab.     Prior to discharge, please ensure the patient's follow-up has been scheduled.    If there is still no follow-up scheduled prior to discharge, please send an EPIC message to Lauren Stiles in Infectious Diseases.    Patient was admitted for dislodged PEG tube. GI was consulted and he underwent 20 F PEG tube placed without any immediate complications. External bumper at 3 cm shelby. During the course of this admission serial routine labs with mild hyperglycemia, mild anemia, persistence leukocytosis. He was found to have positive blood Culture with enterococcus Faecalis and coagulase negative staph. ID was consulted and was started on IV antibiotic and outpatient antibiotic see recommendation above. Throughout hospital stays, no new acute changes. He will be transferred back to the nursing home and follow up with ID, PCP.      Interval History:   No acute changes, no spike of fever, non verbal, awaiting for placement.  Per CM, appeal denied.    Review of Systems   Unable to perform ROS: Patient nonverbal   Objective:     Vital Signs (Most Recent):  Temp: 98.1 °F (36.7 °C) (02/06/23 0831)  Pulse: 89 (02/06/23 0858)  Resp: 17 (02/06/23 0858)  BP: 132/63 (02/06/23 0831)  SpO2: 99 % (02/06/23 0858) Vital Signs (24h Range):  Temp:  [96.7 °F (35.9 °C)-98.8 °F (37.1 °C)] 98.1 °F (36.7 °C)  Pulse:  [86-93] 89  Resp:  [17-19] 17  SpO2:  [98 %-100 %] 99 %  BP: (132-165)/(63-79) 132/63     Weight: 71.7 kg (158 lb)  Body mass index is 21.43 kg/m².    Intake/Output Summary (Last 24 hours) at 2/6/2023 1137  Last data filed at 2/6/2023 0827  Gross per 24 hour   Intake 1410 ml   Output 1100 ml   Net 310 ml        Physical Exam  Vitals and nursing note reviewed.   Constitutional:       General: He is not in acute distress.  HENT:      Head: Normocephalic and atraumatic.      Nose: No congestion.   Eyes:      Extraocular Movements:  Extraocular movements intact.      Pupils: Pupils are equal, round, and reactive to light.   Cardiovascular:      Rate and Rhythm: Normal rate. Rhythm irregular.      Heart sounds:     No friction rub. No gallop.   Pulmonary:      Effort: Pulmonary effort is normal. No respiratory distress.      Breath sounds: No wheezing or rales.      Comments: Trach in place  Abdominal:      General: Bowel sounds are normal. There is no distension.      Tenderness: There is no abdominal tenderness. There is no guarding or rebound.      Comments: PEG in place   Musculoskeletal:         General: Swelling and deformity present.      Cervical back: No rigidity or tenderness.      Right lower leg: No edema.      Left lower leg: No edema.   Skin:     Findings: Erythema and rash present.   Neurological:      Mental Status: He is alert.      Comments: Hemiplegic with B/L lower extremities, atrophy, bed bound       Significant Labs: All pertinent labs within the past 24 hours have been reviewed.  Blood Culture: No results for input(s): LABBLOO in the last 48 hours.  BMP:   Recent Labs   Lab 02/05/23  1028   *      K 4.1      CO2 27   BUN 25*   CREATININE 1.0   CALCIUM 9.4   MG 2.0       CBC:   Recent Labs   Lab 02/05/23  1028   WBC 14.10*   HGB 9.2*   HCT 31.0*   *       CMP:   Recent Labs   Lab 02/05/23  1028      K 4.1      CO2 27   *   BUN 25*   CREATININE 1.0   CALCIUM 9.4   PROT 7.3   ALBUMIN 1.9*   BILITOT 0.1   ALKPHOS 124   AST 15   ALT 18   ANIONGAP 11       Recent Lab Results         02/06/23  0833   02/06/23  0612   02/05/23  2236   02/05/23  1657        POCT Glucose 195   169   134   217               Significant Imaging: I have reviewed all pertinent imaging results/findings within the past 24 hours.    Imaging Results              X-Ray Chest 1 View (Final result)  Result time 01/25/23 14:28:09      Final result by Elie Manrique MD (01/25/23 14:28:09)                    Impression:      See above      Electronically signed by: Elie Manrique MD  Date:    01/25/2023  Time:    14:28               Narrative:    EXAMINATION:  XR CHEST 1 VIEW    CLINICAL HISTORY:  Elevated white blood cell count, unspecified    TECHNIQUE:  Single frontal view of the chest was performed.    COMPARISON:  None    FINDINGS:  Patient is significantly rotated to the right and the left forearm and hand overlies the left chest.  Therefore the left lung cannot be evaluated.  Repeat examination recommended.  Right lung is clear.  Tracheostomy tube noted.                                          Assessment/Plan:      * Dislodged gastrostomy tube  Oropharyngeal dysphagia    - s/p 20 F PEG tube placed without any immediate complications.   External bumper at 3 cm shelby.-  -resumed feeding tube with Isosource 1.5 nathaniel and dietician consult    Multiple wounds of skin  With scattered lesions with dry skin  Encourage moisturize skin, skin care  Avoid skin debridement, changing position q 2Hrs  Cont Antibiotic ointment at the PEG site      Leukocytosis  - VSSAF  - leukocytosis improving R/O steroids induced  - g-tube site is clean without discharge or erythma  - blood cultures positive for gram + cocci, repeat no growth  - IV ABx with ampicillin    Bacteremia  Cont ampicillin will be DC on ampicillin for 14 days  ID consulted appreciated   2D echo done no vegetations cont medical management        On antiepileptic therapy  - continue keppra 750 mg BID once peg tube is replaced      HTN (hypertension)  - BP well controlled upon admission  - continue coreg, increased to 6.25 mg b.i.d. with improvement of BP trend      Type 2 diabetes mellitus, with long-term current use of insulin  Patient's FSGs are controlled on current medication regimen.  Last A1c reviewed-   Lab Results   Component Value Date    HGBA1C 5.9 (H) 07/12/2018     Most recent fingerstick glucose reviewed-   Recent Labs   Lab 02/05/23  1657 02/05/23  7174  02/06/23  0612 02/06/23  0833   POCTGLUCOSE 217* 134* 169* 195*     Current correctional scale  Low  Maintain anti-hyperglycemic dose as follows-   Antihyperglycemics (From admission, onward)      Start     Stop Route Frequency Ordered    01/25/23 1511  insulin aspart U-100 pen 0-5 Units         -- SubQ Before meals & nightly PRN 01/25/23 1411          Hold Oral hypoglycemics while patient is in the hospital.  - patient is on insulin glargine 15U daily at the nursing home  - resume when tube feedings are resumed    Oropharyngeal dysphagia  -Cont tube feeding      Anemia  - patient's anemia is currently controlled  - etiology likely due to anemia of chronic disease  - current CBC reviewed -   Lab Results   Component Value Date    HGB 9.2 (L) 02/05/2023    HCT 31.0 (L) 02/05/2023     - monitor serial CBC and transfuse if patient becomes hemodynamically unstable, symptomatic, or H/H drops below 7/21.         Chronic kidney disease, stage III (moderate)  - baseline ~1.3  - cont monitor  Cr baseline  Renally dose all medications  Avoid nephrotoxins  Will continue to monitor on daily labs        VTE Risk Mitigation (From admission, onward)           Ordered     Place sequential compression device  Until discontinued         01/25/23 1411     IP VTE HIGH RISK PATIENT  Once         01/25/23 1411     Reason for No Pharmacological VTE Prophylaxis  Once        Question:  Reasons:  Answer:  Risk of Bleeding  Comment:  pre-op    01/25/23 1411                    Discharge Planning   FAINA: 2/6/2023     Code Status: Full Code   Is the patient medically ready for discharge?: Yes    Reason for patient still in hospital (select all that apply): Pending disposition  Discharge Plan A: Long-term acute care facility (LTAC)   Discharge Delays: None known at this time              Divya Galvin MD  Department of Hospital Medicine   [facility]

## 2023-02-06 NOTE — PLAN OF CARE
Recommendations  1. Continue EN recommendations of Isosource 1.5 @ goal of 55 ml/hr to provide 1980 kcals, 90 g of protein, and 1008 ml of fluid.   2. RD following     Goals: Meet % of EEN/EPN by RD f/u date  Nutrition Goal Status: goal met  Communication of RD Recs: POC

## 2023-02-06 NOTE — AI DETERIORATION ALERT
Evaluation of Early Detection of Sepsis Risk     Patient Name: Hao Medrano  MRN:  [id number]  Primary Care Team: [facility]  Date of Admission:  1/25/2023     Principal Problem:  Dislodged gastrostomy tube   Date of Review: 02/06/2023    Patient reviewed for elevated sepsis risk score. Sepsis Screen Positive  Patient already receiving appropriate sepsis care. Please notify Rapid Response Team for any hypotension or decompensation.    Sepsis Screen Results     IP Sepsis Screen (most recent)       Sepsis Screen (IP) - 02/06/23 1546       Is the patient's history or complaint suggestive of a possible infection? Yes  -SS    Are there at least two of the following signs and symptoms present? Yes  -SS    Sepsis signs/symptoms - Tachycardia Tachycardia     >90  -SS    Sepsis signs/symptoms - WBC WBC < 4,000 or WBC > 12,000  -SS    Are any of the following organ dysfunction criteria present and not considered to be due to a chronic condition? Yes  -SS    Organ Dysfunction Criteria - Resp Comp Respiratory Compromise: Requiring > 5L NC  -SS    Initiate Sepsis Protocol No  -SS    Reason sepsis not considered Pt. receiving appropriate management  -SS              User Key  (r) = Recorded By, (t) = Taken By, (c) = Cosigned By      Initials Name    SS BERNADINE Del Rosario PA-C  Sepsis

## 2023-02-07 LAB
ALBUMIN SERPL BCP-MCNC: 2 G/DL (ref 3.5–5.2)
ALP SERPL-CCNC: 128 U/L (ref 55–135)
ALT SERPL W/O P-5'-P-CCNC: 19 U/L (ref 10–44)
ANION GAP SERPL CALC-SCNC: 11 MMOL/L (ref 8–16)
AST SERPL-CCNC: 18 U/L (ref 10–40)
BASOPHILS # BLD AUTO: 0.05 K/UL (ref 0–0.2)
BASOPHILS NFR BLD: 0.4 % (ref 0–1.9)
BILIRUB SERPL-MCNC: 0.2 MG/DL (ref 0.1–1)
BUN SERPL-MCNC: 25 MG/DL (ref 8–23)
CALCIUM SERPL-MCNC: 9.5 MG/DL (ref 8.7–10.5)
CHLORIDE SERPL-SCNC: 101 MMOL/L (ref 95–110)
CO2 SERPL-SCNC: 26 MMOL/L (ref 23–29)
CREAT SERPL-MCNC: 1 MG/DL (ref 0.5–1.4)
DIFFERENTIAL METHOD: ABNORMAL
EOSINOPHIL # BLD AUTO: 2 K/UL (ref 0–0.5)
EOSINOPHIL NFR BLD: 15.9 % (ref 0–8)
ERYTHROCYTE [DISTWIDTH] IN BLOOD BY AUTOMATED COUNT: 16.2 % (ref 11.5–14.5)
EST. GFR  (NO RACE VARIABLE): >60 ML/MIN/1.73 M^2
GLUCOSE SERPL-MCNC: 154 MG/DL (ref 70–110)
HCT VFR BLD AUTO: 30.1 % (ref 40–54)
HGB BLD-MCNC: 9.3 G/DL (ref 14–18)
IMM GRANULOCYTES # BLD AUTO: 0.03 K/UL (ref 0–0.04)
IMM GRANULOCYTES NFR BLD AUTO: 0.2 % (ref 0–0.5)
LYMPHOCYTES # BLD AUTO: 2 K/UL (ref 1–4.8)
LYMPHOCYTES NFR BLD: 15.9 % (ref 18–48)
MAGNESIUM SERPL-MCNC: 2 MG/DL (ref 1.6–2.6)
MCH RBC QN AUTO: 27.5 PG (ref 27–31)
MCHC RBC AUTO-ENTMCNC: 30.9 G/DL (ref 32–36)
MCV RBC AUTO: 89 FL (ref 82–98)
MONOCYTES # BLD AUTO: 0.7 K/UL (ref 0.3–1)
MONOCYTES NFR BLD: 5.5 % (ref 4–15)
NEUTROPHILS # BLD AUTO: 7.9 K/UL (ref 1.8–7.7)
NEUTROPHILS NFR BLD: 62.1 % (ref 38–73)
NRBC BLD-RTO: 0 /100 WBC
PHOSPHATE SERPL-MCNC: 3.4 MG/DL (ref 2.7–4.5)
PLATELET # BLD AUTO: 536 K/UL (ref 150–450)
PMV BLD AUTO: 9.8 FL (ref 9.2–12.9)
POCT GLUCOSE: 139 MG/DL (ref 70–110)
POCT GLUCOSE: 189 MG/DL (ref 70–110)
POCT GLUCOSE: 193 MG/DL (ref 70–110)
POCT GLUCOSE: 228 MG/DL (ref 70–110)
POTASSIUM SERPL-SCNC: 4.5 MMOL/L (ref 3.5–5.1)
PROT SERPL-MCNC: 7.4 G/DL (ref 6–8.4)
RBC # BLD AUTO: 3.38 M/UL (ref 4.6–6.2)
SODIUM SERPL-SCNC: 138 MMOL/L (ref 136–145)
WBC # BLD AUTO: 12.79 K/UL (ref 3.9–12.7)

## 2023-02-07 PROCEDURE — 11000001 HC ACUTE MED/SURG PRIVATE ROOM

## 2023-02-07 PROCEDURE — 25000003 PHARM REV CODE 250: Performed by: STUDENT IN AN ORGANIZED HEALTH CARE EDUCATION/TRAINING PROGRAM

## 2023-02-07 PROCEDURE — 27000221 HC OXYGEN, UP TO 24 HOURS

## 2023-02-07 PROCEDURE — 94761 N-INVAS EAR/PLS OXIMETRY MLT: CPT

## 2023-02-07 PROCEDURE — 85025 COMPLETE CBC W/AUTO DIFF WBC: CPT | Performed by: STUDENT IN AN ORGANIZED HEALTH CARE EDUCATION/TRAINING PROGRAM

## 2023-02-07 PROCEDURE — 99232 PR SUBSEQUENT HOSPITAL CARE,LEVL II: ICD-10-PCS | Mod: ,,, | Performed by: STUDENT IN AN ORGANIZED HEALTH CARE EDUCATION/TRAINING PROGRAM

## 2023-02-07 PROCEDURE — 84100 ASSAY OF PHOSPHORUS: CPT | Performed by: STUDENT IN AN ORGANIZED HEALTH CARE EDUCATION/TRAINING PROGRAM

## 2023-02-07 PROCEDURE — 99232 SBSQ HOSP IP/OBS MODERATE 35: CPT | Mod: ,,, | Performed by: STUDENT IN AN ORGANIZED HEALTH CARE EDUCATION/TRAINING PROGRAM

## 2023-02-07 PROCEDURE — 25000003 PHARM REV CODE 250: Performed by: FAMILY MEDICINE

## 2023-02-07 PROCEDURE — 63600175 PHARM REV CODE 636 W HCPCS: Performed by: FAMILY MEDICINE

## 2023-02-07 PROCEDURE — 83735 ASSAY OF MAGNESIUM: CPT | Performed by: STUDENT IN AN ORGANIZED HEALTH CARE EDUCATION/TRAINING PROGRAM

## 2023-02-07 PROCEDURE — 99900035 HC TECH TIME PER 15 MIN (STAT)

## 2023-02-07 PROCEDURE — 36415 COLL VENOUS BLD VENIPUNCTURE: CPT | Performed by: STUDENT IN AN ORGANIZED HEALTH CARE EDUCATION/TRAINING PROGRAM

## 2023-02-07 PROCEDURE — 80053 COMPREHEN METABOLIC PANEL: CPT | Performed by: STUDENT IN AN ORGANIZED HEALTH CARE EDUCATION/TRAINING PROGRAM

## 2023-02-07 RX ADMIN — LEVETIRACETAM 750 MG: 100 SOLUTION ORAL at 08:02

## 2023-02-07 RX ADMIN — CALCIUM CARBONATE (ANTACID) CHEW TAB 500 MG 500 MG: 500 CHEW TAB at 08:02

## 2023-02-07 RX ADMIN — AMPICILLIN 2 G: 2 INJECTION, POWDER, FOR SOLUTION INTRAMUSCULAR; INTRAVENOUS at 08:02

## 2023-02-07 RX ADMIN — AMPICILLIN 2 G: 2 INJECTION, POWDER, FOR SOLUTION INTRAMUSCULAR; INTRAVENOUS at 12:02

## 2023-02-07 RX ADMIN — PREDNISONE 10 MG: 10 TABLET ORAL at 08:02

## 2023-02-07 RX ADMIN — CARVEDILOL 6.25 MG: 6.25 TABLET, FILM COATED ORAL at 08:02

## 2023-02-07 RX ADMIN — BACLOFEN 15 MG: 5 TABLET ORAL at 08:02

## 2023-02-07 RX ADMIN — ESOMEPRAZOLE MAGNESIUM 40 MG: 10 GRANULE, FOR SUSPENSION, EXTENDED RELEASE ORAL at 08:02

## 2023-02-07 RX ADMIN — Medication: at 08:02

## 2023-02-07 RX ADMIN — AMPICILLIN 2 G: 2 INJECTION, POWDER, FOR SOLUTION INTRAMUSCULAR; INTRAVENOUS at 04:02

## 2023-02-07 RX ADMIN — BACLOFEN 15 MG: 5 TABLET ORAL at 03:02

## 2023-02-07 RX ADMIN — INSULIN ASPART 2 UNITS: 100 INJECTION, SOLUTION INTRAVENOUS; SUBCUTANEOUS at 05:02

## 2023-02-07 RX ADMIN — AMPICILLIN 2 G: 2 INJECTION, POWDER, FOR SOLUTION INTRAMUSCULAR; INTRAVENOUS at 06:02

## 2023-02-07 RX ADMIN — NYSTATIN: 100000 POWDER TOPICAL at 08:02

## 2023-02-07 RX ADMIN — NYSTATIN: 100000 POWDER TOPICAL at 09:02

## 2023-02-07 RX ADMIN — ASPIRIN 81 MG CHEWABLE TABLET 81 MG: 81 TABLET CHEWABLE at 08:02

## 2023-02-07 NOTE — RESPIRATORY THERAPY
RAPID RESPONSE RESPIRATORY THERAPY PROACTIVE NOTE           Time of visit: 1036     Code Status: Full Code   : 1960  Bed: 1155/1155 A:   MRN: [id number]  Time spent at the bedside: < 15 min    SITUATION    Evaluated patient for: LDA Check     BACKGROUND    Patient has no past medical history on file.  Clinically Significant Surgical Hx: tracheostomy    24 Hours Vitals Range:  Temp:  [97.2 °F (36.2 °C)-98.7 °F (37.1 °C)]   Pulse:  [87-95]   Resp:  [16-20]   BP: (133-159)/(62-76)   SpO2:  [96 %-100 %]     Labs:    Recent Labs     23  1028 23  0417    138   K 4.1 4.5    101   CO2 27 26   CREATININE 1.0 1.0   * 154*   PHOS 3.2 3.4   MG 2.0 2.0        No results for input(s): PH, PCO2, PO2, HCO3, POCSATURATED, BE in the last 72 hours.    ASSESSMENT/INTERVENTIONS  Patient resting comfortably. No respiratory concerns at this time.      Last VS   Temp: 97.2 °F (36.2 °C) (838)  Pulse: 90 (900)  Resp: 18 (900)  BP: 159/74 (838)  SpO2: 100 % (900)      Extra trachs at bedside: 60XLT Proximal cuffed Shiley; 50XLT Distal cuffed Shiley (50XLT Proximal cuffed Shiley out of stock)  Level of Consciousness: Level of Consciousness (AVPU): alert  Respiratory Effort: Respiratory Effort: Unlabored Expansion/Accessory Muscle Usage: Expansion/Accessory Muscles/Retractions: expansion symmetric  All Lung Field Breath Sounds: All Lung Fields Breath Sounds: clear, diminished  ELSIE Breath Sounds: continuous  O2 Device/Concentration: 5L/28%  Surgical airway: Yes, Type: Shiley Size: 6 XLT, cuffed  Ambu at bedside: Ambu bag with the patient?: Yes, Adult Ambu     Active Orders   Respiratory Care    Inhalation Treatment Q4H PRN     Frequency: Q4H PRN     Number of Occurrences: Until Specified    Oxygen PRN     Frequency: PRN     Number of Occurrences: Until Specified     Order Questions:      Device type: Low flow      Device: Trach Collar      Titrate O2 per Oxygen Titration  Protocol: Yes      To maintain SpO2 goal of: >= 90%      Notify MD of: Inability to achieve desired SpO2; Sudden change in patient status and requires 20% increase in FiO2; Patient requires >60% FiO2    Pulse Oximetry Q4H     Frequency: Q4H     Number of Occurrences: Until Specified    Routine tracheostomy care     Frequency: BID     Number of Occurrences: Until Specified       RECOMMENDATIONS    We recommend: RRT Recs: Continue POC per primary team.      FOLLOW-UP    Please call back the Rapid Response RT, Patricia Khan RRT at x [phone] for any questions or concerns.

## 2023-02-07 NOTE — ASSESSMENT & PLAN NOTE
Patient's FSGs are controlled on current medication regimen.  Last A1c reviewed-   Lab Results   Component Value Date    HGBA1C 5.9 (H) 07/12/2018     Most recent fingerstick glucose reviewed-   Recent Labs   Lab 02/06/23  1526 02/06/23  1941 02/07/23  0839 02/07/23  1139   POCTGLUCOSE 240* 158* 189* 193*     Current correctional scale  Low  Maintain anti-hyperglycemic dose as follows-   Antihyperglycemics (From admission, onward)    Start     Stop Route Frequency Ordered    01/25/23 1511  insulin aspart U-100 pen 0-5 Units         -- SubQ Before meals & nightly PRN 01/25/23 1411        Hold Oral hypoglycemics while patient is in the hospital.  - patient is on insulin glargine 15U daily at the nursing home  - resume when tube feedings are resumed

## 2023-02-07 NOTE — PLAN OF CARE
Problem: Adult Inpatient Plan of Care  Goal: Absence of Hospital-Acquired Illness or Injury  Outcome: Ongoing, Progressing  Goal: Optimal Comfort and Wellbeing  Outcome: Ongoing, Progressing     Problem: Skin Injury Risk Increased  Goal: Skin Health and Integrity  Outcome: Ongoing, Progressing

## 2023-02-07 NOTE — ASSESSMENT & PLAN NOTE
Cont ampicillin will be on ampicillin for 14 days, end date 2/11  ID consulted appreciated   2D echo done no vegetations cont medical management

## 2023-02-07 NOTE — PROGRESS NOTES
[facility] [address] [place] [address] Medicine  Progress Note    Patient Name: Hao Medrano  MRN: [id number]  Patient Class: IP- Inpatient   Admission Date: 1/25/2023  Length of Stay: 12 days  Attending Physician: Divya Galvin MD  Primary Care Provider: Keara Yanes NP        Subjective:     Principal Problem:Dislodged gastrostomy tube        HPI:  Hao Medrano is a 62 y.o. male with a past medical history of CVA with trach and G-tube, CKD, HTN, and HLD who is being palced in observation for further management of dislodged peg tube. Patient presented to the ED from [facility]. Patient is nonverbal, history obtained from chart and ED staff. Per the ED, the nursing home reports they noticed the dislodged ped tube around 0400 this am. Patient was recently admitted on 1/10 for same issue with a new peg placed on 1/12.    ED: vital signs stable, afebrile. Leukocytosis of 23K. Hb 11.4. PT/INR 10/9/1.0. Furhter labs pending at time of admission. CXR with inconclusive findings due to patient position. Blood cultures obtained. GI consulted and plan for EGD with G-tube placement tomorrow.       Overview/Hospital Course:  S/p 20 F PEG tube placed without any immediate complications.   External bumper at 3 cm shelby.  Positive blood Cx with gram positive Cocci  2D echo ordered, unremarkable for vegetation  ID consulted:    Outpatient Antibiotic Therapy Plan:     Please send referral to [facility].     1) Infection: E faecalis bacteremia     2) Discharge Antibiotics:     Intravenous antibiotics:   Ampicillin 2 grams IV q 4 hours (may be given as continuous infusion)        3) Therapy Duration:  14 days     4) Outpatient Weekly Labs:     Order the following labs to be drawn on Mondays:    CBC   CMP      5) Fax Lab Results to Infectious Diseases Provider: Dr. Price     [facility] Fax Number: [phone]     6) Outpatient Infectious Diseases Follow-up       Follow-up appointment will be arranged by the ID clinic and will be found in the patient's appointments tab.      Prior to discharge, please ensure the patient's follow-up has been scheduled.     If there is still no follow-up scheduled prior to discharge, please send an EPIC message to Lauren Stiles in Infectious Diseases.    Patient was admitted for dislodged PEG tube. GI was consulted and he underwent 20 F PEG tube placed without any immediate complications. External bumper at 3 cm shelby. During the course of this admission serial routine labs with mild hyperglycemia, mild anemia, persistence leukocytosis. He was found to have positive blood Culture with enterococcus Faecalis and coagulase negative staph. ID was consulted and was started on IV antibiotic and outpatient antibiotic see recommendation above. Throughout hospital stays, no new acute changes. He will be transferred back to the nursing home and follow up with ID, PCP.      Interval History:   No acute changes, no spike of fever, non verbal, awaiting for placement.    Review of Systems   Unable to perform ROS: Patient nonverbal   Objective:     Vital Signs (Most Recent):  Temp: 98.1 °F (36.7 °C) (02/07/23 1137)  Pulse: 96 (02/07/23 1137)  Resp: 18 (02/07/23 1137)  BP: (!) 142/72 (02/07/23 1137)  SpO2: 100 % (02/07/23 1137) Vital Signs (24h Range):  Temp:  [97.2 °F (36.2 °C)-98.7 °F (37.1 °C)] 98.1 °F (36.7 °C)  Pulse:  [87-96] 96  Resp:  [16-20] 18  SpO2:  [96 %-100 %] 100 %  BP: (133-159)/(62-76) 142/72     Weight: 71.7 kg (158 lb)  Body mass index is 21.43 kg/m².    Intake/Output Summary (Last 24 hours) at 2/7/2023 1325  Last data filed at 2/7/2023 0838  Gross per 24 hour   Intake 1000 ml   Output 650 ml   Net 350 ml        Physical Exam  Vitals and nursing note reviewed.   Constitutional:       General: He is not in acute distress.  HENT:      Head: Normocephalic and atraumatic.      Nose: No congestion.   Eyes:      Extraocular Movements:  Extraocular movements intact.      Pupils: Pupils are equal, round, and reactive to light.   Cardiovascular:      Rate and Rhythm: Normal rate. Rhythm irregular.      Heart sounds:     No friction rub. No gallop.   Pulmonary:      Effort: Pulmonary effort is normal. No respiratory distress.      Breath sounds: No wheezing or rales.      Comments: Trach in place  Abdominal:      General: Bowel sounds are normal. There is no distension.      Tenderness: There is no abdominal tenderness. There is no guarding or rebound.      Comments: PEG in place   Musculoskeletal:         General: Swelling and deformity present.      Cervical back: No rigidity or tenderness.      Right lower leg: No edema.      Left lower leg: No edema.   Skin:     Findings: Erythema and rash present.   Neurological:      Mental Status: He is alert.      Comments: Hemiplegic with B/L lower extremities, atrophy, bed bound       Significant Labs: All pertinent labs within the past 24 hours have been reviewed.  Blood Culture: No results for input(s): LABBLOO in the last 48 hours.  BMP:   Recent Labs   Lab 02/07/23  0417   *      K 4.5      CO2 26   BUN 25*   CREATININE 1.0   CALCIUM 9.5   MG 2.0       CBC:   Recent Labs   Lab 02/07/23 0417   WBC 12.79*   HGB 9.3*   HCT 30.1*   *       CMP:   Recent Labs   Lab 02/07/23 0417      K 4.5      CO2 26   *   BUN 25*   CREATININE 1.0   CALCIUM 9.5   PROT 7.4   ALBUMIN 2.0*   BILITOT 0.2   ALKPHOS 128   AST 18   ALT 19   ANIONGAP 11       Recent Lab Results  (Last 5 results in the past 24 hours)        02/07/23  1139   02/07/23  0839   02/07/23  0417   02/06/23  1941   02/06/23  1526        Albumin     2.0           Alkaline Phosphatase     128           ALT     19           Anion Gap     11           AST     18           Baso #     0.05           Basophil %     0.4           BILIRUBIN TOTAL     0.2  Comment: For infants and newborns, interpretation of results  should be based  on gestational age, weight and in agreement with clinical  observations.    Premature Infant recommended reference ranges:  Up to 24 hours.............<8.0 mg/dL  Up to 48 hours............<12.0 mg/dL  3-5 days..................<15.0 mg/dL  6-29 days.................<15.0 mg/dL             BUN     25           Calcium     9.5           Chloride     101           CO2     26           Creatinine     1.0           Differential Method     Automated           eGFR     >60.0           Eos #     2.0           Eosinophil %     15.9           Glucose     154           Gran # (ANC)     7.9           Gran %     62.1           Hematocrit     30.1           Hemoglobin     9.3           Immature Grans (Abs)     0.03  Comment: Mild elevation in immature granulocytes is non specific and   can be seen in a variety of conditions including stress response,   acute inflammation, trauma and pregnancy. Correlation with other   laboratory and clinical findings is essential.             Immature Granulocytes     0.2           Lymph #     2.0           Lymph %     15.9           Magnesium     2.0           MCH     27.5           MCHC     30.9           MCV     89           Mono #     0.7           Mono %     5.5           MPV     9.8           nRBC     0           Phosphorus     3.4           Platelets     536           POCT Glucose 193   189     158   240       Potassium     4.5           PROTEIN TOTAL     7.4           RBC     3.38           RDW     16.2           Sodium     138           WBC     12.79                                  Significant Imaging: I have reviewed all pertinent imaging results/findings within the past 24 hours.    Imaging Results              X-Ray Chest 1 View (Final result)  Result time 01/25/23 14:28:09      Final result by Elie Manrique MD (01/25/23 14:28:09)                   Impression:      See above      Electronically signed by: Elie Manrique MD  Date:    01/25/2023  Time:    14:28                Narrative:    EXAMINATION:  XR CHEST 1 VIEW    CLINICAL HISTORY:  Elevated white blood cell count, unspecified    TECHNIQUE:  Single frontal view of the chest was performed.    COMPARISON:  None    FINDINGS:  Patient is significantly rotated to the right and the left forearm and hand overlies the left chest.  Therefore the left lung cannot be evaluated.  Repeat examination recommended.  Right lung is clear.  Tracheostomy tube noted.                                          Assessment/Plan:      * Dislodged gastrostomy tube  Oropharyngeal dysphagia    - s/p 20 F PEG tube placed without any immediate complications.   External bumper at 3 cm shelby.-  -resumed feeding tube with Isosource 1.5 nathaniel and dietician consult    Multiple wounds of skin  With scattered lesions with dry skin  Encourage moisturize skin, skin care  Avoid skin debridement, changing position q 2Hrs  Cont Antibiotic ointment at the PEG site      Leukocytosis  - VSSAF  - leukocytosis improving R/O steroids induced  - g-tube site is clean without discharge or erythma  - blood cultures positive for gram + cocci, repeat no growth  - IV ABx with ampicillin    Bacteremia  Cont ampicillin will be on ampicillin for 14 days, end date 2/11  ID consulted appreciated   2D echo done no vegetations cont medical management        On antiepileptic therapy  - continue keppra 750 mg BID once peg tube is replaced      HTN (hypertension)  - BP well controlled upon admission  - continue coreg, increased to 6.25 mg b.i.d. with improvement of BP trend      Type 2 diabetes mellitus, with long-term current use of insulin  Patient's FSGs are controlled on current medication regimen.  Last A1c reviewed-   Lab Results   Component Value Date    HGBA1C 5.9 (H) 07/12/2018     Most recent fingerstick glucose reviewed-   Recent Labs   Lab 02/06/23  1526 02/06/23  1941 02/07/23  0839 02/07/23  1139   POCTGLUCOSE 240* 158* 189* 193*     Current correctional scale  Low  Maintain  anti-hyperglycemic dose as follows-   Antihyperglycemics (From admission, onward)    Start     Stop Route Frequency Ordered    01/25/23 1511  insulin aspart U-100 pen 0-5 Units         -- SubQ Before meals & nightly PRN 01/25/23 1411        Hold Oral hypoglycemics while patient is in the hospital.  - patient is on insulin glargine 15U daily at the nursing home  - resume when tube feedings are resumed    Oropharyngeal dysphagia  -Cont tube feeding      Anemia  - patient's anemia is currently controlled  - etiology likely due to anemia of chronic disease  - current CBC reviewed -   Lab Results   Component Value Date    HGB 9.2 (L) 02/05/2023    HCT 31.0 (L) 02/05/2023     - monitor serial CBC and transfuse if patient becomes hemodynamically unstable, symptomatic, or H/H drops below 7/21.         Chronic kidney disease, stage III (moderate)  - baseline ~1.3  - cont monitor  Cr baseline  Renally dose all medications  Avoid nephrotoxins  Will continue to monitor on daily labs        VTE Risk Mitigation (From admission, onward)         Ordered     Place sequential compression device  Until discontinued         01/25/23 1411     IP VTE HIGH RISK PATIENT  Once         01/25/23 1411     Reason for No Pharmacological VTE Prophylaxis  Once        Question:  Reasons:  Answer:  Risk of Bleeding  Comment:  pre-op    01/25/23 1411                Discharge Planning   FAINA: 2/8/2023     Code Status: Full Code   Is the patient medically ready for discharge?: Yes    Reason for patient still in hospital (select all that apply): Treatment and Pending disposition  Discharge Plan A: Long-term acute care facility (LTAC)   Discharge Delays: None known at this time              Divya Galvin MD  Department of Hospital Medicine   [facility] 11H

## 2023-02-07 NOTE — SUBJECTIVE & OBJECTIVE
Interval History:   No acute changes, no spike of fever, non verbal, awaiting for placement.    Review of Systems   Unable to perform ROS: Patient nonverbal   Objective:     Vital Signs (Most Recent):  Temp: 98.1 °F (36.7 °C) (02/07/23 1137)  Pulse: 96 (02/07/23 1137)  Resp: 18 (02/07/23 1137)  BP: (!) 142/72 (02/07/23 1137)  SpO2: 100 % (02/07/23 1137) Vital Signs (24h Range):  Temp:  [97.2 °F (36.2 °C)-98.7 °F (37.1 °C)] 98.1 °F (36.7 °C)  Pulse:  [87-96] 96  Resp:  [16-20] 18  SpO2:  [96 %-100 %] 100 %  BP: (133-159)/(62-76) 142/72     Weight: 71.7 kg (158 lb)  Body mass index is 21.43 kg/m².    Intake/Output Summary (Last 24 hours) at 2/7/2023 1325  Last data filed at 2/7/2023 0838  Gross per 24 hour   Intake 1000 ml   Output 650 ml   Net 350 ml        Physical Exam  Vitals and nursing note reviewed.   Constitutional:       General: He is not in acute distress.  HENT:      Head: Normocephalic and atraumatic.      Nose: No congestion.   Eyes:      Extraocular Movements: Extraocular movements intact.      Pupils: Pupils are equal, round, and reactive to light.   Cardiovascular:      Rate and Rhythm: Normal rate. Rhythm irregular.      Heart sounds:     No friction rub. No gallop.   Pulmonary:      Effort: Pulmonary effort is normal. No respiratory distress.      Breath sounds: No wheezing or rales.      Comments: Trach in place  Abdominal:      General: Bowel sounds are normal. There is no distension.      Tenderness: There is no abdominal tenderness. There is no guarding or rebound.      Comments: PEG in place   Musculoskeletal:         General: Swelling and deformity present.      Cervical back: No rigidity or tenderness.      Right lower leg: No edema.      Left lower leg: No edema.   Skin:     Findings: Erythema and rash present.   Neurological:      Mental Status: He is alert.      Comments: Hemiplegic with B/L lower extremities, atrophy, bed bound       Significant Labs: All pertinent labs within the past 24  hours have been reviewed.  Blood Culture: No results for input(s): LABBLOO in the last 48 hours.  BMP:   Recent Labs   Lab 02/07/23  0417   *      K 4.5      CO2 26   BUN 25*   CREATININE 1.0   CALCIUM 9.5   MG 2.0       CBC:   Recent Labs   Lab 02/07/23  0417   WBC 12.79*   HGB 9.3*   HCT 30.1*   *       CMP:   Recent Labs   Lab 02/07/23 0417      K 4.5      CO2 26   *   BUN 25*   CREATININE 1.0   CALCIUM 9.5   PROT 7.4   ALBUMIN 2.0*   BILITOT 0.2   ALKPHOS 128   AST 18   ALT 19   ANIONGAP 11       Recent Lab Results  (Last 5 results in the past 24 hours)        02/07/23  1139   02/07/23  0839   02/07/23  0417   02/06/23  1941   02/06/23  1526        Albumin     2.0           Alkaline Phosphatase     128           ALT     19           Anion Gap     11           AST     18           Baso #     0.05           Basophil %     0.4           BILIRUBIN TOTAL     0.2  Comment: For infants and newborns, interpretation of results should be based  on gestational age, weight and in agreement with clinical  observations.    Premature Infant recommended reference ranges:  Up to 24 hours.............<8.0 mg/dL  Up to 48 hours............<12.0 mg/dL  3-5 days..................<15.0 mg/dL  6-29 days.................<15.0 mg/dL             BUN     25           Calcium     9.5           Chloride     101           CO2     26           Creatinine     1.0           Differential Method     Automated           eGFR     >60.0           Eos #     2.0           Eosinophil %     15.9           Glucose     154           Gran # (ANC)     7.9           Gran %     62.1           Hematocrit     30.1           Hemoglobin     9.3           Immature Grans (Abs)     0.03  Comment: Mild elevation in immature granulocytes is non specific and   can be seen in a variety of conditions including stress response,   acute inflammation, trauma and pregnancy. Correlation with other   laboratory and clinical  findings is essential.             Immature Granulocytes     0.2           Lymph #     2.0           Lymph %     15.9           Magnesium     2.0           MCH     27.5           MCHC     30.9           MCV     89           Mono #     0.7           Mono %     5.5           MPV     9.8           nRBC     0           Phosphorus     3.4           Platelets     536           POCT Glucose 193   189     158   240       Potassium     4.5           PROTEIN TOTAL     7.4           RBC     3.38           RDW     16.2           Sodium     138           WBC     12.79                                  Significant Imaging: I have reviewed all pertinent imaging results/findings within the past 24 hours.    Imaging Results              X-Ray Chest 1 View (Final result)  Result time 01/25/23 14:28:09      Final result by Elie Manrique MD (01/25/23 14:28:09)                   Impression:      See above      Electronically signed by: Elie Manrique MD  Date:    01/25/2023  Time:    14:28               Narrative:    EXAMINATION:  XR CHEST 1 VIEW    CLINICAL HISTORY:  Elevated white blood cell count, unspecified    TECHNIQUE:  Single frontal view of the chest was performed.    COMPARISON:  None    FINDINGS:  Patient is significantly rotated to the right and the left forearm and hand overlies the left chest.  Therefore the left lung cannot be evaluated.  Repeat examination recommended.  Right lung is clear.  Tracheostomy tube noted.

## 2023-02-08 LAB
POCT GLUCOSE: 169 MG/DL (ref 70–110)
POCT GLUCOSE: 190 MG/DL (ref 70–110)
POCT GLUCOSE: 254 MG/DL (ref 70–110)

## 2023-02-08 PROCEDURE — 63600175 PHARM REV CODE 636 W HCPCS: Performed by: FAMILY MEDICINE

## 2023-02-08 PROCEDURE — 25000003 PHARM REV CODE 250: Performed by: STUDENT IN AN ORGANIZED HEALTH CARE EDUCATION/TRAINING PROGRAM

## 2023-02-08 PROCEDURE — 99900035 HC TECH TIME PER 15 MIN (STAT)

## 2023-02-08 PROCEDURE — 63600175 PHARM REV CODE 636 W HCPCS: Performed by: STUDENT IN AN ORGANIZED HEALTH CARE EDUCATION/TRAINING PROGRAM

## 2023-02-08 PROCEDURE — 94761 N-INVAS EAR/PLS OXIMETRY MLT: CPT

## 2023-02-08 PROCEDURE — 99900026 HC AIRWAY MAINTENANCE (STAT)

## 2023-02-08 PROCEDURE — 25000003 PHARM REV CODE 250: Performed by: FAMILY MEDICINE

## 2023-02-08 PROCEDURE — 99231 SBSQ HOSP IP/OBS SF/LOW 25: CPT | Mod: ,,, | Performed by: STUDENT IN AN ORGANIZED HEALTH CARE EDUCATION/TRAINING PROGRAM

## 2023-02-08 PROCEDURE — 99231 PR SUBSEQUENT HOSPITAL CARE,LEVL I: ICD-10-PCS | Mod: ,,, | Performed by: STUDENT IN AN ORGANIZED HEALTH CARE EDUCATION/TRAINING PROGRAM

## 2023-02-08 PROCEDURE — 27000221 HC OXYGEN, UP TO 24 HOURS

## 2023-02-08 PROCEDURE — 11000001 HC ACUTE MED/SURG PRIVATE ROOM

## 2023-02-08 RX ORDER — DIPHENHYDRAMINE HYDROCHLORIDE 50 MG/ML
12.5 INJECTION INTRAMUSCULAR; INTRAVENOUS EVERY 6 HOURS PRN
Status: DISCONTINUED | OUTPATIENT
Start: 2023-02-08 | End: 2023-02-09

## 2023-02-08 RX ADMIN — Medication: at 08:02

## 2023-02-08 RX ADMIN — DIPHENHYDRAMINE HYDROCHLORIDE 12.5 MG: 50 INJECTION, SOLUTION INTRAMUSCULAR; INTRAVENOUS at 08:02

## 2023-02-08 RX ADMIN — AMPICILLIN 2 G: 2 INJECTION, POWDER, FOR SOLUTION INTRAMUSCULAR; INTRAVENOUS at 05:02

## 2023-02-08 RX ADMIN — LEVETIRACETAM 750 MG: 100 SOLUTION ORAL at 09:02

## 2023-02-08 RX ADMIN — NYSTATIN 1 UNITS: 100000 POWDER TOPICAL at 09:02

## 2023-02-08 RX ADMIN — NYSTATIN: 100000 POWDER TOPICAL at 08:02

## 2023-02-08 RX ADMIN — BACLOFEN 15 MG: 5 TABLET ORAL at 03:02

## 2023-02-08 RX ADMIN — LEVETIRACETAM 750 MG: 100 SOLUTION ORAL at 08:02

## 2023-02-08 RX ADMIN — PREDNISONE 10 MG: 10 TABLET ORAL at 08:02

## 2023-02-08 RX ADMIN — AMPICILLIN 2 G: 2 INJECTION, POWDER, FOR SOLUTION INTRAMUSCULAR; INTRAVENOUS at 12:02

## 2023-02-08 RX ADMIN — CALCIUM CARBONATE (ANTACID) CHEW TAB 500 MG 500 MG: 500 CHEW TAB at 08:02

## 2023-02-08 RX ADMIN — BACLOFEN 15 MG: 5 TABLET ORAL at 08:02

## 2023-02-08 RX ADMIN — ESOMEPRAZOLE MAGNESIUM 40 MG: 10 GRANULE, FOR SUSPENSION, EXTENDED RELEASE ORAL at 08:02

## 2023-02-08 RX ADMIN — CARVEDILOL 6.25 MG: 6.25 TABLET, FILM COATED ORAL at 08:02

## 2023-02-08 RX ADMIN — AMPICILLIN 2 G: 2 INJECTION, POWDER, FOR SOLUTION INTRAMUSCULAR; INTRAVENOUS at 08:02

## 2023-02-08 RX ADMIN — ASPIRIN 81 MG CHEWABLE TABLET 81 MG: 81 TABLET CHEWABLE at 09:02

## 2023-02-08 RX ADMIN — AMPICILLIN 2 G: 2 INJECTION, POWDER, FOR SOLUTION INTRAMUSCULAR; INTRAVENOUS at 04:02

## 2023-02-08 NOTE — RESPIRATORY THERAPY
RAPID RESPONSE RESPIRATORY THERAPY PROACTIVE NOTE           Time of visit: 926     Code Status: Full Code   : 1960  Bed: 1155/1155 A:   MRN: [id number]  Time spent at the bedside: < 15 min    SITUATION    Evaluated patient for: LDA Check     BACKGROUND    Patient has no past medical history on file.  Clinically Significant Surgical Hx: tracheostomy    24 Hours Vitals Range:  Temp:  [97.7 °F (36.5 °C)-99.2 °F (37.3 °C)]   Pulse:  []   Resp:  [16-19]   BP: (110-142)/(56-72)   SpO2:  [96 %-100 %]     Labs:    Recent Labs     23  0417      K 4.5      CO2 26   CREATININE 1.0   *   PHOS 3.4   MG 2.0        No results for input(s): PH, PCO2, PO2, HCO3, POCSATURATED, BE in the last 72 hours.    ASSESSMENT/INTERVENTIONS  Pt resting in bed, no s/s of distress on exam, all supplies at bedside.      Last VS   Temp: 98 °F (36.7 °C) (859)  Pulse: 100 (859)  Resp: 17 (859)  BP: 133/71 (859)  SpO2: 100 % (859)      Extra trachs at bedside: 6.0 & 5.0 Shiley XLT cuffed  Level of Consciousness: Level of Consciousness (AVPU): alert  Respiratory Effort: Respiratory Effort: Unlabored Expansion/Accessory Muscle Usage: Expansion/Accessory Muscles/Retractions: expansion symmetric  All Lung Field Breath Sounds: All Lung Fields Breath Sounds: Anterior:, Posterior:, Lateral:, diminished, coarse  ELSIE Breath Sounds: continuous  O2 Device/Concentration: trach collar 5L/28%  Surgical airway: Yes, Type: Shiley Size: 6, cuffed  Ambu at bedside: Ambu bag with the patient?: Yes, Adult Ambu     Active Orders   Respiratory Care    Inhalation Treatment Q4H PRN     Frequency: Q4H PRN     Number of Occurrences: Until Specified    Oxygen PRN     Frequency: PRN     Number of Occurrences: Until Specified     Order Questions:      Device type: Low flow      Device: Trach Collar      Titrate O2 per Oxygen Titration Protocol: Yes      To maintain SpO2 goal of: >= 90%      Notify MD of:  Inability to achieve desired SpO2; Sudden change in patient status and requires 20% increase in FiO2; Patient requires >60% FiO2    Pulse Oximetry Q4H     Frequency: Q4H     Number of Occurrences: Until Specified    Routine tracheostomy care     Frequency: BID     Number of Occurrences: Until Specified       RECOMMENDATIONS    We recommend: RRT Recs: Continue POC per primary team.      FOLLOW-UP    Please call back the Rapid Response RT, Stef Benites, RRT at x [phone] for any questions or concerns.

## 2023-02-08 NOTE — SUBJECTIVE & OBJECTIVE
Interval History:   No acute changes, no spike of fever, non verbal, awaiting for placement.    Review of Systems   Unable to perform ROS: Patient nonverbal   Objective:     Vital Signs (Most Recent):  Temp: 98.6 °F (37 °C) (02/08/23 1312)  Pulse: 87 (02/08/23 1312)  Resp: 17 (02/08/23 1312)  BP: 128/72 (02/08/23 1312)  SpO2: 100 % (02/08/23 1312) Vital Signs (24h Range):  Temp:  [97.7 °F (36.5 °C)-99.2 °F (37.3 °C)] 98.6 °F (37 °C)  Pulse:  [] 87  Resp:  [16-19] 17  SpO2:  [96 %-100 %] 100 %  BP: (110-138)/(56-72) 128/72     Weight: 71.7 kg (158 lb)  Body mass index is 21.43 kg/m².    Intake/Output Summary (Last 24 hours) at 2/8/2023 1417  Last data filed at 2/8/2023 0439  Gross per 24 hour   Intake 2350 ml   Output 815 ml   Net 1535 ml        Physical Exam  Vitals and nursing note reviewed.   Constitutional:       General: He is not in acute distress.  HENT:      Head: Normocephalic and atraumatic.      Nose: No congestion.   Eyes:      Extraocular Movements: Extraocular movements intact.      Pupils: Pupils are equal, round, and reactive to light.   Cardiovascular:      Rate and Rhythm: Normal rate. Rhythm irregular.      Heart sounds:     No friction rub. No gallop.   Pulmonary:      Effort: Pulmonary effort is normal. No respiratory distress.      Breath sounds: No wheezing or rales.      Comments: Trach in place  Abdominal:      General: Bowel sounds are normal. There is no distension.      Tenderness: There is no abdominal tenderness. There is no guarding or rebound.      Comments: PEG in place   Musculoskeletal:         General: Swelling and deformity present.      Cervical back: No rigidity or tenderness.      Right lower leg: No edema.      Left lower leg: No edema.   Skin:     Findings: Erythema and rash present.   Neurological:      Mental Status: He is alert.      Comments: Hemiplegic with B/L lower extremities, atrophy, bed bound       Significant Labs: All pertinent labs within the past 24 hours  have been reviewed.  Blood Culture: No results for input(s): LABBLOO in the last 48 hours.  BMP:   Recent Labs   Lab 02/07/23  0417   *      K 4.5      CO2 26   BUN 25*   CREATININE 1.0   CALCIUM 9.5   MG 2.0       CBC:   Recent Labs   Lab 02/07/23  0417   WBC 12.79*   HGB 9.3*   HCT 30.1*   *       CMP:   Recent Labs   Lab 02/07/23  0417      K 4.5      CO2 26   *   BUN 25*   CREATININE 1.0   CALCIUM 9.5   PROT 7.4   ALBUMIN 2.0*   BILITOT 0.2   ALKPHOS 128   AST 18   ALT 19   ANIONGAP 11       Recent Lab Results         02/08/23  1311   02/08/23  0858   02/07/23  2039   02/07/23  1627        POCT Glucose 254   190   139   228               Significant Imaging: I have reviewed all pertinent imaging results/findings within the past 24 hours.    Imaging Results              X-Ray Chest 1 View (Final result)  Result time 01/25/23 14:28:09      Final result by Elie Manrique MD (01/25/23 14:28:09)                   Impression:      See above      Electronically signed by: Elie Manrique MD  Date:    01/25/2023  Time:    14:28               Narrative:    EXAMINATION:  XR CHEST 1 VIEW    CLINICAL HISTORY:  Elevated white blood cell count, unspecified    TECHNIQUE:  Single frontal view of the chest was performed.    COMPARISON:  None    FINDINGS:  Patient is significantly rotated to the right and the left forearm and hand overlies the left chest.  Therefore the left lung cannot be evaluated.  Repeat examination recommended.  Right lung is clear.  Tracheostomy tube noted.

## 2023-02-08 NOTE — PLAN OF CARE
[facility]: Pt finishing his IV ABX and will be returning. Left VM with Patricia in admissions asking if they accept a Saturday return.   Will continue to update plan as needed.  Elie Burgos RN,BSN

## 2023-02-08 NOTE — PROGRESS NOTES
[facility] [address] [place] [address] Medicine  Progress Note    Patient Name: Hao Medrano  MRN: [id number]  Patient Class: IP- Inpatient   Admission Date: 1/25/2023  Length of Stay: 13 days  Attending Physician: Divya Galvin MD  Primary Care Provider: Keara Yanes NP        Subjective:     Principal Problem:Dislodged gastrostomy tube        HPI:  Hao Medrano is a 62 y.o. male with a past medical history of CVA with trach and G-tube, CKD, HTN, and HLD who is being palced in observation for further management of dislodged peg tube. Patient presented to the ED from [facility] (formerly [facility]). Patient is nonverbal, history obtained from chart and ED staff. Per the ED, the nursing home reports they noticed the dislodged ped tube around 0400 this am. Patient was recently admitted on 1/10 for same issue with a new peg placed on 1/12.    ED: vital signs stable, afebrile. Leukocytosis of 23K. Hb 11.4. PT/INR 10/9/1.0. Furhter labs pending at time of admission. CXR with inconclusive findings due to patient position. Blood cultures obtained. GI consulted and plan for EGD with G-tube placement tomorrow.       Overview/Hospital Course:  Patient was admitted for dislodged PEG tube. GI was consulted and he underwent 20 F PEG tube placed without any immediate complications. External bumper at 3 cm shelby. During the course of this admission serial routine labs with mild hyperglycemia, mild anemia, persistence leukocytosis. He was found to have positive blood Culture with enterococcus Faecalis and coagulase negative staph. ID was consulted and was started on IV antibiotic and outpatient antibiotic see recommendation below. Throughout hospital stays, no new acute changes. He will be transferred back to the nursing home and follow up with ID, PCP.      S/p 20 F PEG tube placed without any immediate complications.   External bumper at 3 cm shelby.  Positive blood Cx with gram positive Cocci  2D echo ordered, unremarkable  for vegetation  ID consulted:    Outpatient Antibiotic Therapy Plan:     Please send referral to [facility].     1) Infection: E faecalis bacteremia     2) Discharge Antibiotics:     Intravenous antibiotics:   Ampicillin 2 grams IV q 4 hours (may be given as continuous infusion)        3) Therapy Duration:  14 days     4) Outpatient Weekly Labs:     Order the following labs to be drawn on Mondays:    CBC   CMP      5) Fax Lab Results to Infectious Diseases Provider: Dr. Price     [facility] Fax Number: [phone]     6) Outpatient Infectious Diseases Follow-up      Follow-up appointment will be arranged by the ID clinic and will be found in the patient's appointments tab.      Prior to discharge, please ensure the patient's follow-up has been scheduled.     If there is still no follow-up scheduled prior to discharge, please send an EPIC message to Lauren Stiles in Infectious Diseases.          Interval History:   No acute changes, no spike of fever, non verbal, awaiting for placement.    Review of Systems   Unable to perform ROS: Patient nonverbal   Objective:     Vital Signs (Most Recent):  Temp: 98.6 °F (37 °C) (02/08/23 1312)  Pulse: 87 (02/08/23 1312)  Resp: 17 (02/08/23 1312)  BP: 128/72 (02/08/23 1312)  SpO2: 100 % (02/08/23 1312) Vital Signs (24h Range):  Temp:  [97.7 °F (36.5 °C)-99.2 °F (37.3 °C)] 98.6 °F (37 °C)  Pulse:  [] 87  Resp:  [16-19] 17  SpO2:  [96 %-100 %] 100 %  BP: (110-138)/(56-72) 128/72     Weight: 71.7 kg (158 lb)  Body mass index is 21.43 kg/m².    Intake/Output Summary (Last 24 hours) at 2/8/2023 1417  Last data filed at 2/8/2023 0439  Gross per 24 hour   Intake 2350 ml   Output 815 ml   Net 1535 ml        Physical Exam  Vitals and nursing note reviewed.   Constitutional:       General: He is not in acute distress.  HENT:      Head: Normocephalic and atraumatic.      Nose: No congestion.   Eyes:      Extraocular Movements: Extraocular  movements intact.      Pupils: Pupils are equal, round, and reactive to light.   Cardiovascular:      Rate and Rhythm: Normal rate. Rhythm irregular.      Heart sounds:     No friction rub. No gallop.   Pulmonary:      Effort: Pulmonary effort is normal. No respiratory distress.      Breath sounds: No wheezing or rales.      Comments: Trach in place  Abdominal:      General: Bowel sounds are normal. There is no distension.      Tenderness: There is no abdominal tenderness. There is no guarding or rebound.      Comments: PEG in place   Musculoskeletal:         General: Swelling and deformity present.      Cervical back: No rigidity or tenderness.      Right lower leg: No edema.      Left lower leg: No edema.   Skin:     Findings: Erythema and rash present.   Neurological:      Mental Status: He is alert.      Comments: Hemiplegic with B/L lower extremities, atrophy, bed bound       Significant Labs: All pertinent labs within the past 24 hours have been reviewed.  Blood Culture: No results for input(s): LABBLOO in the last 48 hours.  BMP:   Recent Labs   Lab 02/07/23  0417   *      K 4.5      CO2 26   BUN 25*   CREATININE 1.0   CALCIUM 9.5   MG 2.0       CBC:   Recent Labs   Lab 02/07/23  0417   WBC 12.79*   HGB 9.3*   HCT 30.1*   *       CMP:   Recent Labs   Lab 02/07/23  0417      K 4.5      CO2 26   *   BUN 25*   CREATININE 1.0   CALCIUM 9.5   PROT 7.4   ALBUMIN 2.0*   BILITOT 0.2   ALKPHOS 128   AST 18   ALT 19   ANIONGAP 11       Recent Lab Results         02/08/23  1311   02/08/23  0858   02/07/23  2039   02/07/23  1627        POCT Glucose 254   190   139   228               Significant Imaging: I have reviewed all pertinent imaging results/findings within the past 24 hours.    Imaging Results              X-Ray Chest 1 View (Final result)  Result time 01/25/23 14:28:09      Final result by Elie Manrique MD (01/25/23 14:28:09)                   Impression:      See  above      Electronically signed by: Elie Manrique MD  Date:    01/25/2023  Time:    14:28               Narrative:    EXAMINATION:  XR CHEST 1 VIEW    CLINICAL HISTORY:  Elevated white blood cell count, unspecified    TECHNIQUE:  Single frontal view of the chest was performed.    COMPARISON:  None    FINDINGS:  Patient is significantly rotated to the right and the left forearm and hand overlies the left chest.  Therefore the left lung cannot be evaluated.  Repeat examination recommended.  Right lung is clear.  Tracheostomy tube noted.                                          Assessment/Plan:      * Dislodged gastrostomy tube  Oropharyngeal dysphagia    - s/p 20 F PEG tube placed without any immediate complications.   External bumper at 3 cm shelby.-  -resumed feeding tube with Isosource 1.5 nathaniel and dietician consult    Multiple wounds of skin  With scattered lesions with dry skin  Encourage moisturize skin, skin care  Avoid skin debridement, changing position q 2Hrs  Cont Antibiotic ointment at the PEG site      Leukocytosis  - VSSAF  - leukocytosis improving R/O steroids induced  - g-tube site is clean without discharge or erythma  - blood cultures positive for gram + cocci, repeat no growth  - IV ABx with ampicillin    Bacteremia  Cont ampicillin will be on ampicillin for 14 days, end date 2/11  ID consulted appreciated   2D echo done no vegetations cont medical management        On antiepileptic therapy  - continue keppra 750 mg BID once peg tube is replaced      HTN (hypertension)  - BP well controlled upon admission  - continue coreg, increased to 6.25 mg b.i.d. with improvement of BP trend      Type 2 diabetes mellitus, with long-term current use of insulin  Patient's FSGs are controlled on current medication regimen.  Last A1c reviewed-   Lab Results   Component Value Date    HGBA1C 5.9 (H) 07/12/2018     Most recent fingerstick glucose reviewed-   Recent Labs   Lab 02/07/23  1627 02/07/23  2039 02/08/23  0858  02/08/23  1311   POCTGLUCOSE 228* 139* 190* 254*     Current correctional scale  Low  Maintain anti-hyperglycemic dose as follows-   Antihyperglycemics (From admission, onward)    Start     Stop Route Frequency Ordered    01/25/23 1511  insulin aspart U-100 pen 0-5 Units         -- SubQ Before meals & nightly PRN 01/25/23 1411        Hold Oral hypoglycemics while patient is in the hospital.  - patient is on insulin glargine 15U daily at the nursing home  - resume when tube feedings are resumed    Oropharyngeal dysphagia  -Cont tube feeding      Anemia  - patient's anemia is currently controlled  - etiology likely due to anemia of chronic disease  - current CBC reviewed -   Lab Results   Component Value Date    HGB 9.2 (L) 02/05/2023    HCT 31.0 (L) 02/05/2023     - monitor serial CBC and transfuse if patient becomes hemodynamically unstable, symptomatic, or H/H drops below 7/21.         Chronic kidney disease, stage III (moderate)  - baseline ~1.3  - cont monitor  Cr baseline  Renally dose all medications  Avoid nephrotoxins  Will continue to monitor on daily labs        VTE Risk Mitigation (From admission, onward)         Ordered     Place sequential compression device  Until discontinued         01/25/23 1411     IP VTE HIGH RISK PATIENT  Once         01/25/23 1411     Reason for No Pharmacological VTE Prophylaxis  Once        Question:  Reasons:  Answer:  Risk of Bleeding  Comment:  pre-op    01/25/23 1411                Discharge Planning   FAINA: 2/11/2023     Code Status: Full Code   Is the patient medically ready for discharge?: No    Reason for patient still in hospital (select all that apply): Treatment and Pending disposition  Discharge Plan A: Long-term acute care facility (LTAC)   Discharge Delays: None known at this time              Divya Galvin MD  Department of Hospital Medicine   [facility] 11H

## 2023-02-08 NOTE — ASSESSMENT & PLAN NOTE
Patient's FSGs are controlled on current medication regimen.  Last A1c reviewed-   Lab Results   Component Value Date    HGBA1C 5.9 (H) 07/12/2018     Most recent fingerstick glucose reviewed-   Recent Labs   Lab 02/07/23  1627 02/07/23  2039 02/08/23  0858 02/08/23  1311   POCTGLUCOSE 228* 139* 190* 254*     Current correctional scale  Low  Maintain anti-hyperglycemic dose as follows-   Antihyperglycemics (From admission, onward)    Start     Stop Route Frequency Ordered    01/25/23 1511  insulin aspart U-100 pen 0-5 Units         -- SubQ Before meals & nightly PRN 01/25/23 1411        Hold Oral hypoglycemics while patient is in the hospital.  - patient is on insulin glargine 15U daily at the nursing home  - resume when tube feedings are resumed

## 2023-02-09 LAB
ALBUMIN SERPL BCP-MCNC: 2 G/DL (ref 3.5–5.2)
ALP SERPL-CCNC: 128 U/L (ref 55–135)
ALT SERPL W/O P-5'-P-CCNC: 17 U/L (ref 10–44)
ANION GAP SERPL CALC-SCNC: 13 MMOL/L (ref 8–16)
AST SERPL-CCNC: 16 U/L (ref 10–40)
BASOPHILS # BLD AUTO: 0.06 K/UL (ref 0–0.2)
BASOPHILS NFR BLD: 0.5 % (ref 0–1.9)
BILIRUB SERPL-MCNC: 0.2 MG/DL (ref 0.1–1)
BUN SERPL-MCNC: 25 MG/DL (ref 8–23)
CALCIUM SERPL-MCNC: 9.4 MG/DL (ref 8.7–10.5)
CHLORIDE SERPL-SCNC: 103 MMOL/L (ref 95–110)
CO2 SERPL-SCNC: 21 MMOL/L (ref 23–29)
CREAT SERPL-MCNC: 1 MG/DL (ref 0.5–1.4)
DIFFERENTIAL METHOD: ABNORMAL
EOSINOPHIL # BLD AUTO: 1.7 K/UL (ref 0–0.5)
EOSINOPHIL NFR BLD: 14.1 % (ref 0–8)
ERYTHROCYTE [DISTWIDTH] IN BLOOD BY AUTOMATED COUNT: 16.3 % (ref 11.5–14.5)
EST. GFR  (NO RACE VARIABLE): >60 ML/MIN/1.73 M^2
GLUCOSE SERPL-MCNC: 139 MG/DL (ref 70–110)
HCT VFR BLD AUTO: 33.3 % (ref 40–54)
HCT VFR BLD AUTO: 34.5 % (ref 40–54)
HGB BLD-MCNC: 10.7 G/DL (ref 14–18)
HGB BLD-MCNC: 9.9 G/DL (ref 14–18)
IMM GRANULOCYTES # BLD AUTO: 0.07 K/UL (ref 0–0.04)
IMM GRANULOCYTES NFR BLD AUTO: 0.6 % (ref 0–0.5)
LYMPHOCYTES # BLD AUTO: 2.1 K/UL (ref 1–4.8)
LYMPHOCYTES NFR BLD: 17 % (ref 18–48)
MAGNESIUM SERPL-MCNC: 2 MG/DL (ref 1.6–2.6)
MCH RBC QN AUTO: 27.6 PG (ref 27–31)
MCHC RBC AUTO-ENTMCNC: 31 G/DL (ref 32–36)
MCV RBC AUTO: 89 FL (ref 82–98)
MONOCYTES # BLD AUTO: 0.7 K/UL (ref 0.3–1)
MONOCYTES NFR BLD: 5.9 % (ref 4–15)
NEUTROPHILS # BLD AUTO: 7.5 K/UL (ref 1.8–7.7)
NEUTROPHILS NFR BLD: 61.9 % (ref 38–73)
NRBC BLD-RTO: 0 /100 WBC
PHOSPHATE SERPL-MCNC: 3.1 MG/DL (ref 2.7–4.5)
PLATELET # BLD AUTO: 458 K/UL (ref 150–450)
PMV BLD AUTO: 10 FL (ref 9.2–12.9)
POCT GLUCOSE: 168 MG/DL (ref 70–110)
POCT GLUCOSE: 202 MG/DL (ref 70–110)
POCT GLUCOSE: 223 MG/DL (ref 70–110)
POCT GLUCOSE: 229 MG/DL (ref 70–110)
POTASSIUM SERPL-SCNC: 4.5 MMOL/L (ref 3.5–5.1)
PROT SERPL-MCNC: 7.6 G/DL (ref 6–8.4)
RBC # BLD AUTO: 3.87 M/UL (ref 4.6–6.2)
SODIUM SERPL-SCNC: 137 MMOL/L (ref 136–145)
WBC # BLD AUTO: 12.11 K/UL (ref 3.9–12.7)

## 2023-02-09 PROCEDURE — 36415 COLL VENOUS BLD VENIPUNCTURE: CPT | Performed by: FAMILY MEDICINE

## 2023-02-09 PROCEDURE — 25000003 PHARM REV CODE 250: Performed by: FAMILY MEDICINE

## 2023-02-09 PROCEDURE — 83735 ASSAY OF MAGNESIUM: CPT | Performed by: STUDENT IN AN ORGANIZED HEALTH CARE EDUCATION/TRAINING PROGRAM

## 2023-02-09 PROCEDURE — 63600175 PHARM REV CODE 636 W HCPCS: Performed by: STUDENT IN AN ORGANIZED HEALTH CARE EDUCATION/TRAINING PROGRAM

## 2023-02-09 PROCEDURE — 80053 COMPREHEN METABOLIC PANEL: CPT | Performed by: STUDENT IN AN ORGANIZED HEALTH CARE EDUCATION/TRAINING PROGRAM

## 2023-02-09 PROCEDURE — 99900026 HC AIRWAY MAINTENANCE (STAT)

## 2023-02-09 PROCEDURE — 94761 N-INVAS EAR/PLS OXIMETRY MLT: CPT

## 2023-02-09 PROCEDURE — 25000003 PHARM REV CODE 250: Performed by: STUDENT IN AN ORGANIZED HEALTH CARE EDUCATION/TRAINING PROGRAM

## 2023-02-09 PROCEDURE — 85014 HEMATOCRIT: CPT | Performed by: FAMILY MEDICINE

## 2023-02-09 PROCEDURE — 99900035 HC TECH TIME PER 15 MIN (STAT)

## 2023-02-09 PROCEDURE — 63600175 PHARM REV CODE 636 W HCPCS: Performed by: FAMILY MEDICINE

## 2023-02-09 PROCEDURE — 84100 ASSAY OF PHOSPHORUS: CPT | Performed by: STUDENT IN AN ORGANIZED HEALTH CARE EDUCATION/TRAINING PROGRAM

## 2023-02-09 PROCEDURE — 36415 COLL VENOUS BLD VENIPUNCTURE: CPT | Performed by: STUDENT IN AN ORGANIZED HEALTH CARE EDUCATION/TRAINING PROGRAM

## 2023-02-09 PROCEDURE — 85025 COMPLETE CBC W/AUTO DIFF WBC: CPT | Performed by: STUDENT IN AN ORGANIZED HEALTH CARE EDUCATION/TRAINING PROGRAM

## 2023-02-09 PROCEDURE — 11000001 HC ACUTE MED/SURG PRIVATE ROOM

## 2023-02-09 PROCEDURE — 85018 HEMOGLOBIN: CPT | Performed by: FAMILY MEDICINE

## 2023-02-09 PROCEDURE — 27200966 HC CLOSED SUCTION SYSTEM

## 2023-02-09 PROCEDURE — 27000221 HC OXYGEN, UP TO 24 HOURS

## 2023-02-09 PROCEDURE — 99233 SBSQ HOSP IP/OBS HIGH 50: CPT | Mod: ,,, | Performed by: FAMILY MEDICINE

## 2023-02-09 PROCEDURE — 99233 PR SUBSEQUENT HOSPITAL CARE,LEVL III: ICD-10-PCS | Mod: ,,, | Performed by: FAMILY MEDICINE

## 2023-02-09 RX ORDER — HYDROXYZINE HYDROCHLORIDE 10 MG/5ML
25 SYRUP ORAL 2 TIMES DAILY
Status: DISCONTINUED | OUTPATIENT
Start: 2023-02-09 | End: 2023-02-12 | Stop reason: HOSPADM

## 2023-02-09 RX ADMIN — AMPICILLIN 2 G: 2 INJECTION, POWDER, FOR SOLUTION INTRAMUSCULAR; INTRAVENOUS at 01:02

## 2023-02-09 RX ADMIN — BACLOFEN 15 MG: 5 TABLET ORAL at 09:02

## 2023-02-09 RX ADMIN — PREDNISONE 10 MG: 10 TABLET ORAL at 09:02

## 2023-02-09 RX ADMIN — HYDROXYZINE HYDROCHLORIDE 25 MG: 10 SOLUTION ORAL at 03:02

## 2023-02-09 RX ADMIN — AMPICILLIN 2 G: 2 INJECTION, POWDER, FOR SOLUTION INTRAMUSCULAR; INTRAVENOUS at 09:02

## 2023-02-09 RX ADMIN — INSULIN ASPART 2 UNITS: 100 INJECTION, SOLUTION INTRAVENOUS; SUBCUTANEOUS at 12:02

## 2023-02-09 RX ADMIN — INSULIN DETEMIR 10 UNITS: 100 INJECTION, SOLUTION SUBCUTANEOUS at 10:02

## 2023-02-09 RX ADMIN — CALCIUM CARBONATE (ANTACID) CHEW TAB 500 MG 500 MG: 500 CHEW TAB at 09:02

## 2023-02-09 RX ADMIN — BACLOFEN 15 MG: 5 TABLET ORAL at 10:02

## 2023-02-09 RX ADMIN — INSULIN ASPART 2 UNITS: 100 INJECTION, SOLUTION INTRAVENOUS; SUBCUTANEOUS at 09:02

## 2023-02-09 RX ADMIN — CARVEDILOL 6.25 MG: 6.25 TABLET, FILM COATED ORAL at 09:02

## 2023-02-09 RX ADMIN — AMPICILLIN 2 G: 2 INJECTION, POWDER, FOR SOLUTION INTRAMUSCULAR; INTRAVENOUS at 05:02

## 2023-02-09 RX ADMIN — HYDROXYZINE HYDROCHLORIDE 25 MG: 10 SOLUTION ORAL at 09:02

## 2023-02-09 RX ADMIN — AMPICILLIN 2 G: 2 INJECTION, POWDER, FOR SOLUTION INTRAMUSCULAR; INTRAVENOUS at 04:02

## 2023-02-09 RX ADMIN — BACLOFEN 15 MG: 5 TABLET ORAL at 03:02

## 2023-02-09 RX ADMIN — NYSTATIN: 100000 POWDER TOPICAL at 09:02

## 2023-02-09 RX ADMIN — DIPHENHYDRAMINE HYDROCHLORIDE 12.5 MG: 50 INJECTION, SOLUTION INTRAMUSCULAR; INTRAVENOUS at 03:02

## 2023-02-09 RX ADMIN — LEVETIRACETAM 750 MG: 100 SOLUTION ORAL at 09:02

## 2023-02-09 RX ADMIN — Medication: at 09:02

## 2023-02-09 NOTE — PROGRESS NOTES
Patient seen for wound care consultation for bleeding sacral wounds.   Reviewed chart for this encounter.   See Flow Sheet for findings.    Pt known to our service, wound care re-consulted as pt had a large amount of bleeding from sacral wounds requiring multiple al pad and dressing exchanges. Pt non-verbal, primary RN at bedside to assist w/ turning and general information. Bedside RN states that pt had bloody hands and has been scratching at his skin/wounds - clear scratch marks to R anterior thigh. Cleansed sacrum w/ Coloplast wipes and applied Mepilex Ag to larger/bleeding wounds to assist w/ drainage management, applied Triad to rest of sacrum, upper thighs. Waffle mattress ordered, extra dressing supplies and coloplast wipes left at bedside.    RECOMMENDATIONS  Keep pt clean and dry, no diapers.  Mepilex Ag q3 days/PRN to bleeding wounds  Triad BID/PRN to remaining sacral/thigh wounds  Utilize coloplast wipes to clean pt for incidences of incontinence, remove triad  Bedside RN to apply waffle mattress on arrival.     Discussed POC with patient and primary RN.   See EMR for orders & patient education    Nursing to continue care. Wound care to follow.         02/09/23 1339   WOCN Assessment   WOCN Total Time (mins) 45   Visit Date 02/09/23   Visit Time 1339   Consult Type New   WOCN Speciality Wound   Intervention assessed;applied;orders;team conference;chart review   Teaching on-going        Altered Skin Integrity 01/27/23 1215 Right Buttocks   Date First Assessed/Time First Assessed: 01/27/23 1215   Altered Skin Integrity Present on Admission: yes  Side: Right  Location: Buttocks   Wound Image    Description of Altered Skin Integrity Partial thickness tissue loss. Shallow open ulcer with a red or pink wound bed, without slough. Intact or Open/Ruptured Serum-filled blister.   Dressing Appearance Moist drainage   Drainage Amount Moderate   Drainage Characteristics/Odor Sanguineous   Appearance Pink;Red;Moist    Care Cleansed with:;Soap and water   Dressing Applied;Other (comment)  (Mepilex Ag)

## 2023-02-09 NOTE — RESPIRATORY THERAPY
[facility] RESPIRATORY THERAPY PROACTIVE NOTE           Time of visit: 906     Code Status: Full Code   : 1960  Bed: 1155/1155 A:   MRN: [id number]  Time spent at the bedside: < 15 min    SITUATION    Evaluated patient for: LDA Check     BACKGROUND    Patient has no past medical history on file.  Clinically Significant Surgical Hx: tracheostomy    24 Hours Vitals Range:  Temp:  [98 °F (36.7 °C)-98.8 °F (37.1 °C)]   Pulse:  []   Resp:  [17-20]   BP: (109-159)/(60-81)   SpO2:  [95 %-100 %]     Labs:    Recent Labs     23  0417 23  0326    137   K 4.5 4.5    103   CO2 26 21*   CREATININE 1.0 1.0   * 139*   PHOS 3.4 3.1   MG 2.0 2.0        No results for input(s): PH, PCO2, PO2, HCO3, POCSATURATED, BE in the last 72 hours.    ASSESSMENT/INTERVENTIONS  Patient resting comfortably. No respiratory  concerns at this time.      Last VS   Temp: 98.5 °F (36.9 °C) (1125)  Pulse: 103 (1125)  Resp: 17 (1125)  BP: 138/75 (1125)  SpO2: 100 % (1125)      Extra trachs at bedside: 60XLT proximal cuffed Shiley, 50XLT distal cuffed Shiley (proximal out of stock)  Level of Consciousness: Level of Consciousness (AVPU): alert  Respiratory Effort: Respiratory Effort: Unlabored Expansion/Accessory Muscle Usage: Expansion/Accessory Muscles/Retractions: no use of accessory muscles  All Lung Field Breath Sounds: All Lung Fields Breath Sounds: Anterior:, Lateral:, coarse  ELSIE Breath Sounds: coarse  LLL Breath Sounds: coarse  RUL Breath Sounds: coarse  RML Breath Sounds: coarse  RLL Breath Sounds: coarse  O2 Device/Concentration: 5L/28%  Surgical airway: Yes, Type: Shiley Size: 6 XLT, cuffed  Ambu at bedside: Ambu bag with the patient?: Yes, Adult Ambu     Active Orders   Respiratory Care    Inhalation Treatment Q4H PRN     Frequency: Q4H PRN     Number of Occurrences: Until Specified    Oxygen PRN     Frequency: PRN     Number of Occurrences: Until Specified     Order  Questions:      Device type: Low flow      Device: Trach Collar      Titrate O2 per Oxygen Titration Protocol: Yes      To maintain SpO2 goal of: >= 90%      Notify MD of: Inability to achieve desired SpO2; Sudden change in patient status and requires 20% increase in FiO2; Patient requires >60% FiO2    Pulse Oximetry Q4H     Frequency: Q4H     Number of Occurrences: Until Specified    Routine tracheostomy care     Frequency: BID     Number of Occurrences: Until Specified       RECOMMENDATIONS    We recommend: RRT Recs: Continue POC per primary team.      FOLLOW-UP    Please call back the Rapid Response RT, Patricia Khan RRT at x [phone] for any questions or concerns.

## 2023-02-09 NOTE — PLAN OF CARE
[facility], spoke with Patricia in Admissions. Reported that the pt will be ready for discharge in 2/11/23. She stated that as long as his orders are in by 2:00 PM. CM alerted attending via secure chat.   Will continue to update plan as needed.  Elie Burgos RN,BSN

## 2023-02-09 NOTE — NURSING
Pt with multiple wounds covering body. Turned q2h. Multiple times incontinent care bowel and bladder. Linens changed. Nonverbal. Pt scratches right hip so given twice this past shift benadryl iv for itching. Area cleansed and barrier cleaned and covered. Trach care per RT. Severely contracted. Only visualized using left hand/arm. Unable to perform ROM except with left arm.

## 2023-02-10 LAB
ERYTHROCYTE [DISTWIDTH] IN BLOOD BY AUTOMATED COUNT: 16.4 % (ref 11.5–14.5)
ESTIMATED AVG GLUCOSE: 157 MG/DL (ref 68–131)
HBA1C MFR BLD: 7.1 % (ref 4–5.6)
HCT VFR BLD AUTO: 32.8 % (ref 40–54)
HGB BLD-MCNC: 9.6 G/DL (ref 14–18)
MCH RBC QN AUTO: 27.3 PG (ref 27–31)
MCHC RBC AUTO-ENTMCNC: 29.3 G/DL (ref 32–36)
MCV RBC AUTO: 93 FL (ref 82–98)
PLATELET # BLD AUTO: 570 K/UL (ref 150–450)
PMV BLD AUTO: 10.6 FL (ref 9.2–12.9)
POCT GLUCOSE: 133 MG/DL (ref 70–110)
POCT GLUCOSE: 172 MG/DL (ref 70–110)
POCT GLUCOSE: 191 MG/DL (ref 70–110)
POCT GLUCOSE: 213 MG/DL (ref 70–110)
RBC # BLD AUTO: 3.52 M/UL (ref 4.6–6.2)
WBC # BLD AUTO: 13.22 K/UL (ref 3.9–12.7)

## 2023-02-10 PROCEDURE — 63600175 PHARM REV CODE 636 W HCPCS: Performed by: FAMILY MEDICINE

## 2023-02-10 PROCEDURE — 85027 COMPLETE CBC AUTOMATED: CPT | Performed by: FAMILY MEDICINE

## 2023-02-10 PROCEDURE — 99233 PR SUBSEQUENT HOSPITAL CARE,LEVL III: ICD-10-PCS | Mod: ,,, | Performed by: FAMILY MEDICINE

## 2023-02-10 PROCEDURE — 27000221 HC OXYGEN, UP TO 24 HOURS

## 2023-02-10 PROCEDURE — 25000003 PHARM REV CODE 250: Performed by: FAMILY MEDICINE

## 2023-02-10 PROCEDURE — 99233 SBSQ HOSP IP/OBS HIGH 50: CPT | Mod: ,,, | Performed by: FAMILY MEDICINE

## 2023-02-10 PROCEDURE — 27200966 HC CLOSED SUCTION SYSTEM

## 2023-02-10 PROCEDURE — 99900022

## 2023-02-10 PROCEDURE — 99900035 HC TECH TIME PER 15 MIN (STAT)

## 2023-02-10 PROCEDURE — 36415 COLL VENOUS BLD VENIPUNCTURE: CPT | Performed by: FAMILY MEDICINE

## 2023-02-10 PROCEDURE — 83036 HEMOGLOBIN GLYCOSYLATED A1C: CPT | Performed by: FAMILY MEDICINE

## 2023-02-10 PROCEDURE — 94761 N-INVAS EAR/PLS OXIMETRY MLT: CPT

## 2023-02-10 PROCEDURE — 11000001 HC ACUTE MED/SURG PRIVATE ROOM

## 2023-02-10 PROCEDURE — 25000003 PHARM REV CODE 250: Performed by: STUDENT IN AN ORGANIZED HEALTH CARE EDUCATION/TRAINING PROGRAM

## 2023-02-10 RX ORDER — HYDROXYZINE HYDROCHLORIDE 10 MG/5ML
25 SYRUP ORAL 3 TIMES DAILY
Qty: 90 EACH | Refills: 0 | Status: SHIPPED | OUTPATIENT
Start: 2023-02-10 | End: 2023-03-12

## 2023-02-10 RX ORDER — CARVEDILOL 3.12 MG/1
3.12 TABLET ORAL 2 TIMES DAILY
Status: DISCONTINUED | OUTPATIENT
Start: 2023-02-10 | End: 2023-02-12 | Stop reason: HOSPADM

## 2023-02-10 RX ADMIN — Medication: at 10:02

## 2023-02-10 RX ADMIN — BACLOFEN 15 MG: 5 TABLET ORAL at 11:02

## 2023-02-10 RX ADMIN — AMPICILLIN 2 G: 2 INJECTION, POWDER, FOR SOLUTION INTRAMUSCULAR; INTRAVENOUS at 04:02

## 2023-02-10 RX ADMIN — HYDROXYZINE HYDROCHLORIDE 25 MG: 10 SOLUTION ORAL at 10:02

## 2023-02-10 RX ADMIN — BACLOFEN 15 MG: 5 TABLET ORAL at 02:02

## 2023-02-10 RX ADMIN — LEVETIRACETAM 750 MG: 100 SOLUTION ORAL at 10:02

## 2023-02-10 RX ADMIN — CARVEDILOL 3.12 MG: 3.12 TABLET, FILM COATED ORAL at 10:02

## 2023-02-10 RX ADMIN — BACLOFEN 15 MG: 5 TABLET ORAL at 10:02

## 2023-02-10 RX ADMIN — ESOMEPRAZOLE MAGNESIUM 40 MG: 10 GRANULE, FOR SUSPENSION, EXTENDED RELEASE ORAL at 10:02

## 2023-02-10 RX ADMIN — AMPICILLIN 2 G: 2 INJECTION, POWDER, FOR SOLUTION INTRAMUSCULAR; INTRAVENOUS at 01:02

## 2023-02-10 RX ADMIN — AMPICILLIN 2 G: 2 INJECTION, POWDER, FOR SOLUTION INTRAMUSCULAR; INTRAVENOUS at 10:02

## 2023-02-10 RX ADMIN — AMPICILLIN 2 G: 2 INJECTION, POWDER, FOR SOLUTION INTRAMUSCULAR; INTRAVENOUS at 06:02

## 2023-02-10 RX ADMIN — NYSTATIN: 100000 POWDER TOPICAL at 10:02

## 2023-02-10 RX ADMIN — CARVEDILOL 6.25 MG: 6.25 TABLET, FILM COATED ORAL at 10:02

## 2023-02-10 RX ADMIN — AMPICILLIN 2 G: 2 INJECTION, POWDER, FOR SOLUTION INTRAMUSCULAR; INTRAVENOUS at 12:02

## 2023-02-10 RX ADMIN — CALCIUM CARBONATE (ANTACID) CHEW TAB 500 MG 500 MG: 500 CHEW TAB at 10:02

## 2023-02-10 RX ADMIN — PREDNISONE 10 MG: 10 TABLET ORAL at 10:02

## 2023-02-10 RX ADMIN — INSULIN DETEMIR 10 UNITS: 100 INJECTION, SOLUTION SUBCUTANEOUS at 08:02

## 2023-02-10 NOTE — PLAN OF CARE
Pt turned q 2 hrs. Care continued.     Problem: Infection  Goal: Absence of Infection Signs and Symptoms  Outcome: Ongoing, Progressing     Problem: Adult Inpatient Plan of Care  Goal: Plan of Care Review  Outcome: Ongoing, Progressing  Goal: Patient-Specific Goal (Individualized)  Outcome: Ongoing, Progressing  Goal: Absence of Hospital-Acquired Illness or Injury  Outcome: Ongoing, Progressing

## 2023-02-10 NOTE — ASSESSMENT & PLAN NOTE
- patient's anemia is currently controlled  - etiology likely due to anemia of chronic disease.  - monitor bleeding skin excoriations closely and follow wound care recs.  - monitor serial CBC and transfuse if patient becomes hemodynamically unstable, symptomatic, or H/H drops below 7/21.

## 2023-02-10 NOTE — PROGRESS NOTES
[facility] - Observation [address] [place] [address] Medicine  Progress Note    Patient Name: Hao Medrano  MRN: [id number]  Patient Class: IP- Inpatient   Admission Date: 1/25/2023  Length of Stay: 14 days  Attending Physician: Marcello Vinson III,*  Primary Care Provider: Keara Yanes NP        Subjective:     Principal Problem:Dislodged gastrostomy tube        HPI:  Hao Medrano is a 62 y.o. male with a past medical history of CVA with trach and G-tube, CKD, HTN, and HLD who is being palced in observation for further management of dislodged peg tube. Patient presented to the ED from [facility]. Patient is nonverbal, history obtained from chart and ED staff. Per the ED, the nursing home reports they noticed the dislodged ped tube around 0400 this am. Patient was recently admitted on 1/10 for same issue with a new peg placed on 1/12.    ED: vital signs stable, afebrile. Leukocytosis of 23K. Hb 11.4. PT/INR 10/9/1.0. Furhter labs pending at time of admission. CXR with inconclusive findings due to patient position. Blood cultures obtained. GI consulted and plan for EGD with G-tube placement tomorrow.       Overview/Hospital Course:  Patient was admitted for dislodged PEG tube. GI was consulted and he underwent 20 F PEG tube placed without any immediate complications. External bumper at 3 cm shelby. During the course of this admission serial routine labs with mild hyperglycemia, mild anemia, persistence leukocytosis. He was found to have positive blood Culture with enterococcus Faecalis and coagulase negative staph. ID was consulted and was started on IV antibiotic and outpt recs made by ID, see below. Unable to get outpt Abx covered by insurance so last day of IV ampicillin 2/11/23. Morning of 2/9, pt found with large amount of bright red blood in bed. Source thought to be multiple excoriations in gluteal/perineal region oozing blood actively on exam. Pressure dressing applied with hemostasis. Hb  and vitals monitored closely and stable. Rectal exam with no hemorrhoids noted or palpated. Brown stool on glove, no evidence of GI bleed. Aspirin held for now due to excessive bleeding. Wound care consulted. Vistaril ordered due to pruritis. Plan for discharge back to the nursing home and follow up with ID, PCP.      S/p 20 F PEG tube placed without any immediate complications.   External bumper at 3 cm shelby.  Positive blood Cx with gram positive Cocci  2D echo ordered, unremarkable for vegetation  ID consulted:    Outpatient Antibiotic Therapy Plan:     Please send referral to [facility].     1) Infection: E faecalis bacteremia     2) Discharge Antibiotics:     Intravenous antibiotics:   Ampicillin 2 grams IV q 4 hours (may be given as continuous infusion)        3) Therapy Duration:  14 days     4) Outpatient Weekly Labs:     Order the following labs to be drawn on Mondays:    CBC   CMP      5) Fax Lab Results to Infectious Diseases Provider: Dr. Price     [facility] Fax Number: [phone]     6) Outpatient Infectious Diseases Follow-up      Follow-up appointment will be arranged by the ID clinic and will be found in the patient's appointments tab.      Prior to discharge, please ensure the patient's follow-up has been scheduled.     If there is still no follow-up scheduled prior to discharge, please send an EPIC message to Lauren Stiles in Infectious Diseases.          Interval History:  Pt seen and examined with nursing at bedside. Pt remains nonverbal. He is in no distress. Nursing staff reports significant amount of red blood in bed and actively oozing areas of excoriation in gluteal/perineal region. No pain elicited on abdominal exam.     Review of Systems   Unable to perform ROS: Patient nonverbal   Objective:     Vital Signs (Most Recent):  Temp: 98.5 °F (36.9 °C) (02/09/23 1600)  Pulse: 104 (02/09/23 1600)  Resp: 17 (02/09/23 1600)  BP: 122/72 (02/09/23  1600)  SpO2: 100 % (02/09/23 1600) Vital Signs (24h Range):  Temp:  [98 °F (36.7 °C)-98.8 °F (37.1 °C)] 98.5 °F (36.9 °C)  Pulse:  [] 104  Resp:  [17-20] 17  SpO2:  [95 %-100 %] 100 %  BP: (109-159)/(60-81) 122/72     Weight: 71.7 kg (158 lb)  Body mass index is 21.43 kg/m².    Intake/Output Summary (Last 24 hours) at 2/9/2023 1802  Last data filed at 2/8/2023 2000  Gross per 24 hour   Intake 1160 ml   Output --   Net 1160 ml      Physical Exam  Vitals and nursing note reviewed.   Constitutional:       General: He is not in acute distress.     Appearance: He is well-developed.   HENT:      Mouth/Throat:      Comments: Trach in place  Eyes:      Conjunctiva/sclera: Conjunctivae normal.   Cardiovascular:      Rate and Rhythm: Normal rate and regular rhythm.   Pulmonary:      Effort: Pulmonary effort is normal.      Breath sounds: Normal breath sounds.   Abdominal:      General: Bowel sounds are normal. There is no distension.      Palpations: Abdomen is soft.      Tenderness: There is no abdominal tenderness (no grimace on abdom exam). There is no guarding or rebound.      Comments: PEG in place   Genitourinary:     Rectum: No tenderness, external hemorrhoid or internal hemorrhoid.      Comments: Bilateral excoriations of gluteal/perineal region actively bleeding. Brown stool on rectal exam with no evidence of GIB  Musculoskeletal:         General: Deformity present.   Skin:     General: Skin is warm and dry.      Comments: Multiple excoriations some of which actively bleeding   Neurological:      Mental Status: He is alert.      Comments: Hemiplegic with B/L lower extremities, atrophy, bed bound. Nonverbal       Significant Labs: All pertinent labs within the past 24 hours have been reviewed.  Recent Lab Results  (Last 5 results in the past 24 hours)        02/09/23  1613   02/09/23  1134   02/09/23  1003   02/09/23  0917   02/09/23  0326        Albumin         2.0       Alkaline Phosphatase         128        ALT         17       Anion Gap         13       AST         16       BILIRUBIN TOTAL         0.2  Comment: For infants and newborns, interpretation of results should be based  on gestational age, weight and in agreement with clinical  observations.    Premature Infant recommended reference ranges:  Up to 24 hours.............<8.0 mg/dL  Up to 48 hours............<12.0 mg/dL  3-5 days..................<15.0 mg/dL  6-29 days.................<15.0 mg/dL         BUN         25       Calcium         9.4       Chloride         103       CO2         21       Creatinine         1.0       eGFR         >60.0       Glucose         139       Hematocrit     33.3           Hemoglobin     9.9           Magnesium         2.0       Phosphorus         3.1       POCT Glucose 168   202     229         Potassium         4.5       PROTEIN TOTAL         7.6       Sodium         137                              Significant Imaging: I have reviewed all pertinent imaging results/findings within the past 24 hours.      Assessment/Plan:      * Dislodged gastrostomy tube  Oropharyngeal dysphagia    - s/p 20 F PEG tube placed without any immediate complications.   -External bumper at 3 cm shelby.-  -resumed feeding tube with Isosource 1.5 nathaniel and dietitian consult    Bacteremia  Cont ampicillin will be on ampicillin for 14 days, last day 2/11  ID consult appreciated, signed off   2D echo done no vegetations cont medical management        Multiple wounds of skin  BLEEDING EXCORIATIONS    With scattered lesions and dry skin.  Encourage moisturize skin, skin care.  Avoid skin debridement, changing position q 2Hrs.  Added scheduled vistaril for pruritis, monitor sedating effect.  Cont Antibiotic ointment at the PEG site.  D/w RN, pressure dressing applied with hemostasis.  No signs of GIB. No hematochezia noted on rectal exam.  Monitor H/H closely. If H/H drop or evidence of GIB, will consult GI.  Appreciate wound care assistance      UTI (urinary  tract infection)  -Urine cx with candida, likely representing asymptomatic candiduria/colonization      On antiepileptic therapy  - continue keppra 750 mg BID once peg tube is replaced      HTN (hypertension)  - BP well controlled upon admission  - continue coreg, increased to 6.25 mg b.i.d. with improvement of BP trend      Leukocytosis  - VSSAF  - leukocytosis improving R/O steroids induced  - g-tube site is clean without discharge or erythma  - blood cultures positive for gram + cocci, repeat no growth  - IV ABx with ampicillin    Type 2 diabetes mellitus, with long-term current use of insulin  Last A1c reviewed- 5.9 in 2018. Repeat pending.   Hold Oral hypoglycemics while patient is in the hospital.  Continue accuchecks and sliding scale.  Patient is on insulin glargine 15U daily at the nursing home. Will start at 10 units and titrate.    Oropharyngeal dysphagia  -Cont tube feeding      Anemia  - patient's anemia is currently controlled  - etiology likely due to anemia of chronic disease.  - monitor bleeding skin excoriations closely and follow wound care recs.  - monitor serial CBC and transfuse if patient becomes hemodynamically unstable, symptomatic, or H/H drops below 7/21.         Chronic kidney disease, stage III (moderate)  Renally dose all medications  Avoid nephrotoxins  Will continue to monitor on daily labs        VTE Risk Mitigation (From admission, onward)         Ordered     Place sequential compression device  Until discontinued         01/25/23 1411     IP VTE HIGH RISK PATIENT  Once         01/25/23 1411     Reason for No Pharmacological VTE Prophylaxis  Once        Question:  Reasons:  Answer:  Risk of Bleeding  Comment:  pre-op    01/25/23 1411                Discharge Planning   FAINA: 2/11/2023     Code Status: Full Code   Is the patient medically ready for discharge?: No    Reason for patient still in hospital (select all that apply): Patient trending condition  Discharge Plan A: Long-term  acute care facility (LTAC)   Discharge Delays: None known at this time              Marcello Vinson III, MD  Department of Hospital Medicine   [facility] - Observation 11[facility]

## 2023-02-10 NOTE — ASSESSMENT & PLAN NOTE
BLEEDING EXCORIATIONS    With scattered lesions and dry skin.  Encourage moisturize skin, skin care.  Avoid skin debridement, changing position q 2Hrs.  Added scheduled vistaril for pruritis, monitor sedating effect.  Cont Antibiotic ointment at the PEG site.  D/w RN, pressure dressing applied with hemostasis.  No signs of GIB. No hematochezia noted on rectal exam.  Monitor H/H closely. If H/H drop or evidence of GIB, will consult GI.  Appreciate wound care assistance

## 2023-02-10 NOTE — ASSESSMENT & PLAN NOTE
Oropharyngeal dysphagia    - s/p 20 F PEG tube placed without any immediate complications.   -External bumper at 3 cm shelby.-  -resumed feeding tube with Isosource 1.5 nathaniel and dietitian consult and 250mL water flushes q8h

## 2023-02-10 NOTE — SUBJECTIVE & OBJECTIVE
Interval History:  Pt seen and examined with nursing at bedside. Pt remains nonverbal. He is in no distress. Nursing staff reports significant amount of red blood in bed and actively oozing areas of excoriation in gluteal/perineal region. No pain elicited on abdominal exam.     Review of Systems   Unable to perform ROS: Patient nonverbal   Objective:     Vital Signs (Most Recent):  Temp: 98.5 °F (36.9 °C) (02/09/23 1600)  Pulse: 104 (02/09/23 1600)  Resp: 17 (02/09/23 1600)  BP: 122/72 (02/09/23 1600)  SpO2: 100 % (02/09/23 1600) Vital Signs (24h Range):  Temp:  [98 °F (36.7 °C)-98.8 °F (37.1 °C)] 98.5 °F (36.9 °C)  Pulse:  [] 104  Resp:  [17-20] 17  SpO2:  [95 %-100 %] 100 %  BP: (109-159)/(60-81) 122/72     Weight: 71.7 kg (158 lb)  Body mass index is 21.43 kg/m².    Intake/Output Summary (Last 24 hours) at 2/9/2023 1802  Last data filed at 2/8/2023 2000  Gross per 24 hour   Intake 1160 ml   Output --   Net 1160 ml      Physical Exam  Vitals and nursing note reviewed.   Constitutional:       General: He is not in acute distress.     Appearance: He is well-developed.   HENT:      Mouth/Throat:      Comments: Trach in place  Eyes:      Conjunctiva/sclera: Conjunctivae normal.   Cardiovascular:      Rate and Rhythm: Normal rate and regular rhythm.   Pulmonary:      Effort: Pulmonary effort is normal.      Breath sounds: Normal breath sounds.   Abdominal:      General: Bowel sounds are normal. There is no distension.      Palpations: Abdomen is soft.      Tenderness: There is no abdominal tenderness (no grimace on abdom exam). There is no guarding or rebound.      Comments: PEG in place   Genitourinary:     Rectum: No tenderness, external hemorrhoid or internal hemorrhoid.      Comments: Bilateral excoriations of gluteal/perineal region actively bleeding. Brown stool on rectal exam with no evidence of GIB  Musculoskeletal:         General: Deformity present.   Skin:     General: Skin is warm and dry.       Comments: Multiple excoriations some of which actively bleeding   Neurological:      Mental Status: He is alert.      Comments: Hemiplegic with B/L lower extremities, atrophy, bed bound. Nonverbal       Significant Labs: All pertinent labs within the past 24 hours have been reviewed.  Recent Lab Results  (Last 5 results in the past 24 hours)        02/09/23  1613   02/09/23  1134   02/09/23  1003   02/09/23  0917   02/09/23  0326        Albumin         2.0       Alkaline Phosphatase         128       ALT         17       Anion Gap         13       AST         16       BILIRUBIN TOTAL         0.2  Comment: For infants and newborns, interpretation of results should be based  on gestational age, weight and in agreement with clinical  observations.    Premature Infant recommended reference ranges:  Up to 24 hours.............<8.0 mg/dL  Up to 48 hours............<12.0 mg/dL  3-5 days..................<15.0 mg/dL  6-29 days.................<15.0 mg/dL         BUN         25       Calcium         9.4       Chloride         103       CO2         21       Creatinine         1.0       eGFR         >60.0       Glucose         139       Hematocrit     33.3           Hemoglobin     9.9           Magnesium         2.0       Phosphorus         3.1       POCT Glucose 168   202     229         Potassium         4.5       PROTEIN TOTAL         7.6       Sodium         137                              Significant Imaging: I have reviewed all pertinent imaging results/findings within the past 24 hours.

## 2023-02-10 NOTE — SUBJECTIVE & OBJECTIVE
Interval History:  Pt seen and examined and is sleeping comfortably. Opens eyes to voice. Pt remains nonverbal. He is in no distress. Nursing staff says there have been no further episodes of bleeding overnight.    Review of Systems   Unable to perform ROS: Patient nonverbal   Objective:     Vital Signs (Most Recent):  Temp: 97.9 °F (36.6 °C) (02/10/23 1317)  Pulse: 82 (02/10/23 1317)  Resp: 18 (02/10/23 1317)  BP: (!) 101/58 (02/10/23 1317)  SpO2: 99 % (02/10/23 1317) Vital Signs (24h Range):  Temp:  [97 °F (36.1 °C)-98.3 °F (36.8 °C)] 97.9 °F (36.6 °C)  Pulse:  [58-99] 82  Resp:  [16-18] 18  SpO2:  [94 %-100 %] 99 %  BP: (101-131)/(58-67) 101/58     Weight: 71.7 kg (158 lb)  Body mass index is 21.43 kg/m².    Intake/Output Summary (Last 24 hours) at 2/10/2023 1615  Last data filed at 2/10/2023 1545  Gross per 24 hour   Intake 500 ml   Output --   Net 500 ml        Physical Exam  Vitals and nursing note reviewed.   Constitutional:       General: He is not in acute distress.  HENT:      Mouth/Throat:      Comments: Trach in place  Eyes:      Conjunctiva/sclera: Conjunctivae normal.      Pupils: Pupils are equal, round, and reactive to light.   Cardiovascular:      Rate and Rhythm: Normal rate and regular rhythm.   Pulmonary:      Effort: Pulmonary effort is normal.      Breath sounds: Normal breath sounds.   Abdominal:      General: Bowel sounds are normal. There is no distension.      Palpations: Abdomen is soft.      Tenderness: There is no abdominal tenderness (no grimace on abdom exam). There is no guarding or rebound.      Comments: PEG in place   Genitourinary:     Rectum: No tenderness, external hemorrhoid or internal hemorrhoid.      Comments: Dressings of sacral/gluteal/perineal region C/D/I. No active bleeding noted.  Musculoskeletal:         General: Deformity (rt arm contracted, both legs contracted) present.   Skin:     General: Skin is warm and dry.      Comments: Multiple excoriations but no active  bleeding   Neurological:      Mental Status: He is alert.      Comments: Rt arm and B/L lower extremities contracted with noted atrophy, bed bound. Nonverbal       Significant Labs: All pertinent labs within the past 24 hours have been reviewed.  Recent Lab Results  (Last 5 results in the past 24 hours)        02/10/23  1222   02/10/23  0819   02/10/23  0535   02/10/23  0241   02/09/23 2033        Estimated Avg Glucose       157         Hematocrit     32.8           Hemoglobin     9.6           Hemoglobin A1C External       7.1  Comment: ADA Screening Guidelines:  5.7-6.4%  Consistent with prediabetes  >or=6.5%  Consistent with diabetes    High levels of fetal hemoglobin interfere with the HbA1C  assay. Heterozygous hemoglobin variants (HbS, HgC, etc)do  not significantly interfere with this assay.   However, presence of multiple variants may affect accuracy.           MCH     27.3           MCHC     29.3           MCV     93           MPV     10.6           Platelets     570           POCT Glucose 213   172       223       RBC     3.52           RDW     16.4           WBC     13.22                                  Significant Imaging: I have reviewed all pertinent imaging results/findings within the past 24 hours.

## 2023-02-10 NOTE — PLAN OF CARE
Orders faxed to Patricia at [facility] for a Sunday return to Nursing [place], Called to confirm receipt , she confirmed. She instructed to have the w/e CM staff call main number and ask for [place] to give report and time of transportation. CM informed attending via secure chat and weekend staff via email.   Will continue to update plan as needed.

## 2023-02-10 NOTE — RESPIRATORY THERAPY
RAPID RESPONSE RESPIRATORY THERAPY PROACTIVE NOTE           Time of visit: 1347     Code Status: Full Code   : 1960  Bed: 1155/1155 A:   MRN: [id number]  Time spent at the bedside: < 15 min    SITUATION    Evaluated patient for: LDA Check     BACKGROUND    Patient has no past medical history on file.  Clinically Significant Surgical Hx: tracheostomy    24 Hours Vitals Range:  Temp:  [97 °F (36.1 °C)-98.5 °F (36.9 °C)]   Pulse:  []   Resp:  [16-18]   BP: (101-131)/(58-72)   SpO2:  [94 %-100 %]     Labs:    Recent Labs     23  0326      K 4.5      CO2 21*   CREATININE 1.0   *   PHOS 3.1   MG 2.0        No results for input(s): PH, PCO2, PO2, HCO3, POCSATURATED, BE in the last 72 hours.    ASSESSMENT/INTERVENTIONS  Pt resting comfortably with no respiratory needs at this time      Last VS   Temp: 97.9 °F (36.6 °C) (02/10 1317)  Pulse: 82 (02/10 1317)  Resp: 18 (02/10 1317)  BP: 101/58 (02/10 1317)  SpO2: 99 % (02/10 1317)      Extra trachs at bedside: y  Level of Consciousness: Level of Consciousness (AVPU): alert  Respiratory Effort: Respiratory Effort: Unlabored Expansion/Accessory Muscle Usage: Expansion/Accessory Muscles/Retractions: no use of accessory muscles, no retractions  All Lung Field Breath Sounds: All Lung Fields Breath Sounds: coarse  ELSIE Breath Sounds: coarse  LLL Breath Sounds: coarse  RUL Breath Sounds: coarse  RML Breath Sounds: coarse  RLL Breath Sounds: coarse  O2 Device/Concentration: R.A.  Surgical airway: Yes, Type: Shiley Size: 6, uncuffed  Ambu at bedside: Ambu bag with the patient?: Yes, Adult Ambu     Active Orders   Respiratory Care    Inhalation Treatment Q4H PRN     Frequency: Q4H PRN     Number of Occurrences: Until Specified    Oxygen PRN     Frequency: PRN     Number of Occurrences: Until Specified     Order Questions:      Device type: Low flow      Device: Trach Collar      Titrate O2 per Oxygen Titration Protocol: Yes      To maintain SpO2 goal  of: >= 90%      Notify MD of: Inability to achieve desired SpO2; Sudden change in patient status and requires 20% increase in FiO2; Patient requires >60% FiO2    Pulse Oximetry Q4H     Frequency: Q4H     Number of Occurrences: Until Specified    Routine tracheostomy care     Frequency: BID     Number of Occurrences: Until Specified       RECOMMENDATIONS    We recommend: RRT Recs: Continue POC per primary team.      FOLLOW-UP    Please call back the Rapid Response RT, Ayad Herbert RRT at x [phone] for any questions or concerns.

## 2023-02-10 NOTE — PROGRESS NOTES
[facility] Observ[facility] [address] [place] [address] Medicine  Progress Note    Patient Name: Hao Medarno  MRN: [id number]  Patient Class: IP- Inpatient   Admission Date: 1/25/2023  Length of Stay: 15 days  Attending Physician: Marcello Vinson III,*  Primary Care Provider: Keara Yanes NP        Subjective:     Principal Problem:Dislodged gastrostomy tube        HPI:  Hao Medrano is a 62 y.o. male with a past medical history of CVA with trach and G-tube, CKD, HTN, and HLD who is being palced in observation for further management of dislodged peg tube. Patient presented to the ED from [facility]. Patient is nonverbal, history obtained from chart and ED staff. Per the ED, the nursing home reports they noticed the dislodged ped tube around 0400 this am. Patient was recently admitted on 1/10 for same issue with a new peg placed on 1/12.    ED: vital signs stable, afebrile. Leukocytosis of 23K. Hb 11.4. PT/INR 10/9/1.0. Furhter labs pending at time of admission. CXR with inconclusive findings due to patient position. Blood cultures obtained. GI consulted and plan for EGD with G-tube placement tomorrow.       Overview/Hospital Course:  Patient was admitted for dislodged PEG tube. GI was consulted and he underwent 20 F PEG tube placed without any immediate complications. External bumper at 3 cm shelby. During the course of this admission serial routine labs with mild hyperglycemia, mild anemia, persistence leukocytosis. He was found to have positive blood Culture with enterococcus Faecalis and coagulase negative staph. ID was consulted and was started on IV antibiotic and outpt recs made by ID, see below. Unable to get outpt Abx covered by insurance so last day of IV ampicillin 2/11/23. Morning of 2/9, pt found with large amount of bright red blood in bed. Source thought to be multiple excoriations in gluteal/perineal region oozing blood actively on exam. Pressure dressing applied with hemostasis. Hb  and vitals monitored closely and stable. Rectal exam with no hemorrhoids noted or palpated. Brown stool on glove, no evidence of GI bleed. Aspirin held for now due to excessive bleeding. Wound care consulted. Vistaril ordered due to pruritis. Plan for discharge back to the nursing home and follow up with ID, PCP.      S/p 20 F PEG tube placed without any immediate complications.   External bumper at 3 cm shelby.  Positive blood Cx with gram positive Cocci  2D echo ordered, unremarkable for vegetation  ID consulted:    Pt will complete IV antibiotics in the hospital. Labs ordered for following week     Fax Lab Results to Infectious Diseases Provider: Dr. Price     [facility] Fax Number: [phone]     Outpatient Infectious Diseases Follow-up     Follow-up appointment will be arranged by the ID clinic and will be found in the patient's appointments tab.  Prior to discharge, please ensure the patient's follow-up has been scheduled.    If there is still no follow-up scheduled prior to discharge, please send an EPIC message to Lauren Stiles in Infectious Diseases.        Interval History:  Pt seen and examined and is sleeping comfortably. Opens eyes to voice. Pt remains nonverbal. He is in no distress. Nursing staff says there have been no further episodes of bleeding overnight.    Review of Systems   Unable to perform ROS: Patient nonverbal   Objective:     Vital Signs (Most Recent):  Temp: 97.9 °F (36.6 °C) (02/10/23 1317)  Pulse: 82 (02/10/23 1317)  Resp: 18 (02/10/23 1317)  BP: (!) 101/58 (02/10/23 1317)  SpO2: 99 % (02/10/23 1317) Vital Signs (24h Range):  Temp:  [97 °F (36.1 °C)-98.3 °F (36.8 °C)] 97.9 °F (36.6 °C)  Pulse:  [58-99] 82  Resp:  [16-18] 18  SpO2:  [94 %-100 %] 99 %  BP: (101-131)/(58-67) 101/58     Weight: 71.7 kg (158 lb)  Body mass index is 21.43 kg/m².    Intake/Output Summary (Last 24 hours) at 2/10/2023 1615  Last data filed at 2/10/2023 1545  Gross per 24 hour   Intake 500 ml   Output --    Net 500 ml        Physical Exam  Vitals and nursing note reviewed.   Constitutional:       General: He is not in acute distress.  HENT:      Mouth/Throat:      Comments: Trach in place  Eyes:      Conjunctiva/sclera: Conjunctivae normal.      Pupils: Pupils are equal, round, and reactive to light.   Cardiovascular:      Rate and Rhythm: Normal rate and regular rhythm.   Pulmonary:      Effort: Pulmonary effort is normal.      Breath sounds: Normal breath sounds.   Abdominal:      General: Bowel sounds are normal. There is no distension.      Palpations: Abdomen is soft.      Tenderness: There is no abdominal tenderness (no grimace on abdom exam). There is no guarding or rebound.      Comments: PEG in place   Genitourinary:     Rectum: No tenderness, external hemorrhoid or internal hemorrhoid.      Comments: Dressings of sacral/gluteal/perineal region C/D/I. No active bleeding noted.  Musculoskeletal:         General: Deformity (rt arm contracted, both legs contracted) present.   Skin:     General: Skin is warm and dry.      Comments: Multiple excoriations but no active bleeding   Neurological:      Mental Status: He is alert.      Comments: Rt arm and B/L lower extremities contracted with noted atrophy, bed bound. Nonverbal       Significant Labs: All pertinent labs within the past 24 hours have been reviewed.  Recent Lab Results  (Last 5 results in the past 24 hours)        02/10/23  1222   02/10/23  0819   02/10/23  0535   02/10/23  0241   02/09/23 2033        Estimated Avg Glucose       157         Hematocrit     32.8           Hemoglobin     9.6           Hemoglobin A1C External       7.1  Comment: ADA Screening Guidelines:  5.7-6.4%  Consistent with prediabetes  >or=6.5%  Consistent with diabetes    High levels of fetal hemoglobin interfere with the HbA1C  assay. Heterozygous hemoglobin variants (HbS, HgC, etc)do  not significantly interfere with this assay.   However, presence of multiple variants may  affect accuracy.           MCH     27.3           MCHC     29.3           MCV     93           MPV     10.6           Platelets     570           POCT Glucose 213   172       223       RBC     3.52           RDW     16.4           WBC     13.22                                  Significant Imaging: I have reviewed all pertinent imaging results/findings within the past 24 hours.    Assessment/Plan:      * Dislodged gastrostomy tube  Oropharyngeal dysphagia    - s/p 20 F PEG tube placed without any immediate complications.   -External bumper at 3 cm shelby.-  -resumed feeding tube with Isosource 1.5 nathaniel and dietitian consult and 250mL water flushes q8h    Bacteremia  Cont ampicillin will be on ampicillin for 14 days, last day 2/11  ID consult appreciated, signed off   2D echo done no vegetations cont medical management        Multiple wounds of skin  EXCORIATIONS, improved and bleeding resolved    With scattered lesions and dry skin.  Encourage moisturize skin, skin care.  Avoid skin debridement, changing position q 2Hrs.  Added scheduled vistaril for pruritis, monitor sedating effect.  Cont Antibiotic ointment at the PEG site.  D/w RN, pressure dressing applied with hemostasis. Appreciate wound care assistance.  No signs of GIB. No hematochezia noted on rectal exam.  H/H stable.       UTI (urinary tract infection)  -Urine cx with candida, likely representing asymptomatic candiduria/colonization      On antiepileptic therapy  - continue keppra 750 mg BID once peg tube is replaced      HTN (hypertension)  - BP well controlled upon admission  - continue coreg, increased to 6.25 mg b.i.d. but bp low nml. Resumed previous dose of 3.125mg BID      Leukocytosis  - VSSAF  - leukocytosis improved overall. Steroids likely causing some elevation of WBC.  - g-tube site is clean without discharge or erythma  - IV ABx with ampicillin for bacteremia as above.     Type 2 diabetes mellitus, with long-term current use of insulin  Last  A1c reviewed- 5.9 in 2018. Repeat pending.   Hold Oral hypoglycemics while patient is in the hospital.  Continue accuchecks and sliding scale.  Patient is on insulin glargine 15U daily at the nursing home. Will start at 10 units and titrate.    Oropharyngeal dysphagia  -Cont tube feeding      Anemia  - patient's anemia is currently controlled  - etiology likely due to anemia of chronic disease.  - monitor bleeding skin excoriations closely and follow wound care recs.  - monitor serial CBC and transfuse if patient becomes hemodynamically unstable, symptomatic, or H/H drops below 7/21.         Chronic kidney disease, stage III (moderate)  Renally dose all medications  Avoid nephrotoxins  Will continue to monitor on daily labs        VTE Risk Mitigation (From admission, onward)           Ordered     Place sequential compression device  Until discontinued         01/25/23 1411     IP VTE HIGH RISK PATIENT  Once         01/25/23 1411     Reason for No Pharmacological VTE Prophylaxis  Once        Question:  Reasons:  Answer:  Risk of Bleeding  Comment:  pre-op    01/25/23 1411                    Discharge Planning   FAINA: 2/12/2023     Code Status: Full Code   Is the patient medically ready for discharge?: No    Reason for patient still in hospital (select all that apply): Treatment  Discharge Plan A: Long-term acute care facility (LTAC)   Discharge Delays: None known at this time              Marcello Vinson III, MD  Department of Hospital Medicine   [facility] 11H

## 2023-02-10 NOTE — ASSESSMENT & PLAN NOTE
- VSSAF  - leukocytosis improved overall. Steroids likely causing some elevation of WBC.  - g-tube site is clean without discharge or erythma  - IV ABx with ampicillin for bacteremia as above.

## 2023-02-10 NOTE — PLAN OF CARE
Problem: Infection  Goal: Absence of Infection Signs and Symptoms  Outcome: Ongoing, Progressing     Problem: Adult Inpatient Plan of Care  Goal: Plan of Care Review  Outcome: Ongoing, Progressing  Goal: Patient-Specific Goal (Individualized)  Outcome: Ongoing, Progressing  Goal: Absence of Hospital-Acquired Illness or Injury  Outcome: Ongoing, Progressing  Goal: Optimal Comfort and Wellbeing  Outcome: Ongoing, Progressing  Goal: Readiness for Transition of Care  Outcome: Ongoing, Progressing     Problem: Diabetes Comorbidity  Goal: Blood Glucose Level Within Targeted Range  Outcome: Ongoing, Progressing     Problem: Impaired Wound Healing  Goal: Optimal Wound Healing  Outcome: Ongoing, Progressing     Problem: Skin Injury Risk Increased  Goal: Skin Health and Integrity  Outcome: Ongoing, Progressing     Problem: Communication Impairment (Artificial Airway)  Goal: Effective Communication  Outcome: Ongoing, Progressing     Problem: Device-Related Complication Risk (Artificial Airway)  Goal: Optimal Device Function  Outcome: Ongoing, Progressing     Problem: Skin and Tissue Injury (Artificial Airway)  Goal: Absence of Device-Related Skin or Tissue Injury  Outcome: Ongoing, Progressing     Problem: Adjustment to Illness (Sepsis/Septic Shock)  Goal: Optimal Coping  Outcome: Ongoing, Progressing     Problem: Bleeding (Sepsis/Septic Shock)  Goal: Absence of Bleeding  Outcome: Ongoing, Progressing     Problem: Glycemic Control Impaired (Sepsis/Septic Shock)  Goal: Blood Glucose Level Within Desired Range  Outcome: Ongoing, Progressing     Problem: Infection Progression (Sepsis/Septic Shock)  Goal: Absence of Infection Signs and Symptoms  Outcome: Ongoing, Progressing     Problem: Nutrition Impaired (Sepsis/Septic Shock)  Goal: Optimal Nutrition Intake  Outcome: Ongoing, Progressing     Problem: Aspiration (Enteral Nutrition)  Goal: Absence of Aspiration Signs and Symptoms  Outcome: Ongoing, Progressing     Problem:  Device-Related Complication Risk (Enteral Nutrition)  Goal: Safe, Effective Therapy Delivery  Outcome: Ongoing, Progressing     Problem: Feeding Intolerance (Enteral Nutrition)  Goal: Feeding Tolerance  Outcome: Ongoing, Progressing     Problem: Fall Injury Risk  Goal: Absence of Fall and Fall-Related Injury  Outcome: Ongoing, Progressing

## 2023-02-10 NOTE — ASSESSMENT & PLAN NOTE
Last A1c reviewed- 5.9 in 2018. Repeat pending.   Hold Oral hypoglycemics while patient is in the hospital.  Continue accuchecks and sliding scale.  Patient is on insulin glargine 15U daily at the nursing home. Will start at 10 units and titrate.

## 2023-02-10 NOTE — ASSESSMENT & PLAN NOTE
Cont ampicillin will be on ampicillin for 14 days, last day 2/11  ID consult appreciated, signed off   2D echo done no vegetations cont medical management

## 2023-02-10 NOTE — ASSESSMENT & PLAN NOTE
- BP well controlled upon admission  - continue coreg, increased to 6.25 mg b.i.d. but bp low nml. Resumed previous dose of 3.125mg BID

## 2023-02-10 NOTE — ASSESSMENT & PLAN NOTE
Oropharyngeal dysphagia    - s/p 20 F PEG tube placed without any immediate complications.   -External bumper at 3 cm shelby.-  -resumed feeding tube with Isosource 1.5 nathaniel and dietitian consult

## 2023-02-10 NOTE — PLAN OF CARE
NURSING HOME ORDERS    02/12/2023  [facility]  [facility] - OBSERVATION 11H  [id number]  [facility] [address]  Dept: [phone]  Loc: [phone]     Admit to Nursing Home:  Home or Self Care    Diagnoses:  Active Hospital Problems    Diagnosis  POA    Multiple wounds of skin [T14.8XXA]  Yes     Priority: 2     Type 2 diabetes mellitus, with long-term current use of insulin [E11.9, Z79.4]  Not Applicable    HTN (hypertension) [I10]  Yes    On antiepileptic therapy [Z79.899]  Not Applicable    Anemia [D64.9]  Yes    Oropharyngeal dysphagia [R13.12]  Yes    Chronic kidney disease, stage III (moderate) [N18.30]  Yes      Resolved Hospital Problems    Diagnosis Date Resolved POA    *Dislodged gastrostomy tube [T85.528A] 02/12/2023 Yes     Priority: 3     Bacteremia [R78.81] 02/12/2023 Yes     Priority: 1 - High    Leukocytosis [D72.829] 02/12/2023 Yes     Priority: 4     UTI (urinary tract infection) [N39.0] 02/12/2023 Yes       Patient is homebound due to:  Dislodged gastrostomy tube    Allergies:Review of patient's allergies indicates:  No Known Allergies    Vitals:  Routine    Diet:  Tube Feeding: - Isosource 1.5 @ 55ml/hr x 24 hr to provide 1980 kcal, 90g protein, 1008 ml fluid.  Free water flush 250mL q8hr    Activities:   Activity as tolerated    Goals of Care Treatment Preferences:  Code Status: Full Code      Labs:  CBC to monitor WBC and Hgb in 1 week.    Nursing Precautions:  Aspiration , Fall, Seizure, and Pressure ulcer prevention    Consults:   Wound Care     Miscellaneous Care: PEG Care:  Clean site every 24 hours  Wound Care: yes:  sacral area, bilat upper thighs. Keep pt clean and dry, no diapers. Mepilex Ag q3 days/PRN to bleeding wounds. Triad BID/PRN to remaining sacral/thigh wounds. Utilize coloplast wipes to clean pt for incidences of incontinence, remove triad.     Diabetes Care: Diabetes: Continue Hyyejumc26 units daily.   Check blood sugar. Fingerstick blood sugar  every 6 hours if unable to eat  Sliding Scale/Hypoglycemia Protocol: For FSG<80, give 1 amp D50 or 1 glucose tablet. For FSG , do nothing. For -200, give 1 unit of novolog in addition to pre-meal insulin. For -250, give 2 units of novolog in addition to pre-meal insulin. For -300, give 3 units of novolog in addition to pre-meal insulin. For 301-350, give 4 units of novolog in addition to pre-meal insulin. For 351-400, give 5 units of novolog in addition to pre-meal insulin. For FSG >400, give 5 units of novolog in addition to pre-meal insulin and please call MD                   Diabetes Care:  SN to perform and educate Diabetic management with blood glucose monitoring:, Fingerstick blood sugar every 6 hours if patient is unable to eat, and Report CBG < 60 or > 350 to physician.      Medications: Discontinue all previous medication orders, if any. See new list below.     Medication List        START taking these medications      atorvastatin 40 MG tablet  Commonly known as: LIPITOR  1 tablet (40 mg total) by Per G Tube route every evening.     bisacodyL 10 mg Supp  Commonly known as: DULCOLAX  Place 1 suppository (10 mg total) rectally daily as needed (Until bowel movement if patient has no bowel movement for 2 days).     fenofibrate 160 MG Tab  Take 1 tablet (160 mg total) by mouth once daily.     hydrOXYzine 10 mg/5 mL syrup  Commonly known as: ATARAX  12.5 mLs (25 mg total) by Per G Tube route 3 (three) times daily.            CHANGE how you take these medications      predniSONE 5 MG tablet  Commonly known as: DELTASONE  Take 1 tablet (5 mg total) by Per G Tube route once daily for 3 days, THEN 0.5 tablets (2.5 mg total) once daily for 3 days.  Start taking on: February 12, 2023  What changed:   medication strength  See the new instructions.            CONTINUE taking these medications      aspirin 81 MG Chew  81 mg by Per G Tube route once daily.     baclofen 5 mg Tab tablet  Commonly  known as: LIORESAL  15 mg by Per G Tube route 3 (three) times daily.     calcium carbonate 500 mg calcium (1,250 mg) tablet  Commonly known as: OS-OLGA  1,250 mg by Per G Tube route once daily.     CALMOSEPTINE TOP  Apply to the affected area twice daily as needed     carvediloL 3.125 MG tablet  Commonly known as: COREG  3.125 mg by Per G Tube route 2 (two) times daily.     esomeprazole 40 mg Grps  Commonly known as: NEXIUM  40 mg by Per G Tube route once daily.     insulin glargine-yfgn 100 unit/mL (3 mL) Inpn  Inject 15 Units into the skin once daily.     SHEREE 7-7-1.5 gram Pwpk  Generic drug: arginine-glutamine-calcium HMB  1 packet by Per G Tube route 2 (two) times daily.     Lactobacillus rhamnosus GG 10 billion cell capsule  Commonly known as: CULTURELLE  2 capsules by Per G Tube route 2 (two) times a day.     levETIRAcetam 100 mg/mL Soln  Commonly known as: KEPPRA  750 mg by Per G Tube route 2 (two) times daily.     nystatin powder  Commonly known as: MYCOSTATIN  Apply to the affected area daily                Immunizations Administered as of 2/12/2023       Name Date Dose VIS Date Route Exp Date    COVID-19, MRNA, LN-S, PF (Pfizer) (Purple Cap) 6/25/2021 -- 5/10/2021 Intramuscular --    Site: Right arm     : Pf[employer]     Lot: [id number]     External: Auto Reconciled From Outside Source     Comment: Adminis             This patient has had both covid vaccinations    Some patients may experience side effects after vaccination.  These may include fever, headache, muscle or joint aches.  Most symptoms resolve with 24-48 hours and do not require urgent medical evaluation unless they persist for more than 72 hours or symptoms are concerning for an unrelated medical condition.          _________________________________  Marcello Vinson III, MD  02/12/2023

## 2023-02-10 NOTE — ASSESSMENT & PLAN NOTE
EXCORIATIONS, improved and bleeding resolved    With scattered lesions and dry skin.  Encourage moisturize skin, skin care.  Avoid skin debridement, changing position q 2Hrs.  Added scheduled vistaril for pruritis, monitor sedating effect.  Cont Antibiotic ointment at the PEG site.  D/w RN, pressure dressing applied with hemostasis. Appreciate wound care assistance.  No signs of GIB. No hematochezia noted on rectal exam.  H/H stable.

## 2023-02-10 NOTE — NURSING
Pt found in room bleeding from wound sites. Md called to bed side. Md instructed to apply pressure dressing to wound sites. Pressure dressing applied. Bleeding minimized. Asprin held per Md. Care continued.

## 2023-02-11 LAB
ALBUMIN SERPL BCP-MCNC: 1.9 G/DL (ref 3.5–5.2)
ALP SERPL-CCNC: 120 U/L (ref 55–135)
ALT SERPL W/O P-5'-P-CCNC: 18 U/L (ref 10–44)
ANION GAP SERPL CALC-SCNC: 13 MMOL/L (ref 8–16)
ANISOCYTOSIS BLD QL SMEAR: SLIGHT
AST SERPL-CCNC: 20 U/L (ref 10–40)
BASO STIPL BLD QL SMEAR: ABNORMAL
BASOPHILS # BLD AUTO: ABNORMAL K/UL (ref 0–0.2)
BASOPHILS NFR BLD: 0 % (ref 0–1.9)
BILIRUB SERPL-MCNC: 0.2 MG/DL (ref 0.1–1)
BUN SERPL-MCNC: 24 MG/DL (ref 8–23)
CALCIUM SERPL-MCNC: 9.5 MG/DL (ref 8.7–10.5)
CHLORIDE SERPL-SCNC: 104 MMOL/L (ref 95–110)
CO2 SERPL-SCNC: 21 MMOL/L (ref 23–29)
CREAT SERPL-MCNC: 1.1 MG/DL (ref 0.5–1.4)
DIFFERENTIAL METHOD: ABNORMAL
DOHLE BOD BLD QL SMEAR: PRESENT
EOSINOPHIL # BLD AUTO: ABNORMAL K/UL (ref 0–0.5)
EOSINOPHIL NFR BLD: 21 % (ref 0–8)
ERYTHROCYTE [DISTWIDTH] IN BLOOD BY AUTOMATED COUNT: 16.2 % (ref 11.5–14.5)
EST. GFR  (NO RACE VARIABLE): >60 ML/MIN/1.73 M^2
FERRITIN SERPL-MCNC: 687 NG/ML (ref 20–300)
FOLATE SERPL-MCNC: 13.7 NG/ML (ref 4–24)
GLUCOSE SERPL-MCNC: 110 MG/DL (ref 70–110)
HCT VFR BLD AUTO: 26.4 % (ref 40–54)
HGB BLD-MCNC: 8.4 G/DL (ref 14–18)
HYPOCHROMIA BLD QL SMEAR: ABNORMAL
IMM GRANULOCYTES # BLD AUTO: ABNORMAL K/UL (ref 0–0.04)
IMM GRANULOCYTES NFR BLD AUTO: ABNORMAL % (ref 0–0.5)
IRON SERPL-MCNC: 27 UG/DL (ref 45–160)
LYMPHOCYTES # BLD AUTO: ABNORMAL K/UL (ref 1–4.8)
LYMPHOCYTES NFR BLD: 11 % (ref 18–48)
MAGNESIUM SERPL-MCNC: 2.1 MG/DL (ref 1.6–2.6)
MCH RBC QN AUTO: 27.6 PG (ref 27–31)
MCHC RBC AUTO-ENTMCNC: 31.8 G/DL (ref 32–36)
MCV RBC AUTO: 87 FL (ref 82–98)
MONOCYTES # BLD AUTO: ABNORMAL K/UL (ref 0.3–1)
MONOCYTES NFR BLD: 4 % (ref 4–15)
MYELOCYTES NFR BLD MANUAL: 1 %
NEUTROPHILS # BLD AUTO: ABNORMAL K/UL (ref 1.8–7.7)
NEUTROPHILS NFR BLD: 62 % (ref 38–73)
NEUTS BAND NFR BLD MANUAL: 1 %
NRBC BLD-RTO: 0 /100 WBC
OVALOCYTES BLD QL SMEAR: ABNORMAL
PHOSPHATE SERPL-MCNC: 3.4 MG/DL (ref 2.7–4.5)
PLATELET # BLD AUTO: 462 K/UL (ref 150–450)
PLATELET BLD QL SMEAR: ABNORMAL
PMV BLD AUTO: 10.4 FL (ref 9.2–12.9)
POCT GLUCOSE: 171 MG/DL (ref 70–110)
POCT GLUCOSE: 194 MG/DL (ref 70–110)
POCT GLUCOSE: 224 MG/DL (ref 70–110)
POIKILOCYTOSIS BLD QL SMEAR: SLIGHT
POLYCHROMASIA BLD QL SMEAR: ABNORMAL
POTASSIUM SERPL-SCNC: 3.8 MMOL/L (ref 3.5–5.1)
POTASSIUM SERPL-SCNC: 5.2 MMOL/L (ref 3.5–5.1)
PROT SERPL-MCNC: 7.4 G/DL (ref 6–8.4)
RBC # BLD AUTO: 3.04 M/UL (ref 4.6–6.2)
SATURATED IRON: 17 % (ref 20–50)
SODIUM SERPL-SCNC: 138 MMOL/L (ref 136–145)
TOTAL IRON BINDING CAPACITY: 161 UG/DL (ref 250–450)
TOXIC GRANULES BLD QL SMEAR: PRESENT
TRANSFERRIN SERPL-MCNC: 109 MG/DL (ref 200–375)
VIT B12 SERPL-MCNC: 493 PG/ML (ref 210–950)
WBC # BLD AUTO: 15.64 K/UL (ref 3.9–12.7)
WBC TOXIC VACUOLES BLD QL SMEAR: PRESENT

## 2023-02-11 PROCEDURE — 63600175 PHARM REV CODE 636 W HCPCS: Performed by: FAMILY MEDICINE

## 2023-02-11 PROCEDURE — 85027 COMPLETE CBC AUTOMATED: CPT | Performed by: STUDENT IN AN ORGANIZED HEALTH CARE EDUCATION/TRAINING PROGRAM

## 2023-02-11 PROCEDURE — 36415 COLL VENOUS BLD VENIPUNCTURE: CPT | Performed by: STUDENT IN AN ORGANIZED HEALTH CARE EDUCATION/TRAINING PROGRAM

## 2023-02-11 PROCEDURE — 84132 ASSAY OF SERUM POTASSIUM: CPT | Performed by: FAMILY MEDICINE

## 2023-02-11 PROCEDURE — 36415 COLL VENOUS BLD VENIPUNCTURE: CPT | Performed by: FAMILY MEDICINE

## 2023-02-11 PROCEDURE — 99900035 HC TECH TIME PER 15 MIN (STAT)

## 2023-02-11 PROCEDURE — 82607 VITAMIN B-12: CPT | Performed by: FAMILY MEDICINE

## 2023-02-11 PROCEDURE — 80053 COMPREHEN METABOLIC PANEL: CPT | Performed by: STUDENT IN AN ORGANIZED HEALTH CARE EDUCATION/TRAINING PROGRAM

## 2023-02-11 PROCEDURE — 84100 ASSAY OF PHOSPHORUS: CPT | Performed by: STUDENT IN AN ORGANIZED HEALTH CARE EDUCATION/TRAINING PROGRAM

## 2023-02-11 PROCEDURE — 27000221 HC OXYGEN, UP TO 24 HOURS

## 2023-02-11 PROCEDURE — 11000001 HC ACUTE MED/SURG PRIVATE ROOM

## 2023-02-11 PROCEDURE — 25000003 PHARM REV CODE 250: Performed by: FAMILY MEDICINE

## 2023-02-11 PROCEDURE — 82746 ASSAY OF FOLIC ACID SERUM: CPT | Performed by: FAMILY MEDICINE

## 2023-02-11 PROCEDURE — 99233 PR SUBSEQUENT HOSPITAL CARE,LEVL III: ICD-10-PCS | Mod: ,,, | Performed by: FAMILY MEDICINE

## 2023-02-11 PROCEDURE — 82728 ASSAY OF FERRITIN: CPT | Performed by: FAMILY MEDICINE

## 2023-02-11 PROCEDURE — 94761 N-INVAS EAR/PLS OXIMETRY MLT: CPT

## 2023-02-11 PROCEDURE — 84466 ASSAY OF TRANSFERRIN: CPT | Performed by: FAMILY MEDICINE

## 2023-02-11 PROCEDURE — 85007 BL SMEAR W/DIFF WBC COUNT: CPT | Performed by: STUDENT IN AN ORGANIZED HEALTH CARE EDUCATION/TRAINING PROGRAM

## 2023-02-11 PROCEDURE — 83735 ASSAY OF MAGNESIUM: CPT | Performed by: STUDENT IN AN ORGANIZED HEALTH CARE EDUCATION/TRAINING PROGRAM

## 2023-02-11 PROCEDURE — 99233 SBSQ HOSP IP/OBS HIGH 50: CPT | Mod: ,,, | Performed by: FAMILY MEDICINE

## 2023-02-11 RX ORDER — NAPROXEN SODIUM 220 MG/1
81 TABLET, FILM COATED ORAL DAILY
Status: DISCONTINUED | OUTPATIENT
Start: 2023-02-11 | End: 2023-02-12 | Stop reason: HOSPADM

## 2023-02-11 RX ORDER — PREDNISONE 5 MG/1
5 TABLET ORAL DAILY
Status: DISCONTINUED | OUTPATIENT
Start: 2023-02-11 | End: 2023-02-12 | Stop reason: HOSPADM

## 2023-02-11 RX ORDER — ATORVASTATIN CALCIUM 40 MG/1
40 TABLET, FILM COATED ORAL NIGHTLY
Status: DISCONTINUED | OUTPATIENT
Start: 2023-02-11 | End: 2023-02-12 | Stop reason: HOSPADM

## 2023-02-11 RX ORDER — FENOFIBRATE 145 MG/1
145 TABLET, FILM COATED ORAL DAILY
Status: DISCONTINUED | OUTPATIENT
Start: 2023-02-11 | End: 2023-02-12 | Stop reason: HOSPADM

## 2023-02-11 RX ADMIN — LEVETIRACETAM 750 MG: 100 SOLUTION ORAL at 10:02

## 2023-02-11 RX ADMIN — INSULIN DETEMIR 10 UNITS: 100 INJECTION, SOLUTION SUBCUTANEOUS at 10:02

## 2023-02-11 RX ADMIN — AMPICILLIN 2 G: 2 INJECTION, POWDER, FOR SOLUTION INTRAMUSCULAR; INTRAVENOUS at 05:02

## 2023-02-11 RX ADMIN — HYDROXYZINE HYDROCHLORIDE 25 MG: 10 SOLUTION ORAL at 10:02

## 2023-02-11 RX ADMIN — NYSTATIN: 100000 POWDER TOPICAL at 10:02

## 2023-02-11 RX ADMIN — FENOFIBRATE 145 MG: 145 TABLET, FILM COATED ORAL at 10:02

## 2023-02-11 RX ADMIN — AMPICILLIN 2 G: 2 INJECTION, POWDER, FOR SOLUTION INTRAMUSCULAR; INTRAVENOUS at 10:02

## 2023-02-11 RX ADMIN — CARVEDILOL 3.12 MG: 3.12 TABLET, FILM COATED ORAL at 10:02

## 2023-02-11 RX ADMIN — ESOMEPRAZOLE MAGNESIUM 40 MG: 10 GRANULE, FOR SUSPENSION, EXTENDED RELEASE ORAL at 10:02

## 2023-02-11 RX ADMIN — BACLOFEN 15 MG: 5 TABLET ORAL at 01:02

## 2023-02-11 RX ADMIN — AMPICILLIN 2 G: 2 INJECTION, POWDER, FOR SOLUTION INTRAMUSCULAR; INTRAVENOUS at 06:02

## 2023-02-11 RX ADMIN — ATORVASTATIN CALCIUM 40 MG: 40 TABLET, FILM COATED ORAL at 10:02

## 2023-02-11 RX ADMIN — ASPIRIN 81 MG: 81 TABLET, CHEWABLE ORAL at 10:02

## 2023-02-11 RX ADMIN — Medication: at 10:02

## 2023-02-11 RX ADMIN — PREDNISONE 5 MG: 5 TABLET ORAL at 10:02

## 2023-02-11 RX ADMIN — AMPICILLIN 2 G: 2 INJECTION, POWDER, FOR SOLUTION INTRAMUSCULAR; INTRAVENOUS at 02:02

## 2023-02-11 RX ADMIN — AMPICILLIN 2 G: 2 INJECTION, POWDER, FOR SOLUTION INTRAMUSCULAR; INTRAVENOUS at 01:02

## 2023-02-11 RX ADMIN — CALCIUM CARBONATE (ANTACID) CHEW TAB 500 MG 500 MG: 500 CHEW TAB at 10:02

## 2023-02-11 RX ADMIN — BACLOFEN 15 MG: 5 TABLET ORAL at 10:02

## 2023-02-11 NOTE — ASSESSMENT & PLAN NOTE
- etiology likely due to anemia of chronic disease.  - monitor skin excoriations closely and follow wound care recs.  - monitor serial CBC and transfuse if patient becomes hemodynamically unstable, symptomatic, or H/H drops below 7/21.

## 2023-02-11 NOTE — RESPIRATORY THERAPY
RAPID RESPONSE RESPIRATORY THERAPY PROACTIVE NOTE           Time of visit: 915     Code Status: Full Code   : 1960  Bed: 1155/1155 A:   MRN: [id number]  Time spent at the bedside: < 15 min    SITUATION    Evaluated patient for: LDA Check     BACKGROUND    Patient has no past medical history on file.  Clinically Significant Surgical Hx: tracheostomy    24 Hours Vitals Range:  Temp:  [97.7 °F (36.5 °C)-99 °F (37.2 °C)]   Pulse:  [82-88]   Resp:  [17-20]   BP: (101-133)/(56-63)   SpO2:  [96 %-100 %]     Labs:    Recent Labs     23  0326 23  0342 23  0738    138  --    K 4.5 5.2* 3.8    104  --    CO2 21* 21*  --    CREATININE 1.0 1.1  --    * 110  --    PHOS 3.1 3.4  --    MG 2.0 2.1  --         No results for input(s): PH, PCO2, PO2, HCO3, POCSATURATED, BE in the last 72 hours.    ASSESSMENT/INTERVENTIONS  Patient resting. All supplies available and obturator HOB.       Last VS   Temp: 99 °F (37.2 °C) (806)  Pulse: 88 (530)  Resp: 17 (806)  BP: 133/63 (806)  SpO2: 97 % (921)      Extra trachs at bedside: 5,6 XLT  Level of Consciousness: Level of Consciousness (AVPU): responds to voice  Respiratory Effort: Respiratory Effort: Unlabored Expansion/Accessory Muscle Usage: Expansion/Accessory Muscles/Retractions: no use of accessory muscles, no retractions, expansion symmetric  All Lung Field Breath Sounds: All Lung Fields Breath Sounds: Anterior:, Posterior:, Lateral:, coarse  ELSIE Breath Sounds: coarse  LLL Breath Sounds: coarse  RUL Breath Sounds: coarse  RML Breath Sounds: coarse  RLL Breath Sounds: coarse  O2 Device/Concentration: Trach Collar 5L/28%  Surgical airway: Yes, Type: Shiley Size: 6, cuffed  Ambu at bedside: Ambu bag with the patient?: Yes, Adult Ambu     Active Orders   Respiratory Care    Inhalation Treatment Q4H PRN     Frequency: Q4H PRN     Number of Occurrences: Until Specified    Oxygen PRN     Frequency: PRN     Number of  Occurrences: Until Specified     Order Questions:      Device type: Low flow      Device: Trach Collar      Titrate O2 per Oxygen Titration Protocol: Yes      To maintain SpO2 goal of: >= 90%      Notify MD of: Inability to achieve desired SpO2; Sudden change in patient status and requires 20% increase in FiO2; Patient requires >60% FiO2    Pulse Oximetry Q4H     Frequency: Q4H     Number of Occurrences: Until Specified    Routine tracheostomy care     Frequency: BID     Number of Occurrences: Until Specified       RECOMMENDATIONS    We recommend: RRT Recs: Continue POC per primary team.      FOLLOW-UP    Please call back the Rapid Response RT, Kaleigh Davila, RRT at x [phone] for any questions or concerns.

## 2023-02-11 NOTE — SUBJECTIVE & OBJECTIVE
Interval History:  Pt seen and examined, resting comfortably in bed. Awake but remains nonverbal. He is in no distress. Nursing staff says there have been no further episodes of bleeding overnight. Pt is urinating normally and had BM with no evidence of blood overnight.    Review of Systems   Unable to perform ROS: Patient nonverbal   Objective:     Vital Signs (Most Recent):  Temp: 98.5 °F (36.9 °C) (02/11/23 1137)  Pulse: 86 (02/11/23 0921)  Resp: 16 (02/11/23 1137)  BP: (!) 110/58 (02/11/23 1137)  SpO2: 98 % (02/11/23 1137) Vital Signs (24h Range):  Temp:  [97.7 °F (36.5 °C)-99 °F (37.2 °C)] 98.5 °F (36.9 °C)  Pulse:  [84-88] 86  Resp:  [16-20] 16  SpO2:  [96 %-100 %] 98 %  BP: (104-133)/(56-63) 110/58     Weight: 71.7 kg (158 lb)  Body mass index is 21.43 kg/m².    Intake/Output Summary (Last 24 hours) at 2/11/2023 1317  Last data filed at 2/11/2023 0958  Gross per 24 hour   Intake 1170 ml   Output 1050 ml   Net 120 ml        Physical Exam  Vitals and nursing note reviewed.   Constitutional:       General: He is not in acute distress.  HENT:      Mouth/Throat:      Comments: Trach in place  Cardiovascular:      Rate and Rhythm: Normal rate and regular rhythm.   Pulmonary:      Effort: Pulmonary effort is normal.      Breath sounds: Normal breath sounds.   Abdominal:      General: Bowel sounds are normal. There is no distension.      Palpations: Abdomen is soft.      Tenderness: There is no abdominal tenderness (no grimace on abdom exam). There is no guarding or rebound.      Comments: PEG in place, skin surrounding PEG intact with no signs of cellulitis   Genitourinary:     Rectum: No tenderness, external hemorrhoid or internal hemorrhoid.      Comments: Dressings of sacral/gluteal/perineal region C/D/I. No active bleeding noted.  Musculoskeletal:         General: Deformity (rt arm contracted, both legs contracted) present.   Skin:     General: Skin is warm and dry.      Comments: Multiple excoriations but no  active bleeding, dressings C/D/I. No signs of surrounding cellulitis.   Neurological:      Mental Status: He is alert.      Comments: Rt arm and B/L lower extremities contracted with noted atrophy, bed bound. Nonverbal       Significant Labs: All pertinent labs within the past 24 hours have been reviewed.        Significant Imaging: I have reviewed all pertinent imaging results/findings within the past 24 hours.

## 2023-02-11 NOTE — ASSESSMENT & PLAN NOTE
- VSSAF  - Persistent leukocytosis with no new signs or symptoms of infxn.  - g-tube site is clean without discharge or erythma, no evidence of cellulitis.  - IV ABx with ampicillin for bacteremia as above.   -Possible related to steroid use. Pt started on prednisone on previous admission 1/2023 for unclear indication. Will taper off.

## 2023-02-11 NOTE — PROGRESS NOTES
[facility] [address] [place] [address] Medicine  Progress Note    Patient Name: Hao Medrano  MRN: [id number]  Patient Class: IP- Inpatient   Admission Date: 1/25/2023  Length of Stay: 16 days  Attending Physician: Marcello Vinson III,*  Primary Care Provider: Keara Yanes NP        Subjective:     Principal Problem:Dislodged gastrostomy tube        HPI:  Hao Medrano is a 62 y.o. male with a past medical history of CVA with trach and G-tube, CKD, HTN, and HLD who is being palced in observation for further management of dislodged peg tube. Patient presented to the ED from [facility] (formerly [facility] Patient is nonverbal, history obtained from chart and ED staff. Per the ED, the nursing home reports they noticed the dislodged ped tube around 0400 this am. Patient was recently admitted on 1/10 for same issue with a new peg placed on 1/12.    ED: vital signs stable, afebrile. Leukocytosis of 23K. Hb 11.4. PT/INR 10/9/1.0. Furhter labs pending at time of admission. CXR with inconclusive findings due to patient position. Blood cultures obtained. GI consulted and plan for EGD with G-tube placement tomorrow.       Overview/Hospital Course:  Patient was admitted for dislodged PEG tube. GI was consulted and he underwent 20 F PEG tube placed without any immediate complications. External bumper at 3 cm shelby. During the course of this admission serial routine labs with mild hyperglycemia, mild anemia, persistence leukocytosis. He was found to have positive blood Culture with enterococcus Faecalis and coagulase negative staph. ID was consulted and was started on IV antibiotic and outpt recs made by ID, see below. Unable to get outpt Abx covered by insurance so last day of IV ampicillin 2/11/23. Morning of 2/9, pt found with large amount of bright red blood in bed. Source thought to be multiple excoriations in gluteal/perineal region oozing blood actively on exam. Pressure dressing applied with hemostasis. Hb  and vitals monitored closely and stable. Rectal exam with no hemorrhoids noted or palpated. Brown stool on glove, no evidence of GI bleed. Aspirin held for now due to excessive bleeding. Wound care consulted. Vistaril ordered due to pruritis. Plan for discharge back to the nursing home and follow up with ID, PCP.    Of note, pt was started on prednisone previous admission on 1/12/23. I was unable to identify indication. Contacted pharmacy and they were unable to identify indication either. Will start to titrate down dose and recommend further taper at facility. Will provide instructions.       S/p 20 F PEG tube placed without any immediate complications.   External bumper at 3 cm shelby.  Positive blood Cx with gram positive Cocci  2D echo ordered, unremarkable for vegetation  ID consulted:    Pt will complete IV antibiotics in the hospital. Labs ordered for following week    Fax Lab Results to Infectious Diseases Provider: Dr. Priec     [facility] Fax Number: [phone]     Outpatient Infectious Diseases Follow-up      Follow-up appointment will be arranged by the ID clinic and will be found in the patient's appointments tab.   Prior to discharge, please ensure the patient's follow-up has been scheduled.     If there is still no follow-up scheduled prior to discharge, please send an EPIC message to Lauren Stiles in Infectious Diseases.          Interval History:  Pt seen and examined, resting comfortably in bed. Awake but remains nonverbal. He is in no distress. Nursing staff says there have been no further episodes of bleeding overnight. Pt is urinating normally and had BM with no evidence of blood overnight.    Review of Systems   Unable to perform ROS: Patient nonverbal   Objective:     Vital Signs (Most Recent):  Temp: 98.5 °F (36.9 °C) (02/11/23 1137)  Pulse: 86 (02/11/23 0921)  Resp: 16 (02/11/23 1137)  BP: (!) 110/58 (02/11/23 1137)  SpO2: 98 % (02/11/23 1137) Vital Signs (24h Range):  Temp:  [97.7 °F  (36.5 °C)-99 °F (37.2 °C)] 98.5 °F (36.9 °C)  Pulse:  [84-88] 86  Resp:  [16-20] 16  SpO2:  [96 %-100 %] 98 %  BP: (104-133)/(56-63) 110/58     Weight: 71.7 kg (158 lb)  Body mass index is 21.43 kg/m².    Intake/Output Summary (Last 24 hours) at 2/11/2023 1317  Last data filed at 2/11/2023 0958  Gross per 24 hour   Intake 1170 ml   Output 1050 ml   Net 120 ml        Physical Exam  Vitals and nursing note reviewed.   Constitutional:       General: He is not in acute distress.  HENT:      Mouth/Throat:      Comments: Trach in place  Cardiovascular:      Rate and Rhythm: Normal rate and regular rhythm.   Pulmonary:      Effort: Pulmonary effort is normal.      Breath sounds: Normal breath sounds.   Abdominal:      General: Bowel sounds are normal. There is no distension.      Palpations: Abdomen is soft.      Tenderness: There is no abdominal tenderness (no grimace on abdom exam). There is no guarding or rebound.      Comments: PEG in place, skin surrounding PEG intact with no signs of cellulitis   Genitourinary:     Rectum: No tenderness, external hemorrhoid or internal hemorrhoid.      Comments: Dressings of sacral/gluteal/perineal region C/D/I. No active bleeding noted.  Musculoskeletal:         General: Deformity (rt arm contracted, both legs contracted) present.   Skin:     General: Skin is warm and dry.      Comments: Multiple excoriations but no active bleeding, dressings C/D/I. No signs of surrounding cellulitis.   Neurological:      Mental Status: He is alert.      Comments: Rt arm and B/L lower extremities contracted with noted atrophy, bed bound. Nonverbal       Significant Labs: All pertinent labs within the past 24 hours have been reviewed.        Significant Imaging: I have reviewed all pertinent imaging results/findings within the past 24 hours.      Assessment/Plan:      * Dislodged gastrostomy tube  Oropharyngeal dysphagia    - s/p 20 F PEG tube placed without any immediate complications.   -External  "bumper at 3 cm shelby.-  -resumed feeding tube with Isosource 1.5 nathaniel and dietitian consult and 250mL water flushes q8h    Bacteremia  Cont ampicillin will be on ampicillin for 14 days, last day 2/11  ID consult appreciated, signed off  2D echo done no vegetations cont medical management  Leukocutosis persists with no signs of new infection and pt remains afebrile. Possible related to steroids, see "leukocytosis" below.        Multiple wounds of skin  EXCORIATIONS, improved and bleeding resolved    With scattered lesions and dry skin.  Encourage moisturize skin, skin care.  Avoid skin debridement, changing position q 2Hrs.  Added scheduled vistaril for pruritis, monitor sedating effect.  D/w RN, pressure dressing applied with hemostasis. Appreciate wound care assistance.  No signs of GIB. No hematochezia noted on rectal exam.  Continue to monitor H/H closely.      Leukocytosis  - VSSAF  - Persistent leukocytosis with no new signs or symptoms of infxn.  - g-tube site is clean without discharge or erythma, no evidence of cellulitis.  - IV ABx with ampicillin for bacteremia as above.   -Possible related to steroid use. Pt started on prednisone on previous admission 1/2023 for unclear indication. Will taper off.     UTI (urinary tract infection)  -Urine cx with candida, likely representing asymptomatic candiduria/colonization      On antiepileptic therapy  - continue keppra 750 mg BID once peg tube is replaced      HTN (hypertension)  - BP well controlled upon admission  - continue coreg, increased to 6.25 mg b.i.d. but bp low nml. Resumed previous dose of 3.125mg BID      Type 2 diabetes mellitus, with long-term current use of insulin  Last A1c reviewed- 5.9 in 2018. Repeat pending.   Hold Oral hypoglycemics while patient is in the hospital.  Continue accuchecks and sliding scale.  Patient is on insulin glargine 15U daily at the nursing home. Will start at 10 units and titrate.  Glu may improve with decreased steroid " dose.    Oropharyngeal dysphagia  -Cont tube feeding      Anemia  - etiology likely due to anemia of chronic disease.  - monitor skin excoriations closely and follow wound care recs.  - monitor serial CBC and transfuse if patient becomes hemodynamically unstable, symptomatic, or H/H drops below 7/21.         Chronic kidney disease, stage III (moderate)  Renally dose all medications  Avoid nephrotoxins  Will continue to monitor on daily labs        VTE Risk Mitigation (From admission, onward)         Ordered     Place sequential compression device  Until discontinued         01/25/23 1411     IP VTE HIGH RISK PATIENT  Once         01/25/23 1411     Reason for No Pharmacological VTE Prophylaxis  Once        Question:  Reasons:  Answer:  Risk of Bleeding  Comment:  pre-op    01/25/23 1411                Discharge Planning   FAINA: 2/12/2023     Code Status: Full Code   Is the patient medically ready for discharge?: No    Reason for patient still in hospital (select all that apply): Patient trending condition  Discharge Plan A: Long-term acute care facility (LTAC)   Discharge Delays: None known at this time              Marcello Vinson III, MD  Department of Hospital Medicine   [facility] - Observation 11H

## 2023-02-11 NOTE — ASSESSMENT & PLAN NOTE
Last A1c reviewed- 5.9 in 2018. Repeat pending.   Hold Oral hypoglycemics while patient is in the hospital.  Continue accuchecks and sliding scale.  Patient is on insulin glargine 15U daily at the nursing home. Will start at 10 units and titrate.  Glu may improve with decreased steroid dose.

## 2023-02-11 NOTE — ASSESSMENT & PLAN NOTE
"Cont ampicillin will be on ampicillin for 14 days, last day 2/11  ID consult appreciated, signed off  2D echo done no vegetations cont medical management  Leukocutosis persists with no signs of new infection and pt remains afebrile. Possible related to steroids, see "leukocytosis" below.      "

## 2023-02-11 NOTE — ASSESSMENT & PLAN NOTE
EXCORIATIONS, improved and bleeding resolved    With scattered lesions and dry skin.  Encourage moisturize skin, skin care.  Avoid skin debridement, changing position q 2Hrs.  Added scheduled vistaril for pruritis, monitor sedating effect.  D/w RN, pressure dressing applied with hemostasis. Appreciate wound care assistance.  No signs of GIB. No hematochezia noted on rectal exam.  Continue to monitor H/H closely.

## 2023-02-12 VITALS
HEIGHT: 72 IN | DIASTOLIC BLOOD PRESSURE: 69 MMHG | OXYGEN SATURATION: 99 % | RESPIRATION RATE: 18 BRPM | WEIGHT: 158 LBS | HEART RATE: 93 BPM | SYSTOLIC BLOOD PRESSURE: 128 MMHG | TEMPERATURE: 97 F | BODY MASS INDEX: 21.4 KG/M2

## 2023-02-12 PROBLEM — N39.0 UTI (URINARY TRACT INFECTION): Status: RESOLVED | Noted: 2023-01-25 | Resolved: 2023-02-12

## 2023-02-12 PROBLEM — T85.528A DISLODGED GASTROSTOMY TUBE: Status: RESOLVED | Noted: 2023-01-10 | Resolved: 2023-02-12

## 2023-02-12 PROBLEM — D72.829 LEUKOCYTOSIS: Status: RESOLVED | Noted: 2023-01-25 | Resolved: 2023-02-12

## 2023-02-12 PROBLEM — R78.81 BACTEREMIA: Status: RESOLVED | Noted: 2023-01-26 | Resolved: 2023-02-12

## 2023-02-12 LAB
ANION GAP SERPL CALC-SCNC: 11 MMOL/L (ref 8–16)
ANISOCYTOSIS BLD QL SMEAR: SLIGHT
BASOPHILS # BLD AUTO: 0.1 K/UL (ref 0–0.2)
BASOPHILS NFR BLD: 0.9 % (ref 0–1.9)
BUN SERPL-MCNC: 22 MG/DL (ref 8–23)
CALCIUM SERPL-MCNC: 8.9 MG/DL (ref 8.7–10.5)
CHLORIDE SERPL-SCNC: 104 MMOL/L (ref 95–110)
CO2 SERPL-SCNC: 24 MMOL/L (ref 23–29)
CREAT SERPL-MCNC: 0.9 MG/DL (ref 0.5–1.4)
DIFFERENTIAL METHOD: ABNORMAL
EOSINOPHIL # BLD AUTO: 2.6 K/UL (ref 0–0.5)
EOSINOPHIL NFR BLD: 23.7 % (ref 0–8)
ERYTHROCYTE [DISTWIDTH] IN BLOOD BY AUTOMATED COUNT: 16.4 % (ref 11.5–14.5)
EST. GFR  (NO RACE VARIABLE): >60 ML/MIN/1.73 M^2
GLUCOSE SERPL-MCNC: 147 MG/DL (ref 70–110)
HCT VFR BLD AUTO: 26.2 % (ref 40–54)
HGB BLD-MCNC: 7.6 G/DL (ref 14–18)
IMM GRANULOCYTES # BLD AUTO: 0.02 K/UL (ref 0–0.04)
IMM GRANULOCYTES NFR BLD AUTO: 0.2 % (ref 0–0.5)
LYMPHOCYTES # BLD AUTO: 2.2 K/UL (ref 1–4.8)
LYMPHOCYTES NFR BLD: 20.5 % (ref 18–48)
MCH RBC QN AUTO: 26.9 PG (ref 27–31)
MCHC RBC AUTO-ENTMCNC: 29 G/DL (ref 32–36)
MCV RBC AUTO: 93 FL (ref 82–98)
MONOCYTES # BLD AUTO: 0.6 K/UL (ref 0.3–1)
MONOCYTES NFR BLD: 5.2 % (ref 4–15)
NEUTROPHILS # BLD AUTO: 5.4 K/UL (ref 1.8–7.7)
NEUTROPHILS NFR BLD: 49.5 % (ref 38–73)
NRBC BLD-RTO: 0 /100 WBC
PLATELET # BLD AUTO: 484 K/UL (ref 150–450)
PLATELET BLD QL SMEAR: ABNORMAL
PMV BLD AUTO: 10.1 FL (ref 9.2–12.9)
POCT GLUCOSE: 165 MG/DL (ref 70–110)
POCT GLUCOSE: 169 MG/DL (ref 70–110)
POCT GLUCOSE: 184 MG/DL (ref 70–110)
POCT GLUCOSE: 218 MG/DL (ref 70–110)
POIKILOCYTOSIS BLD QL SMEAR: SLIGHT
POTASSIUM SERPL-SCNC: 4.1 MMOL/L (ref 3.5–5.1)
RBC # BLD AUTO: 2.83 M/UL (ref 4.6–6.2)
SODIUM SERPL-SCNC: 139 MMOL/L (ref 136–145)
TARGETS BLD QL SMEAR: ABNORMAL
WBC # BLD AUTO: 10.82 K/UL (ref 3.9–12.7)

## 2023-02-12 PROCEDURE — 25000003 PHARM REV CODE 250: Performed by: FAMILY MEDICINE

## 2023-02-12 PROCEDURE — 27200966 HC CLOSED SUCTION SYSTEM

## 2023-02-12 PROCEDURE — 63600175 PHARM REV CODE 636 W HCPCS: Performed by: FAMILY MEDICINE

## 2023-02-12 PROCEDURE — 27000221 HC OXYGEN, UP TO 24 HOURS

## 2023-02-12 PROCEDURE — 99900035 HC TECH TIME PER 15 MIN (STAT)

## 2023-02-12 PROCEDURE — 94761 N-INVAS EAR/PLS OXIMETRY MLT: CPT

## 2023-02-12 PROCEDURE — 36415 COLL VENOUS BLD VENIPUNCTURE: CPT | Performed by: FAMILY MEDICINE

## 2023-02-12 PROCEDURE — 80048 BASIC METABOLIC PNL TOTAL CA: CPT | Performed by: FAMILY MEDICINE

## 2023-02-12 PROCEDURE — 85025 COMPLETE CBC W/AUTO DIFF WBC: CPT | Performed by: FAMILY MEDICINE

## 2023-02-12 RX ORDER — ATORVASTATIN CALCIUM 40 MG/1
40 TABLET, FILM COATED ORAL NIGHTLY
Qty: 90 TABLET | Refills: 3 | Status: SHIPPED | OUTPATIENT
Start: 2023-02-12 | End: 2023-04-08

## 2023-02-12 RX ORDER — CARVEDILOL 3.12 MG/1
3.12 TABLET ORAL 2 TIMES DAILY
Qty: 60 TABLET | Refills: 11 | Status: SHIPPED | OUTPATIENT
Start: 2023-02-12 | End: 2023-02-12 | Stop reason: HOSPADM

## 2023-02-12 RX ORDER — PREDNISONE 5 MG/1
TABLET ORAL
Qty: 6 TABLET | Refills: 0 | Status: SHIPPED | OUTPATIENT
Start: 2023-02-12 | End: 2023-02-21

## 2023-02-12 RX ORDER — NAPROXEN SODIUM 220 MG/1
81 TABLET, FILM COATED ORAL DAILY
Qty: 30 TABLET | Refills: 0 | Status: SHIPPED | OUTPATIENT
Start: 2023-02-12 | End: 2023-02-12 | Stop reason: HOSPADM

## 2023-02-12 RX ORDER — FENOFIBRATE 160 MG/1
160 TABLET ORAL DAILY
Qty: 90 TABLET | Refills: 0 | Status: SHIPPED | OUTPATIENT
Start: 2023-02-12 | End: 2023-04-08

## 2023-02-12 RX ADMIN — INSULIN DETEMIR 10 UNITS: 100 INJECTION, SOLUTION SUBCUTANEOUS at 08:02

## 2023-02-12 RX ADMIN — ESOMEPRAZOLE MAGNESIUM 40 MG: 10 GRANULE, FOR SUSPENSION, EXTENDED RELEASE ORAL at 08:02

## 2023-02-12 RX ADMIN — BACLOFEN 15 MG: 5 TABLET ORAL at 08:02

## 2023-02-12 RX ADMIN — NYSTATIN: 100000 POWDER TOPICAL at 08:02

## 2023-02-12 RX ADMIN — FENOFIBRATE 145 MG: 145 TABLET, FILM COATED ORAL at 08:02

## 2023-02-12 RX ADMIN — HYDROXYZINE HYDROCHLORIDE 25 MG: 10 SOLUTION ORAL at 08:02

## 2023-02-12 RX ADMIN — AMPICILLIN 2 G: 2 INJECTION, POWDER, FOR SOLUTION INTRAMUSCULAR; INTRAVENOUS at 03:02

## 2023-02-12 RX ADMIN — LEVETIRACETAM 750 MG: 100 SOLUTION ORAL at 08:02

## 2023-02-12 RX ADMIN — CALCIUM CARBONATE (ANTACID) CHEW TAB 500 MG 500 MG: 500 CHEW TAB at 08:02

## 2023-02-12 RX ADMIN — AMPICILLIN 2 G: 2 INJECTION, POWDER, FOR SOLUTION INTRAMUSCULAR; INTRAVENOUS at 11:02

## 2023-02-12 RX ADMIN — BACLOFEN 15 MG: 5 TABLET ORAL at 03:02

## 2023-02-12 RX ADMIN — PREDNISONE 5 MG: 5 TABLET ORAL at 08:02

## 2023-02-12 RX ADMIN — CARVEDILOL 3.12 MG: 3.12 TABLET, FILM COATED ORAL at 08:02

## 2023-02-12 RX ADMIN — AMPICILLIN 2 G: 2 INJECTION, POWDER, FOR SOLUTION INTRAMUSCULAR; INTRAVENOUS at 05:02

## 2023-02-12 RX ADMIN — ASPIRIN 81 MG: 81 TABLET, CHEWABLE ORAL at 08:02

## 2023-02-12 RX ADMIN — AMPICILLIN 2 G: 2 INJECTION, POWDER, FOR SOLUTION INTRAMUSCULAR; INTRAVENOUS at 02:02

## 2023-02-12 RX ADMIN — Medication: at 08:02

## 2023-02-12 NOTE — ASSESSMENT & PLAN NOTE
- etiology likely due to anemia of chronic disease.  - monitor skin excoriations closely and follow wound care recs.  - Pt remains on ASA for stroke prevention. Monitor closely for bleeding. Repeat labs ordered 2/14/23

## 2023-02-12 NOTE — RESPIRATORY THERAPY
RAPID RESPONSE RESPIRATORY THERAPY PROACTIVE NOTE           Time of visit: 815       Code Status: Full Code   : 1960  Bed: 1155/1155 A:   MRN: [id number]  Time spent at the bedside: < 15 min    SITUATION    Evaluated patient for: LDA Check     BACKGROUND    Patient has no past medical history on file.  Clinically Significant Surgical Hx: tracheostomy    24 Hours Vitals Range:  Temp:  [97.7 °F (36.5 °C)-98.3 °F (36.8 °C)]   Pulse:  [89-96]   Resp:  [16-19]   BP: (121-151)/(59-77)   SpO2:  [96 %-98 %]     Labs:    Recent Labs     23  0342 23  0738 23  0603     --  139   K 5.2* 3.8 4.1     --  104   CO2 21*  --  24   CREATININE 1.1  --  0.9     --  147*   PHOS 3.4  --   --    MG 2.1  --   --         No results for input(s): PH, PCO2, PO2, HCO3, POCSATURATED, BE in the last 72 hours.    ASSESSMENT/INTERVENTIONS  Pt resting comfortably with no respiratory needs at this time      Last VS   Temp: 98.3 °F (36.8 °C) (1103)  Pulse: 90 ( 1103)  Resp: 18 ( 1103)  BP: 139/77 ( 1103)  SpO2: 97 % ( 1224)      Extra trachs at bedside: y  Level of Consciousness: Level of Consciousness (AVPU): alert  Respiratory Effort: Respiratory Effort: Unlabored Expansion/Accessory Muscle Usage: Expansion/Accessory Muscles/Retractions: no retractions, no use of accessory muscles  All Lung Field Breath Sounds: All Lung Fields Breath Sounds: coarse  ELSIE Breath Sounds: coarse  LLL Breath Sounds: coarse  RUL Breath Sounds: coarse  RML Breath Sounds: coarse  RLL Breath Sounds: coarse  O2 Device/Concentration: R.A.  Surgical airway: Yes, Type: Shiley Size: 6, cuffed  Ambu at bedside: Ambu bag with the patient?: Yes, Adult Ambu     Active Orders   Respiratory Care    Inhalation Treatment Q4H PRN     Frequency: Q4H PRN     Number of Occurrences: Until Specified    Oxygen PRN     Frequency: PRN     Number of Occurrences: Until Specified     Order Questions:      Device type: Low flow       Device: Trach Collar      Titrate O2 per Oxygen Titration Protocol: Yes      To maintain SpO2 goal of: >= 90%      Notify MD of: Inability to achieve desired SpO2; Sudden change in patient status and requires 20% increase in FiO2; Patient requires >60% FiO2    Pulse Oximetry Q4H     Frequency: Q4H     Number of Occurrences: Until Specified    Routine tracheostomy care     Frequency: BID     Number of Occurrences: Until Specified       RECOMMENDATIONS    We recommend: RRT Recs: Continue POC per primary team.      FOLLOW-UP    Please call back the Rapid Response RT, Ayad Herbert RRT at x [phone] for any questions or concerns.

## 2023-02-12 NOTE — PLAN OF CARE
CM received message from Dr. Vinson to inquire which staff could assist with patient's discharge. Provided requested information.      Ines Hooker RN  Weekend  - [facility]  Spectralink: [phone]

## 2023-02-12 NOTE — ASSESSMENT & PLAN NOTE
- BP well controlled upon admission  - continue coreg, increased to 6.25 mg b.i.d. but bp low nml. Resumed previous dose of 3.125mg BID, continue 3.125mg bid on dc

## 2023-02-12 NOTE — PLAN OF CARE
Nurse to call report to Terrell Cosme to , report to [facility] and stretcher setup for 2pm, nurse updated on time.

## 2023-02-12 NOTE — PLAN OF CARE
[facility]H  Discharge Final Note    Primary Care Provider: Keara Yanes NP    Expected Discharge Date: 2/12/2023    Final Discharge Note (most recent)       Final Note - 02/12/23 1012          Final Note    Assessment Type Final Discharge Note     Anticipated Discharge Disposition Intermediate Care Facility for group home or Supportive Care     Hospital Resources/Appts/Education Provided Post-Acute resouces added to AVS        Post-Acute Status    Post-Acute Authorization Placement     Post-Acute Placement Status Set-up Complete/Auth obtained     Discharge Delays None known at this time                     Important Message from Medicare  Important Message from Medicare regarding Discharge Appeal Rights: Given to patient/caregiver, Explained to patient/caregiver, Other (comments) (verbal consent with spouse, Tiffany witnessed)     Date IMM was signed: 02/06/23  Time IMM was signed: 1054    Contact Info       Keara Yanes NP   Specialty: Family Medicine   Relationship: PCP - General    Family Medicine  [address] [place] [id number]   Phone: [phone]       Next Steps: Follow up in 1 week(s)    INFECTIOUS DISEASE        Next Steps: Follow up in 1 week(s)

## 2023-02-12 NOTE — PLAN OF CARE
Pt. Follow up appointment will be made tomorrow        Kesha Dyer [facility]  Case Management  [phone]

## 2023-02-14 NOTE — PHYSICIAN QUERY
PT Name: Hao Medrano  MR #: [id number]     DOCUMENTATION CLARIFICATION     CDS: Jan SILVESTRE,RN        Contact information:[email or web address]  This form is a permanent document in the medical record.     Query Date: February 13, 2023    By submitting this query, we are merely seeking further clarification of documentation.  Please utilize your independent clinical judgment when addressing the question(s) below.  The Medical Record contains the following:  Indicators Supporting Clinical Findings Location in Medical Record   x HR         RR          BP        Temp Vital Signs (24 h Range):   Temp: 96.3F ------>98.7F  Pulse: 99----->110  Resp: 18--->22  SBP: 114---->137  DBP: 61----->82     2/3 HM PN   x Lactic Acid          Procalcitonin Lactate venous: 1.1  Procalcitonin: 0.27 1/25 Lab  1/25 Lab   x WBC           Bands          CRP WBC: 23.17----->17.72---->14.82, 16.34--->16.71--->15.00--->16.87--->17.89, 14.10,12.79, 12.11    Bands: 1.0  CRP:  220.9---->207.7 1/25---->1/27, 1/29---->2/2, 2/5, 2/7,2/9 Labs    2/11 Labs  1/25 Labs   x Culture(s) CANDIDA ALBICANS   > 100,000 cfu/ml   Treatment of asymptomatic candiduria is not recommended (except for specific populations). Candida isolated in the urine typically represents colonization. If an indwelling urinary catheter is present it should be removed or replaced.     Blood Culture, Routine Gram stain aer bottle: Gram positive cocci in chains resembling Strep   Blood Culture, Routine Gram stain omar bottle: Gram positive cocci   Blood Culture, Routine Positive results previously called 01/26/2023  11:58   Blood Culture, Routine ENTEROCOCCUS FAECALIS Abnormal    Blood Culture, Routine  Abnormal   COAGULASE-NEGATIVE STAPHYLOCOCCUS SPECIES   Organism is a probable contaminant       1/25 Urine Culture                    1/25 Blood Culture    AMS, Confusion, LOC, etc.      Organ Dysfunction/Failure     x Bacteremia or Sepsis / Septic Patient reviewed for  elevated sepsis risk score. Sepsis Screen Positive  Patient already receiving appropriate sepsis care.  Sepsis Screen (IP) - 02/01/23 1048   Is the patient's history or complaint suggestive of a possible infection? Yes  -SS  Are there at least two of the following signs and symptoms present? Yes  -SS  Sepsis signs/symptoms - Tachycardia Tachycardia     >90  -SS  Sepsis signs/symptoms - WBC WBC < 4,000 or WBC > 12,000  -SS  Are any of the following organ dysfunction criteria present and not considered to be due to a chronic condition? Yes  -SS  Organ Dysfunction Criteria - Resp Comp Respiratory Compromise: Requiring > 5L NC  -SS  Initiate Sepsis Protocol No  -SS  Reason sepsis not considered Pt. receiving appropriate management  -SS    Infection: E faecalis bacteremia  Bacteremia  Cont ampicillin will be on ampicillin for 14 days, last day 2/11  ID consult appreciated, signed off  2D echo done no vegetations cont medical management  He was found to have positive blood Culture with enterococcus Faecalis and coagulase negative staph               2/1  HM  Al Deterioration Alert/ Pee PA-C                                2/9 HN PN    Known or Suspected Source of Infection documented      (Failed) Outpatient Treatment     x Medication Lactated ringers bolus 1,000 ml Intravenous Once@ 500 ml/hr  over 2 hours 1 dose(s) given  Vancomycin 1,500 mg in dextrose 5% 250 ml IVPB Intravenous Once 1 dose(s) given  Ampicillin  2 g in sodium chloride 0.9% 100 ml IVPB  Intravenous Every 4 hours  Ceftriaxone 1 g in dextrose 5% in water 50 ml IVPB Intravenous Every 24 hours  Metronidazole IVPB  500 mg  Intravenous  Every 8 hours  Vancomycin 1,000 mg in dextrose 5% 250 ml IVPB Intravenous Every 24 hours  Vancomycin 750 mg in dextrose 5% 250 ml IVPB Intravenous Every 12 hours 1/25 MAR    1/25 MAR    1/30---->2/12 MAR  1/25---->1/27 MAR  1/25----->1/27 MAR  1/28----->1/29 MAR  1/26---->1/27 MAR    Treatment     x Other  "Leukocytosis  VSSAF  leukocytosis of 23K upon admission  g-tube site is clean without discharge or erythma  patient on prednisone 10 mg daily, unclear why per chart review - could be contributing to significant leukocytosis     1/26  HN PN        Provider, due to the conflicting  documentation regarding the "infection process", please clarify/confirm the diagnosis associated with above clinical findings.  [   ] Sepsis due to unknown organism   [   ] Sepsis due to (suspected) organism (please specify): __________   [ X ] Bacteremia without Sepsis   [   ] Bacteremia with Sepsis   [   ] Other Infectious Disease (please specify): __________   [   ] Sepsis Ruled Out       Please document in your progress notes daily for the duration of treatment until resolved and include in your discharge summary.     "

## 2023-04-08 ENCOUNTER — HOSPITAL ENCOUNTER (OUTPATIENT)
Facility: HOSPITAL | Age: 63
Discharge: NURSING, REHAB OR LONG-TERM CARE | End: 2023-04-12
Attending: EMERGENCY MEDICINE | Admitting: FAMILY MEDICINE
Payer: MEDICARE

## 2023-04-08 DIAGNOSIS — R79.89 ELEVATED TROPONIN: ICD-10-CM

## 2023-04-08 DIAGNOSIS — R53.1 WEAKNESS: ICD-10-CM

## 2023-04-08 DIAGNOSIS — K92.0 HEMATEMESIS, UNSPECIFIED WHETHER NAUSEA PRESENT: Primary | ICD-10-CM

## 2023-04-08 DIAGNOSIS — R07.9 CHEST PAIN: ICD-10-CM

## 2023-04-08 DIAGNOSIS — R00.0 TACHYCARDIA: ICD-10-CM

## 2023-04-08 DIAGNOSIS — G82.50 QUADRIPLEGIA: ICD-10-CM

## 2023-04-08 PROBLEM — R74.01 ELEVATED TRANSAMINASE LEVEL: Status: ACTIVE | Noted: 2023-04-08

## 2023-04-08 PROBLEM — J18.9 COMMUNITY ACQUIRED PNEUMONIA: Status: ACTIVE | Noted: 2023-04-08

## 2023-04-08 PROBLEM — J69.0 ASPIRATION PNEUMONIA: Status: ACTIVE | Noted: 2023-04-08

## 2023-04-08 PROBLEM — K29.70 GASTRITIS: Status: ACTIVE | Noted: 2023-04-08

## 2023-04-08 PROBLEM — A41.9 SEPSIS: Status: ACTIVE | Noted: 2023-04-08

## 2023-04-08 LAB
ABO + RH BLD: NORMAL
ALBUMIN SERPL BCP-MCNC: 2.6 G/DL (ref 3.5–5.2)
ALLENS TEST: NORMAL
ALP SERPL-CCNC: 399 U/L (ref 55–135)
ALT SERPL W/O P-5'-P-CCNC: 154 U/L (ref 10–44)
ANION GAP SERPL CALC-SCNC: 12 MMOL/L (ref 8–16)
AST SERPL-CCNC: 70 U/L (ref 10–40)
BACTERIA #/AREA URNS AUTO: ABNORMAL /HPF
BASOPHILS # BLD AUTO: 0.05 K/UL (ref 0–0.2)
BASOPHILS NFR BLD: 0.3 % (ref 0–1.9)
BILIRUB SERPL-MCNC: 0.3 MG/DL (ref 0.1–1)
BILIRUB UR QL STRIP: NEGATIVE
BLD GP AB SCN CELLS X3 SERPL QL: NORMAL
BUN SERPL-MCNC: 62 MG/DL (ref 8–23)
CALCIUM SERPL-MCNC: 10.2 MG/DL (ref 8.7–10.5)
CHLORIDE SERPL-SCNC: 105 MMOL/L (ref 95–110)
CLARITY UR REFRACT.AUTO: ABNORMAL
CO2 SERPL-SCNC: 23 MMOL/L (ref 23–29)
COLOR UR AUTO: YELLOW
CREAT SERPL-MCNC: 1.1 MG/DL (ref 0.5–1.4)
DIFFERENTIAL METHOD: ABNORMAL
EOSINOPHIL # BLD AUTO: 0.3 K/UL (ref 0–0.5)
EOSINOPHIL NFR BLD: 1.8 % (ref 0–8)
ERYTHROCYTE [DISTWIDTH] IN BLOOD BY AUTOMATED COUNT: 17.1 % (ref 11.5–14.5)
EST. GFR  (NO RACE VARIABLE): >60 ML/MIN/1.73 M^2
GLUCOSE SERPL-MCNC: 196 MG/DL (ref 70–110)
GLUCOSE UR QL STRIP: ABNORMAL
HCT VFR BLD AUTO: 40.1 % (ref 40–54)
HCT VFR BLD AUTO: 42 % (ref 40–54)
HCV AB SERPL QL IA: NORMAL
HGB BLD-MCNC: 12.3 G/DL (ref 14–18)
HGB BLD-MCNC: 13.2 G/DL (ref 14–18)
HGB UR QL STRIP: ABNORMAL
HIV 1+2 AB+HIV1 P24 AG SERPL QL IA: NORMAL
HYALINE CASTS UR QL AUTO: 0 /LPF
IMM GRANULOCYTES # BLD AUTO: 0.05 K/UL (ref 0–0.04)
IMM GRANULOCYTES NFR BLD AUTO: 0.3 % (ref 0–0.5)
INFLUENZA A, MOLECULAR: NOT DETECTED
INFLUENZA B, MOLECULAR: NOT DETECTED
KETONES UR QL STRIP: NEGATIVE
LACTATE SERPL-SCNC: 1 MMOL/L (ref 0.5–2.2)
LACTATE SERPL-SCNC: 1.3 MMOL/L (ref 0.5–2.2)
LDH SERPL L TO P-CCNC: 1.41 MMOL/L (ref 0.5–2.2)
LEUKOCYTE ESTERASE UR QL STRIP: ABNORMAL
LYMPHOCYTES # BLD AUTO: 2.5 K/UL (ref 1–4.8)
LYMPHOCYTES NFR BLD: 16.2 % (ref 18–48)
MAGNESIUM SERPL-MCNC: 1.9 MG/DL (ref 1.6–2.6)
MCH RBC QN AUTO: 26.1 PG (ref 27–31)
MCHC RBC AUTO-ENTMCNC: 31.4 G/DL (ref 32–36)
MCV RBC AUTO: 83 FL (ref 82–98)
MICROSCOPIC COMMENT: ABNORMAL
MONOCYTES # BLD AUTO: 1.1 K/UL (ref 0.3–1)
MONOCYTES NFR BLD: 7.2 % (ref 4–15)
NEUTROPHILS # BLD AUTO: 11.4 K/UL (ref 1.8–7.7)
NEUTROPHILS NFR BLD: 74.2 % (ref 38–73)
NITRITE UR QL STRIP: NEGATIVE
NON-SQ EPI CELLS #/AREA URNS AUTO: 3 /HPF
NRBC BLD-RTO: 0 /100 WBC
PH UR STRIP: 8 [PH] (ref 5–8)
PHOSPHATE SERPL-MCNC: 2.4 MG/DL (ref 2.7–4.5)
PLATELET # BLD AUTO: 553 K/UL (ref 150–450)
PMV BLD AUTO: 10.5 FL (ref 9.2–12.9)
POCT GLUCOSE: 134 MG/DL (ref 70–110)
POCT GLUCOSE: 98 MG/DL (ref 70–110)
POTASSIUM SERPL-SCNC: 4.5 MMOL/L (ref 3.5–5.1)
PROCALCITONIN SERPL IA-MCNC: 0.44 NG/ML
PROT SERPL-MCNC: 8.4 G/DL (ref 6–8.4)
PROT UR QL STRIP: ABNORMAL
RBC # BLD AUTO: 5.06 M/UL (ref 4.6–6.2)
RBC #/AREA URNS AUTO: >100 /HPF (ref 0–4)
RSV AG BY MOLECULAR METHOD: NOT DETECTED
SAMPLE: NORMAL
SARS-COV-2 RNA RESP QL NAA+PROBE: NOT DETECTED
SITE: NORMAL
SODIUM SERPL-SCNC: 140 MMOL/L (ref 136–145)
SP GR UR STRIP: >=1.03 (ref 1–1.03)
SPECIMEN OUTDATE: NORMAL
SQUAMOUS #/AREA URNS AUTO: 5 /HPF
TROPONIN I SERPL DL<=0.01 NG/ML-MCNC: 0.09 NG/ML (ref 0–0.03)
TROPONIN I SERPL DL<=0.01 NG/ML-MCNC: 0.09 NG/ML (ref 0–0.03)
TROPONIN I SERPL DL<=0.01 NG/ML-MCNC: 0.1 NG/ML (ref 0–0.03)
URN SPEC COLLECT METH UR: ABNORMAL
WBC # BLD AUTO: 15.36 K/UL (ref 3.9–12.7)
WBC #/AREA URNS AUTO: >100 /HPF (ref 0–5)

## 2023-04-08 PROCEDURE — 63600175 PHARM REV CODE 636 W HCPCS: Performed by: EMERGENCY MEDICINE

## 2023-04-08 PROCEDURE — 83605 ASSAY OF LACTIC ACID: CPT | Performed by: FAMILY MEDICINE

## 2023-04-08 PROCEDURE — 96361 HYDRATE IV INFUSION ADD-ON: CPT

## 2023-04-08 PROCEDURE — 86900 BLOOD TYPING SEROLOGIC ABO: CPT | Performed by: EMERGENCY MEDICINE

## 2023-04-08 PROCEDURE — 31720 CLEARANCE OF AIRWAYS: CPT

## 2023-04-08 PROCEDURE — 87040 BLOOD CULTURE FOR BACTERIA: CPT | Performed by: EMERGENCY MEDICINE

## 2023-04-08 PROCEDURE — 84484 ASSAY OF TROPONIN QUANT: CPT | Mod: 91 | Performed by: EMERGENCY MEDICINE

## 2023-04-08 PROCEDURE — 87077 CULTURE AEROBIC IDENTIFY: CPT | Performed by: EMERGENCY MEDICINE

## 2023-04-08 PROCEDURE — 99223 1ST HOSP IP/OBS HIGH 75: CPT | Mod: AI,,, | Performed by: FAMILY MEDICINE

## 2023-04-08 PROCEDURE — 81001 URINALYSIS AUTO W/SCOPE: CPT | Performed by: EMERGENCY MEDICINE

## 2023-04-08 PROCEDURE — 25500020 PHARM REV CODE 255: Performed by: EMERGENCY MEDICINE

## 2023-04-08 PROCEDURE — 87186 SC STD MICRODIL/AGAR DIL: CPT | Performed by: EMERGENCY MEDICINE

## 2023-04-08 PROCEDURE — C9113 INJ PANTOPRAZOLE SODIUM, VIA: HCPCS | Performed by: FAMILY MEDICINE

## 2023-04-08 PROCEDURE — 0241U SARS-COV2 (COVID) WITH FLU/RSV BY PCR: CPT | Performed by: FAMILY MEDICINE

## 2023-04-08 PROCEDURE — 87086 URINE CULTURE/COLONY COUNT: CPT | Performed by: EMERGENCY MEDICINE

## 2023-04-08 PROCEDURE — 63600175 PHARM REV CODE 636 W HCPCS: Performed by: FAMILY MEDICINE

## 2023-04-08 PROCEDURE — C9113 INJ PANTOPRAZOLE SODIUM, VIA: HCPCS | Performed by: EMERGENCY MEDICINE

## 2023-04-08 PROCEDURE — 85018 HEMOGLOBIN: CPT | Mod: 91 | Performed by: FAMILY MEDICINE

## 2023-04-08 PROCEDURE — 99900035 HC TECH TIME PER 15 MIN (STAT)

## 2023-04-08 PROCEDURE — 96372 THER/PROPH/DIAG INJ SC/IM: CPT | Performed by: FAMILY MEDICINE

## 2023-04-08 PROCEDURE — 83735 ASSAY OF MAGNESIUM: CPT | Performed by: EMERGENCY MEDICINE

## 2023-04-08 PROCEDURE — 80053 COMPREHEN METABOLIC PANEL: CPT | Performed by: EMERGENCY MEDICINE

## 2023-04-08 PROCEDURE — 99900026 HC AIRWAY MAINTENANCE (STAT)

## 2023-04-08 PROCEDURE — 99291 PR CRITICAL CARE, E/M 30-74 MINUTES: ICD-10-PCS | Mod: CS,,, | Performed by: EMERGENCY MEDICINE

## 2023-04-08 PROCEDURE — 96375 TX/PRO/DX INJ NEW DRUG ADDON: CPT | Mod: 59

## 2023-04-08 PROCEDURE — 86803 HEPATITIS C AB TEST: CPT | Performed by: PHYSICIAN ASSISTANT

## 2023-04-08 PROCEDURE — 87088 URINE BACTERIA CULTURE: CPT | Performed by: EMERGENCY MEDICINE

## 2023-04-08 PROCEDURE — G0378 HOSPITAL OBSERVATION PER HR: HCPCS

## 2023-04-08 PROCEDURE — 93005 ELECTROCARDIOGRAM TRACING: CPT

## 2023-04-08 PROCEDURE — 84100 ASSAY OF PHOSPHORUS: CPT | Performed by: EMERGENCY MEDICINE

## 2023-04-08 PROCEDURE — 93010 EKG 12-LEAD: ICD-10-PCS | Mod: ,,, | Performed by: INTERNAL MEDICINE

## 2023-04-08 PROCEDURE — 96365 THER/PROPH/DIAG IV INF INIT: CPT | Mod: 59

## 2023-04-08 PROCEDURE — 83605 ASSAY OF LACTIC ACID: CPT

## 2023-04-08 PROCEDURE — 99223 PR INITIAL HOSPITAL CARE,LEVL III: ICD-10-PCS | Mod: AI,,, | Performed by: FAMILY MEDICINE

## 2023-04-08 PROCEDURE — 25000003 PHARM REV CODE 250: Performed by: EMERGENCY MEDICINE

## 2023-04-08 PROCEDURE — 85025 COMPLETE CBC W/AUTO DIFF WBC: CPT | Performed by: EMERGENCY MEDICINE

## 2023-04-08 PROCEDURE — 93010 ELECTROCARDIOGRAM REPORT: CPT | Mod: ,,, | Performed by: INTERNAL MEDICINE

## 2023-04-08 PROCEDURE — 96376 TX/PRO/DX INJ SAME DRUG ADON: CPT | Mod: 59

## 2023-04-08 PROCEDURE — 96366 THER/PROPH/DIAG IV INF ADDON: CPT

## 2023-04-08 PROCEDURE — 84484 ASSAY OF TROPONIN QUANT: CPT | Performed by: FAMILY MEDICINE

## 2023-04-08 PROCEDURE — 84145 PROCALCITONIN (PCT): CPT | Performed by: FAMILY MEDICINE

## 2023-04-08 PROCEDURE — 94761 N-INVAS EAR/PLS OXIMETRY MLT: CPT

## 2023-04-08 PROCEDURE — 25000003 PHARM REV CODE 250: Performed by: FAMILY MEDICINE

## 2023-04-08 PROCEDURE — 96367 TX/PROPH/DG ADDL SEQ IV INF: CPT

## 2023-04-08 PROCEDURE — 85014 HEMATOCRIT: CPT | Mod: 91 | Performed by: FAMILY MEDICINE

## 2023-04-08 PROCEDURE — 99291 CRITICAL CARE FIRST HOUR: CPT | Mod: CS,,, | Performed by: EMERGENCY MEDICINE

## 2023-04-08 PROCEDURE — 36415 COLL VENOUS BLD VENIPUNCTURE: CPT | Performed by: FAMILY MEDICINE

## 2023-04-08 PROCEDURE — 87389 HIV-1 AG W/HIV-1&-2 AB AG IA: CPT | Performed by: PHYSICIAN ASSISTANT

## 2023-04-08 PROCEDURE — 99285 EMERGENCY DEPT VISIT HI MDM: CPT | Mod: 25

## 2023-04-08 RX ORDER — SIMETHICONE 80 MG
1 TABLET,CHEWABLE ORAL 4 TIMES DAILY PRN
Status: DISCONTINUED | OUTPATIENT
Start: 2023-04-08 | End: 2023-04-12 | Stop reason: HOSPADM

## 2023-04-08 RX ORDER — POLYETHYLENE GLYCOL 3350 17 G/17G
17 POWDER, FOR SOLUTION ORAL DAILY PRN
Status: DISCONTINUED | OUTPATIENT
Start: 2023-04-08 | End: 2023-04-12 | Stop reason: HOSPADM

## 2023-04-08 RX ORDER — DOXYCYCLINE HYCLATE 100 MG
100 TABLET ORAL EVERY 12 HOURS
Status: DISCONTINUED | OUTPATIENT
Start: 2023-04-08 | End: 2023-04-10

## 2023-04-08 RX ORDER — PANTOPRAZOLE SODIUM 40 MG/10ML
80 INJECTION, POWDER, LYOPHILIZED, FOR SOLUTION INTRAVENOUS
Status: COMPLETED | OUTPATIENT
Start: 2023-04-08 | End: 2023-04-08

## 2023-04-08 RX ORDER — SODIUM CHLORIDE 0.9 % (FLUSH) 0.9 %
5 SYRINGE (ML) INJECTION
Status: DISCONTINUED | OUTPATIENT
Start: 2023-04-08 | End: 2023-04-12 | Stop reason: HOSPADM

## 2023-04-08 RX ORDER — SODIUM CHLORIDE, SODIUM LACTATE, POTASSIUM CHLORIDE, CALCIUM CHLORIDE 600; 310; 30; 20 MG/100ML; MG/100ML; MG/100ML; MG/100ML
INJECTION, SOLUTION INTRAVENOUS CONTINUOUS
Status: ACTIVE | OUTPATIENT
Start: 2023-04-08 | End: 2023-04-09

## 2023-04-08 RX ORDER — HYDROXYZINE HYDROCHLORIDE 10 MG/5ML
25 SYRUP ORAL EVERY 8 HOURS
COMMUNITY

## 2023-04-08 RX ORDER — NALOXONE HCL 0.4 MG/ML
0.02 VIAL (ML) INJECTION
Status: DISCONTINUED | OUTPATIENT
Start: 2023-04-08 | End: 2023-04-12 | Stop reason: HOSPADM

## 2023-04-08 RX ORDER — TALC
6 POWDER (GRAM) TOPICAL NIGHTLY PRN
Status: DISCONTINUED | OUTPATIENT
Start: 2023-04-08 | End: 2023-04-12 | Stop reason: HOSPADM

## 2023-04-08 RX ORDER — ONDANSETRON 8 MG/1
8 TABLET, ORALLY DISINTEGRATING ORAL EVERY 8 HOURS PRN
Status: DISCONTINUED | OUTPATIENT
Start: 2023-04-08 | End: 2023-04-12 | Stop reason: HOSPADM

## 2023-04-08 RX ORDER — MAG HYDROX/ALUMINUM HYD/SIMETH 200-200-20
30 SUSPENSION, ORAL (FINAL DOSE FORM) ORAL 4 TIMES DAILY PRN
Status: DISCONTINUED | OUTPATIENT
Start: 2023-04-08 | End: 2023-04-12 | Stop reason: HOSPADM

## 2023-04-08 RX ORDER — PROCHLORPERAZINE EDISYLATE 5 MG/ML
5 INJECTION INTRAMUSCULAR; INTRAVENOUS EVERY 6 HOURS PRN
Status: DISCONTINUED | OUTPATIENT
Start: 2023-04-08 | End: 2023-04-12 | Stop reason: HOSPADM

## 2023-04-08 RX ORDER — INSULIN ASPART 100 [IU]/ML
0-5 INJECTION, SOLUTION INTRAVENOUS; SUBCUTANEOUS EVERY 6 HOURS PRN
Status: DISCONTINUED | OUTPATIENT
Start: 2023-04-08 | End: 2023-04-12 | Stop reason: HOSPADM

## 2023-04-08 RX ORDER — ONDANSETRON 2 MG/ML
4 INJECTION INTRAMUSCULAR; INTRAVENOUS
Status: COMPLETED | OUTPATIENT
Start: 2023-04-08 | End: 2023-04-08

## 2023-04-08 RX ORDER — DEXTROSE 40 %
30 GEL (GRAM) ORAL
Status: DISCONTINUED | OUTPATIENT
Start: 2023-04-08 | End: 2023-04-12 | Stop reason: HOSPADM

## 2023-04-08 RX ORDER — ACETAMINOPHEN 500 MG
1000 TABLET ORAL EVERY 8 HOURS PRN
Status: DISCONTINUED | OUTPATIENT
Start: 2023-04-08 | End: 2023-04-12 | Stop reason: HOSPADM

## 2023-04-08 RX ORDER — ATORVASTATIN CALCIUM 40 MG/1
40 TABLET, FILM COATED ORAL NIGHTLY
Status: DISCONTINUED | OUTPATIENT
Start: 2023-04-08 | End: 2023-04-08

## 2023-04-08 RX ORDER — ACETAMINOPHEN 325 MG/1
650 TABLET ORAL EVERY 4 HOURS PRN
Status: DISCONTINUED | OUTPATIENT
Start: 2023-04-08 | End: 2023-04-12 | Stop reason: HOSPADM

## 2023-04-08 RX ORDER — FENOFIBRATE 160 MG/1
160 TABLET ORAL DAILY
Status: DISCONTINUED | OUTPATIENT
Start: 2023-04-08 | End: 2023-04-08

## 2023-04-08 RX ORDER — DEXTROSE 40 %
15 GEL (GRAM) ORAL
Status: DISCONTINUED | OUTPATIENT
Start: 2023-04-08 | End: 2023-04-12 | Stop reason: HOSPADM

## 2023-04-08 RX ORDER — GLUCAGON 1 MG
1 KIT INJECTION
Status: DISCONTINUED | OUTPATIENT
Start: 2023-04-08 | End: 2023-04-12 | Stop reason: HOSPADM

## 2023-04-08 RX ORDER — LEVETIRACETAM 100 MG/ML
750 SOLUTION ORAL 2 TIMES DAILY
Status: DISCONTINUED | OUTPATIENT
Start: 2023-04-08 | End: 2023-04-12 | Stop reason: HOSPADM

## 2023-04-08 RX ADMIN — DOXYCYCLINE HYCLATE 100 MG: 100 TABLET, COATED ORAL at 08:04

## 2023-04-08 RX ADMIN — SODIUM CHLORIDE 1000 ML: 9 INJECTION, SOLUTION INTRAVENOUS at 12:04

## 2023-04-08 RX ADMIN — PANTOPRAZOLE SODIUM 40 MG: 40 INJECTION, POWDER, FOR SOLUTION INTRAVENOUS at 03:04

## 2023-04-08 RX ADMIN — CEFEPIME 2 G: 2 INJECTION, POWDER, FOR SOLUTION INTRAVENOUS at 03:04

## 2023-04-08 RX ADMIN — Medication 2 CAPSULE: at 08:04

## 2023-04-08 RX ADMIN — BACLOFEN 15 MG: 5 TABLET ORAL at 03:04

## 2023-04-08 RX ADMIN — SODIUM CHLORIDE 1000 ML: 9 INJECTION, SOLUTION INTRAVENOUS at 07:04

## 2023-04-08 RX ADMIN — PANTOPRAZOLE SODIUM 40 MG: 40 INJECTION, POWDER, FOR SOLUTION INTRAVENOUS at 08:04

## 2023-04-08 RX ADMIN — Medication 2 CAPSULE: at 03:04

## 2023-04-08 RX ADMIN — CEFEPIME 2 G: 2 INJECTION, POWDER, FOR SOLUTION INTRAVENOUS at 08:04

## 2023-04-08 RX ADMIN — VANCOMYCIN HYDROCHLORIDE 1500 MG: 1.5 INJECTION, POWDER, LYOPHILIZED, FOR SOLUTION INTRAVENOUS at 04:04

## 2023-04-08 RX ADMIN — ONDANSETRON 4 MG: 2 INJECTION INTRAMUSCULAR; INTRAVENOUS at 07:04

## 2023-04-08 RX ADMIN — LEVETIRACETAM 750 MG: 500 SOLUTION ORAL at 08:04

## 2023-04-08 RX ADMIN — LEVETIRACETAM 750 MG: 500 SOLUTION ORAL at 03:04

## 2023-04-08 RX ADMIN — IOHEXOL 100 ML: 350 INJECTION, SOLUTION INTRAVENOUS at 09:04

## 2023-04-08 RX ADMIN — BACLOFEN 15 MG: 5 TABLET ORAL at 08:04

## 2023-04-08 RX ADMIN — DOXYCYCLINE HYCLATE 100 MG: 100 TABLET, COATED ORAL at 03:04

## 2023-04-08 RX ADMIN — INSULIN DETEMIR 10 UNITS: 100 INJECTION, SOLUTION SUBCUTANEOUS at 08:04

## 2023-04-08 RX ADMIN — SODIUM CHLORIDE, POTASSIUM CHLORIDE, SODIUM LACTATE AND CALCIUM CHLORIDE: 600; 310; 30; 20 INJECTION, SOLUTION INTRAVENOUS at 03:04

## 2023-04-08 RX ADMIN — PANTOPRAZOLE SODIUM 80 MG: 40 INJECTION, POWDER, FOR SOLUTION INTRAVENOUS at 07:04

## 2023-04-08 NOTE — ED PROVIDER NOTES
Chief Complaint   Hematemesis (From [facility], since 0430. Has trach)      History Of Present Illness   Hao Medrano is a 62 y.o. male presenting with coffee-ground emesis this morning.  Sent from [facility].  Patient has tracheostomy and is nonverbal.  No history of GI bleed noted on medical records sent from nursing home.    History obtained from: Nursing home records, EMS.    Review of patient's allergies indicates:  No Known Allergies    No current facility-administered medications on file prior to encounter.     Current Outpatient Medications on File Prior to Encounter   Medication Sig Dispense Refill    arginine-glutamine-calcium HMB (SHEREE) 7-7-1.5 gram PwPk 1 packet by Per G Tube route 2 (two) times daily.      aspirin 81 MG Chew 81 mg by Per G Tube route once daily.      atorvastatin (LIPITOR) 40 MG tablet 1 tablet (40 mg total) by Per G Tube route every evening. 90 tablet 3    baclofen (LIORESAL) 5 mg Tab tablet 15 mg by Per G Tube route 3 (three) times daily.      bisacodyL (DULCOLAX) 10 mg Supp Place 1 suppository (10 mg total) rectally daily as needed (Until bowel movement if patient has no bowel movement for 2 days). 30 suppository 0    calcium carbonate (OS-OLGA) 500 mg calcium (1,250 mg) tablet 1,250 mg by Per G Tube route once daily.      carvediloL (COREG) 3.125 MG tablet 3.125 mg by Per G Tube route 2 (two) times daily.      esomeprazole (NEXIUM) 40 mg GrPS 40 mg by Per G Tube route once daily.      fenofibrate 160 MG Tab Take 1 tablet (160 mg total) by mouth once daily. 90 tablet 0    insulin glargine-yfgn 100 unit/mL (3 mL) InPn Inject 15 Units into the skin once daily.      Lactobacillus rhamnosus GG (CULTURELLE) 10 billion cell capsule 2 capsules by Per G Tube route 2 (two) times a day.      levETIRAcetam (KEPPRA) 100 mg/mL Soln 750 mg by Per G Tube route 2 (two) times daily.      menthol/zinc oxide (CALMOSEPTINE TOP) Apply to the affected area twice daily as needed      nystatin  (MYCOSTATIN) powder Apply to the affected area daily         Past History   As per HPI and below:  History reviewed. No pertinent past medical history.  Past Surgical History:   Procedure Laterality Date    ESOPHAGOGASTRODUODENOSCOPY N/A 1/11/2023    Procedure: EGD (ESOPHAGOGASTRODUODENOSCOPY);  Surgeon: Francisco Javier Holt MD;  [facility]  Service: Endoscopy;  Laterality: N/A;    ESOPHAGOGASTRODUODENOSCOPY N/A 1/12/2023    Procedure: EGD (ESOPHAGOGASTRODUODENOSCOPY);  Surgeon: Francisco Javier Holt MD;  [facility]  Service: Endoscopy;  Laterality: N/A;    ESOPHAGOGASTRODUODENOSCOPY N/A 1/26/2023    Procedure: EGD (ESOPHAGOGASTRODUODENOSCOPY);  Surgeon: Manju Whitman MD;  Location[facility]  Service: Endoscopy;  Laterality: N/A;       Social History     Socioeconomic History    Marital status: Significant Other   Tobacco Use    Smoking status: Never       History reviewed. No pertinent family history.    Physical Exam     Vitals:    04/08/23 0818 04/08/23 0832 04/08/23 0932 04/08/23 0944   BP:  136/85 115/66    Pulse: (!) 129 (!) 126  (!) 116   Resp:  20  15   Temp:       TempSrc:       SpO2:  99%  100%     Appearance: No acute distress. Slightly diaphoretic.  Skin: No rashes seen.  Good turgor.  No abrasions.  No ecchymoses.  Eyes: No conjunctival injection.  ENT: Tracheostomy intact.  Chest: Clear to auscultation bilaterally.  Good air movement.  No wheezes.  No rhonchi.  Cardiovascular: Tachycardic.  No murmurs. No gallops. No rubs.  Abdomen: Soft.  Not distended.  Nontender.  No guarding.  No rebound.  Neurologic: Motor intact.  Sensation intact.  Cranial nerves intact.  Mental Status:  Alert and oriented x 3.  Appropriate, conversant.      Initial MDM   Coffee-ground emesis this morning with tachycardia.  Patient is trach with a T-piece and is little bit tachypneic and tachycardic.  Will give fluids, do GI bleed evaluation, and obtain CT abdomen and chest x-ray  given patient's slight diaphoresis.  He does not seem particularly tender.  Differential diagnosis is vast, and history is difficult given the patient is nonverbal.    Medications Given     Medications   sodium chloride 0.9% bolus 1,000 mL 1,000 mL (0 mLs Intravenous Stopped 4/8/23 1018)   pantoprazole injection 80 mg (80 mg Intravenous Given 4/8/23 0710)   ondansetron injection 4 mg (4 mg Intravenous Given 4/8/23 0710)   iohexoL (OMNIPAQUE 350) injection 100 mL (100 mLs Intravenous Given 4/8/23 0900)       Results and Course     Labs Reviewed   CBC W/ AUTO DIFFERENTIAL - Abnormal; Notable for the following components:       Result Value    WBC 15.36 (*)     Hemoglobin 13.2 (*)     MCH 26.1 (*)     MCHC 31.4 (*)     RDW 17.1 (*)     Platelets 553 (*)     Gran # (ANC) 11.4 (*)     Immature Grans (Abs) 0.05 (*)     Mono # 1.1 (*)     Gran % 74.2 (*)     Lymph % 16.2 (*)     All other components within normal limits    Narrative:     Release to patient->Immediate   COMPREHENSIVE METABOLIC PANEL - Abnormal; Notable for the following components:    Glucose 196 (*)     BUN 62 (*)     Albumin 2.6 (*)     Alkaline Phosphatase 399 (*)     AST 70 (*)      (*)     All other components within normal limits    Narrative:     Release to patient->Immediate   PHOSPHORUS - Abnormal; Notable for the following components:    Phosphorus 2.4 (*)     All other components within normal limits    Narrative:     Release to patient->Immediate   TROPONIN I - Abnormal; Notable for the following components:    Troponin I 0.085 (*)     All other components within normal limits   HIV 1 / 2 ANTIBODY    Narrative:     Release to patient->Immediate   HEPATITIS C ANTIBODY    Narrative:     Release to patient->Immediate   MAGNESIUM    Narrative:     Release to patient->Immediate   URINALYSIS, REFLEX TO URINE CULTURE   TYPE & SCREEN   ISTAT LACTATE       Imaging Results              X-Ray Chest AP Portable (Final result)  Result time 04/08/23  09:27:58      Final result by Natividad Rae MD (04/08/23 09:27:58)                   Impression:      Right basilar atelectasis versus consolidation.      Electronically signed by: Natividad Rae MD  Date:    04/08/2023  Time:    09:27               Narrative:    EXAMINATION:  XR CHEST AP PORTABLE    CLINICAL HISTORY:  Weakness    TECHNIQUE:  Single frontal view of the chest was performed.    COMPARISON:  Prior dated 01/25/2023    FINDINGS:  There is a tracheostomy tube in satisfactory position.  The mediastinal structures are midline.  The cardiac silhouette is magnified by AP technique.  There is right basilar atelectasis versus consolidation                                       CT Abdomen Pelvis With Contrast (Final result)  Result time 04/08/23 09:21:05      Final result by Loki Vital MD (04/08/23 09:21:05)                   Impression:      1. Urinary bladder wall thickening with mucosal hyperemia and surrounding inflammatory change, for which correlation for signs/symptoms of cystitis would be recommended.  2. Additionally questioned mucosal hyperenhancement wall thickening of the visualized lower esophagus and stomach, which could relate to esophagitis and gastritis, respectively.  Clinical correlation advised in this regard.  3. Small pleural effusions.  Nonspecific airspace consolidation in the basilar right lower lobe, which could relate to atelectasis, aspiration, and/or pneumonia.  4. Question skin thickening induration of the subcutaneous fat planes overlying the lower sacrum and coccyx without discrete drainable fluid collection or skin defect.  Correlation with direct visualization would be recommended to exclude the possibility of developing pressure ulcer.  5. Extensive heterotopic calcification is noted about bilateral hips. There is somewhat expansile soft tissue attenuation within both hip joints, possibly representing age-indeterminate synovitis.  Difficult to exclude  effusions.  Findings of uncertain etiology or significance.  Correlation with any prior outside imaging would be of benefit to establish chronicity of these findings.  Note that an acute or chronic septic arthritis is not excluded by current imaging.  6. Additional details, as provided in the body of report.      Electronically signed by: Loki Vital  Date:    04/08/2023  Time:    09:21               Narrative:    EXAMINATION:  CT ABDOMEN PELVIS WITH CONTRAST    CLINICAL HISTORY:  Abdominal abscess/infection suspected;    TECHNIQUE:  Low dose axial images, sagittal and coronal reformations were obtained from the lung bases to the pubic symphysis following the IV administration of 100 mL of Omnipaque 350    COMPARISON:  None.    FINDINGS:  Lower chest: Small pleural effusions.  Atelectasis.  Nonspecific airspace consolidation in the basilar right lower lobe.    Liver: Unremarkable.    Gallbladder and bile ducts: Unremarkable. No biliary ductal dilatation.    Pancreas: Unremarkable.    Spleen: Unremarkable.    Adrenals: Unremarkable.    Kidneys: Cortical atrophy of both kidneys.  No evidence of hydronephrosis.  Subcentimeter hypoattenuating lesions in the kidneys are too small to definitely characterize.    Lymph nodes: No abdominal or pelvic lymphadenopathy.    Bowel and mesentery: Small hiatal hernia.  Question mucosal hyperenhancement of the stomach and visualized lower esophagus.  Gastrostomy tube in-situ.  No evidence of bowel obstruction.  Normal appearing candidate appendix suggested.    Abdominal aorta: Nonaneurysmal.  Moderate atherosclerotic calcification.    Inferior vena cava: Unremarkable.    Free fluid or free air: None.    Pelvis: Wall thickening of the urinary bladder with suggested mucosal hyperenhancement and minor adjoining inflammatory changes.    Body wall: Question skin thickening induration of the subcutaneous fat planes overlying the lower sacrum and coccyx without discrete drainable fluid  collection or skin defect.    Bones: Query osseous demineralization.  Degenerative findings in the spine noted.  Question avascular necrosis of bilateral femoral heads without definite articular surface collapse.    Extensive heterotopic calcification is noted about bilateral hips.  There is somewhat expansile soft tissue attenuation within both hip joints.                                      ED Course as of 04/08/23 1057   Sat Apr 08, 2023   0703 EKG 12-lead  Sinus tachycardia @ 144 bpm, diffuse ST/T changes per my independent interpretation.     [DC]   0746 WBC(!): 15.36 [DC]   0746 Hemoglobin(!): 13.2 [DC]   0746 Platelets(!): 553 [DC]   0805 Troponin I(!): 0.085 [DC]   0806 BUN(!): 62 [DC]   0806 Creatinine: 1.1 [DC]   0806 Phosphorus(!): 2.4 [DC]   0806 Alkaline Phosphatase(!): 399 [DC]   0806 AST(!): 70 [DC]   0806 ALT(!): 154 [DC]   0829 Hepatitis C Ab: Non-reactive [DC]   0829 HIV 1/2 Ag/Ab: Non-reactive [DC]   0928 X-Ray Chest AP Portable  CXR shows no acute disease per my independent interpretation.     [DC]   1054 WBC, UA(!): >100 [DC]   1055 Bacteria, UA(!): Many [DC]      ED Course User Index  [DC] Ibrahima Ennis MD     This patient does have evidence of infective focus  My overall impression is sepsis.  Source: Urinary Tract  Antibiotics given-   Antibiotics (72h ago, onward)      Start     Stop Route Frequency Ordered    04/08/23 1200  vancomycin 1,250 mg in dextrose 5 % (D5W) 250 mL IVPB (Vial-Mate)         -- IV Once 04/08/23 1050    04/08/23 1045  piperacillin-tazobactam (ZOSYN) 4.5 g in dextrose 5 % in water (D5W) 5 % 100 mL IVPB (MB+)  (ED Adult Sepsis Treatment)         04/09 1044 IV Every 8 hours (non-standard times) 04/08/23 1038            Fluid challenge Ideal Body Weight- The patient's ideal body weight is Ideal body weight: 77.6 kg (171 lb 1.2 oz) which will be used to calculate fluid bolus of 30 ml/kg for treatment of septic shock.      Post- resuscitation assessment Yes Perfusion exam  was performed within 6 hours of septic shock presentation after bolus shows Adequate tissue perfusion assessed by non-invasive monitoring         Critical Care   Date: 04/08/2023  Performed by: Ibrahima Ennis MD   Authorized by: Ibrahima Ennis MD    Total critical care time (exclusive of procedural time) : 45 minutes  Critical care was necessary to treat or prevent imminent or life-threatening deterioration of the following conditions:  tachycardia, GI bleed, sepsis      MDM, Impression and Plan   62 y.o. male with hematemesis this morning, no further episodes.  Arrived tachycardic, but improved significantly with fluids.  Labs suggest significant dehydration.  Will place in observation to Hospital Medicine for serial H&H, further fluids and treatment.         Final diagnoses:  [R00.0] Tachycardia  [R53.1] Weakness  [K92.0] Hematemesis, unspecified whether nausea present (Primary)        ED Disposition Condition    Observation Stable                  Ibrahima Ennis MD  04/08/23 1029       Ibrahima Ennis MD  04/08/23 0620

## 2023-04-08 NOTE — ED NOTES
Telemetry Confirmation    Box#: [id number]  Rhythm: Sinus Tach  Rate: 120      Awaiting escort for transport

## 2023-04-08 NOTE — ASSESSMENT & PLAN NOTE
Sinus Tachycardia  -elevated troponin likely due to demand ischemia.  Remained flat  -CTA chest pending rule out PE  -U/S BLE pending rule out DVT  -sinus tachycardia now improved s/p IVFs and continue IV antibiotics  -EKG personally reviewed with sinus tachycardia and nonspecific T-wave changes.  There is no evidence of significant acute infarction.  EKG repeated once tachycardia improved and personally reviewed.  Nonspecific T-wave changes overall EKG similar to previous on 01/27/2023  -TTE a of 60%, normal diastolic function, normal RV size and function, no evidence of vegetations

## 2023-04-08 NOTE — Clinical Note
Diagnosis: Tachycardia [131627]   Future Attending Provider: ARMOND DEXTER III [[id number]]   Admitting Provider:: ARMOND DEXTER III [[id number]]   Special Needs:: Trach [26]   Special Needs:: Wheelchair or Bed Bound [2]

## 2023-04-08 NOTE — CLINICAL REVIEW
IP Sepsis Screen (most recent)       Sepsis Screen (IP) - 04/08/23 1106       Is the patient's history or complaint suggestive of a possible infection? Yes  -    Are there at least two of the following signs and symptoms present? Yes  -    Sepsis signs/symptoms - Tachycardia Tachycardia     >90  -    Sepsis signs/symptoms - WBC WBC < 4,000 or WBC > 12,000  -    Are any of the following organ dysfunction criteria present and not considered to be due to a chronic condition? Yes  -    Organ Dysfunction Criteria - Resp Comp Respiratory Compromise: Requiring > 5L NC  -JM    Initiate Sepsis Protocol No  -JM    Reason sepsis not considered Pt. receiving appropriate management  -              User Key  (r) = Recorded By, (t) = Taken By, (c) = Cosigned By      Initials Name     Flora Banks RN

## 2023-04-08 NOTE — HPI
62 y.o. 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  Patient is nonverbal and unable to provide any history so the information from this H&P gathered via ER doctor, nursing facility, and past medical records.  According to the nursing facility, at 4:36 a.m. this morning, the patient was found with coffee-ground emesis on his gown and transferred to this facility.  According to the [facility] ED physician and nursing staff, no further hematemesis noted.  Of note, patient was admitted January 2023 with dislodged PEG tube but was found to be septic and treated for E faecalis bacteremia.  At that time, peg tube was replaced    In the ED, patient met sepsis criteria:  tachycardic, tachypneic with low normal blood pressure and WBC 15.36.  CXR with small pleural effusions, atelectasis, airspace consolidation in the basilar right lower lobe.  Urinalysis concerning for infection.  CT abdomen and pelvis with urinary bladder thickening, evidence of gastritis/esophagitis, small pleural effusions with right lower lobe consolidation, skin thickening at lower sacrum and coccyx, calcifications about bilateral hips and can not rule out effusions.  Sepsis IV fluid bolus administered with improved vitals.  Blood and urine culture ordered.  Lactic acid 1.41.  IV antibiotics initiated.  Patient admitted to the hospitalist service for further evaluation and treatment of possible hematemesis and sepsis.     I contacted the patient's partner, Rosa Isela, and sister, Annabelle, and updated on clinical condition.  At the moment, patient is full code.  Family is discussing DNR and possible palliative/hospice.

## 2023-04-08 NOTE — H&P
HPI:  62 y.o. 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  Patient is nonverbal and unable to provide any history so the information from this H&P gathered via ER doctor, nursing facility, and past medical records.  According to the nursing facility, at 4:36 a.m. this morning, the patient was found with coffee-ground emesis on his gown and transferred to this facility.  [place] [facility] [place] and nursing staff, no further hematemesis noted.  Of note, patient was admitted January 2023 with dislodged PEG tube but was found to be septic and treated for E faecalis bacteremia.  At that time, peg tube was replaced     In the ED, patient met sepsis criteria:  tachycardic, tachypneic with low normal blood pressure and WBC 15.36.  CXR with small pleural effusions, atelectasis, airspace consolidation in the basilar right lower lobe.  Urinalysis concerning for infection.  CT abdomen and pelvis with urinary bladder thickening, evidence of gastritis/esophagitis, small pleural effusions with right lower lobe consolidation, skin thickening at lower sacrum and coccyx, calcifications about bilateral hips and can not rule out effusions.  Sepsis IV fluid bolus administered with improved vitals.  Blood and urine culture ordered.  Lactic acid 1.41.  IV antibiotics initiated.  Patient admitted to the hospitalist service for further evaluation and treatment of possible hematemesis and sepsis.     I contacted the patient's partner, Rosa Isela, and sister, Annabelle, and updated on clinical condition.  At the moment, patient is full code.  Family is discussing DNR and possible palliative/hospice.      Current Subjective & Objective Note     Past medical history as above         Past Surgical History:   Procedure  Laterality Date    ESOPHAGOGASTRODUODENOSCOPY N/A 1/11/2023    Procedure: EGD (ESOPHAGOGASTRODUODENOSCOPY); Surgeon: Francisco Javier Holt MD; [facility] Service: Endoscopy; Laterality: N/A;    ESOPHAGOGASTRODUODENOSCOPY N/A 1/12/2023    Procedure: EGD (ESOPHAGOGASTRODUODENOSCOPY); Surgeon: Francisco Javier Holt MD; [facility] Service: Endoscopy; Laterality: N/A;    ESOPHAGOGASTRODUODENOSCOPY N/A 1/26/2023    Procedure: EGD (ESOPHAGOGASTRODUODENOSCOPY); Surgeon: Manju Whitman MD; [facility] Service: Endoscopy; Laterality: N/A;     Review of patient's allergies indicates:   No Known Allergies   No current facility-administered medications on file prior to encounter.          Current Outpatient Medications on File Prior to Encounter   Medication Sig    arginine-glutamine-calcium HMB (SHEREE) 7-7-1.5 gram PwPk 1 packet by Per G Tube route 2 (two) times daily.    aspirin 81 MG Chew 81 mg by Per G Tube route once daily.    baclofen (LIORESAL) 5 mg Tab tablet 15 mg by Per G Tube route 3 (three) times daily.    bisacodyL (DULCOLAX) 10 mg Supp Place 1 suppository (10 mg total) rectally daily as needed (Until bowel movement if patient has no bowel movement for 2 days).    calcium carbonate (OS-OLGA) 500 mg calcium (1,250 mg) tablet 1,250 mg by Per G Tube route once daily.    carvediloL (COREG) 3.125 MG tablet 3.125 mg by Per G Tube route 2 (two) times daily.    esomeprazole (NEXIUM) 40 mg GrPS 40 mg by Per G Tube route once daily.    hydrOXYzine (ATARAX) 10 mg/5 mL syrup Take 25 mg by mouth every 8 (eight) hours. (2 pm, 10 pm & 6 pm)    insulin glargine-yfgn 100 unit/mL (3 mL) InPn Inject 15 Units into the skin once daily.    Lactobacillus rhamnosus GG (CULTURELLE) 10 billion cell capsule 2 capsules by Per G Tube route 2 (two) times a day.    levETIRAcetam (KEPPRA) 100 mg/mL Soln 750 mg by Per G Tube route 2 (two) times daily.    menthol/zinc oxide (CALMOSEPTINE TOP) Apply  to the affected area twice daily as needed    nystatin (MYCOSTATIN) powder Apply to the affected area daily    [DISCONTINUED] atorvastatin (LIPITOR) 40 MG tablet 1 tablet (40 mg total) by Per G Tube route every evening.    [DISCONTINUED] fenofibrate 160 MG Tab Take 1 tablet (160 mg total) by mouth once daily.     Family History    None             Tobacco Use    Smoking status: Never    Smokeless tobacco: Not on file   Substance and Sexual Activity    Alcohol use: Not on file    Drug use: Not on file    Sexual activity: Not on file     Review of Systems   Reason unable to perform ROS: Nonverbal.     Objective:     Vital Signs (Most Recent):   Temp: 98.7 °F (37.1 °C) (04/08/23 0635)   Pulse: (!) 115 (04/08/23 1200)   Resp: 14 (04/08/23 1048)   BP: 105/65 (04/08/23 1200)   SpO2: 98 % (04/08/23 1200)    Vital Signs (24h Range):   Temp: [98.7 °F (37.1 °C)] 98.7 °F (37.1 °C)   Pulse: [115-141] 115   Resp: [14-30] 14   SpO2: [95 %-100 %] 98 %   BP: (105-136)/(65-86) 105/65   Weight: 68 kg (150 lb)   Body mass index is 20.34 kg/m².     Physical Exam     Vitals and nursing note reviewed.   General: He is not in acute distress.   Appearance: He is well-developed.  Chronically ill-appearing  Pupils: Pupils are equal, round, and reactive to light.   Cardiovascular: Rate and Rhythm: Regular rhythm. Tachycardia present.   Pulmonary: Effort: Pulmonary effort is normal. Tachypnea, diminished breath sounds bilaterally  Abdomen:  Soft, nontender  (Patient does not express pain on palpation).   Musculoskeletal:  No tenderness elicited on exam. Right and left lower extremities contracted to abdomen. Right arm and hand contracted. Left arm by his waist, unable to move.   Skin:  Warm, dry, multiple abrasions and skin tears bilateral upper and lower extremities.  Grade 2 sacral ulcer with no surrounding erythema  Neurological:  Unable to assess as patient is nonverbal. Follows commands to close and open his eyes but does not follow other  commands. Can not move any extremity on command.   Psychiatric: Unable to assess     Assessment/plan:    Sepsis  UTI  Likely aspiration versus community-acquired pneumonia  -tachycardic, tachypneic, leukocytosis  -CT abdomen/pelvis mentions possible Bilat hip synovitis. Cannot rule out septic arthritis. Consider ortho consult. Will discuss with family as they are considering palliative measures.  -blood and urine cultures pending.  -continue IV antibiotics vanc, cefepime, doxycycline  -deescalate pending culture results    Hematemesis  Esophagitis  Gastritis  -hemoglobin 13.2 on admission, 7.6 on discharge 02/12/2023.  -repeat H&H pending  -no further coffee-ground emesis since presenting to this facility.  -IV Protonix b.i.d. for now  -monitor hemoglobin, transfuse < 7    Elevated troponin  -likely due to demand ischemia.  -EKG personally reviewed with sinus tachycardia and nonspecific ST changes.  There is no evidence of significant acute infarction.  Will repeat EKG when heart rate slows.  -trend troponins    Diabetes mellitus type 2 with long-term insulin use  -Hemoglobin A1c 7.1 on 02/10/2023.  -outpatient regimen, Lantus 15 units daily  -will start Levemir 10 units daily and sliding scale.  Titrate as needed.  -echo pending    Multiple wounds of skin  -inpatient wound care consult to come appreciate assistance    Elevated transaminase level  -possibly related to current infectious process  -continue to monitor, expect improvement    Goals of care  -currently patient remains full code.  Discussed with partner, Rosa Isela, and Annabelle, sister.  Considering transition to DNR and possibly palliative vs hospice    Diet: Tube feeds  DVT prophylaxis:  SCDs if the patient will tolerate.  Holding heparin products due to reports of hematemesis.  Code status: Full

## 2023-04-08 NOTE — ASSESSMENT & PLAN NOTE
UTI  Likely aspiration versus community-acquired pneumonia  -tachycardic, tachypneic, leukocytosis  -broad-spectrum antibiotics started since patient was recently admitted requiring IV antibiotics.  Vancomycin for MRSA coverage.  Cefepime and doxy for pneumonia.  -CT abdomen/pelvis mentions possible Bilat hip synovitis.  Discussed on phone with Orthopedic Service who agree that there is low clinical suspicion for septic arthritis at this time.  With decreasing WBC, procalcitonin, and improved vital signs it seems that source control has been obtained.  If further infectious signs or symptoms despite appropriate treatment for UTI and pneumonia, consideration for possible septic arthritis should be made at that time.  -blood cx NGTD  -urine culture ESBL, vanc and cefepime discontinued.  Merrem started.  ID consulted

## 2023-04-08 NOTE — ED TRIAGE NOTES
Pt arriving from From [facility], c/o vomiting blood since since 0430. Pt has trach, is nonverbal. Current HR 140s

## 2023-04-08 NOTE — PLAN OF CARE
Patient is 62yr old male admitted today. Patient orignally came in with complaints of coffee ground emesis but was noted to have an increased heart rate. Patient has multiple wounds and is contracted. IV fluids started. IV antibiotics started, and pantoprazole iv started.   Problem: Adult Inpatient Plan of Care  Goal: Plan of Care Review  Outcome: Ongoing, Progressing  Flowsheets (Taken 4/8/2023 1819)  Plan of Care Reviewed With: patient     Problem: Infection  Goal: Absence of Infection Signs and Symptoms  Outcome: Ongoing, Progressing  Intervention: Prevent or Manage Infection  Flowsheets (Taken 4/8/2023 1819)  Fever Reduction/Comfort Measures: fluid intake increased  Infection Management: aseptic technique maintained

## 2023-04-08 NOTE — ASSESSMENT & PLAN NOTE
Esophagitis  Gastritis  -hemoglobin 13.2 on admission, 7.6 on discharge 02/12/2023.  -repeat H&H pending  -no further coffee-ground emesis since presenting to this facility.  -IV Protonix b.i.d. for now  -monitor hemoglobin, transfuse was 7.  Hemoglobin downtrending, continue to monitor.

## 2023-04-08 NOTE — PROGRESS NOTES
Pharmacokinetic Initial Assessment: IV Vancomycin    Assessment/Plan:    -Vancomycin initiated for sepsis.  -Goal 15-20 mcg/mL.  -Renal function wnl.    -Start vancomycin 1500 mg IV x 1, followed by 1250 mg IV Q24H.   -Trough on 4/10 @ 12:30.    Pharmacy will continue to follow and monitor vancomycin.      Please contact pharmacy at extension [phone] with any questions regarding this assessment.     Thank you for the consult,   Ronni Ana       Patient brief summary:  Hao Medrano is a 62 y.o. male initiated on antimicrobial therapy with IV Vancomycin for treatment of suspected sepsis    Drug Allergies:   Review of patient's allergies indicates:  No Known Allergies    Actual Body Weight:   68 kg    Renal Function:   Estimated Creatinine Clearance: 66.1 mL/min (based on SCr of 1.1 mg/dL).,     Dialysis Method (if applicable):  N/A    CBC (last 72 hours):  Recent Labs   Lab Result Units 04/08/23  0707   WBC K/uL 15.36*   Hemoglobin g/dL 13.2*   Hematocrit % 42.0   Platelets K/uL 553*   Gran % % 74.2*   Lymph % % 16.2*   Mono % % 7.2   Eosinophil % % 1.8   Basophil % % 0.3   Differential Method  Automated       Metabolic Panel (last 72 hours):  Recent Labs   Lab Result Units 04/08/23  0707 04/08/23  1018   Sodium mmol/L 140  --    Potassium mmol/L 4.5  --    Chloride mmol/L 105  --    CO2 mmol/L 23  --    Glucose mg/dL 196*  --    Glucose, UA   --  1+*   BUN mg/dL 62*  --    Creatinine mg/dL 1.1  --    Albumin g/dL 2.6*  --    Total Bilirubin mg/dL 0.3  --    Alkaline Phosphatase U/L 399*  --    AST U/L 70*  --    ALT U/L 154*  --    Magnesium mg/dL 1.9  --    Phosphorus mg/dL 2.4*  --        Drug levels (last 3 results):  No results for input(s): VANCOMYCINRA, VANCORANDOM, VANCOMYCINPE, VANCOPEAK, VANCOMYCINTR, VANCOTROUGH in the last 72 hours.    Microbiologic Results:  Microbiology Results (last 7 days)       Procedure Component Value Units Date/Time    Blood Culture #1 **CANNOT BE ORDERED STAT** [[id number]]  Collected: 04/08/23 1205    Order Status: Sent Specimen: Blood from Peripheral, Hand, Left Updated: 04/08/23 1206    Blood Culture #2 **CANNOT BE ORDERED STAT** [[id number]] Collected: 04/08/23 1150    Order Status: Sent Specimen: Blood from Peripheral, Forearm, Left Updated: 04/08/23 1206    Urine culture [[id number]] Collected: 04/08/23 1018    Order Status: No result Specimen: Urine Updated: 04/08/23 1051

## 2023-04-08 NOTE — ED NOTES
Patient identifiers verified and correct for Hao Medrano  LOC: The patient is awake, alert. Pt is nonverbal  APPEARANCE: Patient appears comfortable and in no acute distress  SKIN: The skin is warm and dry, color consistent with ethnicity, patient has normal skin turgor and moist mucus membranes  MUSCULOSKELETAL: Patient is contracted.   RESPIRATORY: Airway is open and patent, respirations are spontaneous, patient has a normal effort and rate, no accessory muscle use noted, pt placed on continuous pulse ox with O2 sats noted at 99% with trach collar in place.  CARDIAC: Pt placed on cardiac monitor. Patient has a tachy rate, no edema noted, capillary refill < 3 seconds.   GASTRO: Soft and non tender to palpation, no distention noted, normoactive bowel sounds present in all four quadrants. PEG tube in place.  NEURO: Pt opens eyes spontaneously, behavior appropriate to situation.

## 2023-04-08 NOTE — PHARMACY MED REC
"  Admission Medication History     The home medication history was taken by Alejandra Hernandes.    You may go to "Admission" then "Reconcile Home Medications" tabs to review and/or act upon these items.     The home medication list has been updated by the Pharmacy department.   Please read ALL comments highlighted in yellow.   Please address this information as you see fit.    Feel free to contact us if you have any questions or require assistance.      The medications listed below were removed from the home medication list. Please reorder if appropriate:  Patient reports no longer taking the following medication(s):  ATORVASTATIN 40 MG  FENOFIBRATE 160 MG    Medications listed below were obtained from: Nursing home  Current Outpatient Medications on File Prior to Encounter   Medication Sig    arginine-glutamine-calcium HMB (SHEREE) 7-7-1.5 gram PwPk   1 packet by Per G Tube route 2 (two) times daily.    aspirin 81 MG Chew 81 mg by Per G Tube route once daily.      baclofen (LIORESAL) 5 mg Tab tablet   15 mg by Per G Tube route 3 (three) times daily.    bisacodyL (DULCOLAX) 10 mg Supp Place 1 suppository (10 mg total) rectally daily as needed (Until bowel movement if patient has no bowel movement for 2 days).      calcium carbonate (OS-OLGA) 500 mg calcium (1,250 mg) tablet   1,250 mg by Per G Tube route once daily.    carvediloL (COREG) 3.125 MG tablet   3.125 mg by Per G Tube route 2 (two) times daily.    esomeprazole (NEXIUM) 40 mg GrPS   40 mg by Per G Tube route once daily.    hydrOXYzine (ATARAX) 10 mg/5 mL syrup   Take 25 mg by mouth every 8 (eight) hours. (2 pm, 10 pm & 6 pm)    insulin glargine-yfgn 100 unit/mL (3 mL) InPn   Inject 15 Units into the skin once daily.    Lactobacillus rhamnosus GG (CULTURELLE) 10 billion cell capsule   2 capsules by Per G Tube route 2 (two) times a day.    levETIRAcetam (KEPPRA) 100 mg/mL Soln   750 mg by Per G Tube route 2 (two) times daily.    menthol/zinc oxide (CALMOSEPTINE TOP)   " Apply to the affected area twice daily as needed    nystatin (MYCOSTATIN) powder   Apply to the affected area daily     Potential issues to be addressed PRIOR TO DISCHARGE  The listed medications were obtained from another facility ([facility] [place] The patient may not have been able to fill these prescriptions prior to this admission and may require new scripts upon discharge.           Alejandra Hernandes  [phone]                .

## 2023-04-08 NOTE — ASSESSMENT & PLAN NOTE
-Hemoglobin A1c 7.1 on 02/10/2023.  -outpatient regimen, Lantus 15 units daily  -will start Levemir 10 units daily and sliding scale.  Titrate as needed.

## 2023-04-09 LAB
ALBUMIN SERPL BCP-MCNC: 2.2 G/DL (ref 3.5–5.2)
ALP SERPL-CCNC: 299 U/L (ref 55–135)
ALT SERPL W/O P-5'-P-CCNC: 91 U/L (ref 10–44)
ANION GAP SERPL CALC-SCNC: 12 MMOL/L (ref 8–16)
AST SERPL-CCNC: 28 U/L (ref 10–40)
BASOPHILS # BLD AUTO: 0.06 K/UL (ref 0–0.2)
BASOPHILS NFR BLD: 0.6 % (ref 0–1.9)
BILIRUB SERPL-MCNC: 0.5 MG/DL (ref 0.1–1)
BSA FOR ECHO PROCEDURE: 1.79 M2
BUN SERPL-MCNC: 45 MG/DL (ref 8–23)
CALCIUM SERPL-MCNC: 9.8 MG/DL (ref 8.7–10.5)
CHLORIDE SERPL-SCNC: 113 MMOL/L (ref 95–110)
CO2 SERPL-SCNC: 18 MMOL/L (ref 23–29)
CREAT SERPL-MCNC: 1.2 MG/DL (ref 0.5–1.4)
DIFFERENTIAL METHOD: ABNORMAL
EJECTION FRACTION: 60 %
EOSINOPHIL # BLD AUTO: 0.9 K/UL (ref 0–0.5)
EOSINOPHIL NFR BLD: 8.5 % (ref 0–8)
ERYTHROCYTE [DISTWIDTH] IN BLOOD BY AUTOMATED COUNT: 17.2 % (ref 11.5–14.5)
EST. GFR  (NO RACE VARIABLE): >60 ML/MIN/1.73 M^2
FRACTIONAL SHORTENING: 39 % (ref 28–44)
GLUCOSE SERPL-MCNC: 71 MG/DL (ref 70–110)
HCT VFR BLD AUTO: 35.1 % (ref 40–54)
HGB BLD-MCNC: 10.5 G/DL (ref 14–18)
IMM GRANULOCYTES # BLD AUTO: 0.04 K/UL (ref 0–0.04)
IMM GRANULOCYTES NFR BLD AUTO: 0.4 % (ref 0–0.5)
INTERVENTRICULAR SEPTUM: 1.7 CM (ref 0.6–1.1)
LACTATE SERPL-SCNC: 0.9 MMOL/L (ref 0.5–2.2)
LEFT INTERNAL DIMENSION IN SYSTOLE: 2.24 CM (ref 2.1–4)
LEFT VENTRICLE DIASTOLIC VOLUME INDEX: 13.19 ML/M2
LEFT VENTRICLE DIASTOLIC VOLUME: 23.47 ML
LEFT VENTRICLE SYSTOLIC VOLUME INDEX: 9.5 ML/M2
LEFT VENTRICLE SYSTOLIC VOLUME: 16.95 ML
LEFT VENTRICULAR INTERNAL DIMENSION IN DIASTOLE: 3.7 CM (ref 3.5–6)
LYMPHOCYTES # BLD AUTO: 1.5 K/UL (ref 1–4.8)
LYMPHOCYTES NFR BLD: 14.1 % (ref 18–48)
MAGNESIUM SERPL-MCNC: 1.7 MG/DL (ref 1.6–2.6)
MCH RBC QN AUTO: 26.1 PG (ref 27–31)
MCHC RBC AUTO-ENTMCNC: 29.9 G/DL (ref 32–36)
MCV RBC AUTO: 87 FL (ref 82–98)
MONOCYTES # BLD AUTO: 0.7 K/UL (ref 0.3–1)
MONOCYTES NFR BLD: 6.8 % (ref 4–15)
NEUTROPHILS # BLD AUTO: 7.6 K/UL (ref 1.8–7.7)
NEUTROPHILS NFR BLD: 69.6 % (ref 38–73)
NRBC BLD-RTO: 0 /100 WBC
PHOSPHATE SERPL-MCNC: 2.7 MG/DL (ref 2.7–4.5)
PLATELET # BLD AUTO: 393 K/UL (ref 150–450)
PMV BLD AUTO: 10.4 FL (ref 9.2–12.9)
POCT GLUCOSE: 53 MG/DL (ref 70–110)
POCT GLUCOSE: 86 MG/DL (ref 70–110)
POCT GLUCOSE: 92 MG/DL (ref 70–110)
POTASSIUM SERPL-SCNC: 4 MMOL/L (ref 3.5–5.1)
PROCALCITONIN SERPL IA-MCNC: 0.6 NG/ML
PROT SERPL-MCNC: 7 G/DL (ref 6–8.4)
RBC # BLD AUTO: 4.02 M/UL (ref 4.6–6.2)
SODIUM SERPL-SCNC: 143 MMOL/L (ref 136–145)
WBC # BLD AUTO: 10.87 K/UL (ref 3.9–12.7)

## 2023-04-09 PROCEDURE — 94761 N-INVAS EAR/PLS OXIMETRY MLT: CPT

## 2023-04-09 PROCEDURE — 96361 HYDRATE IV INFUSION ADD-ON: CPT

## 2023-04-09 PROCEDURE — 99900035 HC TECH TIME PER 15 MIN (STAT)

## 2023-04-09 PROCEDURE — 36415 COLL VENOUS BLD VENIPUNCTURE: CPT | Performed by: FAMILY MEDICINE

## 2023-04-09 PROCEDURE — 96375 TX/PRO/DX INJ NEW DRUG ADDON: CPT

## 2023-04-09 PROCEDURE — 84145 PROCALCITONIN (PCT): CPT | Performed by: FAMILY MEDICINE

## 2023-04-09 PROCEDURE — 85025 COMPLETE CBC W/AUTO DIFF WBC: CPT | Performed by: FAMILY MEDICINE

## 2023-04-09 PROCEDURE — 83605 ASSAY OF LACTIC ACID: CPT | Performed by: FAMILY MEDICINE

## 2023-04-09 PROCEDURE — C9113 INJ PANTOPRAZOLE SODIUM, VIA: HCPCS | Performed by: FAMILY MEDICINE

## 2023-04-09 PROCEDURE — 27000221 HC OXYGEN, UP TO 24 HOURS

## 2023-04-09 PROCEDURE — 84100 ASSAY OF PHOSPHORUS: CPT | Performed by: FAMILY MEDICINE

## 2023-04-09 PROCEDURE — 80053 COMPREHEN METABOLIC PANEL: CPT | Performed by: FAMILY MEDICINE

## 2023-04-09 PROCEDURE — 27200966 HC CLOSED SUCTION SYSTEM

## 2023-04-09 PROCEDURE — 83735 ASSAY OF MAGNESIUM: CPT | Performed by: FAMILY MEDICINE

## 2023-04-09 PROCEDURE — 96366 THER/PROPH/DIAG IV INF ADDON: CPT

## 2023-04-09 PROCEDURE — 99233 PR SUBSEQUENT HOSPITAL CARE,LEVL III: ICD-10-PCS | Mod: ,,, | Performed by: FAMILY MEDICINE

## 2023-04-09 PROCEDURE — G0378 HOSPITAL OBSERVATION PER HR: HCPCS

## 2023-04-09 PROCEDURE — 25000003 PHARM REV CODE 250: Performed by: FAMILY MEDICINE

## 2023-04-09 PROCEDURE — 99233 SBSQ HOSP IP/OBS HIGH 50: CPT | Mod: ,,, | Performed by: FAMILY MEDICINE

## 2023-04-09 PROCEDURE — 63600175 PHARM REV CODE 636 W HCPCS: Performed by: FAMILY MEDICINE

## 2023-04-09 RX ORDER — ALBUTEROL SULFATE 2.5 MG/.5ML
2.5 SOLUTION RESPIRATORY (INHALATION) EVERY 4 HOURS PRN
Status: DISCONTINUED | OUTPATIENT
Start: 2023-04-09 | End: 2023-04-12 | Stop reason: HOSPADM

## 2023-04-09 RX ORDER — CARVEDILOL 3.12 MG/1
3.12 TABLET ORAL 2 TIMES DAILY
Status: DISCONTINUED | OUTPATIENT
Start: 2023-04-09 | End: 2023-04-12 | Stop reason: HOSPADM

## 2023-04-09 RX ADMIN — SODIUM CHLORIDE, POTASSIUM CHLORIDE, SODIUM LACTATE AND CALCIUM CHLORIDE: 600; 310; 30; 20 INJECTION, SOLUTION INTRAVENOUS at 06:04

## 2023-04-09 RX ADMIN — Medication 2 CAPSULE: at 09:04

## 2023-04-09 RX ADMIN — PANTOPRAZOLE SODIUM 40 MG: 40 INJECTION, POWDER, FOR SOLUTION INTRAVENOUS at 09:04

## 2023-04-09 RX ADMIN — BACLOFEN 15 MG: 5 TABLET ORAL at 09:04

## 2023-04-09 RX ADMIN — Medication 2 CAPSULE: at 08:04

## 2023-04-09 RX ADMIN — DOXYCYCLINE HYCLATE 100 MG: 100 TABLET, COATED ORAL at 09:04

## 2023-04-09 RX ADMIN — LEVETIRACETAM 750 MG: 500 SOLUTION ORAL at 09:04

## 2023-04-09 RX ADMIN — CARVEDILOL 3.12 MG: 3.12 TABLET, FILM COATED ORAL at 08:04

## 2023-04-09 RX ADMIN — INSULIN DETEMIR 10 UNITS: 100 INJECTION, SOLUTION SUBCUTANEOUS at 08:04

## 2023-04-09 RX ADMIN — CARVEDILOL 3.12 MG: 3.12 TABLET, FILM COATED ORAL at 09:04

## 2023-04-09 RX ADMIN — BACLOFEN 15 MG: 5 TABLET ORAL at 08:04

## 2023-04-09 RX ADMIN — VANCOMYCIN HYDROCHLORIDE 1250 MG: 1.25 INJECTION, POWDER, LYOPHILIZED, FOR SOLUTION INTRAVENOUS at 02:04

## 2023-04-09 RX ADMIN — LEVETIRACETAM 750 MG: 500 SOLUTION ORAL at 08:04

## 2023-04-09 RX ADMIN — DOXYCYCLINE HYCLATE 100 MG: 100 TABLET, COATED ORAL at 08:04

## 2023-04-09 RX ADMIN — CEFEPIME 2 G: 2 INJECTION, POWDER, FOR SOLUTION INTRAVENOUS at 05:04

## 2023-04-09 RX ADMIN — BACLOFEN 15 MG: 5 TABLET ORAL at 02:04

## 2023-04-09 NOTE — PLAN OF CARE
[facility] - Intensive Care ([facility])  Discharge Assessment    Primary Care Provider: Keara Yanes NP     Discharge Assessment (most recent)       BRIEF DISCHARGE ASSESSMENT - 04/09/23 9535          Discharge Planning    Assessment Type Discharge Planning Brief Assessment     Resource/Environmental Concerns none     Support Systems Family members     Equipment Currently Used at Home other (see comments)   pt is bedbound    Current Living Arrangements extended care facility     Care Facility Name [facility]     Patient/Family Anticipates Transition to long-term care facility     Patient/Family Anticipated Services at Transition none     DME Needed Upon Discharge  none     Discharge Plan A Return to nursing home     Discharge Plan B Return to Nursing Home                 SW called and spoke with pt's sister Annabelle to discuss discharge planning.  Annabelle confirmed that pt is a jail resident at [facility] and the plan is for him to return at discharge.  Per Annabelle pt is bedbound at baseline.      Glo Franklin, ROBBY  [facility]  [phone]

## 2023-04-09 NOTE — PLAN OF CARE
Patient is 62yr old male admitted yesterday for Tachycardia and multiple wounds. Patient is currently receiving IV antibiotics.   Problem: Adult Inpatient Plan of Care  Goal: Plan of Care Review  Outcome: Ongoing, Progressing  Flowsheets (Taken 4/9/2023 1252)  Plan of Care Reviewed With: patient     Problem: Infection  Goal: Absence of Infection Signs and Symptoms  Outcome: Ongoing, Progressing  Intervention: Prevent or Manage Infection  Flowsheets (Taken 4/9/2023 1252)  Infection Management: aseptic technique maintained  Isolation Precautions: precautions maintained     Problem: Diabetes Comorbidity  Goal: Blood Glucose Level Within Targeted Range  Outcome: Ongoing, Progressing  Intervention: Monitor and Manage Glycemia  Flowsheets (Taken 4/9/2023 1252)  Glycemic Management: blood glucose monitored     Problem: Infection (Pneumonia)  Goal: Resolution of Infection Signs and Symptoms  Outcome: Ongoing, Progressing  Intervention: Prevent Infection Progression  Flowsheets (Taken 4/9/2023 1252)  Infection Management: aseptic technique maintained  Isolation Precautions: precautions maintained     Problem: Impaired Wound Healing  Goal: Optimal Wound Healing  Outcome: Ongoing, Progressing  Intervention: Promote Wound Healing  Flowsheets (Taken 4/9/2023 1252)  Activity Management: Patient unable to perform activities

## 2023-04-09 NOTE — RESPIRATORY THERAPY
RAPID RESPONSE RESPIRATORY THERAPY PROACTIVE NOTE           Time of visit: 929     Code Status: DNR   : 1960  Bed: 84612/99926 A:   MRN: [id number]  Time spent at the bedside: < 15 min    SITUATION    Evaluated patient for: LDA Check     BACKGROUND    Patient has no past medical history on file.  Clinically Significant Surgical Hx: tracheostomy    24 Hours Vitals Range:  Temp:  [98.2 °F (36.8 °C)-99.1 °F (37.3 °C)]   Pulse:  []   Resp:  [5-28]   BP: (110-131)/(71-84)   SpO2:  [93 %-98 %]     Labs:    Recent Labs     23  0707 23  0601    143   K 4.5 4.0    113*   CO2 23 18*   CREATININE 1.1 1.2   * 71   PHOS 2.4* 2.7   MG 1.9 1.7        No results for input(s): PH, PCO2, PO2, HCO3, POCSATURATED, BE in the last 72 hours.    ASSESSMENT/INTERVENTIONS  Pt resting comfortably      Last VS   Temp: 98.2 °F (36.8 °C) ( 1107)  Pulse: 108 ( 1512)  Resp: 5 ( 1200)  BP: 110/71 ( 1200)  SpO2: 96 % ( 1512)      Extra trachs at bedside: y  Level of Consciousness: Level of Consciousness (AVPU): alert  Respiratory Effort: Respiratory Effort: Normal, Unlabored Expansion/Accessory Muscle Usage: Expansion/Accessory Muscles/Retractions: expansion symmetric  All Lung Field Breath Sounds: All Lung Fields Breath Sounds: coarse, diminished  O2 Device/Concentration: 5L/21%  Surgical airway: Yes, Type: Shiley Size: 6 XLT,   Ambu at bedside: Ambu bag with the patient?: Yes, Adult Ambu     Active Orders   Respiratory Care    Inhalation Treatment Q4H PRN     Frequency: Q4H PRN     Number of Occurrences: Until Specified    Oxygen PRN     Frequency: PRN     Number of Occurrences: Until Specified     Order Questions:      Device type: Low flow      Device: Nasal Cannula (1- 5 Liters)      LPM: 1-5      Titrate O2 per Oxygen Titration Protocol: Yes      To maintain SpO2 goal of: >= 90%      Notify MD of: Inability to achieve desired SpO2; Sudden change in patient status and requires  20% increase in FiO2; Patient requires >60% FiO2    Pulse Oximetry Continuous     Frequency: Continuous     Number of Occurrences: Until Specified    Routine tracheostomy care     Frequency: BID     Number of Occurrences: Until Specified       RECOMMENDATIONS    We recommend: RRT Recs: Continue POC per primary team.      FOLLOW-UP    Please call back the Rapid Response RT, Ayad Herbert RRT at x [phone] for any questions or concerns.

## 2023-04-09 NOTE — ACP (ADVANCE CARE PLANNING)
Advance Care Planning     Date: 04/09/2023    Today a meeting took place: Telephone    Patient Participation: Patient is able to participate     Attendees (Name and  Relationship to patient): Legal surrogate decision-maker: Sister, No spouse or children.      Staff attendees (Name and  Role): Myself    ACP Conversation (General): Understanding of current condition with sepsis, multiple infections, multiple recent hospitalizations, in frail and debilitated patient with quadriparesis.     Code Status: DNR; status confirmed/order placed in chart     ACP Documents: None    Goals of care: The family endorses that what is most important important right now is continued evaluation and treatment but code status is now DO NOT RESUSCITATE. Family is discussing transition to hospice.    Accordingly, we have decided that the best plan to meet the patient's goals includes continuing with treatment      Recommendations/  Follow-up tasks: Will discuss again tomorrow    Length of ACP   conversation in minutes: 15 minutes

## 2023-04-09 NOTE — PROGRESS NOTES
Hospital Medicine   Progress note   Admit Date: 4/8/2023  Code status: DNR (Do Not Resuscitate)   Principal Problem: Sepsis      HPI:   62 y.o. 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  Patient is nonverbal and unable to provide any history so the information from this H&P gathered via ER doctor, nursing facility, and past medical records.  According to the nursing facility, at 4:36 a.m. this morning, the patient was found with coffee-ground emesis on his gown and transferred to this facility.  According to the [facility] ED physician and nursing staff, no further hematemesis noted.  Of note, patient was admitted January 2023 with dislodged PEG tube but was found to be septic and treated for E faecalis bacteremia.  At that time, peg tube was replaced    Hospital Course:  Patient admitted to the hospitalist service for further evaluation and treatment of hematemesis and sepsis likely urinary and respiratory source.  On admission,  tachycardic, tachypneic with low normal blood pressure and WBC 15.36.  CXR with small pleural effusions, atelectasis, airspace consolidation in the basilar right lower lobe.  Urinalysis concerning for infection. CT abdomen and pelvis with urinary bladder thickening, evidence of gastritis/esophagitis, small pleural effusions with right lower lobe consolidation, skin thickening at lower sacrum and coccyx, calcifications about bilateral hips and can not rule out effusions.  Sepsis IV fluid bolus administered with improved vitals.  Blood and urine culture ordered.  Lactic acid 1.41.  IV antibiotics initiated.      No further episodes of vomiting while inpatient.    Interval hx:   Patient seen and examined at bedside.  Does not appear to be in any acute distress.  Patient  "remains nonverbal and is unable to give any history.  Patient remains tachycardic and tachypneic.  RN reports that at some point in the night, IV fluids became disconnected.  No other events reported overnight.    I again discussed with Annabelle Edgar, patient's sister and surrogate decision maker is says that they are considering transition to hospice.  Discussion is being held with the family.  Annabelle has decided to change code status to DNR.      PEx   /82 (BP Location: Left arm, Patient Position: Lying)   Pulse (!) 117   Temp 99.1 °F (37.3 °C) (Axillary)   Resp (!) 28   Ht 5' 7" (1.702 m)   Wt 67.6 kg (149 lb)   SpO2 98%   BMI 23.34 kg/m²     General Appearance: no acute distress, frail   Heart:  Tachycardia  Respiratory:  Tachypneic, Diminished breath sounds bilaterally, no wheezing  Abdomen: Soft, non-tender (patient does not express pain on palpation); bowel sounds active   Skin: Warm, dry, multiple abrasions and skin tears bilateral upper and lower extremities.  Grade 2 sacral ulcer with no surrounding erythema  Neurologic: Unable to assess as patient is nonverbal. Follows command to close and open his eyes but does not follow other commands. Can not move any extremity on command      Pertinent labs and imaging personally reviewed.      Assessment and Plan:    Sepsis  UTI  Likely aspiration versus community-acquired pneumonia  -tachycardic, tachypneic, leukocytosis  -CT abdomen/pelvis mentions possible Bilat hip synovitis. Cannot rule out septic arthritis. Consider ortho consult. Will discuss with family as they are considering palliative measures.  -blood cx NGTD  -urine culture presumptive Proteus species  -continue IV antibiotics vanc, cefepime, doxycycline.  Will adjust based on culture susceptibilities.    Hematemesis  Esophagitis  Gastritis  -hemoglobin 13.2 on admission, 7.6 on discharge 02/12/2023.  -no further coffee-ground emesis since presenting to this facility.  -IV Protonix b.i.d. " for now  -monitor hemoglobin, transfuse < 7    Sinus Tachycardia  -CTA chest pending rule out PE  -Cont IVFs and IV antibiotics     Elevated troponin  -likely due to demand ischemia.  -EKG personally reviewed with sinus tachycardia and nonspecific ST changes.  There is no evidence of significant acute infarction.  Will repeat EKG when heart rate slows.  -trend troponins  -TTE a of 60%, normal diastolic function, normal RV size and function, no evidence of vegetations     Diabetes mellitus type 2 with long-term insulin use  -Hemoglobin A1c 7.1 on 02/10/2023.  -outpatient regimen, Lantus 15 units daily  -continue Levemir 10 units daily and sliding scale.  Titrate as needed.    Multiple wounds of skin  -inpatient wound care consult, appreciate assistance     Elevated transaminase level, improved  -possibly related to current infectious process  -U/S RUQ pending  -continue to monitor     Goals of care  -c advance care planning discussion held with Healthcare surrogate on 04/09.  Decision has been made to change code status to DNR.  Family having further discussions to transition to palliative vs hospice     Diet: Tube feeds  DVT prophylaxis:  SCDs if the patient will tolerate.  Holding heparin products due to reports of hematemesis.  Code status: Full

## 2023-04-09 NOTE — PLAN OF CARE
SW went to pt's bedside to attempt to complete discharge assessment but pt is non-verbal and no family at bedside.    Glo Franklin LMSW  [facility]  [phone]

## 2023-04-09 NOTE — PLAN OF CARE
Problem: Adult Inpatient Plan of Care  Goal: Plan of Care Review  Outcome: Ongoing, Progressing  Goal: Patient-Specific Goal (Individualized)  Outcome: Ongoing, Progressing  Goal: Absence of Hospital-Acquired Illness or Injury  Outcome: Ongoing, Progressing  Goal: Optimal Comfort and Wellbeing  Outcome: Ongoing, Progressing  Goal: Readiness for Transition of Care  Outcome: Ongoing, Progressing     Problem: Infection  Goal: Absence of Infection Signs and Symptoms  Outcome: Ongoing, Progressing     Problem: Diabetes Comorbidity  Goal: Blood Glucose Level Within Targeted Range  Outcome: Ongoing, Progressing     Problem: Adjustment to Illness (Sepsis/Septic Shock)  Goal: Optimal Coping  Outcome: Ongoing, Progressing

## 2023-04-09 NOTE — CONSULTS
[facility] - Intensive Care ([facility]  Adult Nutrition  Consult Note    SUMMARY     Recommendations    1. When medically able, initiate TF regimen of Glucerna 1.5 @ goal rate of 50 mL/hr- provides 1800 kcals, 99 g pro, and 911 mL free water.      - If alternative TF regimen warranted, rec'd Helen Farms 1.5 @ 50 mL/hr- provides 1846 kcals, 89 g pro, and 840 mL free water.    2. Add Niko BID x 14 days and wound healing cocktail (vit C, zinc, and MVI) to promote adequate wound healing.     3. RD following.    Goals: Will meet % EEN/EPN by next RD f/u.  Nutrition Goal Status: new  Communication of RD Recs:  (POC)    Assessment and Plan    Nutrition Problem  Inadequate oral intake     Related to (etiology):   Inability to consume sufficient needs     Signs and Symptoms (as evidenced by):   NPO status      Interventions/Recommendations (treatment strategy):  Collaboration of nutrition care with other providers   EN     Nutrition Diagnosis Status:   New     Malnutrition Assessment     Skin (Micronutrient): dry, wounds unhealed, other (see comments) (round swollen face)   Micronutrient Evaluation: suspected deficiency  Micronutrient Evaluation Comments: vit A, EFA, zinc, vit C, protein, thiamine     Clavicle Bone Region (Muscle Loss): mild depletion     Reason for Assessment    Reason For Assessment: consult  Diagnosis:  (-)  Relevant Medical History: T2DM, multiple wounds of skin, gastritis, CKD 3, HLD, HTN, seizure disorder  Interdisciplinary Rounds: did not attend  General Information Comments: RD consulted for PEG tube. RD also consulted for multiple wounds- will recommend adding Niko BID x 14 days and wound healing cocktail. Unable to speak with pt 2/2 being nonverbal. Noted pt with PEG/trach- unsure TF regimen at home or if pt was able to tolerate any PO intake. Noted pt with issues of coffee ground emesis yesterday morning. Noted wt on 1/13/22 was 158# and # - indicates 6% wt loss x almost 3  "months (not significant). Unable to complete NFPE at this time 2/2 lack of pt consent - noted facial features appear swollen. RD to complete NFPE at next RD f/u if warranted. LBM 4/8.  Nutrition Discharge Planning: PEG TF regimen    Nutrition Risk Screen    Nutrition Risk Screen: tube feeding or parenteral nutrition, difficulty chewing/swallowing    Nutrition/Diet History    Food Allergies: NKFA  Factors Affecting Nutritional Intake: NPO, difficulty/impaired swallowing    Anthropometrics    Temp: 98.2 °F (36.8 °C)  Height Method: Estimated  Height: 5' 7" (170.2 cm)  Height (inches): 67 in  Weight Method: Estimated  Weight: 67.6 kg (149 lb 0.5 oz)  Weight (lb): 149.03 lb  Ideal Body Weight (IBW), Male: 148 lb  % Ideal Body Weight, Male (lb): 100.7 %  BMI (Calculated): 23.3  BMI Grade: 18.5-24.9 - normal    Lab/Procedures/Meds    Pertinent Labs Reviewed: reviewed  Pertinent Labs Comments: A1C 7.1, Albumin 2.2, ALT 91, Al Phos 299, BUN 45  Pertinent Medications Reviewed: reviewed  Pertinent Medications Comments: insulin, Lactobacillus rhamnosus, pantoprazole    Estimated/Assessed Needs    Weight Used For Calorie Calculations: 67.6 kg (149 lb 0.5 oz)  Energy Calorie Requirements (kcal): 1850  Energy Need Method: Lima Aragon (MSJ x 1.25 PAL)  Protein Requirements:  g (1.2-1.5 g/kg)  Weight Used For Protein Calculations: 67.6 kg (149 lb 0.5 oz)  Fluid Requirements (mL): 1 mL/kcal or fluid per MD  Estimated Fluid Requirement Method: RDA Method  RDA Method (mL): 1850  CHO Requirement: 230 g    Nutrition Prescription Ordered    Current Diet Order: NPO    Evaluation of Received Nutrient/Fluid Intake    I/O: -1.3 L since admit  % Intake of Estimated Energy Needs: 0%  % Meal Intake: NPO    Nutrition Risk    Level of Risk/Frequency of Follow-up:  (1 time/week)     Monitor and Evaluation    Food and Nutrient Intake: enteral nutrition intake  Food and Nutrient Adminstration: enteral and parenteral nutrition " administration  Knowledge/Beliefs/Attitudes: food and nutrition knowledge/skill, beliefs and attitudes  Physical Activity and Function: nutrition-related ADLs and IADLs  Anthropometric Measurements: weight change, body mass index, weight, height/length  Biochemical Data, Medical Tests and Procedures: lipid profile, inflammatory profile, glucose/endocrine profile, gastrointestinal profile, electrolyte and renal panel  Nutrition-Focused Physical Findings: overall appearance     Nutrition Follow-Up    RD Follow-up?: Yes    Viki Colon RDN,LDN

## 2023-04-09 NOTE — NURSING
Patient in bed resting, call light within reach, bed in low position, patient non verbal, no pain noted, no distress noted patient going for ultrasound this morning.

## 2023-04-09 NOTE — CONSULTS
Due to short staffing, [facility] unable to see patient today. If access is not obtained over night please re consult in AM.

## 2023-04-09 NOTE — PROGRESS NOTES
Pharmacist Renal Dose Adjustment Note    Hao Medrano is a 62 y.o. male being treated with the medication cefepime    Patient Data:    Vital Signs (Most Recent):  Temp: 99.1 °F (37.3 °C) (04/09/23 0002)  Pulse: (!) 117 (04/09/23 0735)  Resp: (!) 28 (04/09/23 0735)  BP: 127/82 (04/09/23 0002)  SpO2: 98 % (04/09/23 0735)   Vital Signs (72h Range):  Temp:  [97.7 °F (36.5 °C)-99.1 °F (37.3 °C)]   Pulse:  []   Resp:  [14-30]   BP: (105-136)/(65-86)   SpO2:  [95 %-100 %]      Recent Labs   Lab 04/08/23  0707 04/09/23  0601   CREATININE 1.1 1.2     Serum creatinine: 1.2 mg/dL 04/09/23 0601  Estimated creatinine clearance: 58.9 mL/min    Cefepime 2 g Q8h will be changed to cefepime 2 g Q12h    Pharmacist's Name: Maria Esther Lezama  Pharmacist's Extension: [phone]

## 2023-04-09 NOTE — PLAN OF CARE
Recommendations     1. When medically able, initiate TF regimen of Glucerna 1.5 @ goal rate of 50 mL/hr- provides 1800 kcals, 99 g pro, and 911 mL free water.     - If alternative TF regimen warranted, rec'd [place] Farms 1.5 @ 50 mL/hr- provides 1846 kcals, 89 g pro, and 840 mL free water.     2. Add Niko BID x 14 days and wound healing cocktail (vit C, zinc, and MVI) to promote adequate wound healing.      3. RD following.     Goals: Will meet % EEN/EPN by next RD f/u.  Nutrition Goal Status: new  Communication of RD Recs:  (POC)    Viki Recinos RDN,LDN

## 2023-04-09 NOTE — CARE UPDATE
RAPID RESPONSE NURSE ROUND       Rounding completed with charge RNAlex for increase in O2 requirments reports patinet on 5 L trach collar. No additional concerns verbalized at this time. Instructed to call [phone] for further concerns or assistance.

## 2023-04-10 PROBLEM — G82.50 QUADRIPLEGIA: Status: ACTIVE | Noted: 2023-04-10

## 2023-04-10 PROBLEM — Z71.89 GOALS OF CARE, COUNSELING/DISCUSSION: Status: ACTIVE | Noted: 2023-04-10

## 2023-04-10 PROBLEM — J96.01 ACUTE HYPOXEMIC RESPIRATORY FAILURE: Status: ACTIVE | Noted: 2023-04-10

## 2023-04-10 LAB
ALBUMIN SERPL BCP-MCNC: 2.1 G/DL (ref 3.5–5.2)
ALP SERPL-CCNC: 298 U/L (ref 55–135)
ALT SERPL W/O P-5'-P-CCNC: 85 U/L (ref 10–44)
ANION GAP SERPL CALC-SCNC: 8 MMOL/L (ref 8–16)
AST SERPL-CCNC: 49 U/L (ref 10–40)
BACTERIA UR CULT: ABNORMAL
BILIRUB SERPL-MCNC: 0.3 MG/DL (ref 0.1–1)
BUN SERPL-MCNC: 37 MG/DL (ref 8–23)
CALCIUM SERPL-MCNC: 9.5 MG/DL (ref 8.7–10.5)
CHLORIDE SERPL-SCNC: 113 MMOL/L (ref 95–110)
CO2 SERPL-SCNC: 22 MMOL/L (ref 23–29)
CREAT SERPL-MCNC: 1.2 MG/DL (ref 0.5–1.4)
ERYTHROCYTE [DISTWIDTH] IN BLOOD BY AUTOMATED COUNT: 17.4 % (ref 11.5–14.5)
EST. GFR  (NO RACE VARIABLE): >60 ML/MIN/1.73 M^2
GLUCOSE SERPL-MCNC: 104 MG/DL (ref 70–110)
HCT VFR BLD AUTO: 33.2 % (ref 40–54)
HGB BLD-MCNC: 9.9 G/DL (ref 14–18)
LACTATE SERPL-SCNC: 1.1 MMOL/L (ref 0.5–2.2)
MAGNESIUM SERPL-MCNC: 1.8 MG/DL (ref 1.6–2.6)
MCH RBC QN AUTO: 26.2 PG (ref 27–31)
MCHC RBC AUTO-ENTMCNC: 29.8 G/DL (ref 32–36)
MCV RBC AUTO: 88 FL (ref 82–98)
PHOSPHATE SERPL-MCNC: 3 MG/DL (ref 2.7–4.5)
PLATELET # BLD AUTO: 403 K/UL (ref 150–450)
PMV BLD AUTO: 10.8 FL (ref 9.2–12.9)
POCT GLUCOSE: 111 MG/DL (ref 70–110)
POCT GLUCOSE: 114 MG/DL (ref 70–110)
POCT GLUCOSE: 148 MG/DL (ref 70–110)
POCT GLUCOSE: 175 MG/DL (ref 70–110)
POTASSIUM SERPL-SCNC: 3.5 MMOL/L (ref 3.5–5.1)
PROCALCITONIN SERPL IA-MCNC: 0.39 NG/ML
PROT SERPL-MCNC: 6.8 G/DL (ref 6–8.4)
RBC # BLD AUTO: 3.78 M/UL (ref 4.6–6.2)
SODIUM SERPL-SCNC: 143 MMOL/L (ref 136–145)
WBC # BLD AUTO: 8.61 K/UL (ref 3.9–12.7)

## 2023-04-10 PROCEDURE — 99233 SBSQ HOSP IP/OBS HIGH 50: CPT | Mod: ,,, | Performed by: FAMILY MEDICINE

## 2023-04-10 PROCEDURE — 83735 ASSAY OF MAGNESIUM: CPT | Performed by: FAMILY MEDICINE

## 2023-04-10 PROCEDURE — G0378 HOSPITAL OBSERVATION PER HR: HCPCS

## 2023-04-10 PROCEDURE — 94761 N-INVAS EAR/PLS OXIMETRY MLT: CPT

## 2023-04-10 PROCEDURE — 93005 ELECTROCARDIOGRAM TRACING: CPT

## 2023-04-10 PROCEDURE — 93010 EKG 12-LEAD: ICD-10-PCS | Mod: ,,, | Performed by: INTERNAL MEDICINE

## 2023-04-10 PROCEDURE — 99233 PR SUBSEQUENT HOSPITAL CARE,LEVL III: ICD-10-PCS | Mod: ,,, | Performed by: FAMILY MEDICINE

## 2023-04-10 PROCEDURE — 63600175 PHARM REV CODE 636 W HCPCS: Performed by: PHYSICIAN ASSISTANT

## 2023-04-10 PROCEDURE — 93010 ELECTROCARDIOGRAM REPORT: CPT | Mod: ,,, | Performed by: INTERNAL MEDICINE

## 2023-04-10 PROCEDURE — 27000221 HC OXYGEN, UP TO 24 HOURS

## 2023-04-10 PROCEDURE — C9113 INJ PANTOPRAZOLE SODIUM, VIA: HCPCS | Performed by: FAMILY MEDICINE

## 2023-04-10 PROCEDURE — 83605 ASSAY OF LACTIC ACID: CPT | Performed by: PHYSICIAN ASSISTANT

## 2023-04-10 PROCEDURE — 84100 ASSAY OF PHOSPHORUS: CPT | Performed by: FAMILY MEDICINE

## 2023-04-10 PROCEDURE — 96367 TX/PROPH/DG ADDL SEQ IV INF: CPT

## 2023-04-10 PROCEDURE — 27200966 HC CLOSED SUCTION SYSTEM

## 2023-04-10 PROCEDURE — 96365 THER/PROPH/DIAG IV INF INIT: CPT | Mod: 59

## 2023-04-10 PROCEDURE — 84145 PROCALCITONIN (PCT): CPT | Performed by: FAMILY MEDICINE

## 2023-04-10 PROCEDURE — 63600175 PHARM REV CODE 636 W HCPCS: Performed by: FAMILY MEDICINE

## 2023-04-10 PROCEDURE — 85027 COMPLETE CBC AUTOMATED: CPT | Performed by: FAMILY MEDICINE

## 2023-04-10 PROCEDURE — 99900026 HC AIRWAY MAINTENANCE (STAT)

## 2023-04-10 PROCEDURE — 99214 OFFICE O/P EST MOD 30 MIN: CPT | Mod: ,,, | Performed by: PHYSICIAN ASSISTANT

## 2023-04-10 PROCEDURE — 36415 COLL VENOUS BLD VENIPUNCTURE: CPT | Performed by: PHYSICIAN ASSISTANT

## 2023-04-10 PROCEDURE — 25000003 PHARM REV CODE 250: Performed by: PHYSICIAN ASSISTANT

## 2023-04-10 PROCEDURE — 99214 PR OFFICE/OUTPT VISIT, EST, LEVL IV, 30-39 MIN: ICD-10-PCS | Mod: ,,, | Performed by: PHYSICIAN ASSISTANT

## 2023-04-10 PROCEDURE — 99900035 HC TECH TIME PER 15 MIN (STAT)

## 2023-04-10 PROCEDURE — 80053 COMPREHEN METABOLIC PANEL: CPT | Performed by: FAMILY MEDICINE

## 2023-04-10 PROCEDURE — 25000003 PHARM REV CODE 250: Performed by: FAMILY MEDICINE

## 2023-04-10 RX ADMIN — DOXYCYCLINE HYCLATE 100 MG: 100 TABLET, COATED ORAL at 08:04

## 2023-04-10 RX ADMIN — CEFEPIME 2 G: 2 INJECTION, POWDER, FOR SOLUTION INTRAVENOUS at 06:04

## 2023-04-10 RX ADMIN — PANTOPRAZOLE SODIUM 40 MG: 40 INJECTION, POWDER, FOR SOLUTION INTRAVENOUS at 09:04

## 2023-04-10 RX ADMIN — Medication 2 CAPSULE: at 08:04

## 2023-04-10 RX ADMIN — BACLOFEN 15 MG: 5 TABLET ORAL at 04:04

## 2023-04-10 RX ADMIN — BACLOFEN 15 MG: 5 TABLET ORAL at 09:04

## 2023-04-10 RX ADMIN — Medication 6 MG: at 09:04

## 2023-04-10 RX ADMIN — MEROPENEM 1 G: 1 INJECTION INTRAVENOUS at 01:04

## 2023-04-10 RX ADMIN — GENTAMICIN SULFATE 333.2 MG: 40 INJECTION, SOLUTION INTRAMUSCULAR; INTRAVENOUS at 04:04

## 2023-04-10 RX ADMIN — CARVEDILOL 3.12 MG: 3.12 TABLET, FILM COATED ORAL at 09:04

## 2023-04-10 RX ADMIN — CARVEDILOL 3.12 MG: 3.12 TABLET, FILM COATED ORAL at 08:04

## 2023-04-10 RX ADMIN — PANTOPRAZOLE SODIUM 40 MG: 40 INJECTION, POWDER, FOR SOLUTION INTRAVENOUS at 08:04

## 2023-04-10 RX ADMIN — BACLOFEN 15 MG: 5 TABLET ORAL at 08:04

## 2023-04-10 RX ADMIN — LEVETIRACETAM 750 MG: 500 SOLUTION ORAL at 08:04

## 2023-04-10 RX ADMIN — INSULIN DETEMIR 10 UNITS: 100 INJECTION, SOLUTION SUBCUTANEOUS at 09:04

## 2023-04-10 RX ADMIN — LEVETIRACETAM 750 MG: 500 SOLUTION ORAL at 09:04

## 2023-04-10 RX ADMIN — Medication 2 CAPSULE: at 09:04

## 2023-04-10 NOTE — NURSING
Patient in bed resting, call light within reach, bed in low position, patient has trach collar at 5 L with humidified Air, patient non verbal, peg tube with iso source running at 20 ml/hr, no distress noted at this time.

## 2023-04-10 NOTE — PLAN OF CARE
Status remains unchanged. Tolerating Tf @ goal rate. Transferred pt ot bed ordered per wound care nurse. Extensive Wound care done per wound care nurse, see order set for further wound care treatments.  IV antibiotic med changes done per med team. Safety maintained.

## 2023-04-10 NOTE — PLAN OF CARE
04/10/23 1448   Post-Acute Status   Post-Acute Authorization Other   Other Status See Comments  (NH return to [facility])   Discharge Delays None known at this time   Discharge Plan   Discharge Plan A Return to nursing home     MARTINA left voicemail with Kalpana Mattson at [phone].  MARTINA called and spoke to Patricia at  [facility], requested DC orders and updated COVID results be faxed to # [phone] or send via e-mail at [email or web address].  Any orders need to be faxed before 2 pm.   FAINA: 4/12/23    Grace Villanueva RN  Case Management  [facility]  [phone]

## 2023-04-10 NOTE — SIGNIFICANT EVENT
Evaluation of Early Detection of Sepsis Risk     Patient Name: Hao Medrano  MRN:  [id number]  Primary Care Team: [facility]  Date of Admission:  4/8/2023     Principal Problem:  Aspiration pneumonia   Date of Review: 04/10/2023    Patient reviewed for elevated sepsis risk score. Sepsis Screen Positive  Will check screening lactate and notify attending team for additional orders as indicated. Please notify Rapid Response Team for any hypotension or decompensation.    Sepsis Screen Results     IP Sepsis Screen (most recent)       Sepsis Screen (IP) - 04/10/23 0806       Is the patient's history or complaint suggestive of a possible infection? Yes  -MB    Are there at least two of the following signs and symptoms present? Yes  -MB    Sepsis signs/symptoms - Tachycardia Tachycardia     >90  -MB    Sepsis signs/symptoms - Tachypnea Tachypnea     >20  -MB    Are any of the following organ dysfunction criteria present and not considered to be due to a chronic condition? Yes  -MB    Organ Dysfunction Criteria - Resp Comp Respiratory Compromise: Requiring > 5L NC  -MB    Initiate Sepsis Protocol No  -MB    Reason sepsis not considered Pt. receiving appropriate management  -MB              User Key  (r) = Recorded By, (t) = Taken By, (c) = Cosigned By      Initials Name    SUYAPA Goodson Jr., BERNADINE Goodson PA-C   [facility]

## 2023-04-10 NOTE — PROGRESS NOTES
Patient seen for wound care consultation for sacral and bilateral heels.  Reviewed chart for this encounter.   See Flow Sheet for findings.    Pt non-verbal, bedside RN asked for consult. Legs were washed with soap and water. Revealing multiple wounds on bilateral feet and legs different variations of thickness. All wounds covered with Hydrofera Ready, cast padding football and heel lift boots.   Pt had on diaper, this was removed. Pt turned for sacral assessment revealing several wounds all appear to be moisture associated. Pt also grabbing and scratching self on inner right thing and reaching back to sacral area to try to reach and grab   Area cleansed and traid applied.    Immerse bed ordered.     RECOMMENDATIONS:  See above  Podiatry consult for left medial first met head    Discussed POC with patient and primary RN.   See EMR for orders & patient education.      Nursing to continue care.  Nursing to maintain pressure injury prevention interventions.         04/10/23 1100        Altered Skin Integrity 01/27/23 1215 Right Buttocks   Date First Assessed/Time First Assessed: 01/27/23 1215   Altered Skin Integrity Present on Admission: yes  Side: Right  Location: Buttocks   Wound Image      Dressing Appearance Moist drainage   Drainage Amount Small   Drainage Characteristics/Odor Serosanguineous   Appearance Pink;Red;Moist   Care Cleansed with:;Antimicrobial agent;Soap and water   Dressing Applied;Other (comment)  (triad)        Altered Skin Integrity 01/27/23 1215 Right lateral Foot   Date First Assessed/Time First Assessed: 01/27/23 1215   Altered Skin Integrity Present on Admission: yes  Side: Right  Orientation: lateral  Location: Foot  Is this injury device related?: No   Wound Image       Drainage Amount Small   Drainage Characteristics/Odor Serosanguineous   Red (%), Wound Tissue Color 50 %   Yellow (%), Wound Tissue Color 50 %   Care Cleansed with:;Antimicrobial agent;Soap and water   Dressing  Applied;Methylene blue/gentian violet        Altered Skin Integrity 01/27/23 1215 Left medial Heel   Date First Assessed/Time First Assessed: 01/27/23 1215   Altered Skin Integrity Present on Admission: yes  Side: Left  Orientation: medial  Location: Heel  Is this injury device related?: No   Wound Image          Altered Skin Integrity 02/03/23 0701 Left  Foot Ulceration Partial thickness tissue loss. Shallow open ulcer with a red or pink wound bed, without slough. Intact or Open/Ruptured Serum-filled blister.   Date First Assessed/Time First Assessed: 02/03/23 0701   Side: Left  Orientation: (c)   Location: Foot  Is this injury device related?: No  Primary Wound Type: Ulceration  Description of Altered Skin Integrity: Partial thickness tissue loss. Shallow o...   Wound Image

## 2023-04-10 NOTE — SUBJECTIVE & OBJECTIVE
History reviewed. No pertinent past medical history.    Past Surgical History:   Procedure Laterality Date    ESOPHAGOGASTRODUODENOSCOPY N/A 1/11/2023    Procedure: EGD (ESOPHAGOGASTRODUODENOSCOPY);  Surgeon: Francisco Javier Holt MD;  Location: [address]);  Service: Endoscopy;  Laterality: N/A;    ESOPHAGOGASTRODUODENOSCOPY N/A 1/12/2023    Procedure: EGD (ESOPHAGOGASTRODUODENOSCOPY);  Surgeon: Francisco Javier Holt MD;  Location: [address]);  Service: Endoscopy;  Laterality: N/A;    ESOPHAGOGASTRODUODENOSCOPY N/A 1/26/2023    Procedure: EGD (ESOPHAGOGASTRODUODENOSCOPY);  Surgeon: Manju Whitman MD;  Location: [address]);  Service: Endoscopy;  Laterality: N/A;       Review of patient's allergies indicates:  No Known Allergies    Medications:  Medications Prior to Admission   Medication Sig    arginine-glutamine-calcium HMB (SHEREE) 7-7-1.5 gram PwPk 1 packet by Per G Tube route 2 (two) times daily.    aspirin 81 MG Chew 81 mg by Per G Tube route once daily.    baclofen (LIORESAL) 5 mg Tab tablet 15 mg by Per G Tube route 3 (three) times daily.    bisacodyL (DULCOLAX) 10 mg Supp Place 1 suppository (10 mg total) rectally daily as needed (Until bowel movement if patient has no bowel movement for 2 days).    calcium carbonate (OS-OLGA) 500 mg calcium (1,250 mg) tablet 1,250 mg by Per G Tube route once daily.    carvediloL (COREG) 3.125 MG tablet 3.125 mg by Per G Tube route 2 (two) times daily.    esomeprazole (NEXIUM) 40 mg GrPS 40 mg by Per G Tube route once daily.    hydrOXYzine (ATARAX) 10 mg/5 mL syrup Take 25 mg by mouth every 8 (eight) hours. (2 pm, 10 pm & 6 pm)    insulin glargine-yfgn 100 unit/mL (3 mL) InPn Inject 15 Units into the skin once daily.    Lactobacillus rhamnosus GG (CULTURELLE) 10 billion cell capsule 2 capsules by Per G Tube route 2 (two) times a day.    levETIRAcetam (KEPPRA) 100 mg/mL Soln 750 mg by Per G Tube route 2 (two) times daily.    menthol/zinc oxide (CALMOSEPTINE  TOP) Apply to the affected area twice daily as needed    nystatin (MYCOSTATIN) powder Apply to the affected area daily     Antibiotics (From admission, onward)      Start     Stop Route Frequency Ordered    04/10/23 1515  gentamicin (GARAMYCIN) 333.2 mg in sodium chloride 0.9% 100 mL IVPB  (gentamicin IVPB)         -- IV Once 04/10/23 1407          Antifungals (From admission, onward)      None          Antivirals (From admission, onward)      None             Immunization History   Administered Date(s) Administered    COVID-19, MRNA, LN-S, PF (Pfizer) (Purple Cap) 06/25/2021       Family History    None       Social History     Socioeconomic History    Marital status: Significant Other   Tobacco Use    Smoking status: Never     Review of Systems   Unable to perform ROS: Patient nonverbal   Objective:     Vital Signs (Most Recent):  Temp: 98.6 °F (37 °C) (04/10/23 1154)  Pulse: 91 (04/10/23 1519)  Resp: 13 (04/10/23 1217)  BP: 101/62 (04/10/23 1154)  SpO2: 97 % (04/10/23 1217) Vital Signs (24h Range):  Temp:  [98.1 °F (36.7 °C)-98.7 °F (37.1 °C)] 98.6 °F (37 °C)  Pulse:  [] 91  Resp:  [13-23] 13  SpO2:  [95 %-100 %] 97 %  BP: ()/(61-69) 101/62     Weight: 67.4 kg (148 lb 9.4 oz)  Body mass index is 23.27 kg/m².    Estimated Creatinine Clearance: 58.9 mL/min (based on SCr of 1.2 mg/dL).    Physical Exam  Vitals and nursing note reviewed.   Constitutional:       General: He is not in acute distress.     Appearance: He is well-developed. He is not diaphoretic.   HENT:      Head: Normocephalic and atraumatic.   Eyes:      Pupils: Pupils are equal, round, and reactive to light.   Neck:      Comments: Trach c/d/I   Cardiovascular:      Rate and Rhythm: Normal rate and regular rhythm.      Heart sounds: Normal heart sounds. No murmur heard.    No friction rub. No gallop.   Pulmonary:      Effort: Pulmonary effort is normal.      Breath sounds: Normal breath sounds.      Comments: On O2.    Abdominal:       General: Bowel sounds are normal. There is no distension.      Palpations: There is no mass.      Tenderness: There is no abdominal tenderness. There is no guarding.      Comments: Peg tube c/d/I    Musculoskeletal:         General: No deformity. Normal range of motion.      Cervical back: Normal range of motion and neck supple.   Skin:     General: Skin is warm and dry.      Findings: No erythema or rash.   Neurological:      Mental Status: He is alert and oriented to person, place, and time.   Psychiatric:         Behavior: Behavior normal.         Thought Content: Thought content normal.         Judgment: Judgment normal.       Significant Labs: All pertinent labs within the past 24 hours have been reviewed.    Significant Imaging: I have reviewed all pertinent imaging results/findings within the past 24 hours.

## 2023-04-10 NOTE — PLAN OF CARE
Pt dx UTI. Opens eyes spontaneously. Tracking with eyes noted. Aphasic. Moved left arm spontaneously. Doesn't follow any commands. Right upper ext, BLE contracted. HOB elevated 30 degrees. PIV 20g to right FA intact, secured and patent. Purwic in place. PEG to mid abd intact secured to abd with TF infusing well to site. Tolerating TF well. Explained plan of care, no evidence of learning. Safety maintained.

## 2023-04-10 NOTE — PLAN OF CARE
Problem: Adult Inpatient Plan of Care  Goal: Plan of Care Review  Outcome: Ongoing, Progressing  Goal: Patient-Specific Goal (Individualized)  Outcome: Ongoing, Progressing  Goal: Absence of Hospital-Acquired Illness or Injury  Outcome: Ongoing, Progressing  Goal: Optimal Comfort and Wellbeing  Outcome: Ongoing, Progressing  Goal: Readiness for Transition of Care  Outcome: Ongoing, Progressing     Problem: Infection  Goal: Absence of Infection Signs and Symptoms  Outcome: Ongoing, Progressing     Problem: Diabetes Comorbidity  Goal: Blood Glucose Level Within Targeted Range  Outcome: Ongoing, Progressing     Problem: Adjustment to Illness (Sepsis/Septic Shock)  Goal: Optimal Coping  Outcome: Ongoing, Progressing     Problem: Glycemic Control Impaired (Sepsis/Septic Shock)  Goal: Blood Glucose Level Within Desired Range  Outcome: Ongoing, Progressing     Problem: Infection Progression (Sepsis/Septic Shock)  Goal: Absence of Infection Signs and Symptoms  Outcome: Ongoing, Progressing     Problem: Nutrition Impaired (Sepsis/Septic Shock)  Goal: Optimal Nutrition Intake  Outcome: Ongoing, Progressing

## 2023-04-10 NOTE — CONSULTS
[facility] - Intensive [facility], [place] ([place] [address])  Infectious Disease  Consult Note    Patient Name: Hao Medrano  MRN: [id number]  Admission Date: 4/8/2023  Hospital Length of Stay: 0 days  Attending Physician: Marcello Vinson III,*  Primary Care Provider: Keara Yanes NP     Isolation Status: No active isolations      Inpatient consult to Infectious Diseases  Consult performed by: Benji Perez PA-C  Consult ordered by: Marcello Visnon III, MD  Reason for consult: ESBL UTI, antibiotic selection and duration for inpatient and on discharge.        Assessment/Plan:     Renal/  UTI (urinary tract infection)  61 y/o male CVA with peg and trach admitted for hematemesis. ID consulted for abx mgmt for UTI. Blood cultures NGTD.       Recommendations  1. Would d/c doxy and meropenem. Give one time dose of gentamicin for cystitis  2. Continue local wound care         Thank you for your consult. I will sign off. Please contact us if you have any additional questions.    Benji Perez PA-C  Infectious Disease  [facility] - Intensive Care ([place] [address])    Subjective:     Principal Problem: Aspiration pneumonia    HPI: History obtained per chart review. Pt nonverbal    61 y/o male with CVA with trach and PEG tube, bendbound, DMII, admitted for coffee ground emesis. ID consulted for abx mgmt for ESBL Proteus UTI. Pt started on doxy and meropenem. H/H stable on admission.     In ED pt tachycardic, tachypneic , leukocytosis 15K. CXR with RLL consolidation. UA with many bacteruria. CT A/p with bladder wall thickening c/f cystitis.  Pt remained afebrile, stable, leukocytosis normalized. No hematemeiss or melena.       History reviewed. No pertinent past medical history.    Past Surgical History:   Procedure Laterality Date    ESOPHAGOGASTRODUODENOSCOPY N/A 1/11/2023    Procedure: EGD (ESOPHAGOGASTRODUODENOSCOPY);  Surgeon: Francisco Javier Holt MD;  Location: [address];  Service: Endoscopy;  Laterality: N/A;     ESOPHAGOGASTRODUODENOSCOPY N/A 1/12/2023    Procedure: EGD (ESOPHAGOGASTRODUODENOSCOPY);  Surgeon: Francisco Javier Holt MD;  [facility]  Service: Endoscopy;  Laterality: N/A;    ESOPHAGOGASTRODUODENOSCOPY N/A 1/26/2023    Procedure: EGD (ESOPHAGOGASTRODUODENOSCOPY);  Surgeon: Manju Whitman MD;  [facility]  Service: Endoscopy;  Laterality: N/A;       Review of patient's allergies indicates:  No Known Allergies    Medications:  Medications Prior to Admission   Medication Sig    arginine-glutamine-calcium HMB (SHEREE) 7-7-1.5 gram PwPk 1 packet by Per G Tube route 2 (two) times daily.    aspirin 81 MG Chew 81 mg by Per G Tube route once daily.    baclofen (LIORESAL) 5 mg Tab tablet 15 mg by Per G Tube route 3 (three) times daily.    bisacodyL (DULCOLAX) 10 mg Supp Place 1 suppository (10 mg total) rectally daily as needed (Until bowel movement if patient has no bowel movement for 2 days).    calcium carbonate (OS-OLGA) 500 mg calcium (1,250 mg) tablet 1,250 mg by Per G Tube route once daily.    carvediloL (COREG) 3.125 MG tablet 3.125 mg by Per G Tube route 2 (two) times daily.    esomeprazole (NEXIUM) 40 mg GrPS 40 mg by Per G Tube route once daily.    hydrOXYzine (ATARAX) 10 mg/5 mL syrup Take 25 mg by mouth every 8 (eight) hours. (2 pm, 10 pm & 6 pm)    insulin glargine-yfgn 100 unit/mL (3 mL) InPn Inject 15 Units into the skin once daily.    Lactobacillus rhamnosus GG (CULTURELLE) 10 billion cell capsule 2 capsules by Per G Tube route 2 (two) times a day.    levETIRAcetam (KEPPRA) 100 mg/mL Soln 750 mg by Per G Tube route 2 (two) times daily.    menthol/zinc oxide (CALMOSEPTINE TOP) Apply to the affected area twice daily as needed    nystatin (MYCOSTATIN) powder Apply to the affected area daily     Antibiotics (From admission, onward)      Start     Stop Route Frequency Ordered    04/10/23 2856  gentamicin (GARAMYCIN) 333.2 mg in sodium chloride 0.9% 100 mL IVPB   (gentamicin IVPB)         -- IV Once 04/10/23 1407          Antifungals (From admission, onward)      None          Antivirals (From admission, onward)      None             Immunization History   Administered Date(s) Administered    COVID-19, MRNA, LN-S, PF (Pfizer) (Purple Cap) 06/25/2021       Family History    None       Social History     Socioeconomic History    Marital status: Significant Other   Tobacco Use    Smoking status: Never     Review of Systems   Unable to perform ROS: Patient nonverbal   Objective:     Vital Signs (Most Recent):  Temp: 98.6 °F (37 °C) (04/10/23 1154)  Pulse: 91 (04/10/23 1519)  Resp: 13 (04/10/23 1217)  BP: 101/62 (04/10/23 1154)  SpO2: 97 % (04/10/23 1217) Vital Signs (24h Range):  Temp:  [98.1 °F (36.7 °C)-98.7 °F (37.1 °C)] 98.6 °F (37 °C)  Pulse:  [] 91  Resp:  [13-23] 13  SpO2:  [95 %-100 %] 97 %  BP: ()/(61-69) 101/62     Weight: 67.4 kg (148 lb 9.4 oz)  Body mass index is 23.27 kg/m².    Estimated Creatinine Clearance: 58.9 mL/min (based on SCr of 1.2 mg/dL).    Physical Exam  Vitals and nursing note reviewed.   Constitutional:       General: He is not in acute distress.     Appearance: He is well-developed. He is not diaphoretic.   HENT:      Head: Normocephalic and atraumatic.   Eyes:      Pupils: Pupils are equal, round, and reactive to light.   Neck:      Comments: Trach c/d/I   Cardiovascular:      Rate and Rhythm: Normal rate and regular rhythm.      Heart sounds: Normal heart sounds. No murmur heard.    No friction rub. No gallop.   Pulmonary:      Effort: Pulmonary effort is normal.      Breath sounds: Normal breath sounds.      Comments: On O2.    Abdominal:      General: Bowel sounds are normal. There is no distension.      Palpations: There is no mass.      Tenderness: There is no abdominal tenderness. There is no guarding.      Comments: Peg tube c/d/I    Musculoskeletal:         General: No deformity. Normal range of motion.      Cervical back:  Normal range of motion and neck supple.   Skin:     General: Skin is warm and dry.      Findings: No erythema or rash.   Neurological:      Mental Status: He is alert and oriented to person, place, and time.   Psychiatric:         Behavior: Behavior normal.         Thought Content: Thought content normal.         Judgment: Judgment normal.       Significant Labs: All pertinent labs within the past 24 hours have been reviewed.    Significant Imaging: I have reviewed all pertinent imaging results/findings within the past 24 hours.

## 2023-04-10 NOTE — SUBJECTIVE & OBJECTIVE
Interval History: Patient seen and examined at bedside.  Does not appear to be in any acute distress.  Patient remains nonverbal and is unable to give any history.  No acute events reported by nursing staff overnight.  No reports of hematemesis or melena.      Objective:     Vital Signs (Most Recent):  Temp: 98.6 °F (37 °C) (04/10/23 1154)  Pulse: 92 (04/10/23 1217)  Resp: 13 (04/10/23 1217)  BP: 101/62 (04/10/23 1154)  SpO2: 97 % (04/10/23 1217) Vital Signs (24h Range):  Temp:  [98.1 °F (36.7 °C)-98.7 °F (37.1 °C)] 98.6 °F (37 °C)  Pulse:  [] 92  Resp:  [13-23] 13  SpO2:  [95 %-100 %] 97 %  BP: ()/(61-69) 101/62     Weight: 67.4 kg (148 lb 9.4 oz)  Body mass index is 23.27 kg/m².    Intake/Output Summary (Last 24 hours) at 4/10/2023 1355  Last data filed at 4/10/2023 1352  Gross per 24 hour   Intake 200 ml   Output --   Net 200 ml      Physical exam:    General Appearance: no acute distress, frail   Heart:  Regular rate, normal rhythm  Respiratory:  Diminished breath sounds bilaterally, no wheezing  Abdomen: Soft, non-tender (patient does not express pain on palpation); bowel sounds active   Skin: Warm, dry, multiple abrasions and skin tears bilateral upper and lower extremities.  Grade 2 sacral ulcer with no surrounding erythema  Neurologic: Unable to assess as patient is nonverbal. Follows command to close and open his eyes but does not follow other commands. Can not move any extremity on command         Significant Labs: All pertinent labs within the past 24 hours have been reviewed.  Blood Culture: No results for input(s): LABBLOO in the last 48 hours.  CBC:   Recent Labs   Lab 04/08/23  1441 04/09/23  1117 04/10/23  0540   WBC  --  10.87 8.61   HGB 12.3* 10.5* 9.9*   HCT 40.1 35.1* 33.2*   PLT  --  393 403     CMP:   Recent Labs   Lab 04/09/23  0601 04/10/23  0540    143   K 4.0 3.5   * 113*   CO2 18* 22*   GLU 71 104   BUN 45* 37*   CREATININE 1.2 1.2   CALCIUM 9.8 9.5   PROT 7.0 6.8    ALBUMIN 2.2* 2.1*   BILITOT 0.5 0.3   ALKPHOS 299* 298*   AST 28 49*   ALT 91* 85*   ANIONGAP 12 8         Significant Imaging: I have reviewed all pertinent imaging results/findings within the past 24 hours.

## 2023-04-10 NOTE — RESPIRATORY THERAPY
Adult trach go bag present at patient's bedside with all below listed supplies included yes    Same size trach and one size smaller   ETCO2   Inner cannula  Suction catheter   Normal Saline bullets   Trach ties  10 cc syringes   Water based lubricant   Obturator     Laryngectomy patients:   Pediatric mask    ETT tube size 6 and 8     Ambu bag and mask (in patients room)    Staff is attesting that all supplies are in the go bag by selecting yes.

## 2023-04-10 NOTE — ASSESSMENT & PLAN NOTE
-advance care planning discussion held with Healthcare surrogate on 04/09.  Decision has been made to change code status to DNR.  Family having further discussions to transition to palliative vs hospice

## 2023-04-10 NOTE — ASSESSMENT & PLAN NOTE
Oropharyngeal dysphagia  -continue frequent turning  -continue tube feeds  -appreciate wound care assistance for multiple pressure injuries

## 2023-04-10 NOTE — RESPIRATORY THERAPY
"RAPID RESPONSE RESPIRATORY THERAPY PROACTIVE NOTE           Time of visit: 827     Code Status: DNR   : 1960  Bed: 13415/84581 A:   MRN: [id number]  Time spent at the bedside: < 15 min    SITUATION    Evaluated patient for: LDA Check     BACKGROUND    Patient has no past medical history on file.  Clinically Significant Surgical Hx: tracheostomy    24 Hours Vitals Range:  Temp:  [98.1 °F (36.7 °C)-98.7 °F (37.1 °C)]   Pulse:  []   Resp:  [13-23]   BP: ()/(61-69)   SpO2:  [95 %-100 %]     Labs:    Recent Labs     23  0707 23  0601 04/10/23  0540    143 143   K 4.5 4.0 3.5    113* 113*   CO2 23 18* 22*   CREATININE 1.1 1.2 1.2   * 71 104   PHOS 2.4* 2.7 3.0   MG 1.9 1.7 1.8        No results for input(s): PH, PCO2, PO2, HCO3, POCSATURATED, BE in the last 72 hours.    ASSESSMENT/INTERVENTIONS  No respiratory needs at this time. All supplies available in the "go bag"      Last VS   Temp: 98.6 °F (37 °C) (04/10 115)  Pulse: 92 (04/10 1217)  Resp: 13 (04/10 1217)  BP: 101/62 (04/10 115)  SpO2: 97 % (04/10 1217)      Extra trachs at bedside: yes  Level of Consciousness: Level of Consciousness (AVPU): alert  Respiratory Effort: Respiratory Effort: Unlabored, Normal Expansion/Accessory Muscle Usage: Expansion/Accessory Muscles/Retractions: expansion symmetric  All Lung Field Breath Sounds: All Lung Fields Breath Sounds: Anterior:, Lateral:, coarse, diminished  O2 Device/Concentration: Trach Collar 21%  Surgical airway: Yes, Type: Shiley Size: 6 XLT, cuffed  Ambu at bedside: Ambu bag with the patient?: Yes, Adult Ambu     Active Orders   Respiratory Care    Inhalation Treatment Q4H PRN     Frequency: Q4H PRN     Number of Occurrences: Until Specified    Inhalation Treatment Q6H PRN     Frequency: Q6H PRN     Number of Occurrences: Until Specified    Oxygen PRN     Frequency: PRN     Number of Occurrences: Until Specified     Order Questions:      Device type: Low flow      " Device: Nasal Cannula (1- 5 Liters)      LPM: 1-5      Titrate O2 per Oxygen Titration Protocol: Yes      To maintain SpO2 goal of: >= 90%      Notify MD of: Inability to achieve desired SpO2; Sudden change in patient status and requires 20% increase in FiO2; Patient requires >60% FiO2    Pulse Oximetry Continuous     Frequency: Continuous     Number of Occurrences: Until Specified    Routine tracheostomy care     Frequency: BID     Number of Occurrences: Until Specified       RECOMMENDATIONS    We recommend: RRT Recs: Continue POC per primary team.      FOLLOW-UP    Please call back the Rapid Response RT, Kaleigh Davila, RRT at x [phone] for any questions or concerns.

## 2023-04-10 NOTE — ASSESSMENT & PLAN NOTE
61 y/o male CVA with peg and trach admitted for hematemesis. ID consulted for abx mgmt for UTI. Blood cultures NGTD.       Recommendations  1. Would d/c doxy and meropenem. Give one time dose of gentamicin for cystitis  2. Continue local wound care

## 2023-04-10 NOTE — HOSPITAL COURSE
Patient admitted to the hospitalist service for further evaluation and treatment of possible hematemesis and sepsis.  Patient tachycardic, tachypneic, with leukocytosis with CT abdomen pelvis revealing possible right lower lobe consolidation and UA with possible UTI.  Patient started on broad-spectrum antibiotics as he has been hospitalized within the last 90 days receiving IV antibiotics.  Additional findings of the CT mentioned bilateral hip synovitis.  Discussed with Orthopedic surgery and since patient is responding to current treatment, would not recommend invasive testing at this time.  However, if patient develops infectious signs/symptoms despite adequate treatment of UTI and pneumonia, consideration for septic arthritis could be made.  Urine culture positive for ESBL Proteus.  Discussed with Infectious Disease to was consulted and recommended single dose of gentamicin and discontinuing antibiotic for pneumonia treatment as patient had abnormal image findings likely due to aspiration pneumonitis.    Patient started on IV Protonix for possible hematemesis with known history of esophagitis.  No further episodes of bleeding identified, however, hemoglobin trended down throughout admission.  Recommend repeat lab work drawn on 04/20.  Patient to resume esomeprazole on discharge.  Discussed at length with patient's sister, Annabelle, regarding aspirin administration for stroke prophylaxis.  At this time she has decided to hold the aspirin to prevent risk of further GI bleeding.  Further discussion should be had at follow-up appointments with primary care physician. Due to persistent tachycardia and tachypnea, ultrasound bilateral lower extremities ordered and negative for DVT.  CTA chest was initially ordered but canceled since tachycardia and tachypnea resolved with fluids and antibiotics. Right upper quadrant ultrasound ordered given sepsis on admission and elevated LFTs, which have been improving.  No significant  sonographic abnormality noted.  On day of discharge, nursing staff reported 9 beat run of nonsustained V-tach that was called in by telemetry.  Patient is already on beta-blocker with low normal blood pressure.  Echocardiogram on 4/8 with EF 60%, normal diastolic function, normal RV function.  Also discussed with patient's sister who said that they would not want to pursue any invasive evaluation.  If this persists in the future, could consider up titrating beta blocker if blood pressure will allow.  Elevated troponin but remained flat, likely demand ischemia.  Multiple pressure wounds of the sacrum and lower extremities evaluated by Wound Care and Podiatry.  Podiatry recommends routine wound care and 2 weeks of antibiotics, doxycycline was prescribed.  Patient has reached maximal benefit of hospitalization and is medically stable for discharge back to nursing facility.  Patient's chronic medical conditions were managed appropriately.      Patient's sister, Annabelle, has changed code status to do not resuscitate.  Ambulatory referral made to outpatient palliative care.    Physical Exam  General Appearance: no acute distress, frail   Heart:  Regular rate, normal rhythm  Respiratory:  Diminished breath sounds bilaterally, no wheezing  Abdomen: Soft, non-tender (patient does not express pain on palpation); bowel sounds active   Skin: Warm, dry, multiple abrasions and skin tears bilateral upper and lower extremities.  Grade 2 sacral ulcer with no surrounding erythema  Neurologic: Unable to assess as patient is nonverbal. Follows command to close and open his eyes but does not follow other commands. Can not move any extremity on command

## 2023-04-10 NOTE — HPI
History obtained per chart review. Pt nonverbal    63 y/o male with CVA with trach and PEG tube, bendbound, DMII, admitted for coffee ground emesis. ID consulted for abx mgmt for ESBL Proteus UTI. Pt started on doxy and meropenem. H/H stable on admission.     In ED pt tachycardic, tachypneic , leukocytosis 15K. CXR with RLL consolidation. UA with many bacteruria. CT A/p with bladder wall thickening c/f cystitis.  Pt remained afebrile, stable, leukocytosis normalized. No hematemeiss or melena.

## 2023-04-10 NOTE — PROGRESS NOTES
[facility] - Intensive Care ([address] Medicine  Progress Note    Patient Name: Hao Medrano  MRN: [id number]  Patient Class: OP- Observation   Admission Date: 4/8/2023  Length of Stay: 0 days  Attending Physician: Marcello Vinson III,*  Primary Care Provider: Keara Yanes NP        Subjective:     Principal Problem:Aspiration pneumonia        HPI:  62 y.o. male with a past medical history of CVA with trach and G-tube with quadriparesis, CKD, HTN, DM2, seizure disorder, LA grade A esophagitis on EGD 01/2023 and HLD who presented to the ED from [facility] for reported coffee-ground emesis this morning.  Patient is nonverbal and unable to provide any history so the information from this H&P gathered via ER doctor, nursing facility, and past medical records.  According to the nursing facility, at 4:36 a.m. this morning, the patient was found with coffee-ground emesis on his gown and transferred to this facility.  According to the [facility] ED physician and nursing staff, no further hematemesis noted.  Of note, patient was admitted January 2023 with dislodged PEG tube but was found to be septic and treated for E faecalis bacteremia.  At that time, peg tube was replaced    In the ED, patient met sepsis criteria:  tachycardic, tachypneic with low normal blood pressure and WBC 15.36.  CXR with small pleural effusions, atelectasis, airspace consolidation in the basilar right lower lobe.  Urinalysis concerning for infection.  CT abdomen and pelvis with urinary bladder thickening, evidence of gastritis/esophagitis, small pleural effusions with right lower lobe consolidation, skin thickening at lower sacrum and coccyx, calcifications about bilateral hips and can not rule out effusions.  Sepsis IV fluid bolus administered with improved vitals.  Blood and urine culture ordered.  Lactic acid 1.41.  IV antibiotics initiated.  Patient admitted to the hospitalist service for further evaluation  and treatment of possible hematemesis and sepsis.     I contacted the patient's partner, Rosa Isela, and sister, Annabelle, and updated on clinical condition.  At the moment, patient is full code.  Family is discussing DNR and possible palliative/hospice.      Overview/Hospital Course:  Patient admitted to the hospitalist service for further evaluation and treatment of possible hematemesis and sepsis.  Patient tachycardic, tachypneic, with leukocytosis with CT abdomen pelvis revealing possible right lower lobe consolidation and UA with possible UTI.  Patient is started on broad-spectrum antibiotics as he has been hospitalized within the last 90 days receiving IV antibiotics.  Additional findings of the CT mentioned bilateral hip synovitis.  Discussed with Orthopedic surgery and since patient is responding to current treatment, would not recommend invasive testing at this time.  However, if patient develops infectious signs/symptoms despite adequate treatment of UTI and pneumonia, consideration for septic arthritis could be made.  Urine culture positive for ESBL Proteus.  IV Merrem started and Infectious Disease consulted.    Patient is started on IV Protonix for possible hematemesis with known history of esophagitis.  No further episodes of bleeding identified, however, hemoglobin trended down throughout admission.    Due to persistent tachycardia and tachypnea, patient underwent CTA chest and ultrasound bilateral lower extremities pending.    Right upper quadrant ultrasound pending given sepsis on admission and elevated LFTs, which have been improving.          Interval History: Patient seen and examined at bedside.  Does not appear to be in any acute distress.  Patient remains nonverbal and is unable to give any history.  No acute events reported by nursing staff overnight.  No reports of hematemesis or melena.      Objective:     Vital Signs (Most Recent):  Temp: 98.6 °F (37 °C) (04/10/23 1154)  Pulse: 92 (04/10/23  1217)  Resp: 13 (04/10/23 1217)  BP: 101/62 (04/10/23 1154)  SpO2: 97 % (04/10/23 1217) Vital Signs (24h Range):  Temp:  [98.1 °F (36.7 °C)-98.7 °F (37.1 °C)] 98.6 °F (37 °C)  Pulse:  [] 92  Resp:  [13-23] 13  SpO2:  [95 %-100 %] 97 %  BP: ()/(61-69) 101/62     Weight: 67.4 kg (148 lb 9.4 oz)  Body mass index is 23.27 kg/m².    Intake/Output Summary (Last 24 hours) at 4/10/2023 1355  Last data filed at 4/10/2023 1352  Gross per 24 hour   Intake 200 ml   Output --   Net 200 ml      Physical exam:    General Appearance: no acute distress, frail   Heart:  Regular rate, normal rhythm  Respiratory:  Diminished breath sounds bilaterally, no wheezing  Abdomen: Soft, non-tender (patient does not express pain on palpation); bowel sounds active   Skin: Warm, dry, multiple abrasions and skin tears bilateral upper and lower extremities.  Grade 2 sacral ulcer with no surrounding erythema  Neurologic: Unable to assess as patient is nonverbal. Follows command to close and open his eyes but does not follow other commands. Can not move any extremity on command         Significant Labs: All pertinent labs within the past 24 hours have been reviewed.  Blood Culture: No results for input(s): LABBLOO in the last 48 hours.  CBC:   Recent Labs   Lab 04/08/23  1441 04/09/23  1117 04/10/23  0540   WBC  --  10.87 8.61   HGB 12.3* 10.5* 9.9*   HCT 40.1 35.1* 33.2*   PLT  --  393 403     CMP:   Recent Labs   Lab 04/09/23  0601 04/10/23  0540    143   K 4.0 3.5   * 113*   CO2 18* 22*   GLU 71 104   BUN 45* 37*   CREATININE 1.2 1.2   CALCIUM 9.8 9.5   PROT 7.0 6.8   ALBUMIN 2.2* 2.1*   BILITOT 0.5 0.3   ALKPHOS 299* 298*   AST 28 49*   ALT 91* 85*   ANIONGAP 12 8         Significant Imaging: I have reviewed all pertinent imaging results/findings within the past 24 hours.      Assessment/Plan:      Sepsis  UTI  Likely aspiration versus community-acquired pneumonia  -tachycardic, tachypneic, leukocytosis  -broad-spectrum  antibiotics started since patient was recently admitted requiring IV antibiotics.  Vancomycin for MRSA coverage.  Cefepime and doxy for pneumonia.  -CT abdomen/pelvis mentions possible Bilat hip synovitis.  Discussed on phone with Orthopedic Service who agree that there is low clinical suspicion for septic arthritis at this time.  With decreasing WBC, procalcitonin, and improved vital signs it seems that source control has been obtained.  If further infectious signs or symptoms despite appropriate treatment for UTI and pneumonia, consideration for possible septic arthritis should be made at that time.  -blood cx NGTD  -urine culture ESBL, vanc and cefepime discontinued.  Merrem started.  ID consulted         Hematemesis  Esophagitis  Gastritis  -hemoglobin 13.2 on admission, 7.6 on discharge 02/12/2023.  -repeat H&H pending  -no further coffee-ground emesis since presenting to this facility.  -IV Protonix b.i.d. for now  -monitor hemoglobin, transfuse was 7.  Hemoglobin downtrending, continue to monitor.      Elevated troponin  Sinus Tachycardia  -elevated troponin likely due to demand ischemia.  Remained flat  -CTA chest pending rule out PE  -U/S BLE pending rule out DVT  -sinus tachycardia now improved s/p IVFs and continue IV antibiotics  -EKG personally reviewed with sinus tachycardia and nonspecific T-wave changes.  There is no evidence of significant acute infarction.  EKG repeated once tachycardia improved and personally reviewed.  Nonspecific T-wave changes overall EKG similar to previous on 01/27/2023  -TTE a of 60%, normal diastolic function, normal RV size and function, no evidence of vegetations      Quadriplegia  Oropharyngeal dysphagia  -continue frequent turning  -continue tube feeds  -appreciate wound care assistance for multiple pressure injuries      Goals of care, counseling/discussion  -advance care planning discussion held with Healthcare surrogate on 04/09.  Decision has been made to change  code status to DNR.  Family having further discussions to transition to palliative vs hospice      Elevated transaminase level  -possibly related to current infectious process  -U/S RUQ pending  -continue to monitor      Multiple wounds of skin  -inpatient wound care consult to come appreciate assistance      Type 2 diabetes mellitus, with long-term current use of insulin  -Hemoglobin A1c 7.1 on 02/10/2023.  -outpatient regimen, Lantus 15 units daily  -will start Levemir 10 units daily and sliding scale.  Titrate as needed.      VTE Risk Mitigation (From admission, onward)         Ordered     IP VTE HIGH RISK PATIENT  Once         04/08/23 1145     Place sequential compression device  Until discontinued         04/08/23 1145                Discharge Planning   FAINA: 4/12/2023     Code Status: DNR   Is the patient medically ready for discharge?: No    Reason for patient still in hospital (select all that apply): Patient new problem, Patient trending condition, Treatment, Imaging, Consult recommendations and Pending disposition  Discharge Plan A: Return to nursing home                  Marcello Vinson III, MD  Department of Hospital Medicine   [facility] - Intensive Care ([place])

## 2023-04-11 PROBLEM — L97.529 ULCER OF BOTH FEET: Status: ACTIVE | Noted: 2023-04-11

## 2023-04-11 PROBLEM — I47.20 V-TACH: Status: ACTIVE | Noted: 2023-04-11

## 2023-04-11 PROBLEM — L97.519 ULCER OF BOTH FEET: Status: ACTIVE | Noted: 2023-04-11

## 2023-04-11 LAB
ALBUMIN SERPL BCP-MCNC: 2.1 G/DL (ref 3.5–5.2)
ALP SERPL-CCNC: 282 U/L (ref 55–135)
ALT SERPL W/O P-5'-P-CCNC: 82 U/L (ref 10–44)
ANION GAP SERPL CALC-SCNC: 10 MMOL/L (ref 8–16)
AST SERPL-CCNC: 50 U/L (ref 10–40)
BILIRUB SERPL-MCNC: 0.2 MG/DL (ref 0.1–1)
BUN SERPL-MCNC: 29 MG/DL (ref 8–23)
CALCIUM SERPL-MCNC: 9.6 MG/DL (ref 8.7–10.5)
CHLORIDE SERPL-SCNC: 115 MMOL/L (ref 95–110)
CO2 SERPL-SCNC: 19 MMOL/L (ref 23–29)
CREAT SERPL-MCNC: 1.2 MG/DL (ref 0.5–1.4)
ERYTHROCYTE [DISTWIDTH] IN BLOOD BY AUTOMATED COUNT: 17.6 % (ref 11.5–14.5)
EST. GFR  (NO RACE VARIABLE): >60 ML/MIN/1.73 M^2
GLUCOSE SERPL-MCNC: 101 MG/DL (ref 70–110)
GRAM STN SPEC: NORMAL
GRAM STN SPEC: NORMAL
HCT VFR BLD AUTO: 36 % (ref 40–54)
HGB BLD-MCNC: 10.2 G/DL (ref 14–18)
MAGNESIUM SERPL-MCNC: 1.8 MG/DL (ref 1.6–2.6)
MCH RBC QN AUTO: 25.8 PG (ref 27–31)
MCHC RBC AUTO-ENTMCNC: 28.3 G/DL (ref 32–36)
MCV RBC AUTO: 91 FL (ref 82–98)
PHOSPHATE SERPL-MCNC: 2.9 MG/DL (ref 2.7–4.5)
PLATELET # BLD AUTO: 381 K/UL (ref 150–450)
PMV BLD AUTO: 11.3 FL (ref 9.2–12.9)
POCT GLUCOSE: 145 MG/DL (ref 70–110)
POCT GLUCOSE: 149 MG/DL (ref 70–110)
POCT GLUCOSE: 159 MG/DL (ref 70–110)
POCT GLUCOSE: 183 MG/DL (ref 70–110)
POTASSIUM SERPL-SCNC: 4.3 MMOL/L (ref 3.5–5.1)
PROT SERPL-MCNC: 6.9 G/DL (ref 6–8.4)
RBC # BLD AUTO: 3.95 M/UL (ref 4.6–6.2)
SARS-COV-2 RNA RESP QL NAA+PROBE: NOT DETECTED
SODIUM SERPL-SCNC: 144 MMOL/L (ref 136–145)
WBC # BLD AUTO: 7.35 K/UL (ref 3.9–12.7)

## 2023-04-11 PROCEDURE — 83735 ASSAY OF MAGNESIUM: CPT | Performed by: FAMILY MEDICINE

## 2023-04-11 PROCEDURE — 99900031 HC PATIENT EDUCATION (STAT)

## 2023-04-11 PROCEDURE — 99232 SBSQ HOSP IP/OBS MODERATE 35: CPT | Mod: ,,, | Performed by: FAMILY MEDICINE

## 2023-04-11 PROCEDURE — 87205 SMEAR GRAM STAIN: CPT | Performed by: STUDENT IN AN ORGANIZED HEALTH CARE EDUCATION/TRAINING PROGRAM

## 2023-04-11 PROCEDURE — 99900026 HC AIRWAY MAINTENANCE (STAT)

## 2023-04-11 PROCEDURE — 36415 COLL VENOUS BLD VENIPUNCTURE: CPT | Performed by: FAMILY MEDICINE

## 2023-04-11 PROCEDURE — 87075 CULTR BACTERIA EXCEPT BLOOD: CPT | Performed by: STUDENT IN AN ORGANIZED HEALTH CARE EDUCATION/TRAINING PROGRAM

## 2023-04-11 PROCEDURE — 94761 N-INVAS EAR/PLS OXIMETRY MLT: CPT

## 2023-04-11 PROCEDURE — 87077 CULTURE AEROBIC IDENTIFY: CPT | Performed by: STUDENT IN AN ORGANIZED HEALTH CARE EDUCATION/TRAINING PROGRAM

## 2023-04-11 PROCEDURE — 99223 PR INITIAL HOSPITAL CARE,LEVL III: ICD-10-PCS | Mod: ,,, | Performed by: PODIATRIST

## 2023-04-11 PROCEDURE — 85027 COMPLETE CBC AUTOMATED: CPT | Performed by: FAMILY MEDICINE

## 2023-04-11 PROCEDURE — 99232 PR SUBSEQUENT HOSPITAL CARE,LEVL II: ICD-10-PCS | Mod: ,,, | Performed by: FAMILY MEDICINE

## 2023-04-11 PROCEDURE — 84100 ASSAY OF PHOSPHORUS: CPT | Performed by: FAMILY MEDICINE

## 2023-04-11 PROCEDURE — 99223 1ST HOSP IP/OBS HIGH 75: CPT | Mod: ,,, | Performed by: PODIATRIST

## 2023-04-11 PROCEDURE — U0005 INFEC AGEN DETEC AMPLI PROBE: HCPCS | Performed by: FAMILY MEDICINE

## 2023-04-11 PROCEDURE — 27000221 HC OXYGEN, UP TO 24 HOURS

## 2023-04-11 PROCEDURE — 87186 SC STD MICRODIL/AGAR DIL: CPT | Performed by: STUDENT IN AN ORGANIZED HEALTH CARE EDUCATION/TRAINING PROGRAM

## 2023-04-11 PROCEDURE — 99900035 HC TECH TIME PER 15 MIN (STAT)

## 2023-04-11 PROCEDURE — 27200966 HC CLOSED SUCTION SYSTEM

## 2023-04-11 PROCEDURE — G0378 HOSPITAL OBSERVATION PER HR: HCPCS

## 2023-04-11 PROCEDURE — 87076 CULTURE ANAEROBE IDENT EACH: CPT | Performed by: STUDENT IN AN ORGANIZED HEALTH CARE EDUCATION/TRAINING PROGRAM

## 2023-04-11 PROCEDURE — 87070 CULTURE OTHR SPECIMN AEROBIC: CPT | Performed by: STUDENT IN AN ORGANIZED HEALTH CARE EDUCATION/TRAINING PROGRAM

## 2023-04-11 PROCEDURE — 80053 COMPREHEN METABOLIC PANEL: CPT | Performed by: FAMILY MEDICINE

## 2023-04-11 PROCEDURE — 25000003 PHARM REV CODE 250: Performed by: FAMILY MEDICINE

## 2023-04-11 RX ORDER — POLYETHYLENE GLYCOL 3350 17 G/17G
17 POWDER, FOR SOLUTION ORAL 2 TIMES DAILY
Qty: 60 EACH | Refills: 0 | Status: SHIPPED | OUTPATIENT
Start: 2023-04-11 | End: 2023-05-11

## 2023-04-11 RX ORDER — LEVOFLOXACIN 750 MG/1
750 TABLET ORAL DAILY
Status: DISCONTINUED | OUTPATIENT
Start: 2023-04-11 | End: 2023-04-11

## 2023-04-11 RX ORDER — DOXYCYCLINE 100 MG/1
100 CAPSULE ORAL EVERY 12 HOURS
Qty: 28 CAPSULE | Refills: 0
Start: 2023-04-11 | End: 2023-04-25

## 2023-04-11 RX ORDER — DOXYCYCLINE HYCLATE 100 MG
100 TABLET ORAL EVERY 12 HOURS
Status: DISCONTINUED | OUTPATIENT
Start: 2023-04-11 | End: 2023-04-12 | Stop reason: HOSPADM

## 2023-04-11 RX ORDER — POLYETHYLENE GLYCOL 3350 17 G/17G
17 POWDER, FOR SOLUTION ORAL 2 TIMES DAILY
Status: DISCONTINUED | OUTPATIENT
Start: 2023-04-11 | End: 2023-04-12 | Stop reason: HOSPADM

## 2023-04-11 RX ORDER — PANTOPRAZOLE SODIUM 40 MG/1
40 TABLET, DELAYED RELEASE ORAL
Status: DISCONTINUED | OUTPATIENT
Start: 2023-04-11 | End: 2023-04-12 | Stop reason: HOSPADM

## 2023-04-11 RX ORDER — DOCUSATE SODIUM 50 MG/5ML
100 LIQUID ORAL 2 TIMES DAILY
Status: DISCONTINUED | OUTPATIENT
Start: 2023-04-11 | End: 2023-04-12 | Stop reason: HOSPADM

## 2023-04-11 RX ORDER — DOXYCYCLINE HYCLATE 100 MG
100 TABLET ORAL EVERY 12 HOURS
Status: DISCONTINUED | OUTPATIENT
Start: 2023-04-11 | End: 2023-04-11

## 2023-04-11 RX ADMIN — CARVEDILOL 3.12 MG: 3.12 TABLET, FILM COATED ORAL at 09:04

## 2023-04-11 RX ADMIN — DOCUSATE SODIUM 100 MG: 50 LIQUID ORAL at 08:04

## 2023-04-11 RX ADMIN — DOCUSATE SODIUM 100 MG: 50 LIQUID ORAL at 11:04

## 2023-04-11 RX ADMIN — LEVETIRACETAM 750 MG: 500 SOLUTION ORAL at 09:04

## 2023-04-11 RX ADMIN — BACLOFEN 15 MG: 5 TABLET ORAL at 05:04

## 2023-04-11 RX ADMIN — PANTOPRAZOLE SODIUM 40 MG: 40 TABLET, DELAYED RELEASE ORAL at 06:04

## 2023-04-11 RX ADMIN — DOXYCYCLINE HYCLATE 100 MG: 100 TABLET, COATED ORAL at 08:04

## 2023-04-11 RX ADMIN — INSULIN DETEMIR 10 UNITS: 100 INJECTION, SOLUTION SUBCUTANEOUS at 08:04

## 2023-04-11 RX ADMIN — BACLOFEN 15 MG: 5 TABLET ORAL at 08:04

## 2023-04-11 RX ADMIN — Medication 2 CAPSULE: at 08:04

## 2023-04-11 RX ADMIN — POLYETHYLENE GLYCOL 3350 17 G: 17 POWDER, FOR SOLUTION ORAL at 08:04

## 2023-04-11 RX ADMIN — CARVEDILOL 3.12 MG: 3.12 TABLET, FILM COATED ORAL at 08:04

## 2023-04-11 RX ADMIN — Medication 6 MG: at 08:04

## 2023-04-11 RX ADMIN — BACLOFEN 15 MG: 5 TABLET ORAL at 09:04

## 2023-04-11 RX ADMIN — Medication 2 CAPSULE: at 09:04

## 2023-04-11 RX ADMIN — POLYETHYLENE GLYCOL 3350 17 G: 17 POWDER, FOR SOLUTION ORAL at 11:04

## 2023-04-11 RX ADMIN — PANTOPRAZOLE SODIUM 40 MG: 40 TABLET, DELAYED RELEASE ORAL at 05:04

## 2023-04-11 NOTE — PLAN OF CARE
Pt remain free from fall and injuries. VS remain stable. Tube feeding changed ay 1757 continued at goal rate 55ml/hr. Call light in reach. Bed in low locked position. Side rails x2. Belongings at bedside.     Problem: Adult Inpatient Plan of Care  Goal: Absence of Hospital-Acquired Illness or Injury  Outcome: Ongoing, Progressing  Goal: Optimal Comfort and Wellbeing  Outcome: Ongoing, Progressing     Problem: Infection  Goal: Absence of Infection Signs and Symptoms  Outcome: Ongoing, Progressing     Problem: Adjustment to Illness (Sepsis/Septic Shock)  Goal: Optimal Coping  Outcome: Ongoing, Progressing     Problem: Glycemic Control Impaired (Sepsis/Septic Shock)  Goal: Blood Glucose Level Within Desired Range  Outcome: Ongoing, Progressing

## 2023-04-11 NOTE — NURSING
Patient in bed resting, nonverbal, no signs of pain, vital signs stable, patient at goal with tube feed at 55ml/hr, patient tolerating goal tube feed well.

## 2023-04-11 NOTE — HPI
63 yo M with Hx of DM, CVA, CKD, quadriparesis, PEG, trach admitted for aspiration pneumonia. Will discharge to hospice. Podiatry consulted for multiple wounds of bilateral feet and ankles. Patient is nonambulatory with bilateral knee and hip flexion contractures. Nonverbal. No family at bedside.

## 2023-04-11 NOTE — ASSESSMENT & PLAN NOTE
Plan:  -No intervention by podiatry. OK for discharge from podiatry perspective.   -Recommend 2 weeks emriric PO antibiotics for possible early SSTI.  -Recommend daily betadine wet to dry gauze followed by kerlix and tape to bilateral foot and ankle.  -Offload bilateral heels in Z flex heel protector boots at all times while in bed or chair.   -Podiatry will sign off, reconsult as needed.

## 2023-04-11 NOTE — SUBJECTIVE & OBJECTIVE
Scheduled Meds:   baclofen  15 mg Per G Tube TID    carvediloL  3.125 mg Per G Tube BID    docusate  100 mg Per G Tube BID    insulin detemir U-100  10 Units Subcutaneous QHS    Lactobacillus rhamnosus GG  2 capsule Per G Tube BID    levetiracetam  750 mg Per G Tube BID    pantoprazole  40 mg Oral BID AC    polyethylene glycol  17 g Per G Tube BID     Continuous Infusions:  PRN Meds:acetaminophen, acetaminophen, albuterol sulfate, aluminum-magnesium hydroxide-simethicone, dextrose 10%, dextrose 10%, dextrose, dextrose, glucagon (human recombinant), insulin aspart U-100, melatonin, naloxone, ondansetron, polyethylene glycol, prochlorperazine, simethicone, sodium chloride 0.9%    Review of patient's allergies indicates:  No Known Allergies     History reviewed. No pertinent past medical history.  Past Surgical History:   Procedure Laterality Date    ESOPHAGOGASTRODUODENOSCOPY N/A 1/11/2023    Procedure: EGD (ESOPHAGOGASTRODUODENOSCOPY);  Surgeon: Francisco Javier Holt MD;  Location: [address]);  Service: Endoscopy;  Laterality: N/A;    ESOPHAGOGASTRODUODENOSCOPY N/A 1/12/2023    Procedure: EGD (ESOPHAGOGASTRODUODENOSCOPY);  Surgeon: Francisco Javier Holt MD;  Location: [address]);  Service: Endoscopy;  Laterality: N/A;    ESOPHAGOGASTRODUODENOSCOPY N/A 1/26/2023    Procedure: EGD (ESOPHAGOGASTRODUODENOSCOPY);  Surgeon: Manju Whitman MD;  Location: [address]);  Service: Endoscopy;  Laterality: N/A;       Family History    None       Tobacco Use    Smoking status: Never    Smokeless tobacco: Not on file   Substance and Sexual Activity    Alcohol use: Not on file    Drug use: Not on file    Sexual activity: Not on file     Review of Systems   Unable to perform ROS: Patient nonverbal   Objective:     Vital Signs (Most Recent):  Temp: 98.2 °F (36.8 °C) (04/11/23 0800)  Pulse: 84 (04/11/23 1150)  Resp: 17 (04/11/23 1150)  BP: (!) 97/52 (04/11/23 0800)  SpO2: 97 % (04/11/23 1150)   Vital Signs (24h  Range):  Temp:  [98 °F (36.7 °C)-98.8 °F (37.1 °C)] 98.2 °F (36.8 °C)  Pulse:  [79-91] 84  Resp:  [7-20] 17  SpO2:  [96 %-100 %] 97 %  BP: ()/(52-69) 97/52     Weight: 67.4 kg (148 lb 9.4 oz)  Body mass index is 23.27 kg/m².    Foot Exam    Right Foot/Ankle     Inspection and Palpation  Tenderness: none   Swelling: none   Skin Exam: ulcer; no cellulitis     Neurovascular  Dorsalis pedis: absent  Posterior tibial: absent    Comments  -Multiple skin tears and partial thickness erosions throughout foot and ankle, all with viable wound beds, not clinically infected.    Left Foot/Ankle      Inspection and Palpation  Tenderness: none   Swelling: none   Skin Exam: ulcer; no cellulitis     Neurovascular  Dorsalis pedis: absent  Posterior tibial: absent    Comments  -Multiple skin tears and partial thickness erosions throughout foot and ankle, all with viable wound beds, not clinically infected.  -1st MTPJ wound down to capsule, scant serous drainage.  -1st toe medial wound down to fat.                                    Laboratory:  Blood Cultures: No results for input(s): LABBLOO in the last 48 hours.  CBC:   Recent Labs   Lab 04/11/23  0334   WBC 7.35   RBC 3.95*   HGB 10.2*   HCT 36.0*      MCV 91   MCH 25.8*   MCHC 28.3*     CMP:   Recent Labs   Lab 04/11/23  0334      CALCIUM 9.6   ALBUMIN 2.1*   PROT 6.9      K 4.3   CO2 19*   *   BUN 29*   CREATININE 1.2   ALKPHOS 282*   ALT 82*   AST 50*   BILITOT 0.2     CRP: No results for input(s): CRP in the last 168 hours.  ESR: No results for input(s): SEDRATE in the last 168 hours.  Microbiology Results (last 7 days)       Procedure Component Value Units Date/Time    Aerobic culture [[id number]] Collected: 04/11/23 1100    Order Status: Sent Specimen: Wound from Foot, Left Updated: 04/11/23 1100    Gram stain [[id number]] Collected: 04/11/23 1100    Order Status: Sent Specimen: Wound from Foot, Left Updated: 04/11/23 1100    Culture, Anaerobe  [[id number]] Collected: 04/11/23 1100    Order Status: Sent Specimen: Wound from Foot, Left Updated: 04/11/23 1100    Blood Culture #1 **CANNOT BE ORDERED STAT** [[id number]] Collected: 04/08/23 1205    Order Status: Completed Specimen: Blood from Peripheral, Hand, Left Updated: 04/10/23 1412     Blood Culture, Routine No Growth to date      No Growth to date      No Growth to date    Blood Culture #2 **CANNOT BE ORDERED STAT** [[id number]] Collected: 04/08/23 1150    Order Status: Completed Specimen: Blood from Peripheral, Forearm, Left Updated: 04/10/23 1412     Blood Culture, Routine No Growth to date      No Growth to date      No Growth to date    Urine culture [[id number]]  (Abnormal)  (Susceptibility) Collected: 04/08/23 1018    Order Status: Completed Specimen: Urine Updated: 04/10/23 0804     Urine Culture, Routine PROTEUS MIRABILIS ESBL  >100,000 cfu/ml      Narrative:      Specimen Source->Urine    Culture, Respiratory with Gram Stain [[id number]]     Order Status: No result Specimen: Sputum from Tracheal Aspirate           Specimen (24h ago, onward)      None

## 2023-04-11 NOTE — PLAN OF CARE
NURSING HOME ORDERS    04/12/2023  [facility]  [facility] - INTENSIVE CARE ([address])  [address]  [place] [facility] [address]  Dept: [phone]  Loc: [phone]     Admit to Nursing Home:  Skilled Nursing Facility    Diagnoses:  Active Hospital Problems    Diagnosis  POA    *Aspiration pneumonia [J69.0]  Yes    Sepsis [A41.9]  Yes     Priority: 1 - High    Hematemesis [K92.0]  Yes     Priority: 2     Elevated troponin [R77.8]  Yes     Priority: 3     Quadriplegia [G82.50]  Yes     Priority: 4     Ulcer of both feet [L97.519, L97.529]  Yes    V-tach [I47.20]  No    Goals of care, counseling/discussion [Z71.89]  Not Applicable    Community acquired pneumonia [J18.9]  Yes    Gastritis [K29.70]  Yes    Elevated transaminase level [R74.01]  Yes    Multiple wounds of skin [T14.8XXA]  Yes    UTI (urinary tract infection) [N39.0]  Yes    Type 2 diabetes mellitus, with long-term current use of insulin [E11.9, Z79.4]  Not Applicable    Chronic kidney disease, stage III (moderate) [N18.30]  Yes      Resolved Hospital Problems   No resolved problems to display.       Patient is homebound due to:  Aspiration pneumonia    Allergies:Review of patient's allergies indicates:  No Known Allergies    Vitals:  Routine    Diet:   Glucerna 1.5 @ goal rate of 50 mL/hr- provides 1800 kcals, 99 g pro, and 911 mL free water.      - If alternative TF regimen warranted, rec'd [place] 1.5 @ 50 mL/hr- provides 1846 kcals, 89 g pro, and 840 mL free water.     2. Add Niko BID x 14 days and wound healing cocktail (vit C, zinc, and MVI) to promote adequate wound healing.     Activities:   Activity as tolerated    Goals of Care Treatment Preferences:  Code Status: DNR      Labs:  repeat CMP, CBC, magnesium, phosphorous 4/20 to be followed by PCP    Nursing Precautions:  Aspiration , Seizure, and Pressure ulcer prevention    Consults:   Wound Care     Miscellaneous Care: PEG Care:  Clean site every 24 hours  Routine  Skin for Bedridden Patients:  Apply moisture barrier cream to all    Wound Care: yes:    -Recommend daily betadine wet to dry gauze followed by kerlix and tape to bilateral foot and ankle.  -Offload bilateral heels in Z flex heel protector boots at all times while in bed or chair.   -routine wound care to sacral ulcer    Diabetes Care: Diabetes: Check blood sugar. Fingerstick blood sugar every 6 hours if unable to eat                   Diabetes Care:  SN to perform and educate Diabetic management with blood glucose monitoring:      Medications: Discontinue all previous medication orders, if any. See new list below.     Medication List        START taking these medications      doxycycline 100 MG Cap  Commonly known as: VIBRAMYCIN  Take 1 capsule (100 mg total) by mouth every 12 (twelve) hours. for 14 days     polyethylene glycol 17 gram Pwpk  Commonly known as: GLYCOLAX  17 g by Per G Tube route 2 (two) times daily. Until patient having regular bowel movements, then changed to b.i.d. p.r.n.            CONTINUE taking these medications      baclofen 5 mg Tab tablet  Commonly known as: LIORESAL  15 mg by Per G Tube route 3 (three) times daily.     bisacodyL 10 mg Supp  Commonly known as: DULCOLAX  Place 1 suppository (10 mg total) rectally daily as needed (Until bowel movement if patient has no bowel movement for 2 days).     calcium carbonate 500 mg calcium (1,250 mg) tablet  Commonly known as: OS-OLGA  1,250 mg by Per G Tube route once daily.     CALMOSEPTINE TOP  Apply to the affected area twice daily as needed     carvediloL 3.125 MG tablet  Commonly known as: COREG  3.125 mg by Per G Tube route 2 (two) times daily.     esomeprazole 40 mg Grps  Commonly known as: NEXIUM  40 mg by Per G Tube route once daily.     hydrOXYzine 10 mg/5 mL syrup  Commonly known as: ATARAX  Take 25 mg by mouth every 8 (eight) hours. (2 pm, 10 pm & 6 pm)     insulin glargine-yfgn 100 unit/mL (3 mL) Inpn  Inject 15 Units into the skin once  daily.     SHEREE 7-7-1.5 gram Pwpk  Generic drug: arginine-glutamine-calcium HMB  1 packet by Per G Tube route 2 (two) times daily.     Lactobacillus rhamnosus GG 10 billion cell capsule  Commonly known as: CULTURELLE  2 capsules by Per G Tube route 2 (two) times a day.     levETIRAcetam 100 mg/mL Soln  Commonly known as: KEPPRA  750 mg by Per G Tube route 2 (two) times daily.     nystatin powder  Commonly known as: MYCOSTATIN  Apply to the affected area daily            STOP taking these medications      aspirin 81 MG Chew            **Blood pressure low normal, hold Coreg for heart rate less than 100 or HR less than 60.**    Immunizations Administered as of 4/12/2023       Name Date Dose VIS Date Route Exp Date    COVID-19, MRNA, LN-S, PF (Pfizer) (Purple Cap) 6/25/2021 -- 5/10/2021 Intramuscular --    Site: Right arm     : [employer]     Lot: [id number]     External: Auto Reconciled From Outside Source     Comment: Adminis             This patient has had both covid vaccinations    Some patients may experience side effects after vaccination.  These may include fever, headache, muscle or joint aches.  Most symptoms resolve with 24-48 hours and do not require urgent medical evaluation unless they persist for more than 72 hours or symptoms are concerning for an unrelated medical condition.          _________________________________  Marcello Vinson III, MD  04/12/2023

## 2023-04-11 NOTE — RESPIRATORY THERAPY
RAPID RESPONSE RESPIRATORY THERAPY PROACTIVE NOTE           Time of visit: 839     Code Status: DNR   : 1960  Bed: 52008/46234 A:   MRN: [id number]  Time spent at the bedside: < 15 min    SITUATION    Evaluated patient for: LDA Check     BACKGROUND    Patient has no past medical history on file.  Clinically Significant Surgical Hx: tracheostomy    24 Hours Vitals Range:  Temp:  [98 °F (36.7 °C)-98.8 °F (37.1 °C)]   Pulse:  [79-93]   Resp:  [7-20]   BP: ()/(52-69)   SpO2:  [96 %-100 %]     Labs:    Recent Labs     23  0601 04/10/23  0540 23  0334    143 144   K 4.0 3.5 4.3   * 113* 115*   CO2 18* 22* 19*   CREATININE 1.2 1.2 1.2   GLU 71 104 101   PHOS 2.7 3.0 2.9   MG 1.7 1.8 1.8        No results for input(s): PH, PCO2, PO2, HCO3, POCSATURATED, BE in the last 72 hours.    ASSESSMENT/INTERVENTIONS  Pt resting comfortably with no respiratory needs at this time.      Last VS   Temp: 98.2 °F (36.8 °C) (800)  Pulse: 83 (810)  Resp: 8 (810)  BP: 97/52 (800)  SpO2: 97 % (810)      Extra trachs at bedside: y  Level of Consciousness: Level of Consciousness (AVPU): alert  Respiratory Effort: Respiratory Effort: Unlabored Expansion/Accessory Muscle Usage: Expansion/Accessory Muscles/Retractions: no use of accessory muscles, no retractions, expansion symmetric  All Lung Field Breath Sounds: All Lung Fields Breath Sounds: coarse  O2 Device/Concentration: 5/21%  Surgical airway: Yes, Type: Shiley Size: 6, cuffed  Ambu at bedside: Ambu bag with the patient?: Yes, Adult Ambu     Active Orders   Respiratory Care    Inhalation Treatment Q4H PRN     Frequency: Q4H PRN     Number of Occurrences: Until Specified    Oxygen PRN     Frequency: PRN     Number of Occurrences: Until Specified     Order Questions:      Device type: Low flow      Device: Nasal Cannula (1- 5 Liters)      LPM: 1-5      Titrate O2 per Oxygen Titration Protocol: Yes      To maintain SpO2 goal  of: >= 90%      Notify MD of: Inability to achieve desired SpO2; Sudden change in patient status and requires 20% increase in FiO2; Patient requires >60% FiO2    Pulse Oximetry Continuous     Frequency: Continuous     Number of Occurrences: Until Specified    Routine tracheostomy care     Frequency: BID     Number of Occurrences: Until Specified       RECOMMENDATIONS    We recommend: RRT Recs: Continue POC per primary team.      FOLLOW-UP    Please call back the Rapid Response RT, Ayad Herbert RRT at x [phone] for any questions or concerns.

## 2023-04-11 NOTE — PLAN OF CARE
CM sent secure message to Dr Vinson that may have to discharge tomorrow. Orders have to be sent by 2 pm for facility pharmacy to review transfer orders.    1:45 PM  CM call [facility] and  spoke to Patricia and will send updated clinicals, transfer orders and updated COVID results.      3:01 PM  Spoke to Micro lab and COVID specimen received and results will be in an hour from 1427.          Grace Villanueva RN  Case Management  [facility]  [phone]

## 2023-04-11 NOTE — CONSULTS
[facility] ([place] [address])  Podiatry  Consult Note    Patient Name: Hao Medrano  MRN: [id number]  Admission Date: 4/8/2023  Hospital Length of Stay: 0 days  Attending Physician: Marcello Vinson III,*  Primary Care Provider: Keara Yanes NP     Inpatient consult to Podiatry  Consult performed by: Dionicio Henriquez MD  Consult ordered by: Marcello Vinson III, MD        Subjective:     History of Present Illness:  63 yo M with Hx of DM, CVA, CKD, quadriparesis, PEG, trach admitted for aspiration pneumonia. Will discharge to hospice. Podiatry consulted for multiple wounds of bilateral feet and ankles. Patient is nonambulatory with bilateral knee and hip flexion contractures. Nonverbal. No family at bedside.       Scheduled Meds:   baclofen  15 mg Per G Tube TID    carvediloL  3.125 mg Per G Tube BID    docusate  100 mg Per G Tube BID    insulin detemir U-100  10 Units Subcutaneous QHS    Lactobacillus rhamnosus GG  2 capsule Per G Tube BID    levetiracetam  750 mg Per G Tube BID    pantoprazole  40 mg Oral BID AC    polyethylene glycol  17 g Per G Tube BID     Continuous Infusions:  PRN Meds:acetaminophen, acetaminophen, albuterol sulfate, aluminum-magnesium hydroxide-simethicone, dextrose 10%, dextrose 10%, dextrose, dextrose, glucagon (human recombinant), insulin aspart U-100, melatonin, naloxone, ondansetron, polyethylene glycol, prochlorperazine, simethicone, sodium chloride 0.9%    Review of patient's allergies indicates:  No Known Allergies     History reviewed. No pertinent past medical history.  Past Surgical History:   Procedure Laterality Date    ESOPHAGOGASTRODUODENOSCOPY N/A 1/11/2023    Procedure: EGD (ESOPHAGOGASTRODUODENOSCOPY);  Surgeon: Francisco Javier Holt MD;  Location: [address];  Service: Endoscopy;  Laterality: N/A;    ESOPHAGOGASTRODUODENOSCOPY N/A 1/12/2023    Procedure: EGD (ESOPHAGOGASTRODUODENOSCOPY);  Surgeon: Francisco Javier Holt MD;  Location:  [facility] ENDO (2ND FLR);  Service: Endoscopy;  Laterality: N/A;    ESOPHAGOGASTRODUODENOSCOPY N/A 1/26/2023    Procedure: EGD (ESOPHAGOGASTRODUODENOSCOPY);  Surgeon: Manju Whitman MD;  [facility]  Service: Endoscopy;  Laterality: N/A;       Family History    None       Tobacco Use    Smoking status: Never    Smokeless tobacco: Not on file   Substance and Sexual Activity    Alcohol use: Not on file    Drug use: Not on file    Sexual activity: Not on file     Review of Systems   Unable to perform ROS: Patient nonverbal   Objective:     Vital Signs (Most Recent):  Temp: 98.2 °F (36.8 °C) (04/11/23 0800)  Pulse: 84 (04/11/23 1150)  Resp: 17 (04/11/23 1150)  BP: (!) 97/52 (04/11/23 0800)  SpO2: 97 % (04/11/23 1150)   Vital Signs (24h Range):  Temp:  [98 °F (36.7 °C)-98.8 °F (37.1 °C)] 98.2 °F (36.8 °C)  Pulse:  [79-91] 84  Resp:  [7-20] 17  SpO2:  [96 %-100 %] 97 %  BP: ()/(52-69) 97/52     Weight: 67.4 kg (148 lb 9.4 oz)  Body mass index is 23.27 kg/m².    Foot Exam    Right Foot/Ankle     Inspection and Palpation  Tenderness: none   Swelling: none   Skin Exam: ulcer; no cellulitis     Neurovascular  Dorsalis pedis: absent  Posterior tibial: absent    Comments  -Multiple skin tears and partial thickness erosions throughout foot and ankle, all with viable wound beds, not clinically infected.    Left Foot/Ankle      Inspection and Palpation  Tenderness: none   Swelling: none   Skin Exam: ulcer; no cellulitis     Neurovascular  Dorsalis pedis: absent  Posterior tibial: absent    Comments  -Multiple skin tears and partial thickness erosions throughout foot and ankle, all with viable wound beds, not clinically infected.  -1st MTPJ wound down to capsule, scant serous drainage.  -1st toe medial wound down to fat.                                    Laboratory:  Blood Cultures: No results for input(s): LABBLOO in the last 48 hours.  CBC:   Recent Labs   Lab 04/11/23  0334   WBC 7.35   RBC 3.95*    HGB 10.2*   HCT 36.0*      MCV 91   MCH 25.8*   MCHC 28.3*     CMP:   Recent Labs   Lab 04/11/23  0334      CALCIUM 9.6   ALBUMIN 2.1*   PROT 6.9      K 4.3   CO2 19*   *   BUN 29*   CREATININE 1.2   ALKPHOS 282*   ALT 82*   AST 50*   BILITOT 0.2     CRP: No results for input(s): CRP in the last 168 hours.  ESR: No results for input(s): SEDRATE in the last 168 hours.  Microbiology Results (last 7 days)       Procedure Component Value Units Date/Time    Aerobic culture [[id number]] Collected: 04/11/23 1100    Order Status: Sent Specimen: Wound from Foot, Left Updated: 04/11/23 1100    Gram stain [[id number]] Collected: 04/11/23 1100    Order Status: Sent Specimen: Wound from Foot, Left Updated: 04/11/23 1100    Culture, Anaerobe [[id number]] Collected: 04/11/23 1100    Order Status: Sent Specimen: Wound from Foot, Left Updated: 04/11/23 1100    Blood Culture #1 **CANNOT BE ORDERED STAT** [[id number]] Collected: 04/08/23 1205    Order Status: Completed Specimen: Blood from Peripheral, Hand, Left Updated: 04/10/23 1412     Blood Culture, Routine No Growth to date      No Growth to date      No Growth to date    Blood Culture #2 **CANNOT BE ORDERED STAT** [[id number]] Collected: 04/08/23 1150    Order Status: Completed Specimen: Blood from Peripheral, Forearm, Left Updated: 04/10/23 1412     Blood Culture, Routine No Growth to date      No Growth to date      No Growth to date    Urine culture [[id number]]  (Abnormal)  (Susceptibility) Collected: 04/08/23 1018    Order Status: Completed Specimen: Urine Updated: 04/10/23 0804     Urine Culture, Routine PROTEUS MIRABILIS ESBL  >100,000 cfu/ml      Narrative:      Specimen Source->Urine    Culture, Respiratory with Gram Stain [[id number]]     Order Status: No result Specimen: Sputum from Tracheal Aspirate           Specimen (24h ago, onward)      None                Assessment/Plan:     Orthopedic  Ulcer of both feet  Plan:  -No intervention by  podiatry. OK for discharge from podiatry perspective.   -Recommend 2 weeks emriric PO antibiotics for possible early SSTI.  -Recommend daily betadine wet to dry gauze followed by kerlix and tape to bilateral foot and ankle.  -Offload bilateral heels in Z flex heel protector boots at all times while in bed or chair.   -Podiatry will sign off, reconsult as needed.          Dionicio Henriquez DPM PGY-3  Podiatric Medicine & Surgery  [facility]  Secure Chat Preferred  Mobile: [phone]  Pager: [phone]

## 2023-04-12 VITALS
BODY MASS INDEX: 23.32 KG/M2 | SYSTOLIC BLOOD PRESSURE: 113 MMHG | HEART RATE: 79 BPM | WEIGHT: 148.56 LBS | RESPIRATION RATE: 19 BRPM | TEMPERATURE: 99 F | OXYGEN SATURATION: 100 % | HEIGHT: 67 IN | DIASTOLIC BLOOD PRESSURE: 62 MMHG

## 2023-04-12 LAB
POCT GLUCOSE: 139 MG/DL (ref 70–110)
POCT GLUCOSE: 184 MG/DL (ref 70–110)
POCT GLUCOSE: 203 MG/DL (ref 70–110)

## 2023-04-12 PROCEDURE — 27000221 HC OXYGEN, UP TO 24 HOURS

## 2023-04-12 PROCEDURE — 94761 N-INVAS EAR/PLS OXIMETRY MLT: CPT

## 2023-04-12 PROCEDURE — 99900035 HC TECH TIME PER 15 MIN (STAT)

## 2023-04-12 PROCEDURE — 99239 PR HOSPITAL DISCHARGE DAY,>30 MIN: ICD-10-PCS | Mod: ,,, | Performed by: FAMILY MEDICINE

## 2023-04-12 PROCEDURE — 99900026 HC AIRWAY MAINTENANCE (STAT)

## 2023-04-12 PROCEDURE — 25000003 PHARM REV CODE 250: Performed by: FAMILY MEDICINE

## 2023-04-12 PROCEDURE — 99900031 HC PATIENT EDUCATION (STAT)

## 2023-04-12 PROCEDURE — G0378 HOSPITAL OBSERVATION PER HR: HCPCS

## 2023-04-12 PROCEDURE — 99239 HOSP IP/OBS DSCHRG MGMT >30: CPT | Mod: ,,, | Performed by: FAMILY MEDICINE

## 2023-04-12 RX ADMIN — LEVETIRACETAM 750 MG: 500 SOLUTION ORAL at 09:04

## 2023-04-12 RX ADMIN — PANTOPRAZOLE SODIUM 40 MG: 40 TABLET, DELAYED RELEASE ORAL at 06:04

## 2023-04-12 RX ADMIN — Medication 2 CAPSULE: at 09:04

## 2023-04-12 RX ADMIN — POLYETHYLENE GLYCOL 3350 17 G: 17 POWDER, FOR SOLUTION ORAL at 09:04

## 2023-04-12 RX ADMIN — DOCUSATE SODIUM 100 MG: 50 LIQUID ORAL at 09:04

## 2023-04-12 RX ADMIN — DOXYCYCLINE HYCLATE 100 MG: 100 TABLET, COATED ORAL at 09:04

## 2023-04-12 RX ADMIN — CARVEDILOL 3.12 MG: 3.12 TABLET, FILM COATED ORAL at 09:04

## 2023-04-12 RX ADMIN — BACLOFEN 15 MG: 5 TABLET ORAL at 09:04

## 2023-04-12 NOTE — PLAN OF CARE
04/12/23 1143   Final Note   Assessment Type Final Discharge Note   Anticipated Discharge Disposition intermediate Nu   What phone number can be called within the next 1-3 days to see how you are doing after discharge? [phone]   Post-Acute Status   Post-Acute Authorization Placement   Post-Acute Placement Status Set-up Complete/Auth obtained   Other Status No Post-Acute Service Needs   Discharge Delays None known at this time     0930  CM spoke with Patricia at [facility] and confirmed documents were received and will call with room number and number to call report.  CM updated assigned nurse.    11:46 AM  CM called and spoke to Annabelle richey r/t patient transferring back to [facility].    1:45 PM  CM called and spoke to Patricia @ [facility] [phone] and patient is assigned to room 18 A and provided number above to call report.  Transportation arranged for 3:30 p/u, assigned nurse informed.    Grace Villanueva RN  Case Management  [facility]  [phone]

## 2023-04-12 NOTE — NURSING
Ambulance arrived to transport patient back to the nursing home, Ayad with respiratory connected patient's oxygen for transport.  Patient discharged.

## 2023-04-12 NOTE — PROGRESS NOTES
[facility] ([address] Medicine  Progress Note    Patient Name: Hao Medrano  MRN: [id number]  Patient Class: OP- Observation   Admission Date: 4/8/2023  Length of Stay: 0 days  Attending Physician: Marcello Vinson III,*  Primary Care Provider: Keara Yanes NP        Subjective:     Principal Problem:Aspiration pneumonia        HPI:  62 y.o. male with a past medical history of CVA with trach and G-tube with quadriparesis, CKD, HTN, DM2, seizure disorder, LA grade A esophagitis on EGD 01/2023 and HLD who presented to the ED from [facility] for reported coffee-ground emesis this morning.  Patient is nonverbal and unable to provide any history so the information from this H&P gathered via ER doctor, nursing facility, and past medical records.  According to the nursing facility, at 4:36 a.m. this morning, the patient was found with coffee-ground emesis on his gown and transferred to this facility.  According to the [facility] ED physician and nursing staff, no further hematemesis noted.  Of note, patient was admitted January 2023 with dislodged PEG tube but was found to be septic and treated for E faecalis bacteremia.  At that time, peg tube was replaced    In the ED, patient met sepsis criteria:  tachycardic, tachypneic with low normal blood pressure and WBC 15.36.  CXR with small pleural effusions, atelectasis, airspace consolidation in the basilar right lower lobe.  Urinalysis concerning for infection.  CT abdomen and pelvis with urinary bladder thickening, evidence of gastritis/esophagitis, small pleural effusions with right lower lobe consolidation, skin thickening at lower sacrum and coccyx, calcifications about bilateral hips and can not rule out effusions.  Sepsis IV fluid bolus administered with improved vitals.  Blood and urine culture ordered.  Lactic acid 1.41.  IV antibiotics initiated.  Patient admitted to the hospitalist service for further evaluation  and treatment of possible hematemesis and sepsis.     I contacted the patient's partner, Rosa Isela, and sister, Annabelle, and updated on clinical condition.  At the moment, patient is full code.  Family is discussing DNR and possible palliative/hospice.      Overview/Hospital Course:  Patient admitted to the hospitalist service for further evaluation and treatment of possible hematemesis and sepsis.  Patient tachycardic, tachypneic, with leukocytosis with CT abdomen pelvis revealing possible right lower lobe consolidation and UA with possible UTI.  Patient is started on broad-spectrum antibiotics as he has been hospitalized within the last 90 days receiving IV antibiotics.  Additional findings of the CT mentioned bilateral hip synovitis.  Discussed with Orthopedic surgery and since patient is responding to current treatment, would not recommend invasive testing at this time.  However, if patient develops infectious signs/symptoms despite adequate treatment of UTI and pneumonia, consideration for septic arthritis could be made.  Urine culture positive for ESBL Proteus.  Discussed with Infectious Disease to was consulted and recommended single dose of gentamicin and discontinuing antibiotic for pneumonia treatment as patient had abnormal image findings likely due to aspiration pneumonitis.  Patient started on IV Protonix for possible hematemesis with known history of esophagitis.  No further episodes of bleeding identified, however, hemoglobin trended down throughout admission.  Recommend repeat lab work drawn on 04/20.  Patient to resume esomeprazole on discharge.  Discussed at length with patient's sister, Annabelle, regarding aspirin administration for stroke prophylaxis.  At this time she has decided to hold the aspirin to prevent risk of further GI bleeding.  Further discussion should be had have follow-up appointments with primary care physician.Due to persistent tachycardia and tachypnea, ultrasound bilateral lower  extremities ordered and negative for DVT.  CTA chest was initially ordered but as tachycardia and tachypnea resolved with fluids and antibiotics, CTA chest discontinued. Right upper quadrant ultrasound ordered given sepsis on admission and elevated LFTs, which have been improving.  No significant sonographic abnormality noted.  On day of discharge, nursing staff reported 9 beat run of nonsustained V-tach that was called in by telemetry.  Patient is already on beta-blocker with low normal blood pressure.  Echocardiogram on 4/8 with EF 60%, normal diastolic function, normal RV function.  Also discussed with patient's sister he said that they would not want to pursue any invasive evaluation.  If this persists in the future, could consider up titrating beta blocker if blood pressure will allow.  Elevated troponin but remained flat, likely demand ischemia.  Multiple pressure wounds of the sacrum and lower extremities evaluated by Wound Care and Podiatry.  Podiatry recommends routine wound care and 2 weeks of antibiotics, doxycycline was prescribed.  Patient has reached maximal benefit of hospitalization and is medically stable for discharge back to nursing facility.  Patient's chronic medical conditions were managed appropriately.      Patient's sister, Annabelle, has changed code status to do not resuscitate.  Ambulatory referral made to outpatient palliative care.          Interval History:  Patient seen and examined resting comfortably.  Does not look to be in any acute distress.  No acute events reported by nursing staff.  Patient was to be discharged today, however, unable to obtain results for COVID swab to get patient back to facility.    Objective:     Vital Signs (Most Recent):  Temp: 98.4 °F (36.9 °C) (04/11/23 1535)  Pulse: 82 (04/11/23 2001)  Resp: 11 (04/11/23 2001)  BP: (!) 109/55 (04/11/23 2001)  SpO2: 97 % (04/11/23 2001) Vital Signs (24h Range):  Temp:  [98 °F (36.7 °C)-98.4 °F (36.9 °C)] 98.4 °F (36.9  °C)  Pulse:  [79-90] 82  Resp:  [7-20] 11  SpO2:  [91 %-100 %] 97 %  BP: ()/(52-65) 109/55     Weight: 67.4 kg (148 lb 9.4 oz)  Body mass index is 23.27 kg/m².    Intake/Output Summary (Last 24 hours) at 4/11/2023 2023  Last data filed at 4/11/2023 1638  Gross per 24 hour   Intake 1085 ml   Output 400 ml   Net 685 ml      Physical exam:    General Appearance: no acute distress, frail   Heart:  Regular rate, normal rhythm  Respiratory:  Diminished breath sounds bilaterally, no wheezing  Abdomen: Soft, non-tender (patient does not express pain on palpation); bowel sounds active   Skin: Warm, dry, multiple abrasions and skin tears bilateral upper and lower extremities.  Grade 2 sacral ulcer with no surrounding erythema  Neurologic: Unable to assess as patient is nonverbal. Follows command to close and open his eyes but does not follow other commands. Can not move any extremity on command         Significant Labs: All pertinent labs within the past 24 hours have been reviewed.  Blood Culture: No results for input(s): LABBLOO in the last 48 hours.  CBC:   Recent Labs   Lab 04/10/23  0540 04/11/23  0334   WBC 8.61 7.35   HGB 9.9* 10.2*   HCT 33.2* 36.0*    381     CMP:   Recent Labs   Lab 04/10/23  0540 04/11/23  0334    144   K 3.5 4.3   * 115*   CO2 22* 19*    101   BUN 37* 29*   CREATININE 1.2 1.2   CALCIUM 9.5 9.6   PROT 6.8 6.9   ALBUMIN 2.1* 2.1*   BILITOT 0.3 0.2   ALKPHOS 298* 282*   AST 49* 50*   ALT 85* 82*   ANIONGAP 8 10     WBC, hemoglobin, hematocrit, sodium, potassium, creatinine, bicarb, alk-phos, AST, ALT personally reviewed.    Significant Imaging: I have reviewed all pertinent imaging results/findings within the past 24 hours.      Assessment/Plan:      Sepsis  UTI  Likely aspiration versus community-acquired pneumonia  Cellulitis related to pressure injury  -tachycardic, tachypneic, leukocytosis  -broad-spectrum antibiotics started since patient was recently admitted  requiring IV antibiotics.  Vancomycin for MRSA coverage.  Cefepime and doxy for pneumonia.  -CT abdomen/pelvis mentions possible Bilat hip synovitis.  Discussed on phone with Orthopedic Service who agree that there is low clinical suspicion for septic arthritis at this time.  With decreasing WBC, procalcitonin, and improved vital signs it seems that source control has been obtained.  If further infectious signs or symptoms despite appropriate treatment for UTI and pneumonia, consideration for possible septic arthritis should be made at that time.  -blood cx NGTD  -urine culture ESBL, discussed with ID who recommended single dose of gentamicin for ESBL.  Id recommended discontinuing antibiotics for pneumonia as imaging likely related to aspiration pneumonitis.  -podiatry consult for bilateral foot pressure injuries, appreciate recommendations.  Recommend 2 weeks antibiotics and routine wound care.         Hematemesis  Esophagitis  Gastritis  -hemoglobin 13.2 on admission, 7.6 on discharge 02/12/2023.  -hemoglobin has remained stable  -no further coffee-ground emesis since presenting to this facility.  -IV Protonix b.i.d. while inpatient, home Nexium as Protonix granules are not covered by his insurance.  -discussed with sister, will discontinue aspirin to prevent risk of further bleeding.  Sister understand that patient is at high risk for stroke.  -monitor hemoglobin, transfuse was 7.  Hemoglobin downtrending, continue to monitor.      Elevated troponin  Sinus Tachycardia  -elevated troponin likely due to demand ischemia.  Remained flat  -CTA chest pending rule out PE  -U/S BLE pending rule out DVT  -sinus tachycardia now improved s/p IVFs and continue IV antibiotics  -EKG personally reviewed with sinus tachycardia and nonspecific T-wave changes.  There is no evidence of significant acute infarction.  EKG repeated once tachycardia improved and personally reviewed.  Nonspecific T-wave changes overall EKG similar to  previous on 01/27/2023  -TTE a of 60%, normal diastolic function, normal RV size and function, no evidence of vegetations      Quadriplegia  Oropharyngeal dysphagia  -continue frequent turning  -continue tube feeds  -appreciate wound care assistance for multiple pressure injuries      V-tach  -nonsustained  -echo unremarkable  -currently on beta-blocker  -consider uptitration of beta-blocker as outpatient, BP low normal while inpatient.  -discussed at length with sister.  They would not want patient to undergo any invasive evaluation.      Goals of care, counseling/discussion  -advance care planning discussion held with Healthcare surrogate on 04/09.  Decision has been made to change code status to DNR.  Family having further discussions to transition to palliative vs hospice      Elevated transaminase level  -possibly related to current infectious process  -U/S RUQ unremarkable  -continue to monitor      Multiple wounds of skin  -inpatient wound care consult to come appreciate assistance      Type 2 diabetes mellitus, with long-term current use of insulin  -Hemoglobin A1c 7.1 on 02/10/2023.  -outpatient regimen, Lantus 15 units daily  -will start Levemir 10 units daily and sliding scale.  Titrate as needed.      VTE Risk Mitigation (From admission, onward)         Ordered     IP VTE HIGH RISK PATIENT  Once         04/08/23 1145     Place sequential compression device  Until discontinued         04/08/23 1145                Discharge Planning   FAINA: 4/12/2023     Code Status: DNR   Is the patient medically ready for discharge?: Yes    Reason for patient still in hospital (select all that apply): Patient trending condition, Laboratory test, Treatment and Pending disposition  Discharge Plan A: Return to nursing home   Discharge Delays: None known at this time              Marcello Vinson III, MD  Department of Hospital Medicine   [facility] ([facility]

## 2023-04-12 NOTE — ASSESSMENT & PLAN NOTE
UTI  Likely aspiration versus community-acquired pneumonia  Cellulitis related to pressure injury  -tachycardic, tachypneic, leukocytosis  -broad-spectrum antibiotics started since patient was recently admitted requiring IV antibiotics.  Vancomycin for MRSA coverage.  Cefepime and doxy for pneumonia.  -CT abdomen/pelvis mentions possible Bilat hip synovitis.  Discussed on phone with Orthopedic Service who agree that there is low clinical suspicion for septic arthritis at this time.  With decreasing WBC, procalcitonin, and improved vital signs it seems that source control has been obtained.  If further infectious signs or symptoms despite appropriate treatment for UTI and pneumonia, consideration for possible septic arthritis should be made at that time.  -blood cx NGTD  -urine culture ESBL, discussed with ID who recommended single dose of gentamicin for ESBL.  Id recommended discontinuing antibiotics for pneumonia as imaging likely related to aspiration pneumonitis.  -podiatry consult for bilateral foot pressure injuries, appreciate recommendations.  Recommend 2 weeks antibiotics and routine wound care.

## 2023-04-12 NOTE — SUBJECTIVE & OBJECTIVE
Interval History:  Patient seen and examined resting comfortably.  Does not look to be in any acute distress.  No acute events reported by nursing staff.  Patient was to be discharged today, however, unable to obtain results for COVID swab to get patient back to facility.    Objective:     Vital Signs (Most Recent):  Temp: 98.4 °F (36.9 °C) (04/11/23 1535)  Pulse: 82 (04/11/23 2001)  Resp: 11 (04/11/23 2001)  BP: (!) 109/55 (04/11/23 2001)  SpO2: 97 % (04/11/23 2001) Vital Signs (24h Range):  Temp:  [98 °F (36.7 °C)-98.4 °F (36.9 °C)] 98.4 °F (36.9 °C)  Pulse:  [79-90] 82  Resp:  [7-20] 11  SpO2:  [91 %-100 %] 97 %  BP: ()/(52-65) 109/55     Weight: 67.4 kg (148 lb 9.4 oz)  Body mass index is 23.27 kg/m².    Intake/Output Summary (Last 24 hours) at 4/11/2023 2023  Last data filed at 4/11/2023 1638  Gross per 24 hour   Intake 1085 ml   Output 400 ml   Net 685 ml      Physical exam:    General Appearance: no acute distress, frail   Heart:  Regular rate, normal rhythm  Respiratory:  Diminished breath sounds bilaterally, no wheezing  Abdomen: Soft, non-tender (patient does not express pain on palpation); bowel sounds active   Skin: Warm, dry, multiple abrasions and skin tears bilateral upper and lower extremities.  Grade 2 sacral ulcer with no surrounding erythema  Neurologic: Unable to assess as patient is nonverbal. Follows command to close and open his eyes but does not follow other commands. Can not move any extremity on command         Significant Labs: All pertinent labs within the past 24 hours have been reviewed.  Blood Culture: No results for input(s): LABBLOO in the last 48 hours.  CBC:   Recent Labs   Lab 04/10/23  0540 04/11/23  0334   WBC 8.61 7.35   HGB 9.9* 10.2*   HCT 33.2* 36.0*    381     CMP:   Recent Labs   Lab 04/10/23  0540 04/11/23  0334    144   K 3.5 4.3   * 115*   CO2 22* 19*    101   BUN 37* 29*   CREATININE 1.2 1.2   CALCIUM 9.5 9.6   PROT 6.8 6.9   ALBUMIN  2.1* 2.1*   BILITOT 0.3 0.2   ALKPHOS 298* 282*   AST 49* 50*   ALT 85* 82*   ANIONGAP 8 10     WBC, hemoglobin, hematocrit, sodium, potassium, creatinine, bicarb, alk-phos, AST, ALT personally reviewed.    Significant Imaging: I have reviewed all pertinent imaging results/findings within the past 24 hours.

## 2023-04-12 NOTE — ASSESSMENT & PLAN NOTE
-nonsustained  -echo unremarkable  -currently on beta-blocker  -consider uptitration of beta-blocker as outpatient, BP low normal while inpatient.  -discussed at length with sister.  They would not want patient to undergo any invasive evaluation.

## 2023-04-12 NOTE — NURSING
Patient nonverbal, bed in low position, vital signs throughout the night stable, no distress or signs of pain throughout the night.

## 2023-04-12 NOTE — ASSESSMENT & PLAN NOTE
Esophagitis  Gastritis  -hemoglobin 13.2 on admission, 7.6 on discharge 02/12/2023.  -hemoglobin has remained stable  -no further coffee-ground emesis since presenting to this facility.  -IV Protonix b.i.d. while inpatient, home Nexium as Protonix granules are not covered by his insurance.  -discussed with sister, will discontinue aspirin to prevent risk of further bleeding.  Sister understand that patient is at high risk for stroke.  -monitor hemoglobin, transfuse was 7.  Hemoglobin downtrending, continue to monitor.

## 2023-04-13 LAB
BACTERIA BLD CULT: NORMAL
BACTERIA BLD CULT: NORMAL
BACTERIA SPEC AEROBE CULT: ABNORMAL

## 2023-04-13 NOTE — DISCHARGE SUMMARY
[facility] - [facility] ([place] [address])  [facility] Medicine  Discharge Summary      Patient Name: Hao Medrano  MRN: [id number]  MIGUELINA: [id number]  Patient Class: OP- Observation  Admission Date: 4/8/2023  Hospital Length of Stay: 0 days  Discharge Date and Time:  04/12/2023 8:24 PM  Attending Physician: Lizeth att. providers found   Discharging Provider: Marcello Vinson III, MD  Primary Care Provider: Keara Yanes NP  Hospital Medicine Team: [facility] Marcello Vinson III, MD  Primary Care Team: [facility]    HPI:   62 y.o. 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  Patient is nonverbal and unable to provide any history so the information from this H&P gathered via ER doctor, nursing facility, and past medical records.  According to the nursing facility, at 4:36 a.m. this morning, the patient was found with coffee-ground emesis on his gown and transferred to this facility.  According to the [facility] physician and nursing staff, no further hematemesis noted.  Of note, patient was admitted January 2023 with dislodged PEG tube but was found to be septic and treated for E faecalis bacteremia.  At that time, peg tube was replaced    In the ED, patient met sepsis criteria:  tachycardic, tachypneic with low normal blood pressure and WBC 15.36.  CXR with small pleural effusions, atelectasis, airspace consolidation in the basilar right lower lobe.  Urinalysis concerning for infection.  CT abdomen and pelvis with urinary bladder thickening, evidence of gastritis/esophagitis, small pleural effusions with right lower lobe consolidation, skin thickening at lower sacrum and coccyx, calcifications about bilateral hips and can not rule out effusions.  Sepsis IV fluid bolus administered with  improved vitals.  Blood and urine culture ordered.  Lactic acid 1.41.  IV antibiotics initiated.  Patient admitted to the hospitalist service for further evaluation and treatment of possible hematemesis and sepsis.     I contacted the patient's partner, Rosa Isela, and sister, Annabelle, and updated on clinical condition.  At the moment, patient is full code.  Family is discussing DNR and possible palliative/hospice.      * No surgery found *      Hospital Course:   Patient admitted to the hospitalist service for further evaluation and treatment of possible hematemesis and sepsis.  Patient tachycardic, tachypneic, with leukocytosis with CT abdomen pelvis revealing possible right lower lobe consolidation and UA with possible UTI.  Patient started on broad-spectrum antibiotics as he has been hospitalized within the last 90 days receiving IV antibiotics.  Additional findings of the CT mentioned bilateral hip synovitis.  Discussed with Orthopedic surgery and since patient is responding to current treatment, would not recommend invasive testing at this time.  However, if patient develops infectious signs/symptoms despite adequate treatment of UTI and pneumonia, consideration for septic arthritis could be made.  Urine culture positive for ESBL Proteus.  Discussed with Infectious Disease to was consulted and recommended single dose of gentamicin and discontinuing antibiotic for pneumonia treatment as patient had abnormal image findings likely due to aspiration pneumonitis.    Patient started on IV Protonix for possible hematemesis with known history of esophagitis.  No further episodes of bleeding identified, however, hemoglobin trended down throughout admission.  Recommend repeat lab work drawn on 04/20.  Patient to resume esomeprazole on discharge.  Discussed at length with patient's sister, Annabelle, regarding aspirin administration for stroke prophylaxis.  At this time she has decided to hold the aspirin to prevent risk of  further GI bleeding.  Further discussion should be had at follow-up appointments with primary care physician. Due to persistent tachycardia and tachypnea, ultrasound bilateral lower extremities ordered and negative for DVT.  CTA chest was initially ordered but canceled since tachycardia and tachypnea resolved with fluids and antibiotics. Right upper quadrant ultrasound ordered given sepsis on admission and elevated LFTs, which have been improving.  No significant sonographic abnormality noted.  On day of discharge, nursing staff reported 9 beat run of nonsustained V-tach that was called in by telemetry.  Patient is already on beta-blocker with low normal blood pressure.  Echocardiogram on 4/8 with EF 60%, normal diastolic function, normal RV function.  Also discussed with patient's sister who said that they would not want to pursue any invasive evaluation.  If this persists in the future, could consider up titrating beta blocker if blood pressure will allow.  Elevated troponin but remained flat, likely demand ischemia.  Multiple pressure wounds of the sacrum and lower extremities evaluated by Wound Care and Podiatry.  Podiatry recommends routine wound care and 2 weeks of antibiotics, doxycycline was prescribed.  Patient has reached maximal benefit of hospitalization and is medically stable for discharge back to nursing facility.  Patient's chronic medical conditions were managed appropriately.      Patient's sister, Annabelle, has changed code status to do not resuscitate.  Ambulatory referral made to outpatient palliative care.    Physical Exam  General Appearance: no acute distress, frail   Heart:  Regular rate, normal rhythm  Respiratory:  Diminished breath sounds bilaterally, no wheezing  Abdomen: Soft, non-tender (patient does not express pain on palpation); bowel sounds active   Skin: Warm, dry, multiple abrasions and skin tears bilateral upper and lower extremities.  Grade 2 sacral ulcer with no surrounding  erythema  Neurologic: Unable to assess as patient is nonverbal. Follows command to close and open his eyes but does not follow other commands. Can not move any extremity on command           Goals of Care Treatment Preferences:  Code Status: DNR      Consults:   Consults (From admission, onward)        Status Ordering Provider     Inpatient consult to Podiatry  Once        Provider:  (Not yet assigned)    Completed ZOTTOLI III ARMOND F.     Inpatient consult to Infectious Diseases  Once        Provider:  (Not yet assigned)    Completed ZOTTCONCEPCIÓN III ARMOND F.     Inpatient consult to Midline team  Once        Provider:  (Not yet assigned)    Completed ZOTTOLI III ARMOND F.     Inpatient consult to Registered Dietitian/Nutritionist  Once        Provider:  (Not yet assigned)    Completed ZOTTCONCEPCIÓN III ARMOND F.     Inpatient consult to Registered Dietitian/Nutritionist  Once        Provider:  (Not yet assigned)    Completed ZOTTCONCEPCIÓN III ARMOND F.     Inpatient consult to Midline team  Once        Provider:  (Not yet assigned)    Completed ISACC IIIARMOND F.          No new Assessment & Plan notes have been filed under this hospital service since the last note was generated.  Service: Hospital Medicine    Final Active Diagnoses:    Diagnosis Date Noted POA    PRINCIPAL PROBLEM:  Aspiration pneumonia [J69.0] 04/08/2023 Yes    Sepsis [A41.9] 04/08/2023 Yes    Hematemesis [K92.0] 04/08/2023 Yes    Elevated troponin [R77.8] 04/08/2023 Yes    Quadriplegia [G82.50] 04/10/2023 Yes    Ulcer of both feet [L97.519, L97.529] 04/11/2023 Yes    V-tach [I47.20] 04/11/2023 No    Goals of care, counseling/discussion [Z71.89] 04/10/2023 Not Applicable    Community acquired pneumonia [J18.9] 04/08/2023 Yes    Gastritis [K29.70] 04/08/2023 Yes    Elevated transaminase level [R74.01] 04/08/2023 Yes    Multiple wounds of skin [T14.8XXA] 01/25/2023 Yes    UTI (urinary tract infection) [N39.0] 01/25/2023 Yes     Type 2 diabetes mellitus, with long-term current use of insulin [E11.9, Z79.4] 01/25/2023 Not Applicable    Chronic kidney disease, stage III (moderate) [N18.30] 07/12/2018 Yes      Problems Resolved During this Admission:       Discharged Condition: stable    Disposition: Skilled Nursing Facility    Follow Up:   Follow-up Information     Keara Yanes NP Follow up in 1 week(s).    Specialty: Family Medicine  Why: 1-2 weeks  Contact information:  [address] [place] [id number]  [phone]                       Patient Instructions:      Ambulatory referral/consult to HOME Palliative Care   Standing Status: Future   Referral Priority: Routine Referral Type: Consultation   Requested Specialty: Hospice and Palliative Medicine   Number of Visits Requested: 1     Activity as tolerated       Significant Diagnostic Studies: Labs:   CMP   Recent Labs   Lab 04/11/23  0334      K 4.3   *   CO2 19*      BUN 29*   CREATININE 1.2   CALCIUM 9.6   PROT 6.9   ALBUMIN 2.1*   BILITOT 0.2   ALKPHOS 282*   AST 50*   ALT 82*   ANIONGAP 10    and CBC   Recent Labs   Lab 04/11/23  0334   WBC 7.35   HGB 10.2*   HCT 36.0*          Pending Diagnostic Studies:     None         Medications:  Reconciled Home Medications:      Medication List      START taking these medications    doxycycline 100 MG Cap  Commonly known as: VIBRAMYCIN  Take 1 capsule (100 mg total) by mouth every 12 (twelve) hours. for 14 days     polyethylene glycol 17 gram Pwpk  Commonly known as: GLYCOLAX  17 g by Per G Tube route 2 (two) times daily. Until patient having regular bowel movements, then changed to b.i.d. p.r.n.        CONTINUE taking these medications    baclofen 5 mg Tab tablet  Commonly known as: LIORESAL  15 mg by Per G Tube route 3 (three) times daily.     bisacodyL 10 mg Supp  Commonly known as: DULCOLAX  Place 1 suppository (10 mg total) rectally daily as needed (Until bowel movement if patient has no bowel movement  for 2 days).     calcium carbonate 500 mg calcium (1,250 mg) tablet  Commonly known as: OS-OLGA  1,250 mg by Per G Tube route once daily.     CALMOSEPTINE TOP  Apply to the affected area twice daily as needed     carvediloL 3.125 MG tablet  Commonly known as: COREG  3.125 mg by Per G Tube route 2 (two) times daily.     esomeprazole 40 mg Grps  Commonly known as: NEXIUM  40 mg by Per G Tube route once daily.     hydrOXYzine 10 mg/5 mL syrup  Commonly known as: ATARAX  Take 25 mg by mouth every 8 (eight) hours. (2 pm, 10 pm & 6 pm)     insulin glargine-yfgn 100 unit/mL (3 mL) Inpn  Inject 15 Units into the skin once daily.     SHEREE 7-7-1.5 gram Pwpk  Generic drug: arginine-glutamine-calcium HMB  1 packet by Per G Tube route 2 (two) times daily.     Lactobacillus rhamnosus GG 10 billion cell capsule  Commonly known as: CULTURELLE  2 capsules by Per G Tube route 2 (two) times a day.     levETIRAcetam 100 mg/mL Soln  Commonly known as: KEPPRA  750 mg by Per G Tube route 2 (two) times daily.     nystatin powder  Commonly known as: MYCOSTATIN  Apply to the affected area daily        STOP taking these medications    aspirin 81 MG Chew            Indwelling Lines/Drains at time of discharge:   Lines/Drains/Airways     Drain  Duration                Gastrostomy/Enterostomy 01/12/23 1055 Percutaneous endoscopic gastrostomy (PEG) midline feeding 90 days         Gastrostomy/Enterostomy 01/26/23 LUQ 76 days          Airway  Duration           Adult Surgical Airway 01/10/23 0800 [place] Extra Large Cuffed Proximal 6.0/ 75mm 92 days                Time spent on the discharge of patient: 50 minutes         Marcello Vinson III, MD  Department of Hospital Medicine  [facility] - Intensive Care ([place]-14)

## 2023-04-17 LAB — BACTERIA SPEC ANAEROBE CULT: NORMAL

## 2023-07-10 PROBLEM — N39.0 UTI (URINARY TRACT INFECTION): Status: RESOLVED | Noted: 2023-01-25 | Resolved: 2023-07-10

## 2023-07-10 PROBLEM — A41.9 SEPSIS: Status: RESOLVED | Noted: 2023-04-08 | Resolved: 2023-07-10

## 2023-07-10 PROBLEM — K92.0 HEMATEMESIS: Status: RESOLVED | Noted: 2023-04-08 | Resolved: 2023-07-10

## 2023-07-10 PROBLEM — J18.9 COMMUNITY ACQUIRED PNEUMONIA: Status: RESOLVED | Noted: 2023-04-08 | Resolved: 2023-07-10

## 2023-07-10 PROBLEM — J69.0 ASPIRATION PNEUMONIA: Status: RESOLVED | Noted: 2023-04-08 | Resolved: 2023-07-10
